# Patient Record
Sex: FEMALE | Race: BLACK OR AFRICAN AMERICAN | NOT HISPANIC OR LATINO | Employment: UNEMPLOYED | URBAN - METROPOLITAN AREA
[De-identification: names, ages, dates, MRNs, and addresses within clinical notes are randomized per-mention and may not be internally consistent; named-entity substitution may affect disease eponyms.]

---

## 2022-01-06 ENCOUNTER — APPOINTMENT (EMERGENCY)
Dept: RADIOLOGY | Facility: HOSPITAL | Age: 52
End: 2022-01-06
Payer: MEDICARE

## 2022-01-06 ENCOUNTER — APPOINTMENT (EMERGENCY)
Dept: CT IMAGING | Facility: HOSPITAL | Age: 52
End: 2022-01-06
Payer: MEDICARE

## 2022-01-06 ENCOUNTER — HOSPITAL ENCOUNTER (EMERGENCY)
Facility: HOSPITAL | Age: 52
Discharge: HOME | End: 2022-01-06
Attending: EMERGENCY MEDICINE | Admitting: EMERGENCY MEDICINE
Payer: MEDICARE

## 2022-01-06 VITALS
TEMPERATURE: 100.2 F | SYSTOLIC BLOOD PRESSURE: 126 MMHG | HEART RATE: 88 BPM | OXYGEN SATURATION: 93 % | WEIGHT: 221 LBS | DIASTOLIC BLOOD PRESSURE: 65 MMHG | RESPIRATION RATE: 16 BRPM

## 2022-01-06 DIAGNOSIS — J06.9 VIRAL URI WITH COUGH: ICD-10-CM

## 2022-01-06 DIAGNOSIS — R53.83 FATIGUE: ICD-10-CM

## 2022-01-06 DIAGNOSIS — U07.1 LAB TEST POSITIVE FOR DETECTION OF COVID-19 VIRUS: Primary | ICD-10-CM

## 2022-01-06 LAB
ALBUMIN SERPL BCP-MCNC: 4.3 G/DL (ref 3–5.2)
ALP SERPL-CCNC: 94 U/L (ref 43–122)
ALT SERPL W P-5'-P-CCNC: 78 U/L
ANION GAP SERPL CALCULATED.3IONS-SCNC: 3 MMOL/L (ref 5–14)
AST SERPL W P-5'-P-CCNC: 62 U/L (ref 14–36)
ATRIAL RATE: 96 BPM
BACTERIA UR QL AUTO: ABNORMAL /HPF
BASOPHILS # BLD AUTO: 0.1 THOUSANDS/ΜL (ref 0–0.1)
BASOPHILS NFR BLD AUTO: 1 % (ref 0–1)
BILIRUB SERPL-MCNC: 0.44 MG/DL
BILIRUB UR QL STRIP: NEGATIVE
BUN SERPL-MCNC: 14 MG/DL (ref 5–25)
CALCIUM SERPL-MCNC: 8.5 MG/DL (ref 8.4–10.2)
CARDIAC TROPONIN I PNL SERPL HS: 5 NG/L
CHLORIDE SERPL-SCNC: 101 MMOL/L (ref 97–108)
CLARITY UR: CLEAR
CO2 SERPL-SCNC: 31 MMOL/L (ref 22–30)
COLOR UR: ABNORMAL
CREAT SERPL-MCNC: 0.85 MG/DL (ref 0.6–1.2)
D DIMER PPP FEU-MCNC: 1.6 UG/ML FEU
EOSINOPHIL # BLD AUTO: 0 THOUSAND/ΜL (ref 0–0.4)
EOSINOPHIL NFR BLD AUTO: 0 % (ref 0–6)
ERYTHROCYTE [DISTWIDTH] IN BLOOD BY AUTOMATED COUNT: 14.7 %
EXT PREG TEST URINE: NEGATIVE
EXT. CONTROL ED NAV: NORMAL
FLUAV RNA RESP QL NAA+PROBE: NEGATIVE
FLUBV RNA RESP QL NAA+PROBE: NEGATIVE
GFR SERPL CREATININE-BSD FRML MDRD: 79 ML/MIN/1.73SQ M
GLUCOSE SERPL-MCNC: 135 MG/DL (ref 70–99)
GLUCOSE UR STRIP-MCNC: NEGATIVE MG/DL
HCT VFR BLD AUTO: 45.6 % (ref 36–46)
HGB BLD-MCNC: 15.7 G/DL (ref 12–16)
HGB UR QL STRIP.AUTO: 150
KETONES UR STRIP-MCNC: NEGATIVE MG/DL
LEUKOCYTE ESTERASE UR QL STRIP: NEGATIVE
LYMPHOCYTES # BLD AUTO: 1.5 THOUSANDS/ΜL (ref 0.5–4)
LYMPHOCYTES NFR BLD AUTO: 16 % (ref 25–45)
MAGNESIUM SERPL-MCNC: 2.2 MG/DL (ref 1.6–2.3)
MCH RBC QN AUTO: 30.7 PG (ref 26–34)
MCHC RBC AUTO-ENTMCNC: 34.5 G/DL (ref 31–36)
MCV RBC AUTO: 89 FL (ref 80–100)
MONOCYTES # BLD AUTO: 1 THOUSAND/ΜL (ref 0.2–0.9)
MONOCYTES NFR BLD AUTO: 11 % (ref 1–10)
NEUTROPHILS # BLD AUTO: 6.7 THOUSANDS/ΜL (ref 1.8–7.8)
NEUTS SEG NFR BLD AUTO: 72 % (ref 45–65)
NITRITE UR QL STRIP: NEGATIVE
NON-SQ EPI CELLS URNS QL MICRO: ABNORMAL /HPF
P AXIS: 57 DEGREES
PH UR STRIP.AUTO: 6 [PH]
PLATELET # BLD AUTO: 223 THOUSANDS/UL (ref 150–450)
PMV BLD AUTO: 8.6 FL (ref 8.9–12.7)
POTASSIUM SERPL-SCNC: 3.6 MMOL/L (ref 3.6–5)
PR INTERVAL: 148 MS
PROT SERPL-MCNC: 7.8 G/DL (ref 5.9–8.4)
PROT UR STRIP-MCNC: ABNORMAL MG/DL
QRS AXIS: 33 DEGREES
QRSD INTERVAL: 80 MS
QT INTERVAL: 342 MS
QTC INTERVAL: 432 MS
RBC # BLD AUTO: 5.11 MILLION/UL (ref 4–5.2)
RBC #/AREA URNS AUTO: ABNORMAL /HPF
RSV RNA RESP QL NAA+PROBE: NEGATIVE
SARS-COV-2 RNA RESP QL NAA+PROBE: POSITIVE
SODIUM SERPL-SCNC: 135 MMOL/L (ref 137–147)
SP GR UR STRIP.AUTO: 1.02 (ref 1–1.04)
T WAVE AXIS: 45 DEGREES
TSH SERPL DL<=0.05 MIU/L-ACNC: 1.56 UIU/ML (ref 0.47–4.68)
UROBILINOGEN UA: NEGATIVE MG/DL
VENTRICULAR RATE: 96 BPM
WBC # BLD AUTO: 9.2 THOUSAND/UL (ref 4.5–11)
WBC #/AREA URNS AUTO: ABNORMAL /HPF

## 2022-01-06 PROCEDURE — 36415 COLL VENOUS BLD VENIPUNCTURE: CPT | Performed by: EMERGENCY MEDICINE

## 2022-01-06 PROCEDURE — 71046 X-RAY EXAM CHEST 2 VIEWS: CPT

## 2022-01-06 PROCEDURE — 81001 URINALYSIS AUTO W/SCOPE: CPT | Performed by: EMERGENCY MEDICINE

## 2022-01-06 PROCEDURE — G1004 CDSM NDSC: HCPCS

## 2022-01-06 PROCEDURE — 93010 ELECTROCARDIOGRAM REPORT: CPT

## 2022-01-06 PROCEDURE — 0241U HB NFCT DS VIR RESP RNA 4 TRGT: CPT | Performed by: EMERGENCY MEDICINE

## 2022-01-06 PROCEDURE — 99285 EMERGENCY DEPT VISIT HI MDM: CPT | Performed by: EMERGENCY MEDICINE

## 2022-01-06 PROCEDURE — 85025 COMPLETE CBC W/AUTO DIFF WBC: CPT | Performed by: EMERGENCY MEDICINE

## 2022-01-06 PROCEDURE — 84443 ASSAY THYROID STIM HORMONE: CPT | Performed by: EMERGENCY MEDICINE

## 2022-01-06 PROCEDURE — 83735 ASSAY OF MAGNESIUM: CPT | Performed by: EMERGENCY MEDICINE

## 2022-01-06 PROCEDURE — 93005 ELECTROCARDIOGRAM TRACING: CPT

## 2022-01-06 PROCEDURE — 94640 AIRWAY INHALATION TREATMENT: CPT

## 2022-01-06 PROCEDURE — 81025 URINE PREGNANCY TEST: CPT | Performed by: EMERGENCY MEDICINE

## 2022-01-06 PROCEDURE — 84484 ASSAY OF TROPONIN QUANT: CPT | Performed by: EMERGENCY MEDICINE

## 2022-01-06 PROCEDURE — 85379 FIBRIN DEGRADATION QUANT: CPT | Performed by: EMERGENCY MEDICINE

## 2022-01-06 PROCEDURE — 96360 HYDRATION IV INFUSION INIT: CPT

## 2022-01-06 PROCEDURE — 96361 HYDRATE IV INFUSION ADD-ON: CPT

## 2022-01-06 PROCEDURE — 80053 COMPREHEN METABOLIC PANEL: CPT | Performed by: EMERGENCY MEDICINE

## 2022-01-06 PROCEDURE — 71275 CT ANGIOGRAPHY CHEST: CPT

## 2022-01-06 PROCEDURE — 99284 EMERGENCY DEPT VISIT MOD MDM: CPT

## 2022-01-06 RX ORDER — IPRATROPIUM BROMIDE AND ALBUTEROL SULFATE 2.5; .5 MG/3ML; MG/3ML
3 SOLUTION RESPIRATORY (INHALATION) ONCE
Status: COMPLETED | OUTPATIENT
Start: 2022-01-06 | End: 2022-01-06

## 2022-01-06 RX ORDER — ACETAMINOPHEN 325 MG/1
650 TABLET ORAL ONCE
Status: COMPLETED | OUTPATIENT
Start: 2022-01-06 | End: 2022-01-06

## 2022-01-06 RX ORDER — GUAIFENESIN/DEXTROMETHORPHAN 100-10MG/5
10 SYRUP ORAL ONCE
Status: COMPLETED | OUTPATIENT
Start: 2022-01-06 | End: 2022-01-06

## 2022-01-06 RX ADMIN — IPRATROPIUM BROMIDE AND ALBUTEROL SULFATE 3 ML: 2.5; .5 SOLUTION RESPIRATORY (INHALATION) at 19:52

## 2022-01-06 RX ADMIN — ACETAMINOPHEN 650 MG: 325 TABLET ORAL at 19:44

## 2022-01-06 RX ADMIN — SODIUM CHLORIDE 1000 ML: 0.9 INJECTION, SOLUTION INTRAVENOUS at 19:51

## 2022-01-06 RX ADMIN — IOHEXOL 85 ML: 350 INJECTION, SOLUTION INTRAVENOUS at 21:15

## 2022-01-06 RX ADMIN — GUAIFENESIN AND DEXTROMETHORPHAN 10 ML: 100; 10 SYRUP ORAL at 19:44

## 2022-01-07 ENCOUNTER — TELEPHONE (OUTPATIENT)
Dept: FAMILY MEDICINE CLINIC | Facility: CLINIC | Age: 52
End: 2022-01-07

## 2022-01-07 NOTE — TELEPHONE ENCOUNTER
Called pt re: COVID + f/u  Pt had many complaints regarding her apartment but not her health  She has not been able to contact her PCP  I have offered to set her up with a   [facility] P[facility] and she has declined presently

## 2022-01-07 NOTE — ED PROVIDER NOTES
History  Chief Complaint   Patient presents with    Cold Like Symptoms     body aches, cough, diarrhea since monday  landlord fixed something at home that caused a dust cloud  Patient is a 49-year-old female coming in today with complaints of chest pain with coughing, cough of initially yellow-green turning to clear, body aches, fatigue, shortness of breath with exacerbation  Patient states that she is recently new to the area  She does have a history of hypertension and chronic neck and back pain  She has been taking her medications  She states that she has been trying to med this at home but over the past several days of been worsening  She just feel so for tired and weak she can not eat or drink  She has good urine output and good bowel movements        History provided by:  Patient   used: No    Cough  Cough characteristics:  Productive  Sputum characteristics:  Green and brown  Severity:  Mild  Onset quality:  Gradual  Timing:  Constant  Progression:  Partially resolved  Chronicity:  New  Smoker: no    Context: not animal exposure, not exposure to allergens, not fumes, not occupational exposure, not sick contacts, not smoke exposure, not upper respiratory infection, not weather changes and not with activity    Relieved by:  Nothing  Worsened by:  Nothing  Ineffective treatments:  None tried  Associated symptoms: chest pain, myalgias, shortness of breath and sore throat    Associated symptoms: no chills, no diaphoresis, no ear fullness, no ear pain, no eye discharge, no fever, no headaches, no rash, no rhinorrhea, no sinus congestion, no weight loss and no wheezing    Chest pain:     Quality: aching      Severity:  Mild    Onset quality:  Gradual    Timing:  Intermittent    Progression:  Waxing and waning    Chronicity:  New  Myalgias:     Quality:  Aching    Severity:  Moderate    Onset quality:  Gradual    Timing:  Intermittent    Progression:  Waxing and waning  Shortness of breath:     Severity:  Mild    Onset quality:  Gradual    Timing:  Intermittent    Progression:  Waxing and waning  Sore throat:     Severity:  Mild    Onset quality:  Gradual    Timing:  Constant    Progression:  Waxing and waning  Risk factors: no chemical exposure, no recent infection and no recent travel        None       Past Medical History:   Diagnosis Date    Herniated cervical disc     Hypertension        Past Surgical History:   Procedure Laterality Date     SECTION      EYE SURGERY      SHOULDER SURGERY         History reviewed  No pertinent family history  I have reviewed and agree with the history as documented  E-Cigarette/Vaping     E-Cigarette/Vaping Substances     Social History     Tobacco Use    Smoking status: Current Every Day Smoker     Packs/day: 0 50    Smokeless tobacco: Never Used   Substance Use Topics    Alcohol use: Never    Drug use: Not on file       Review of Systems   Constitutional: Negative  Negative for chills, diaphoresis, fever and weight loss  HENT: Positive for sore throat  Negative for ear pain and rhinorrhea  Eyes: Negative  Negative for pain, discharge and visual disturbance  Respiratory: Positive for cough and shortness of breath  Negative for wheezing  Cardiovascular: Positive for chest pain  Negative for palpitations  Gastrointestinal: Negative  Negative for abdominal pain and vomiting  Genitourinary: Negative  Negative for dysuria and hematuria  Musculoskeletal: Positive for myalgias  Negative for arthralgias and back pain  Skin: Negative  Negative for color change and rash  Neurological: Negative  Negative for seizures, syncope and headaches  Hematological: Negative  Psychiatric/Behavioral: Negative  All other systems reviewed and are negative  Physical Exam  Physical Exam  Vitals and nursing note reviewed  Constitutional:       General: She is not in acute distress  Appearance: She is well-developed  HENT:      Head: Normocephalic and atraumatic  Comments: Patient maintaining airway and secretions  No stridor   No brawniness under tongue  Posterior pharynx clear with no evidence of exudate or pharyngeal edema or erythema  No  Eyes:      Extraocular Movements: Extraocular movements intact  Conjunctiva/sclera: Conjunctivae normal       Pupils: Pupils are equal, round, and reactive to light  Cardiovascular:      Rate and Rhythm: Regular rhythm  Tachycardia present  Pulses:           Radial pulses are 2+ on the right side and 2+ on the left side  Dorsalis pedis pulses are 2+ on the right side and 2+ on the left side  Heart sounds: No murmur heard  Pulmonary:      Effort: Pulmonary effort is normal  No respiratory distress  Breath sounds: Examination of the right-lower field reveals decreased breath sounds and wheezing  Examination of the left-lower field reveals decreased breath sounds and wheezing  Decreased breath sounds and wheezing present  Abdominal:      General: Abdomen is flat  Palpations: Abdomen is soft  Tenderness: There is no abdominal tenderness  Musculoskeletal:      Cervical back: Neck supple  Skin:     General: Skin is warm and dry  Capillary Refill: Capillary refill takes less than 2 seconds  Neurological:      General: No focal deficit present  Mental Status: She is alert and oriented to person, place, and time  Psychiatric:         Mood and Affect: Mood normal          Behavior: Behavior normal          Thought Content:  Thought content normal          Judgment: Judgment normal          Vital Signs  ED Triage Vitals [01/06/22 1903]   Temperature Pulse Respirations Blood Pressure SpO2   100 2 °F (37 9 °C) 102 16 130/87 98 %      Temp Source Heart Rate Source Patient Position - Orthostatic VS BP Location FiO2 (%)   Tympanic Monitor Sitting Left arm --      Pain Score       --           Vitals:    01/06/22 1903 01/06/22 2153 BP: 130/87 126/65   Pulse: 102 88   Patient Position - Orthostatic VS: Sitting Lying         Visual Acuity      ED Medications  Medications   sodium chloride 0 9 % bolus 1,000 mL (0 mL Intravenous Stopped 1/6/22 2124)   ipratropium-albuterol (DUO-NEB) 0 5-2 5 mg/3 mL inhalation solution 3 mL (3 mL Nebulization Given 1/6/22 1952)   dextromethorphan-guaiFENesin (ROBITUSSIN DM) oral syrup 10 mL (10 mL Oral Given 1/6/22 1944)   acetaminophen (TYLENOL) tablet 650 mg (650 mg Oral Given 1/6/22 1944)   iohexol (OMNIPAQUE) 350 MG/ML injection (SINGLE-DOSE) 85 mL (85 mL Intravenous Given 1/6/22 2115)       Diagnostic Studies  Results Reviewed     Procedure Component Value Units Date/Time    Urine Microscopic [[id number]]  (Abnormal) Collected: 01/06/22 2041    Lab Status: Final result Specimen: Urine, Clean Catch Updated: 01/06/22 2109     RBC, UA 2-4 /hpf      WBC, UA 0-1 /hpf      Epithelial Cells Moderate /hpf      Bacteria, UA Moderate /hpf     UA (URINE) with reflex to Scope [[id number]]  (Abnormal) Collected: 01/06/22 2041    Lab Status: Final result Specimen: Urine, Clean Catch Updated: 01/06/22 2103     Color, UA Alissa     Clarity, UA Clear     Specific [place], UA 1 020     pH, UA 6 0     Leukocytes, UA Negative     Nitrite, UA Negative     Protein, UA 30 (1+) mg/dl      Glucose, UA Negative mg/dl      Ketones, UA Negative mg/dl      Bilirubin, UA Negative     Blood,  0     UROBILINOGEN UA Negative mg/dL     POCT pregnancy, urine [[id number]]  (Normal) Resulted: 01/06/22 2100    Lab Status: Final result Updated: 01/06/22 2100     EXT PREG TEST UR (Ref: Negative) negative     Control valid    COVID/FLU/RSV - 2 hour TAT [[id number]]  (Abnormal) Collected: 01/06/22 2012    Lab Status: Final result Specimen: Nares from Nose Updated: 01/06/22 2057     SARS-CoV-2 Positive     INFLUENZA A PCR Negative     INFLUENZA B PCR Negative     RSV PCR Negative    Narrative:      FOR PEDIATRIC PATIENTS - copy/paste COVID Guidelines URL to browser: [email or web address]    SARS-CoV-2 assay is a Nucleic Acid Amplification assay intended for the  qualitative detection of nucleic acid from SARS-CoV-2 in nasopharyngeal  swabs  Results are for the presumptive identification of SARS-CoV-2 RNA  Positive results are indicative of infection with SARS-CoV-2, the virus  causing COVID-19, but do not rule out bacterial infection or co-infection  with other viruses  Laboratories within the [place] and its  territories are required to report all positive results to the appropriate  public health authorities  Negative results do not preclude SARS-CoV-2  infection and should not be used as the sole basis for treatment or other  patient management decisions  Negative results must be combined with  clinical observations, patient history, and epidemiological information  This test has not been FDA cleared or approved  This test has been authorized by FDA under an Emergency Use Authorization  (EUA)  This test is only authorized for the duration of time the  declaration that circumstances exist justifying the authorization of the  emergency use of an in vitro diagnostic tests for detection of SARS-CoV-2  virus and/or diagnosis of COVID-19 infection under section 564(b)(1) of  the Act, 21 U  S C  735EYM-0(Y)(6), unless the authorization is terminated  or revoked sooner  The test has been validated but independent review by FDA  and CLIA is pending  Test performed using [facility] GeneXpert: This RT-PCR assay targets N2,  a region unique to SARS-CoV-2  A conserved region in the E-gene was chosen  for pan-Sarbecovirus detection which includes SARS-CoV-2      TSH [[id number]]  (Normal) Collected: 01/06/22 1953    Lab Status: Final result Specimen: Blood from Arm, Right Updated: 01/06/22 2042     TSH 3RD GENERATON 1 560 uIU/mL     Narrative:      Patients undergoing fluorescein dye angiography may retain small amounts of fluorescein in the body for 48-72 hours post procedure  Samples containing fluorescein can produce falsely depressed TSH values  If the patient had this procedure,a specimen should be resubmitted post fluorescein clearance        HS Troponin 0hr (reflex protocol) [[id number]]  (Normal) Collected: 01/06/22 1953    Lab Status: Final result Specimen: Blood from Arm, Right Updated: 01/06/22 2023     hs TnI 0hr 5 ng/L     Comprehensive metabolic panel [[id number]]  (Abnormal) Collected: 01/06/22 1953    Lab Status: Final result Specimen: Blood from Arm, Right Updated: 01/06/22 2020     Sodium 135 mmol/L      Potassium 3 6 mmol/L      Chloride 101 mmol/L      CO2 31 mmol/L      ANION GAP 3 mmol/L      BUN 14 mg/dL      Creatinine 0 85 mg/dL      Glucose 135 mg/dL      Calcium 8 5 mg/dL      AST 62 U/L      ALT 78 U/L      Alkaline Phosphatase 94 U/L      Total Protein 7 8 g/dL      Albumin 4 3 g/dL      Total Bilirubin 0 44 mg/dL      eGFR 79 ml/min/1 73sq m     Narrative:      [place] guidelines for Chronic Kidney Disease (CKD):     Stage 1 with normal or high GFR (GFR > 90 mL/min/1 73 square meters)    Stage 2 Mild CKD (GFR = 60-89 mL/min/1 73 square meters)    Stage 3A Moderate CKD (GFR = 45-59 mL/min/1 73 square meters)    Stage 3B Moderate CKD (GFR = 30-44 mL/min/1 73 square meters)    Stage 4 Severe CKD (GFR = 15-29 mL/min/1 73 square meters)    Stage 5 End Stage CKD (GFR <15 mL/min/1 73 square meters)  Note: GFR calculation is accurate only with a steady state creatinine    Magnesium [[id number]]  (Normal) Collected: 01/06/22 1953    Lab Status: Final result Specimen: Blood from Arm, Right Updated: 01/06/22 2020     Magnesium 2 2 mg/dL     D-Dimer [[id number]]  (Abnormal) Collected: 01/06/22 1953    Lab Status: Final result Specimen: Blood from Arm, Right Updated: 01/06/22 2017     D-Dimer, Quant 1 60 ug/ml FEU     CBC and differential [[id number]]  (Abnormal) Collected: 01/06/22 1953    Lab Status: Final result Specimen: Blood from Arm, Right Updated: 01/06/22 2001     WBC 9 20 Thousand/uL      RBC 5 11 Million/uL      Hemoglobin 15 7 g/dL      Hematocrit 45 6 %      MCV 89 fL      MCH 30 7 pg      MCHC 34 5 g/dL      RDW 14 7 %      MPV 8 6 fL      Platelets 952 Thousands/uL      Neutrophils Relative 72 %      Lymphocytes Relative 16 %      Monocytes Relative 11 %      Eosinophils Relative 0 %      Basophils Relative 1 %      Neutrophils Absolute 6 70 Thousands/µL      Lymphocytes Absolute 1 50 Thousands/µL      Monocytes Absolute 1 00 Thousand/µL      Eosinophils Absolute 0 00 Thousand/µL      Basophils Absolute 0 10 Thousands/µL                  CTA ED chest PE Study   Final Result by Maritza Whittaker MD (01/06 2129)      No acute pulmonary emboli but interpretation is compromised by suboptimal opacification of the pulmonary arteries  Moderate emphysema  No groundglass opacity as suggested by the chest radiograph  Mild bibasilar atelectasis  Workstation performed: [id number]         XR chest 2 views   Final Result by Maritza Whittaker MD (01/06 2031)      Mild bilateral groundglass opacity suspicious for Covid-19  Workstation performed: [id number]                    Procedures  Procedures         ED Course  ED Course as of 01/06/22 2210   Thu Jan 06, 2022 1933 Patient is a 80-year-old female with a history of hypertension coming in today complaining of fatigue, cough, fevers, wound chest pain with coughing over the past several days  On exam she is well-appearing in no distress however she is tachycardic with mild elevation in temperature  Will give acetaminophen and check basic labs including troponin EKG chest x-ray and COVID    Portions of the record may have been created with voice recognition software   Occasional wrong word or "sound a like" substitutions may have occurred due to the inherent limitations of voice recognition software  Read the chart carefully and recognize, using context, where substitutions have occurred  2048 Patient's labs are stable  D-dimer elevated will CT rule out PE  Troponin 5   2056 Chest x-ray consistent with concerns for COVID-19   2134 CT without acute pathology  Will trial ambulatory sats   2151 Patient resting in bed  Patient tearful and crying upset about her COVID  She is maintaining airway and secretions  Will read vital   Email sent to [facility] team and will plan for DC home  Patient agreeable  HEART Risk Score      Most Recent Value   Heart Score Risk Calculator    History 0 Filed at: 01/06/2022 2048   ECG 1 Filed at: 01/06/2022 2048   Age 1 Filed at: 01/06/2022 2048   Risk Factors 1 Filed at: 01/06/2022 2048   Troponin 0 Filed at: 01/06/2022 2048   HEART Score 3 Filed at: 01/06/2022 2048                PERC Rule for PE      Most Recent Value   PERC Rule for PE    Age >=50 1 Filed at: 01/06/2022 1936   HR >=100 1 Filed at: 01/06/2022 1936   O2 Sat on room air < 95% 0 Filed at: 01/06/2022 1936   History of PE or DVT 0 Filed at: 01/06/2022 1936   Recent trauma or surgery 0 Filed at: 01/06/2022 1936   Hemoptysis 0 Filed at: 01/06/2022 1936   Exogenous estrogen 0 Filed at: 01/06/2022 1936   Unilateral leg swelling 0 Filed at: 01/06/2022 1936   PERC Rule for PE Results 2 Filed at: 01/06/2022 1936              SBIRT 20yo+      Most Recent Value   SBIRT (23 yo +)    In order to provide better care to our patients, we are screening all of our patients for alcohol and drug use  Would it be okay to ask you these screening questions? Yes Filed at: 01/06/2022 5618   Initial Alcohol Screen: US AUDIT-C     1  How often do you have a drink containing alcohol? 0 Filed at: 01/06/2022 1923   2  How many drinks containing alcohol do you have on a typical day you are drinking? 0 Filed at: 01/06/2022 1923   3b  FEMALE Any Age, or MALE 65+:  How often do you have 4 or more drinks on one occassion? 0 Filed at: 01/06/2022 1923   Audit-C Score 0 Filed at: 01/06/2022 6512   MESERET: How many times in the past year have you    Used an illegal drug or used a prescription medication for non-medical reasons? Never Filed at: 01/06/2022 Joyce Stauffer Criteria for PE      Most Recent Value   Sravan' Criteria for PE    Clinical signs and symptoms of DVT 0 Filed at: 01/06/2022 3201   PE is primary diagnosis or equally likely 0 Filed at: 01/06/2022 1937   HR >100 1 5 Filed at: 01/06/2022 1937   Immobilization at least 3 days or Surgery in the previous 4 weeks 0 Filed at: 01/06/2022 1937   Previous, objectively diagnosed PE or DVT 0 Filed at: 01/06/2022 1937   Hemoptysis 0 Filed at: 01/06/2022 1937   Malignancy with treatment within 6 months or palliative 0 Filed at: 01/06/2022 1937   Sravan' Criteria Total 1 5 Filed at: 01/06/2022 1937                MDM  Number of Diagnoses or Management Options  Diagnosis management comments:     EKG  @ 1956  RHYTHM:sinus tachy at 100 bpm  AXIS: normal axis   INTERVALS: WA @ 148 ms  QRS COMPLEX: QRS @ 80 ms  ST SEGMENT:  Nonspecific ST segment changes  Diffuse artifact  QT INTERVAL:  QTC measured at 432 milliseconds DOUGLAS  COMPARED WITH PRIOR   John Cruz Interpretation by Josue Campbell, DO    Different diagnosis : ACS, NSTEMI, pleurisry, pneumothorax, costochondritis, pneumonia, GERD, anxiety, hepatitis, pancreatitis, aortic dissection, pericarditis, myocarditis, pericardial effusion, asthma exacerbation, COPD exacerbation, herpes zoster, peritoneal rupture, esophageal spasm           Amount and/or Complexity of Data Reviewed  Clinical lab tests: ordered and reviewed  Tests in the radiology section of CPT®: ordered and reviewed  Tests in the medicine section of CPT®: ordered and reviewed  Independent visualization of images, tracings, or specimens: yes        Disposition  Final diagnoses:   Lab test positive for detection of COVID-19 virus   Viral URI with cough   Fatigue Time reflects when diagnosis was documented in both MDM as applicable and the Disposition within this note     Time User Action Codes Description Comment    1/6/2022  9:37 PM Victor Huog Jennings Add [U07 1] Lab test positive for detection of COVID-19 virus     1/6/2022  9:37 PM MichaelaVictor Hugo Add [J06 9] Viral URI with cough     1/6/2022  9:38 PM Duc Cantu Add [R53 83] Fatigue       ED Disposition     ED Disposition Condition Date/Time Comment    Discharge Stable Thu Jan 6, 2022  9:38 PM Deidre Waldenrosalba discharge to home/self care  Follow-up Information     Follow up With Specialties Details Why Contact Info Additional [address] [facility] Family Medicine Schedule an appointment as soon as possible for a visit in 1 week  [address], [address] [facility] [address]  [address], [address], [address] [facility], [address]    Lacey Meyers MD Family Medicine Schedule an appointment as soon as possible for a visit in 3 days  [facility] [address]  [phone]             There are no discharge medications for this patient  No discharge procedures on file      PDMP Review     None          ED Provider  Electronically Signed by           Virgilio Shah DO  01/06/22 7746

## 2022-01-10 ENCOUNTER — TELEPHONE (OUTPATIENT)
Dept: FAMILY MEDICINE CLINIC | Facility: CLINIC | Age: 52
End: 2022-01-10

## 2022-01-10 ENCOUNTER — TELEMEDICINE (OUTPATIENT)
Dept: FAMILY MEDICINE CLINIC | Facility: CLINIC | Age: 52
End: 2022-01-10
Payer: MEDICARE

## 2022-01-10 DIAGNOSIS — U07.1 PNEUMONIA DUE TO COVID-19 VIRUS: Primary | ICD-10-CM

## 2022-01-10 DIAGNOSIS — U07.1 COVID-19 VIRUS INFECTION: ICD-10-CM

## 2022-01-10 DIAGNOSIS — J12.82 PNEUMONIA DUE TO COVID-19 VIRUS: Primary | ICD-10-CM

## 2022-01-10 PROCEDURE — G0438 PPPS, INITIAL VISIT: HCPCS | Performed by: NURSE PRACTITIONER

## 2022-01-10 RX ORDER — AMLODIPINE BESYLATE 10 MG/1
10 TABLET ORAL
COMMUNITY
End: 2022-03-16 | Stop reason: HOSPADM

## 2022-01-10 RX ORDER — METHYLPREDNISOLONE 4 MG/1
TABLET ORAL
Qty: 21 EACH | Refills: 0 | Status: SHIPPED | OUTPATIENT
Start: 2022-01-10 | End: 2022-03-16 | Stop reason: HOSPADM

## 2022-01-10 RX ORDER — AZITHROMYCIN 250 MG/1
TABLET, FILM COATED ORAL
Qty: 6 TABLET | Refills: 0 | Status: SHIPPED | OUTPATIENT
Start: 2022-01-10 | End: 2022-01-14

## 2022-01-10 RX ORDER — ACETAMINOPHEN 500 MG
1000 TABLET ORAL EVERY 6 HOURS PRN
COMMUNITY

## 2022-01-10 NOTE — PROGRESS NOTES
COVID-19 Outpatient Progress Note    Assessment/Plan:    Problem List Items Addressed This Visit        Respiratory    Pneumonia due to COVID-19 virus - Primary     Acute symptomatic reviewed ED records, medications, imaging with patient from 1/6/2022  Chest xray showed Mild bilateral groundglass opacity suspicious for Covid-19  Will recommend increase fluids COVID vitamins breathing exercises and start Z-Nba and Medrol Dosepak  Educated on side effects and proper use of medication and will follow-up with patient 01/12/2022  Patient call with any worsening symptoms or report to ED after hours         Relevant Medications    methylPREDNISolone 4 MG tablet therapy pack    azithromycin (ZITHROMAX) 250 mg tablet       Other    COVID-19 virus infection     Acute symptomatic patient positive for covid 1/6/2022  reviewed ED records, medications, imaging with patient from 1/6/2022  Patient began with symptoms 01/03/2022 and reports continues with chest tightness coughing shortness of breath diarrhea body aches fatigue dry mouth  Patient with no ability to check oxygen saturation  Patient is a candidate for monoclonal antibody infusion with unvaccinated status and hypertension and elevated BMI, however patient refusing  Will recommend patient increase fluids COVID vitamins breathing exercises and will follow-up with patient 01/12/2022 to re-evaluate  Educated patient if she has worsening of symptoms to call office  Or report to ED after hours  Disposition:     I have spent 15 minutes directly with the patient  Greater than 50% of this time was spent in counseling/coordination of care regarding: instructions for management and patient and family education        Encounter provider EMMA Douglas    Provider located at [facility] AT [address] [id number] [phone]    Recent Visits  Date Type Provider Dept   01/10/22 Telemedicine Heidi [address] [facility] Tristian Rivas   Showing recent visits within past 7 days and meeting all other requirements  Future Appointments  No visits were found meeting these conditions  Showing future appointments within next 150 days and meeting all other requirements     This virtual check-in was done via [employer] and patient was informed that this is a secure, HIPAA-compliant platform  She agrees to proceed  Patient agrees to participate in a virtual check in via telephone or video visit instead of presenting to the office to address urgent/immediate medical needs  Patient is aware this is a billable service  After connecting through [facility], the patient was identified by name and date of birth  Yarely Umana was informed that this was a telemedicine visit and that the exam was being conducted confidentially over secure lines  My office door was closed  No one else was in the room  Yarely Umana acknowledged consent and understanding of privacy and security of the telemedicine visit  I informed the patient that I have reviewed her record in Epic and presented the opportunity for her to ask any questions regarding the visit today  The patient agreed to participate  Verification of patient location:  Patient is located in the following state in which I hold an active license: [place]    Subjective:   Yarely Umana is a 46 y o  female who has been screened for COVID-19  Patient's symptoms include fatigue, cough, shortness of breath, chest tightness, diarrhea and myalgias  Patient denies fever, chills, malaise, congestion, rhinorrhea, sore throat, anosmia, loss of taste, abdominal pain, nausea, vomiting and headaches  - Date of symptom onset: 1/3/2022  - Date of positive COVID-19 PCR: 1/6/2022  Type of test: PCR    COVID-19 vaccination status: Not vaccinated    Katie has been staying home and has isolated themselves in her home   She is taking care to not share personal items and is cleaning all surfaces that are touched often, like counters, tabletops, and doorknobs using household cleaning sprays or wipes  She is wearing a mask when she leaves her room  Lab Results   Component Value Date    SARSCOV2 Positive (A) 2022     Past Medical History:   Diagnosis Date    Herniated cervical disc     Hypertension      Past Surgical History:   Procedure Laterality Date     SECTION      EYE SURGERY      SHOULDER SURGERY       Current Outpatient Medications   Medication Sig Dispense Refill    acetaminophen (TYLENOL) 500 mg tablet Take 1,000 mg by mouth every 6 (six) hours as needed      amLODIPine (NORVASC) 10 mg tablet Take 10 mg by mouth      azithromycin (ZITHROMAX) 250 mg tablet Take 2 tablets today then 1 tablet daily x 4 days 6 tablet 0    methylPREDNISolone 4 MG tablet therapy pack Use as directed on package 21 each 0     No current facility-administered medications for this visit  No Known Allergies    Review of Systems   Constitutional: Positive for activity change, appetite change and fatigue  Negative for chills and fever  HENT: Negative for congestion, rhinorrhea, sneezing and sore throat  Respiratory: Positive for cough, chest tightness and shortness of breath  Gastrointestinal: Positive for diarrhea  Negative for abdominal pain, nausea and vomiting  Musculoskeletal: Positive for myalgias  Neurological: Negative for headaches  Objective: There were no vitals filed for this visit  Physical Exam  Vitals and nursing note reviewed  Constitutional:       General: She is not in acute distress  Appearance: Normal appearance  She is ill-appearing  She is not toxic-appearing or diaphoretic  HENT:      Head: Normocephalic and atraumatic  Nose: No rhinorrhea  Eyes:      General:         Right eye: No discharge  Left eye: No discharge     Pulmonary:      Effort: Pulmonary effort is normal  No respiratory distress  Musculoskeletal:         General: Normal range of motion  Cervical back: Normal range of motion  Skin:     Coloration: Skin is not jaundiced or pale  Findings: No bruising, erythema, lesion or rash  Neurological:      Mental Status: She is alert and oriented to person, place, and time  Psychiatric:         Mood and Affect: Mood normal          Behavior: Behavior normal          Thought Content: Thought content normal          Judgment: Judgment normal          VIRTUAL VISIT DISCLAIMER    Katie Goffluz Bill verbally agrees to participate in [employer]  Pt is aware that [employer] could be limited without vital signs or the ability to perform a full hands-on physical Bonnee Median understands she or the provider may request at any time to terminate the video visit and request the patient to seek care or treatment in person

## 2022-01-10 NOTE — Clinical Note
Can you please call patient to see if prescription has arrived  I noted in the script for delivery and to call patient but maybe provide pharmacy number to patient ([phone]) so she can call and verify delivery   thanks

## 2022-01-11 ENCOUNTER — TELEPHONE (OUTPATIENT)
Dept: FAMILY MEDICINE CLINIC | Facility: CLINIC | Age: 52
End: 2022-01-11

## 2022-01-11 PROBLEM — U07.1 COVID-19 VIRUS INFECTION: Status: ACTIVE | Noted: 2022-01-11

## 2022-01-11 PROBLEM — J12.82 PNEUMONIA DUE TO COVID-19 VIRUS: Status: ACTIVE | Noted: 2022-01-11

## 2022-01-11 PROBLEM — U07.1 PNEUMONIA DUE TO COVID-19 VIRUS: Status: ACTIVE | Noted: 2022-01-11

## 2022-01-11 NOTE — ASSESSMENT & PLAN NOTE
Acute symptomatic reviewed ED records, medications, imaging with patient from 1/6/2022  Chest xray showed Mild bilateral groundglass opacity suspicious for Covid-19  Will recommend increase fluids COVID vitamins breathing exercises and start Z-Nba and Medrol Dosepak  Educated on side effects and proper use of medication and will follow-up with patient 01/12/2022    Patient call with any worsening symptoms or report to ED after hours

## 2022-01-11 NOTE — ASSESSMENT & PLAN NOTE
Acute symptomatic patient positive for covid 1/6/2022  reviewed ED records, medications, imaging with patient from 1/6/2022  Patient began with symptoms 01/03/2022 and reports continues with chest tightness coughing shortness of breath diarrhea body aches fatigue dry mouth  Patient with no ability to check oxygen saturation  Patient is a candidate for monoclonal antibody infusion with unvaccinated status and hypertension and elevated BMI, however patient refusing  Will recommend patient increase fluids COVID vitamins breathing exercises and will follow-up with patient 01/12/2022 to re-evaluate  Educated patient if she has worsening of symptoms to call office  Or report to ED after hours

## 2022-01-11 NOTE — TELEPHONE ENCOUNTER
----- Message from Audra Garay, [address] sent at 1/11/2022 10:14 AM EST -----  Can you please call patient to see if prescription has arrived  I noted in the script for delivery and to call patient but maybe provide pharmacy number to patient ([phone]) so she can call and verify delivery   thanks

## 2022-01-13 ENCOUNTER — TELEMEDICINE (OUTPATIENT)
Dept: FAMILY MEDICINE CLINIC | Facility: CLINIC | Age: 52
End: 2022-01-13
Payer: MEDICARE

## 2022-01-13 DIAGNOSIS — Z74.8 ASSISTANCE NEEDED WITH TRANSPORTATION: ICD-10-CM

## 2022-01-13 DIAGNOSIS — U07.1 COVID-19 VIRUS INFECTION: Primary | ICD-10-CM

## 2022-01-13 DIAGNOSIS — Z59.48: ICD-10-CM

## 2022-01-13 PROCEDURE — 99214 OFFICE O/P EST MOD 30 MIN: CPT | Performed by: NURSE PRACTITIONER

## 2022-01-13 SDOH — ECONOMIC STABILITY - FOOD INSECURITY: OTHER SPECIFIED LACK OF ADEQUATE FOOD: Z59.48

## 2022-01-13 NOTE — PROGRESS NOTES
COVID-19 Outpatient Progress Note    Assessment/Plan:    Problem List Items Addressed This Visit        Other    COVID-19 virus infection - Primary     Acute symptomatic patient positive for covid 1/6/2022  Patient began with symptoms 01/03/2022 and reports continues with chest tightness coughing diarrhea body aches fatigue  Patient with no ability to check oxygen saturation  Will recommend patient increase fluids COVID vitamins breathing exercises and will follow-up with patient 01/17/2022 to re-evaluate  Educated patient if she has worsening of symptoms to call office  Or report to ED after hours  Assistance needed with transportation     Patient with no transportation and new to the area  No family near patient all in [place]  Will notify social work to assist patient with needs  Relevant Orders    Ambulatory Referral to Social Work Care Management Program    Insufficient supply of food     Patient reports need assistance after moving to area  Patient reports no family or friends near by  Needs assistance with every day tasks, transportation, and food needs  Referral sent for social work          Relevant Orders    Ambulatory Referral to Social Work Care Management Program         Disposition:     I have spent 15 minutes directly with the patient  Greater than 50% of this time was spent in counseling/coordination of care regarding: instructions for management and patient and family education        Encounter provider EMMA Ugarte    Provider located at [facility] AT [address] [address] [phone]    Recent Visits  Date Type Provider Dept   01/13/22 Telemedicine Heidi [address] [facility]   01/11/22 Telephone Anh Cooper, [address] [place] [facility]   01/10/22 Telemedicine Heidi [address], [facility]   Showing recent visits within past 7 days and meeting all other requirements  Today's Visits  Date Type Provider Dept   01/14/22 Telephone Tae Alfaro MA [facility]   Showing today's visits and meeting all other requirements  Future Appointments  No visits were found meeting these conditions  Showing future appointments within next 150 days and meeting all other requirements     This virtual check-in was done via Shoto and patient was informed that this is a secure, HIPAA-compliant platform  She agrees to proceed  Patient agrees to participate in a virtual check in via telephone or video visit instead of presenting to the office to address urgent/immediate medical needs  Patient is aware this is a billable service  After connecting through [facility], the patient was identified by name and date of birth  Brynn Willams was informed that this was a telemedicine visit and that the exam was being conducted confidentially over secure lines  My office door was closed  No one else was in the room  Brynn Willams acknowledged consent and understanding of privacy and security of the telemedicine visit  I informed the patient that I have reviewed her record in Epic and presented the opportunity for her to ask any questions regarding the visit today  The patient agreed to participate  Verification of patient location:  Patient is located in the following state in which I hold an active license: [place]    Subjective:   Brynn Willams is a 46 y o  female who has been screened for COVID-19  Patient's symptoms include fatigue, cough, chest tightness, diarrhea and myalgias  Patient denies fever, chills, malaise, congestion, rhinorrhea, sore throat, anosmia, loss of taste, shortness of breath, abdominal pain, nausea, vomiting and headaches  - Date of symptom onset: 1/3/2022  - Date of positive COVID-19 test: 1/6/2022  Type of test: PCR       COVID-19 vaccination status: Not vaccinated    Katie has been staying home and has isolated themselves in her home  She is taking care to not share personal items and is cleaning all surfaces that are touched often, like counters, tabletops, and doorknobs using household cleaning sprays or wipes  She is wearing a mask when she leaves her room  Lab Results   Component Value Date    SARSCOV2 Positive (A) 2022     Past Medical History:   Diagnosis Date    Herniated cervical disc     Hypertension      Past Surgical History:   Procedure Laterality Date     SECTION      EYE SURGERY      SHOULDER SURGERY       Current Outpatient Medications   Medication Sig Dispense Refill    acetaminophen (TYLENOL) 500 mg tablet Take 1,000 mg by mouth every 6 (six) hours as needed      amLODIPine (NORVASC) 10 mg tablet Take 10 mg by mouth      azithromycin (ZITHROMAX) 250 mg tablet Take 2 tablets today then 1 tablet daily x 4 days 6 tablet 0    benzonatate (TESSALON PERLES) 100 mg capsule Take 2 capsules (200 mg total) by mouth 3 (three) times a day as needed for cough 20 capsule 0    methylPREDNISolone 4 MG tablet therapy pack Use as directed on package 21 each 0     No current facility-administered medications for this visit  No Known Allergies    Review of Systems   Constitutional: Positive for activity change, appetite change and fatigue  Negative for chills and fever  HENT: Negative for congestion, rhinorrhea, sneezing and sore throat  Respiratory: Positive for cough and chest tightness  Negative for shortness of breath  Gastrointestinal: Positive for diarrhea  Negative for abdominal pain, nausea and vomiting  Musculoskeletal: Positive for myalgias  Neurological: Negative for headaches  Objective: There were no vitals filed for this visit  Physical Exam  Vitals and nursing note reviewed  Constitutional:       General: She is not in acute distress  Appearance: Normal appearance  She is ill-appearing   She is not toxic-appearing or diaphoretic  HENT:      Head: Normocephalic and atraumatic  Nose: No rhinorrhea  Eyes:      General:         Right eye: No discharge  Left eye: No discharge  Pulmonary:      Effort: Pulmonary effort is normal  No respiratory distress  Musculoskeletal:         General: Normal range of motion  Cervical back: Normal range of motion  Skin:     Coloration: Skin is not jaundiced or pale  Findings: No bruising, erythema, lesion or rash  Neurological:      Mental Status: She is alert and oriented to person, place, and time  Psychiatric:         Mood and Affect: Mood normal          Behavior: Behavior normal          Thought Content: Thought content normal          Judgment: Judgment normal          VIRTUAL VISIT DISCLAIMER    Katie Quintero verbally agrees to participate in [employer]  Pt is aware that [employer] could be limited without vital signs or the ability to perform a full hands-on physical Sathish Menezes understands she or the provider may request at any time to terminate the video visit and request the patient to seek care or treatment in person

## 2022-01-14 ENCOUNTER — TELEPHONE (OUTPATIENT)
Dept: FAMILY MEDICINE CLINIC | Facility: CLINIC | Age: 52
End: 2022-01-14

## 2022-01-14 DIAGNOSIS — R05.9 COUGH: Primary | ICD-10-CM

## 2022-01-14 PROBLEM — Z59.48 INSUFFICIENT SUPPLY OF FOOD: Status: ACTIVE | Noted: 2022-01-14

## 2022-01-14 PROBLEM — Z74.8 ASSISTANCE NEEDED WITH TRANSPORTATION: Status: ACTIVE | Noted: 2022-01-14

## 2022-01-14 RX ORDER — BENZONATATE 100 MG/1
200 CAPSULE ORAL 3 TIMES DAILY PRN
Qty: 20 CAPSULE | Refills: 0 | Status: ON HOLD | OUTPATIENT
Start: 2022-01-14 | End: 2022-03-15 | Stop reason: SDUPTHER

## 2022-01-14 NOTE — TELEPHONE ENCOUNTER
Called tessalon pearls tid prn for cough to pharmacy, will discuss additional concerns during Monday virtual appt

## 2022-01-14 NOTE — TELEPHONE ENCOUNTER
Pt would like cough medication sent to the pharmacy  She is starting to feel better just has a few concerns regarding her medical conditions   Her tongue keeps going into her teeth,

## 2022-01-15 NOTE — ASSESSMENT & PLAN NOTE
Patient with no transportation and new to the area  No family near patient all in [place]  Will notify social work to assist patient with needs

## 2022-01-15 NOTE — ASSESSMENT & PLAN NOTE
Patient reports need assistance after moving to area  Patient reports no family or friends near by  Needs assistance with every day tasks, transportation, and food needs   Referral sent for social work

## 2022-01-15 NOTE — ASSESSMENT & PLAN NOTE
Acute symptomatic patient positive for covid 1/6/2022  Patient began with symptoms 01/03/2022 and reports continues with chest tightness coughing diarrhea body aches fatigue  Patient with no ability to check oxygen saturation  Will recommend patient increase fluids COVID vitamins breathing exercises and will follow-up with patient 01/17/2022 to re-evaluate  Educated patient if she has worsening of symptoms to call office  Or report to ED after hours

## 2022-01-17 ENCOUNTER — PATIENT OUTREACH (OUTPATIENT)
Dept: FAMILY MEDICINE CLINIC | Facility: CLINIC | Age: 52
End: 2022-01-17

## 2022-01-17 ENCOUNTER — TELEMEDICINE (OUTPATIENT)
Dept: FAMILY MEDICINE CLINIC | Facility: CLINIC | Age: 52
End: 2022-01-17
Payer: MEDICARE

## 2022-01-17 DIAGNOSIS — H69.81 DYSFUNCTION OF RIGHT EUSTACHIAN TUBE: ICD-10-CM

## 2022-01-17 DIAGNOSIS — Z74.8 ASSISTANCE NEEDED WITH TRANSPORTATION: Primary | ICD-10-CM

## 2022-01-17 DIAGNOSIS — U07.1 COVID-19 VIRUS INFECTION: Primary | ICD-10-CM

## 2022-01-17 PROBLEM — R06.02 SOB (SHORTNESS OF BREATH) ON EXERTION: Status: ACTIVE | Noted: 2022-01-17

## 2022-01-17 PROBLEM — H69.91 DYSFUNCTION OF RIGHT EUSTACHIAN TUBE: Status: ACTIVE | Noted: 2022-01-17

## 2022-01-17 PROCEDURE — 99214 OFFICE O/P EST MOD 30 MIN: CPT | Performed by: NURSE PRACTITIONER

## 2022-01-17 RX ORDER — ALBUTEROL SULFATE 90 UG/1
2 AEROSOL, METERED RESPIRATORY (INHALATION) EVERY 6 HOURS PRN
Qty: 8.5 G | Refills: 1 | Status: ON HOLD | OUTPATIENT
Start: 2022-01-17 | End: 2022-03-15 | Stop reason: SDUPTHER

## 2022-01-17 RX ORDER — FLUTICASONE PROPIONATE 50 MCG
1 SPRAY, SUSPENSION (ML) NASAL DAILY
Qty: 9.9 ML | Refills: 0 | Status: SHIPPED | OUTPATIENT
Start: 2022-01-17

## 2022-01-17 NOTE — PROGRESS NOTES
COVID-19 Outpatient Progress Note    Assessment/Plan:    Problem List Items Addressed This Visit        Nervous and Auditory    Dysfunction of right eustachian tube     Acute symptomatic secondary to covid infection 1/6/2022  Will recommend flonase 2 spray daily  Educated on s/e and proper use  Patient to call with concerns  Relevant Medications    fluticasone (FLONASE) 50 mcg/act nasal spray       Other    COVID-19 virus infection - Primary     Acute symptomatic patient positive for covid 1/6/2022  Patient began with symptoms 01/03/2022 and reports continues with chest tightness coughing, and fatigue   Patient with no ability to check oxygen saturation     Will recommend patient increase fluids COVID vitamins breathing exercises, start albuterol q6 hours prn, Patient has been able to end quarantine since 1/9/2022 but she is going to follow the note given by the ED stating to quarantine for 14 days  Patient notified to schedule with [facility] for routine care, provided information  Relevant Medications    albuterol (ProAir HFA) 90 mcg/act inhaler         Disposition:     I recommended continued isolation until at least 24 hours have passed since recovery defined as resolution of fever without the use of fever-reducing medications AND improvement in COVID symptoms AND 10 days have passed since onset of symptoms (or 10 days have passed since date of first positive viral diagnostic test for asymptomatic patients)  I have spent 15 minutes directly with the patient  Greater than 50% of this time was spent in counseling/coordination of care regarding: instructions for management and patient and family education        Encounter provider EMMA Orellana    Provider located at [facility] AT [address] [address] [phone]    Recent Visits  Date Type Provider Dept   01/17/22 Telemedicine EMMA [facility] Louie Daubs   01/14/22 Telephone Tawanda Deras Pg  Primary [facility] [place] [facility]   01/13/22 Telemedicine Heidi [address] [facility] [place] [facility]   01/11/22 Telephone Cinthia Davison MA Pg  Primary Care [place] [facility]   Showing recent visits within past 7 days and meeting all other requirements  Future Appointments  No visits were found meeting these conditions  Showing future appointments within next 150 days and meeting all other requirements     This virtual check-in was done via [email or web address] and patient was informed that this is a secure, HIPAA-compliant platform  She agrees to proceed  Patient agrees to participate in a virtual check in via telephone or video visit instead of presenting to the office to address urgent/immediate medical needs  Patient is aware this is a billable service  After connecting through [facility], the patient was identified by name and date of birth  Bailey Palmer was informed that this was a telemedicine visit and that the exam was being conducted confidentially over secure lines  My office door was closed  No one else was in the room  Bailey Palmer acknowledged consent and understanding of privacy and security of the telemedicine visit  I informed the patient that I have reviewed her record in Epic and presented the opportunity for her to ask any questions regarding the visit today  The patient agreed to participate  Verification of patient location:  Patient is located in the following state in which I hold an active license: [place]    Subjective:   Bailey Palmer is a 46 y o  female who has been screened for COVID-19  Symptom change since last report: improving  Patient's symptoms include fatigue, cough and chest tightness  Patient denies fever, chills, malaise, congestion, rhinorrhea, sore throat, anosmia, loss of taste, shortness of breath, abdominal pain, nausea, vomiting, diarrhea, myalgias and headaches       - Date of symptom onset: 1/3/2022  - Date of positive COVID-19 test: 2022  Type of test: PCR  COVID-19 vaccination status: Not vaccinated    Katie has been staying home and has isolated themselves in her home  She is taking care to not share personal items and is cleaning all surfaces that are touched often, like counters, tabletops, and doorknobs using household cleaning sprays or wipes  She is wearing a mask when she leaves her room  Lab Results   Component Value Date    SARSCOV2 Positive (A) 2022     Past Medical History:   Diagnosis Date    Herniated cervical disc     Hypertension      Past Surgical History:   Procedure Laterality Date     SECTION      EYE SURGERY      SHOULDER SURGERY       Current Outpatient Medications   Medication Sig Dispense Refill    acetaminophen (TYLENOL) 500 mg tablet Take 1,000 mg by mouth every 6 (six) hours as needed      albuterol (ProAir HFA) 90 mcg/act inhaler Inhale 2 puffs every 6 (six) hours as needed for wheezing 8 5 g 1    amLODIPine (NORVASC) 10 mg tablet Take 10 mg by mouth      benzonatate (TESSALON PERLES) 100 mg capsule Take 2 capsules (200 mg total) by mouth 3 (three) times a day as needed for cough 20 capsule 0    fluticasone (FLONASE) 50 mcg/act nasal spray 1 spray into each nostril daily 9 9 mL 0    methylPREDNISolone 4 MG tablet therapy pack Use as directed on package 21 each 0     No current facility-administered medications for this visit  No Known Allergies    Review of Systems   Constitutional: Positive for fatigue  Negative for activity change, chills and fever  HENT: Negative for congestion, rhinorrhea, sneezing and sore throat  Respiratory: Positive for cough and chest tightness  Negative for shortness of breath and wheezing  Gastrointestinal: Negative for abdominal pain, diarrhea, nausea and vomiting  Musculoskeletal: Negative for myalgias  Neurological: Negative for headaches  Objective:     There were no vitals filed for this visit  Physical Exam  Vitals and nursing note reviewed  Constitutional:       General: She is not in acute distress  Appearance: Normal appearance  She is ill-appearing  She is not toxic-appearing or diaphoretic  HENT:      Head: Normocephalic and atraumatic  Nose: No rhinorrhea  Eyes:      General:         Right eye: No discharge  Left eye: No discharge  Pulmonary:      Effort: Pulmonary effort is normal  No respiratory distress  Musculoskeletal:         General: Normal range of motion  Cervical back: Normal range of motion  Skin:     Coloration: Skin is not jaundiced or pale  Findings: No bruising, erythema, lesion or rash  Neurological:      Mental Status: She is alert and oriented to person, place, and time  Psychiatric:         Mood and Affect: Mood normal          Behavior: Behavior normal          Thought Content: Thought content normal          Judgment: Judgment normal          VIRTUAL VISIT DISCLAIMER    Katie Nicole Or verbally agrees to participate in [employer]  Pt is aware that [employer] could be limited without vital signs or the ability to perform a full hands-on physical Lucia Enter understands she or the provider may request at any time to terminate the video visit and request the patient to seek care or treatment in person

## 2022-01-17 NOTE — ASSESSMENT & PLAN NOTE
Acute symptomatic patient positive for covid 1/6/2022  Patient began with symptoms 01/03/2022 and reports continues with chest tightness coughing, and fatigue   Patient with no ability to check oxygen saturation     Will recommend patient increase fluids COVID vitamins breathing exercises, start albuterol q6 hours prn, Patient has been able to end quarantine since 1/9/2022 but she is going to follow the note given by the ED stating to quarantine for 14 days  Patient notified to schedule with [facility] for routine care, provided information

## 2022-01-17 NOTE — PROGRESS NOTES
PRESTON XIONG received a referral from patient's PCP regarding patient's need for social work follow up due to food insecurity and and lack of transportation  PRESTON XIONG contacted patient to assess  Patient spoke about her experience in the ED-states she was unhappy with being sent home with no medication for COVID  Patient went on to discuss how she "does not understanding the [address]" and how once she no longer has COVID she may "go back to [facility] where she belongs "    Patient reports she moved to [facility] from [address] [facility] 4 months ago  Patient spoke about her insurance and wanting to know if she will still have [employer] for insurance  PRESTON XIONG asked if patient was able to successfully cancel her benefits in [place] and transfer all of her benefits to [place]  Patient stated this was "a third grade level question" and she will not have third grade level conversations with people from [facility]  PRESTON XIONG reassured patient she was just seeking clarification so that she can best assist      PRESTON XIONG attempted to address the food insecurity and asked patient about food stamps and food pantries  Patient stated she was "agitated" by the question because she has food stamps and has cash for groceries  Patient stated she was "no longer going to hold her tongue" and she "does not care who she offends " Her concern is that due to quarantine, she is unable to get to a grocery store  PRESTON XIONG informed patient she may have to then utilize [employer] to get groceries delivered  Patient went on to state "she is tired of having to repeat herself" because "no one knows how to deliver to her apartment" because [place] and often deals with deliveries being dropped off at the incorrect place  PRESTON XIONG informed patient that she will need to look into other potential options on how to get groceries to her home  Patient lives alone and states she is disabled due to a neck and spine injury    Patient states she paid someone to insulate her windows and they "did it incorrectly " Patient requested that someone come to her home and "staple plastic to the windows" because it is cold and she cannot reach the windows  PRESTON XIONG informed patient she would have to contact the individual whom she paid to insulate and ask them to fix it  Patient states she will not be doing this because she lives alone and does not want to argue with a man in her home she also states she would be required to pay again and she does not have the money to do so  Patient states her main concern is that she would like to know who her new PCP and she would like this to be updated in the system so that she can get a new insurance card with new PCP listed  Patient also expressed wanting to know why she has not received her cough suppressant  PRESTON XIONG explained that per chart review it does appear a a cough suppressant want sent to the pharmacy  PRESTON XIONG encouraged patient to contact the pharmacy to inquire if it was filled-PRESTON XIONG encouraged patient to let us know if she has any issues with obtaining this medication  PRESTON XIONG was unable to further discuss transportation during this call  Will address at next contact

## 2022-01-18 ENCOUNTER — PATIENT OUTREACH (OUTPATIENT)
Dept: FAMILY MEDICINE CLINIC | Facility: CLINIC | Age: 52
End: 2022-01-18

## 2022-01-18 NOTE — ASSESSMENT & PLAN NOTE
Acute symptomatic secondary to covid infection 1/6/2022  Will recommend flonase 2 spray daily  Educated on s/e and proper use  Patient to call with concerns

## 2022-01-18 NOTE — PROGRESS NOTES
CHW made outreach call to pt regarding applying for [facility]  Pt stated that she already had her physical assessment with [facility]  No other need at this time

## 2022-02-16 ENCOUNTER — APPOINTMENT (EMERGENCY)
Dept: RADIOLOGY | Facility: HOSPITAL | Age: 52
DRG: 720 | End: 2022-02-16
Payer: MEDICARE

## 2022-02-16 ENCOUNTER — APPOINTMENT (EMERGENCY)
Dept: CT IMAGING | Facility: HOSPITAL | Age: 52
DRG: 720 | End: 2022-02-16
Payer: MEDICARE

## 2022-02-16 ENCOUNTER — HOSPITAL ENCOUNTER (INPATIENT)
Facility: HOSPITAL | Age: 52
LOS: 28 days | Discharge: HOME | DRG: 720 | End: 2022-03-16
Attending: EMERGENCY MEDICINE | Admitting: INTERNAL MEDICINE
Payer: MEDICARE

## 2022-02-16 DIAGNOSIS — J96.01 ACUTE RESPIRATORY FAILURE WITH HYPOXIA (HCC): Primary | ICD-10-CM

## 2022-02-16 DIAGNOSIS — N32.81 OAB (OVERACTIVE BLADDER): ICD-10-CM

## 2022-02-16 DIAGNOSIS — U07.1 COVID-19 VIRUS INFECTION: ICD-10-CM

## 2022-02-16 DIAGNOSIS — R05.9 COUGH: ICD-10-CM

## 2022-02-16 DIAGNOSIS — J12.82 PNEUMONIA DUE TO COVID-19 VIRUS: ICD-10-CM

## 2022-02-16 DIAGNOSIS — R74.01 TRANSAMINITIS: ICD-10-CM

## 2022-02-16 DIAGNOSIS — U07.1 PNEUMONIA DUE TO COVID-19 VIRUS: ICD-10-CM

## 2022-02-16 DIAGNOSIS — E11.9 TYPE 2 DIABETES MELLITUS WITHOUT COMPLICATION, WITHOUT LONG-TERM CURRENT USE OF INSULIN (HCC): ICD-10-CM

## 2022-02-16 DIAGNOSIS — I10 PRIMARY HYPERTENSION: Chronic | ICD-10-CM

## 2022-02-16 PROBLEM — D75.839 THROMBOCYTOSIS: Status: ACTIVE | Noted: 2022-02-16

## 2022-02-16 PROBLEM — R79.89 ELEVATED BRAIN NATRIURETIC PEPTIDE (BNP) LEVEL: Status: ACTIVE | Noted: 2022-02-16

## 2022-02-16 PROBLEM — E87.6 HYPOKALEMIA: Status: ACTIVE | Noted: 2022-02-16

## 2022-02-16 PROBLEM — R73.9 HYPERGLYCEMIA: Status: ACTIVE | Noted: 2022-02-16

## 2022-02-16 PROBLEM — R65.10 SIRS (SYSTEMIC INFLAMMATORY RESPONSE SYNDROME) (HCC): Status: ACTIVE | Noted: 2022-02-16

## 2022-02-16 LAB
4HR DELTA HS TROPONIN: 5 NG/L
ALBUMIN SERPL BCP-MCNC: 2.9 G/DL (ref 3.5–5)
ALP SERPL-CCNC: 101 U/L (ref 46–116)
ALT SERPL W P-5'-P-CCNC: 28 U/L (ref 12–78)
ANION GAP SERPL CALCULATED.3IONS-SCNC: 11 MMOL/L (ref 4–13)
APTT PPP: 45 SECONDS (ref 23–37)
AST SERPL W P-5'-P-CCNC: 25 U/L (ref 5–45)
BACTERIA UR QL AUTO: ABNORMAL /HPF
BASOPHILS # BLD AUTO: 0.09 THOUSANDS/ΜL (ref 0–0.1)
BASOPHILS NFR BLD AUTO: 0 % (ref 0–1)
BILIRUB SERPL-MCNC: 0.69 MG/DL (ref 0.2–1)
BILIRUB UR QL STRIP: NEGATIVE
BUN SERPL-MCNC: 11 MG/DL (ref 5–25)
CALCIUM ALBUM COR SERPL-MCNC: 10 MG/DL (ref 8.3–10.1)
CALCIUM SERPL-MCNC: 9.1 MG/DL (ref 8.3–10.1)
CARDIAC TROPONIN I PNL SERPL HS: 49 NG/L
CARDIAC TROPONIN I PNL SERPL HS: 54 NG/L
CHLORIDE SERPL-SCNC: 100 MMOL/L (ref 100–108)
CK SERPL-CCNC: 83 U/L (ref 26–192)
CLARITY UR: CLEAR
CO2 SERPL-SCNC: 25 MMOL/L (ref 21–32)
COLOR UR: YELLOW
CREAT SERPL-MCNC: 0.9 MG/DL (ref 0.6–1.3)
D DIMER PPP FEU-MCNC: 1.18 UG/ML FEU
EOSINOPHIL # BLD AUTO: 0 THOUSAND/ΜL (ref 0–0.61)
EOSINOPHIL NFR BLD AUTO: 0 % (ref 0–6)
ERYTHROCYTE [DISTWIDTH] IN BLOOD BY AUTOMATED COUNT: 15.5 % (ref 11.6–15.1)
GFR SERPL CREATININE-BSD FRML MDRD: 74 ML/MIN/1.73SQ M
GLUCOSE SERPL-MCNC: 137 MG/DL (ref 65–140)
GLUCOSE SERPL-MCNC: 165 MG/DL (ref 65–140)
GLUCOSE UR STRIP-MCNC: NEGATIVE MG/DL
HCT VFR BLD AUTO: 47.3 % (ref 34.8–46.1)
HGB BLD-MCNC: 15.1 G/DL (ref 11.5–15.4)
HGB UR QL STRIP.AUTO: ABNORMAL
IMM GRANULOCYTES # BLD AUTO: 0.23 THOUSAND/UL (ref 0–0.2)
IMM GRANULOCYTES NFR BLD AUTO: 1 % (ref 0–2)
INR PPP: 1.09 (ref 0.84–1.19)
KETONES UR STRIP-MCNC: NEGATIVE MG/DL
LACTATE SERPL-SCNC: 1.5 MMOL/L (ref 0.5–2)
LACTATE SERPL-SCNC: 2.7 MMOL/L (ref 0.5–2)
LEUKOCYTE ESTERASE UR QL STRIP: NEGATIVE
LYMPHOCYTES # BLD AUTO: 3.36 THOUSANDS/ΜL (ref 0.6–4.47)
LYMPHOCYTES NFR BLD AUTO: 16 % (ref 14–44)
MCH RBC QN AUTO: 29.2 PG (ref 26.8–34.3)
MCHC RBC AUTO-ENTMCNC: 31.9 G/DL (ref 31.4–37.4)
MCV RBC AUTO: 91 FL (ref 82–98)
MONOCYTES # BLD AUTO: 1.38 THOUSAND/ΜL (ref 0.17–1.22)
MONOCYTES NFR BLD AUTO: 7 % (ref 4–12)
NEUTROPHILS # BLD AUTO: 15.53 THOUSANDS/ΜL (ref 1.85–7.62)
NEUTS SEG NFR BLD AUTO: 76 % (ref 43–75)
NITRITE UR QL STRIP: NEGATIVE
NON-SQ EPI CELLS URNS QL MICRO: ABNORMAL /HPF
NRBC BLD AUTO-RTO: 1 /100 WBCS
NT-PROBNP SERPL-MCNC: 1472 PG/ML
PH UR STRIP.AUTO: 6 [PH]
PLATELET # BLD AUTO: 440 THOUSANDS/UL (ref 149–390)
PLATELET # BLD AUTO: 502 THOUSANDS/UL (ref 149–390)
PMV BLD AUTO: 9.8 FL (ref 8.9–12.7)
PMV BLD AUTO: 9.9 FL (ref 8.9–12.7)
POTASSIUM SERPL-SCNC: 3.1 MMOL/L (ref 3.5–5.3)
PROCALCITONIN SERPL-MCNC: 0.34 NG/ML
PROCALCITONIN SERPL-MCNC: 0.38 NG/ML
PROT SERPL-MCNC: 8.1 G/DL (ref 6.4–8.2)
PROT UR STRIP-MCNC: NEGATIVE MG/DL
PROTHROMBIN TIME: 13.8 SECONDS (ref 11.6–14.5)
RBC # BLD AUTO: 5.18 MILLION/UL (ref 3.81–5.12)
RBC #/AREA URNS AUTO: ABNORMAL /HPF
SODIUM SERPL-SCNC: 136 MMOL/L (ref 136–145)
SP GR UR STRIP.AUTO: <=1.005 (ref 1–1.03)
UROBILINOGEN UR QL STRIP.AUTO: 0.2 E.U./DL
WBC # BLD AUTO: 20.59 THOUSAND/UL (ref 4.31–10.16)
WBC #/AREA URNS AUTO: ABNORMAL /HPF

## 2022-02-16 PROCEDURE — 96365 THER/PROPH/DIAG IV INF INIT: CPT

## 2022-02-16 PROCEDURE — 85730 THROMBOPLASTIN TIME PARTIAL: CPT | Performed by: EMERGENCY MEDICINE

## 2022-02-16 PROCEDURE — 83880 ASSAY OF NATRIURETIC PEPTIDE: CPT | Performed by: EMERGENCY MEDICINE

## 2022-02-16 PROCEDURE — 83036 HEMOGLOBIN GLYCOSYLATED A1C: CPT

## 2022-02-16 PROCEDURE — 83605 ASSAY OF LACTIC ACID: CPT | Performed by: EMERGENCY MEDICINE

## 2022-02-16 PROCEDURE — 83605 ASSAY OF LACTIC ACID: CPT

## 2022-02-16 PROCEDURE — 86140 C-REACTIVE PROTEIN: CPT | Performed by: INTERNAL MEDICINE

## 2022-02-16 PROCEDURE — 82550 ASSAY OF CK (CPK): CPT | Performed by: EMERGENCY MEDICINE

## 2022-02-16 PROCEDURE — 99291 CRITICAL CARE FIRST HOUR: CPT | Performed by: EMERGENCY MEDICINE

## 2022-02-16 PROCEDURE — 96374 THER/PROPH/DIAG INJ IV PUSH: CPT

## 2022-02-16 PROCEDURE — G1004 CDSM NDSC: HCPCS

## 2022-02-16 PROCEDURE — 84145 PROCALCITONIN (PCT): CPT | Performed by: EMERGENCY MEDICINE

## 2022-02-16 PROCEDURE — 85610 PROTHROMBIN TIME: CPT | Performed by: EMERGENCY MEDICINE

## 2022-02-16 PROCEDURE — 81001 URINALYSIS AUTO W/SCOPE: CPT | Performed by: EMERGENCY MEDICINE

## 2022-02-16 PROCEDURE — 99291 CRITICAL CARE FIRST HOUR: CPT

## 2022-02-16 PROCEDURE — 87205 SMEAR GRAM STAIN: CPT

## 2022-02-16 PROCEDURE — 71275 CT ANGIOGRAPHY CHEST: CPT

## 2022-02-16 PROCEDURE — 85379 FIBRIN DEGRADATION QUANT: CPT | Performed by: EMERGENCY MEDICINE

## 2022-02-16 PROCEDURE — 36415 COLL VENOUS BLD VENIPUNCTURE: CPT | Performed by: EMERGENCY MEDICINE

## 2022-02-16 PROCEDURE — 80053 COMPREHEN METABOLIC PANEL: CPT | Performed by: EMERGENCY MEDICINE

## 2022-02-16 PROCEDURE — 85025 COMPLETE CBC W/AUTO DIFF WBC: CPT | Performed by: EMERGENCY MEDICINE

## 2022-02-16 PROCEDURE — 82948 REAGENT STRIP/BLOOD GLUCOSE: CPT

## 2022-02-16 PROCEDURE — 71045 X-RAY EXAM CHEST 1 VIEW: CPT

## 2022-02-16 PROCEDURE — 85049 AUTOMATED PLATELET COUNT: CPT

## 2022-02-16 PROCEDURE — 87070 CULTURE OTHR SPECIMN AEROBIC: CPT

## 2022-02-16 PROCEDURE — 87081 CULTURE SCREEN ONLY: CPT

## 2022-02-16 PROCEDURE — 87040 BLOOD CULTURE FOR BACTERIA: CPT | Performed by: EMERGENCY MEDICINE

## 2022-02-16 PROCEDURE — 84484 ASSAY OF TROPONIN QUANT: CPT

## 2022-02-16 PROCEDURE — 84484 ASSAY OF TROPONIN QUANT: CPT | Performed by: EMERGENCY MEDICINE

## 2022-02-16 PROCEDURE — 93005 ELECTROCARDIOGRAM TRACING: CPT

## 2022-02-16 PROCEDURE — 84145 PROCALCITONIN (PCT): CPT

## 2022-02-16 RX ORDER — POTASSIUM CHLORIDE 20 MEQ/1
40 TABLET, EXTENDED RELEASE ORAL ONCE
Status: COMPLETED | OUTPATIENT
Start: 2022-02-16 | End: 2022-02-16

## 2022-02-16 RX ORDER — AZITHROMYCIN 250 MG/1
500 TABLET, FILM COATED ORAL ONCE
Status: COMPLETED | OUTPATIENT
Start: 2022-02-16 | End: 2022-02-16

## 2022-02-16 RX ORDER — BENZONATATE 100 MG/1
200 CAPSULE ORAL 3 TIMES DAILY PRN
Status: DISCONTINUED | OUTPATIENT
Start: 2022-02-16 | End: 2022-03-16 | Stop reason: HOSPADM

## 2022-02-16 RX ORDER — POTASSIUM CHLORIDE 14.9 MG/ML
20 INJECTION INTRAVENOUS ONCE
Status: DISCONTINUED | OUTPATIENT
Start: 2022-02-16 | End: 2022-02-16

## 2022-02-16 RX ORDER — FUROSEMIDE 10 MG/ML
40 INJECTION INTRAMUSCULAR; INTRAVENOUS ONCE
Status: COMPLETED | OUTPATIENT
Start: 2022-02-16 | End: 2022-02-16

## 2022-02-16 RX ORDER — HEPARIN SODIUM 5000 [USP'U]/ML
5000 INJECTION, SOLUTION INTRAVENOUS; SUBCUTANEOUS EVERY 8 HOURS SCHEDULED
Status: DISCONTINUED | OUTPATIENT
Start: 2022-02-16 | End: 2022-02-17

## 2022-02-16 RX ORDER — FLUTICASONE PROPIONATE 50 MCG
1 SPRAY, SUSPENSION (ML) NASAL DAILY
Status: DISCONTINUED | OUTPATIENT
Start: 2022-02-17 | End: 2022-02-16

## 2022-02-16 RX ORDER — SODIUM CHLORIDE 9 MG/ML
3 INJECTION INTRAVENOUS
Status: DISCONTINUED | OUTPATIENT
Start: 2022-02-16 | End: 2022-02-16

## 2022-02-16 RX ORDER — AMLODIPINE BESYLATE 10 MG/1
10 TABLET ORAL DAILY
Status: DISCONTINUED | OUTPATIENT
Start: 2022-02-17 | End: 2022-02-18

## 2022-02-16 RX ORDER — KETOROLAC TROMETHAMINE 30 MG/ML
15 INJECTION, SOLUTION INTRAMUSCULAR; INTRAVENOUS ONCE
Status: COMPLETED | OUTPATIENT
Start: 2022-02-16 | End: 2022-02-16

## 2022-02-16 RX ADMIN — AZITHROMYCIN MONOHYDRATE 500 MG: 250 TABLET ORAL at 20:35

## 2022-02-16 RX ADMIN — POTASSIUM CHLORIDE 40 MEQ: 1500 TABLET, EXTENDED RELEASE ORAL at 20:35

## 2022-02-16 RX ADMIN — FUROSEMIDE 40 MG: 10 INJECTION, SOLUTION INTRAVENOUS at 23:03

## 2022-02-16 RX ADMIN — POTASSIUM CHLORIDE 40 MEQ: 1500 TABLET, EXTENDED RELEASE ORAL at 23:07

## 2022-02-16 RX ADMIN — HEPARIN SODIUM 5000 UNITS: 5000 INJECTION INTRAVENOUS; SUBCUTANEOUS at 23:06

## 2022-02-16 RX ADMIN — CEFTRIAXONE SODIUM 1000 MG: 10 INJECTION, POWDER, FOR SOLUTION INTRAVENOUS at 19:35

## 2022-02-16 RX ADMIN — KETOROLAC TROMETHAMINE 15 MG: 30 INJECTION, SOLUTION INTRAMUSCULAR at 19:35

## 2022-02-16 RX ADMIN — IOHEXOL 85 ML: 350 INJECTION, SOLUTION INTRAVENOUS at 21:16

## 2022-02-16 RX ADMIN — SODIUM CHLORIDE 500 ML: 0.9 INJECTION, SOLUTION INTRAVENOUS at 20:39

## 2022-02-17 ENCOUNTER — APPOINTMENT (INPATIENT)
Dept: NON INVASIVE DIAGNOSTICS | Facility: HOSPITAL | Age: 52
DRG: 720 | End: 2022-02-17
Payer: MEDICARE

## 2022-02-17 LAB
ALBUMIN SERPL BCP-MCNC: 2.6 G/DL (ref 3.5–5)
ALP SERPL-CCNC: 93 U/L (ref 46–116)
ALT SERPL W P-5'-P-CCNC: 26 U/L (ref 12–78)
ANION GAP SERPL CALCULATED.3IONS-SCNC: 10 MMOL/L (ref 4–13)
AORTIC ROOT: 2.8 CM
AORTIC VALVE MEAN VELOCITY: 12.1 M/S
APICAL FOUR CHAMBER EJECTION FRACTION: 65 %
ASCENDING AORTA: 3.1 CM (ref 2.04–3.05)
AST SERPL W P-5'-P-CCNC: 24 U/L (ref 5–45)
ATRIAL RATE: 110 BPM
ATRIAL RATE: 115 BPM
AV LVOT MEAN GRADIENT: 3 MMHG
AV LVOT PEAK GRADIENT: 6 MMHG
AV MEAN GRADIENT: 7 MMHG
AV PEAK GRADIENT: 13 MMHG
AV VELOCITY RATIO: 0.66
BASOPHILS # BLD AUTO: 0.06 THOUSANDS/ΜL (ref 0–0.1)
BASOPHILS NFR BLD AUTO: 0 % (ref 0–1)
BILIRUB SERPL-MCNC: 0.74 MG/DL (ref 0.2–1)
BUN SERPL-MCNC: 11 MG/DL (ref 5–25)
CA-I BLD-SCNC: 1.06 MMOL/L (ref 1.12–1.32)
CALCIUM ALBUM COR SERPL-MCNC: 9.6 MG/DL (ref 8.3–10.1)
CALCIUM SERPL-MCNC: 8.5 MG/DL (ref 8.3–10.1)
CHLORIDE SERPL-SCNC: 105 MMOL/L (ref 100–108)
CO2 SERPL-SCNC: 25 MMOL/L (ref 21–32)
CREAT SERPL-MCNC: 0.69 MG/DL (ref 0.6–1.3)
CRP SERPL QL: 125.1 MG/L
DOP CALC AO PEAK VEL: 1.82 M/S
DOP CALC AO VTI: 26.3 CM
DOP CALC LVOT PEAK VEL VTI: 19.68 CM
DOP CALC LVOT PEAK VEL: 1.2 M/S
EOSINOPHIL # BLD AUTO: 0 THOUSAND/ΜL (ref 0–0.61)
EOSINOPHIL NFR BLD AUTO: 0 % (ref 0–6)
ERYTHROCYTE [DISTWIDTH] IN BLOOD BY AUTOMATED COUNT: 15.3 % (ref 11.6–15.1)
EST. AVERAGE GLUCOSE BLD GHB EST-MCNC: 143 MG/DL
FRACTIONAL SHORTENING: 31 % (ref 28–44)
GFR SERPL CREATININE-BSD FRML MDRD: 101 ML/MIN/1.73SQ M
GLUCOSE SERPL-MCNC: 149 MG/DL (ref 65–140)
GLUCOSE SERPL-MCNC: 184 MG/DL (ref 65–140)
GLUCOSE SERPL-MCNC: 265 MG/DL (ref 65–140)
GLUCOSE SERPL-MCNC: 344 MG/DL (ref 65–140)
HBA1C MFR BLD: 6.6 %
HCT VFR BLD AUTO: 43.1 % (ref 34.8–46.1)
HGB BLD-MCNC: 14.1 G/DL (ref 11.5–15.4)
IMM GRANULOCYTES # BLD AUTO: 0.22 THOUSAND/UL (ref 0–0.2)
IMM GRANULOCYTES NFR BLD AUTO: 1 % (ref 0–2)
INTERVENTRICULAR SEPTUM IN DIASTOLE (PARASTERNAL SHORT AXIS VIEW): 1.3 CM (ref 0.53–1)
INTERVENTRICULAR SEPTUM: 1.3 CM (ref 0.6–1.1)
L PNEUMO1 AG UR QL IA.RAPID: NEGATIVE
LAAS-AP2: 21.5 CM2
LAAS-AP4: 17.6 CM2
LEFT ATRIUM SIZE: 3.2 CM
LEFT INTERNAL DIMENSION IN SYSTOLE: 2.7 CM (ref 2.97–4.49)
LEFT VENTRICULAR INTERNAL DIMENSION IN DIASTOLE: 3.9 CM (ref 4.88–7.26)
LEFT VENTRICULAR POSTERIOR WALL IN END DIASTOLE: 1 CM (ref 0.52–0.99)
LEFT VENTRICULAR STROKE VOLUME: 37 ML
LVSV (TEICH): 37 ML
LYMPHOCYTES # BLD AUTO: 2.32 THOUSANDS/ΜL (ref 0.6–4.47)
LYMPHOCYTES NFR BLD AUTO: 12 % (ref 14–44)
MAGNESIUM SERPL-MCNC: 2.3 MG/DL (ref 1.6–2.6)
MCH RBC QN AUTO: 30.2 PG (ref 26.8–34.3)
MCHC RBC AUTO-ENTMCNC: 32.7 G/DL (ref 31.4–37.4)
MCV RBC AUTO: 92 FL (ref 82–98)
MONOCYTES # BLD AUTO: 1.14 THOUSAND/ΜL (ref 0.17–1.22)
MONOCYTES NFR BLD AUTO: 6 % (ref 4–12)
NEUTROPHILS # BLD AUTO: 15.71 THOUSANDS/ΜL (ref 1.85–7.62)
NEUTS SEG NFR BLD AUTO: 81 % (ref 43–75)
NRBC BLD AUTO-RTO: 1 /100 WBCS
P AXIS: 26 DEGREES
P AXIS: 27 DEGREES
PHOSPHATE SERPL-MCNC: 4.5 MG/DL (ref 2.7–4.5)
PLATELET # BLD AUTO: 430 THOUSANDS/UL (ref 149–390)
PMV BLD AUTO: 9.9 FL (ref 8.9–12.7)
POTASSIUM SERPL-SCNC: 4.2 MMOL/L (ref 3.5–5.3)
PR INTERVAL: 138 MS
PR INTERVAL: 148 MS
PROT SERPL-MCNC: 7.5 G/DL (ref 6.4–8.2)
QRS AXIS: 104 DEGREES
QRS AXIS: 96 DEGREES
QRSD INTERVAL: 74 MS
QRSD INTERVAL: 84 MS
QT INTERVAL: 342 MS
QT INTERVAL: 354 MS
QTC INTERVAL: 473 MS
QTC INTERVAL: 479 MS
RBC # BLD AUTO: 4.67 MILLION/UL (ref 3.81–5.12)
RIGHT ATRIAL 2D VOLUME: 47 ML
RIGHT ATRIUM AREA SYSTOLE A4C: 16.3 CM2
RIGHT VENTRICLE ID DIMENSION: 3.4 CM
S PNEUM AG UR QL: NEGATIVE
SL CV LEFT ATRIUM LENGTH A2C: 5.7 CM
SL CV LV EF: 65
SL CV PED ECHO LEFT VENTRICLE DIASTOLIC VOLUME (MOD BIPLANE) 2D: 64 ML
SL CV PED ECHO LEFT VENTRICLE SYSTOLIC VOLUME (MOD BIPLANE) 2D: 27 ML
SODIUM SERPL-SCNC: 140 MMOL/L (ref 136–145)
T WAVE AXIS: 17 DEGREES
T WAVE AXIS: 29 DEGREES
VENTRICULAR RATE: 110 BPM
VENTRICULAR RATE: 115 BPM
WBC # BLD AUTO: 19.45 THOUSAND/UL (ref 4.31–10.16)
Z-SCORE OF ASCENDING AORTA: 2.17
Z-SCORE OF INTERVENTRICULAR SEPTUM IN END DIASTOLE: 4.46
Z-SCORE OF LEFT VENTRICULAR DIMENSION IN END DIASTOLE: -4.23
Z-SCORE OF LEFT VENTRICULAR DIMENSION IN END SYSTOLE: -2.38
Z-SCORE OF LEFT VENTRICULAR POSTERIOR WALL IN END DIASTOLE: 2.05

## 2022-02-17 PROCEDURE — 94640 AIRWAY INHALATION TREATMENT: CPT

## 2022-02-17 PROCEDURE — 85025 COMPLETE CBC W/AUTO DIFF WBC: CPT

## 2022-02-17 PROCEDURE — 83735 ASSAY OF MAGNESIUM: CPT

## 2022-02-17 PROCEDURE — 82948 REAGENT STRIP/BLOOD GLUCOSE: CPT

## 2022-02-17 PROCEDURE — 86038 ANTINUCLEAR ANTIBODIES: CPT | Performed by: NURSE PRACTITIONER

## 2022-02-17 PROCEDURE — 80053 COMPREHEN METABOLIC PANEL: CPT

## 2022-02-17 PROCEDURE — 93010 ELECTROCARDIOGRAM REPORT: CPT

## 2022-02-17 PROCEDURE — 87449 NOS EACH ORGANISM AG IA: CPT

## 2022-02-17 PROCEDURE — 82330 ASSAY OF CALCIUM: CPT

## 2022-02-17 PROCEDURE — 84100 ASSAY OF PHOSPHORUS: CPT

## 2022-02-17 PROCEDURE — 93306 TTE W/DOPPLER COMPLETE: CPT | Performed by: INTERNAL MEDICINE

## 2022-02-17 PROCEDURE — 99223 1ST HOSP IP/OBS HIGH 75: CPT | Performed by: INTERNAL MEDICINE

## 2022-02-17 PROCEDURE — 94664 DEMO&/EVAL PT USE INHALER: CPT

## 2022-02-17 PROCEDURE — 93306 TTE W/DOPPLER COMPLETE: CPT

## 2022-02-17 RX ORDER — CALCIUM GLUCONATE 20 MG/ML
1 INJECTION, SOLUTION INTRAVENOUS ONCE
Status: DISCONTINUED | OUTPATIENT
Start: 2022-02-17 | End: 2022-02-17

## 2022-02-17 RX ORDER — LEVALBUTEROL 1.25 MG/.5ML
1.25 SOLUTION, CONCENTRATE RESPIRATORY (INHALATION) EVERY 8 HOURS PRN
Status: DISCONTINUED | OUTPATIENT
Start: 2022-02-17 | End: 2022-02-17

## 2022-02-17 RX ORDER — GUAIFENESIN 600 MG
600 TABLET, EXTENDED RELEASE 12 HR ORAL EVERY 12 HOURS SCHEDULED
Status: DISCONTINUED | OUTPATIENT
Start: 2022-02-17 | End: 2022-03-16 | Stop reason: HOSPADM

## 2022-02-17 RX ORDER — METHYLPREDNISOLONE SODIUM SUCCINATE 125 MG/2ML
60 INJECTION, POWDER, LYOPHILIZED, FOR SOLUTION INTRAMUSCULAR; INTRAVENOUS EVERY 6 HOURS
Status: DISCONTINUED | OUTPATIENT
Start: 2022-02-17 | End: 2022-02-20

## 2022-02-17 RX ORDER — PANTOPRAZOLE SODIUM 40 MG/1
40 TABLET, DELAYED RELEASE ORAL
Status: DISCONTINUED | OUTPATIENT
Start: 2022-02-17 | End: 2022-03-16 | Stop reason: HOSPADM

## 2022-02-17 RX ORDER — CALCIUM GLUCONATE 20 MG/ML
2 INJECTION, SOLUTION INTRAVENOUS ONCE
Status: COMPLETED | OUTPATIENT
Start: 2022-02-17 | End: 2022-02-17

## 2022-02-17 RX ORDER — ALPRAZOLAM 0.5 MG/1
0.5 TABLET ORAL 2 TIMES DAILY PRN
Status: DISCONTINUED | OUTPATIENT
Start: 2022-02-17 | End: 2022-02-23

## 2022-02-17 RX ORDER — SODIUM CHLORIDE 30 MG/ML INHALATION SOLUTION 30 MG/ML
4 SOLUTION INHALANT
Status: DISCONTINUED | OUTPATIENT
Start: 2022-02-17 | End: 2022-02-21

## 2022-02-17 RX ORDER — AZITHROMYCIN 250 MG/1
500 TABLET, FILM COATED ORAL EVERY 24 HOURS
Status: DISCONTINUED | OUTPATIENT
Start: 2022-02-17 | End: 2022-02-18

## 2022-02-17 RX ORDER — LEVALBUTEROL 1.25 MG/.5ML
1.25 SOLUTION, CONCENTRATE RESPIRATORY (INHALATION)
Status: DISCONTINUED | OUTPATIENT
Start: 2022-02-17 | End: 2022-03-11

## 2022-02-17 RX ORDER — LEVALBUTEROL 1.25 MG/.5ML
1.25 SOLUTION, CONCENTRATE RESPIRATORY (INHALATION)
Status: DISCONTINUED | OUTPATIENT
Start: 2022-02-17 | End: 2022-02-17

## 2022-02-17 RX ADMIN — BENZONATATE 200 MG: 100 CAPSULE ORAL at 08:26

## 2022-02-17 RX ADMIN — GUAIFENESIN 600 MG: 600 TABLET, EXTENDED RELEASE ORAL at 08:26

## 2022-02-17 RX ADMIN — LEVALBUTEROL HYDROCHLORIDE 1.25 MG: 1.25 SOLUTION, CONCENTRATE RESPIRATORY (INHALATION) at 07:55

## 2022-02-17 RX ADMIN — IPRATROPIUM BROMIDE 0.5 MG: 0.5 SOLUTION RESPIRATORY (INHALATION) at 12:45

## 2022-02-17 RX ADMIN — INSULIN LISPRO 1 UNITS: 100 INJECTION, SOLUTION INTRAVENOUS; SUBCUTANEOUS at 13:01

## 2022-02-17 RX ADMIN — INSULIN LISPRO 5 UNITS: 100 INJECTION, SOLUTION INTRAVENOUS; SUBCUTANEOUS at 21:09

## 2022-02-17 RX ADMIN — LEVALBUTEROL HYDROCHLORIDE 1.25 MG: 1.25 SOLUTION, CONCENTRATE RESPIRATORY (INHALATION) at 02:20

## 2022-02-17 RX ADMIN — HEPARIN SODIUM 5000 UNITS: 5000 INJECTION INTRAVENOUS; SUBCUTANEOUS at 05:00

## 2022-02-17 RX ADMIN — ALPRAZOLAM 0.5 MG: 0.5 TABLET ORAL at 13:17

## 2022-02-17 RX ADMIN — BENZONATATE 200 MG: 100 CAPSULE ORAL at 20:00

## 2022-02-17 RX ADMIN — SODIUM CHLORIDE SOLN NEBU 3% 4 ML: 3 NEBU SOLN at 02:20

## 2022-02-17 RX ADMIN — IPRATROPIUM BROMIDE 0.5 MG: 0.5 SOLUTION RESPIRATORY (INHALATION) at 16:09

## 2022-02-17 RX ADMIN — SODIUM CHLORIDE SOLN NEBU 3% 4 ML: 3 NEBU SOLN at 19:39

## 2022-02-17 RX ADMIN — METHYLPREDNISOLONE SODIUM SUCCINATE 60 MG: 125 INJECTION, POWDER, FOR SOLUTION INTRAMUSCULAR; INTRAVENOUS at 22:16

## 2022-02-17 RX ADMIN — AZITHROMYCIN 500 MG: 250 TABLET, FILM COATED ORAL at 19:59

## 2022-02-17 RX ADMIN — METHYLPREDNISOLONE SODIUM SUCCINATE 60 MG: 125 INJECTION, POWDER, FOR SOLUTION INTRAMUSCULAR; INTRAVENOUS at 13:02

## 2022-02-17 RX ADMIN — ENOXAPARIN SODIUM 60 MG: 60 INJECTION SUBCUTANEOUS at 13:17

## 2022-02-17 RX ADMIN — LEVALBUTEROL HYDROCHLORIDE 1.25 MG: 1.25 SOLUTION, CONCENTRATE RESPIRATORY (INHALATION) at 16:09

## 2022-02-17 RX ADMIN — IPRATROPIUM BROMIDE 0.5 MG: 0.5 SOLUTION RESPIRATORY (INHALATION) at 19:39

## 2022-02-17 RX ADMIN — CEFTRIAXONE SODIUM 1000 MG: 10 INJECTION, POWDER, FOR SOLUTION INTRAVENOUS at 20:02

## 2022-02-17 RX ADMIN — SODIUM CHLORIDE SOLN NEBU 3% 4 ML: 3 NEBU SOLN at 07:55

## 2022-02-17 RX ADMIN — PANTOPRAZOLE SODIUM 40 MG: 40 TABLET, DELAYED RELEASE ORAL at 13:02

## 2022-02-17 RX ADMIN — LEVALBUTEROL HYDROCHLORIDE 1.25 MG: 1.25 SOLUTION, CONCENTRATE RESPIRATORY (INHALATION) at 12:45

## 2022-02-17 RX ADMIN — METHYLPREDNISOLONE SODIUM SUCCINATE 60 MG: 125 INJECTION, POWDER, FOR SOLUTION INTRAMUSCULAR; INTRAVENOUS at 18:29

## 2022-02-17 RX ADMIN — SODIUM CHLORIDE SOLN NEBU 3% 4 ML: 3 NEBU SOLN at 12:44

## 2022-02-17 RX ADMIN — LEVALBUTEROL HYDROCHLORIDE 1.25 MG: 1.25 SOLUTION, CONCENTRATE RESPIRATORY (INHALATION) at 19:39

## 2022-02-17 RX ADMIN — ENOXAPARIN SODIUM 60 MG: 60 INJECTION SUBCUTANEOUS at 20:58

## 2022-02-17 RX ADMIN — AMLODIPINE BESYLATE 10 MG: 10 TABLET ORAL at 08:26

## 2022-02-17 RX ADMIN — GUAIFENESIN 600 MG: 600 TABLET, EXTENDED RELEASE ORAL at 02:22

## 2022-02-17 RX ADMIN — CALCIUM GLUCONATE 2 G: 20 INJECTION, SOLUTION INTRAVENOUS at 05:29

## 2022-02-17 RX ADMIN — GUAIFENESIN 600 MG: 600 TABLET, EXTENDED RELEASE ORAL at 20:00

## 2022-02-17 RX ADMIN — INSULIN LISPRO 4 UNITS: 100 INJECTION, SOLUTION INTRAVENOUS; SUBCUTANEOUS at 18:29

## 2022-02-17 NOTE — ASSESSMENT & PLAN NOTE
· Patient was diagnosed with COVID on 1/6, with symptom onset on 1/3  · She reports increasing shortness of breath, fatigue, weakness and unable to complete ADL's over the past 2 weeks  · She presents via EMS hypoxic with oxygen saturations in the 60's  · She was placed on a NRB with improvement in her oxygen saturations to the 80's  · She is currently requiring HFNC 55L/100% + NRB for a oxygen saturation of 90%  · CTA was obtained in the ED and revealed No evidence of pulmonary artery embolus   Scattered airspace opacities within the lungs which may represent infection  · BNP was elevated on admission  · Obtain infectious work-up  · Strep/legionella pneumonia   · Blood cultures/ sputum culture  · Patient was given ceftriaxone and azithromycin in the ED  · Continue Ceftriaxone for now   · Procal on admission was 0 3

## 2022-02-17 NOTE — ED NOTES
Respiratory @bedside placing pt on highflow o2  Pt more comfortable, aox4 and complains of hunger despite decreased appetite over the last few days       Rupa Garcia RN  02/16/22 0186

## 2022-02-17 NOTE — ASSESSMENT & PLAN NOTE
· POA as evidenced by: tachycardia, leukocytosis, and lactic acidosis  · Lactic acid 2 7 and WBC 20 59  · Received 500 ml bolus in the ED  · Obtain infectious work-up as outlined   · Continue antibiotics as outlined

## 2022-02-17 NOTE — UTILIZATION REVIEW
Initial Clinical Review    Admission: Date/Time/Statement:   Admission Orders (From admission, onward)     Ordered        02/16/22 2056  Inpatient Admission  Once                      Orders Placed This Encounter   Procedures    Inpatient Admission     Standing Status:   Standing     Number of Occurrences:   1     Order Specific Question:   Level of Care     Answer:   Level 1 Stepdown [13]     Order Specific Question:   Estimated length of stay     Answer:   More than 2 Midnights     Order Specific Question:   Certification     Answer:   I certify that inpatient services are medically necessary for this patient for a duration of greater than two midnights  See H&P and MD Progress Notes for additional information about the patient's course of treatment  ED Arrival Information     Expected Arrival Acuity    - 2/16/2022 19:01 Emergent         Means of arrival Escorted by Service Admission type    [facility] ([address]) Critical Care/ICU Emergency         Arrival complaint    Shortness of Breath        Chief Complaint   Patient presents with    Respiratory Distress     pt covid+ back in january with ongoing symptoms  called ems tn for increasing sob coughing and body aches  RA 60s% and NRB 80s%  awake aox4       Initial Presentation:    46  Y O female presents to ED via  EMS from home with increasing shortness of breath for past 2 weeks  Now unable to ambulate, do ADL's due to shortness of breath, weakness and fatigue  PMH  Is cervical injury, chronic pain, and recent COVID  Infection diagnosed  1/6  Sats in the  60's  On  ED arrival and placed on  NRB with improvement in sats  To the  80's  She then was placed on HFNC with requirements of 55L/100% + NRB  Labs revealed lactic acidosis, leukocytosis, and elevation in her pro-BNP  Met  SIRS  Criteria with leukocytosis, tachycardia and lactic acidosis  CTA  Negative for PE  But with opacities  Given WILLIAM in  ED     Admit Ip Stepdown LOC With   Acute respiratory failure with hypoxia, SIRS, Elevated  BNP and thrombocytosis and plan is  WILLIAM, 2 DE, blood/sputum cultures,  NRB  For now, S/P  500 cc  Bolus in ED,  accu checks and replace electrolytes as needed  Date:    2/17     Day 2:   Starting IV  S/medrol  Continue  WILLIAM  Continue diuresis with IV lasix  Starting atrovent/xopenex nebulizers  Now with possible  Acute  CHF  Continue  NRB  Continue to monitor  Sats, remain in the  80's       ED Triage Vitals   Temperature Pulse Respirations Blood Pressure SpO2   02/16/22 1904 02/16/22 1904 02/16/22 1904 02/16/22 1904 02/16/22 1904   98 2 °F (36 8 °C) (!) 119 (!) 40 (!) 173/72 (!) 86 %      Temp Source Heart Rate Source Patient Position - Orthostatic VS BP Location FiO2 (%)   02/16/22 1904 02/16/22 1904 02/16/22 1904 02/16/22 1904 02/16/22 1910   Oral Monitor Lying Left arm 100      Pain Score       02/16/22 1904       8          Wt Readings from Last 1 Encounters:   02/17/22 99 3 kg (219 lb)     Additional Vital Signs:    -- 112 Abnormal  41 Abnormal  -- -- 85 % Abnormal  -- -- -- -- --   02/17/22 1145 -- 110 Abnormal  45 Abnormal  -- -- 90 % -- -- -- -- --   02/17/22 1115 -- 116 Abnormal  -- -- -- 81 % Abnormal  -- -- -- -- --   02/17/22 1100 -- 116 Abnormal  -- -- -- 73 % Abnormal  -- -- -- -- --   02/17/22 1045 -- 110 Abnormal  16 -- -- 87 % Abnormal  -- -- -- -- --   02/17/22 1030 -- 112 Abnormal  40 Abnormal  -- -- 92 % -- -- -- -- --   02/17/22 1015 -- 116 Abnormal  36 Abnormal  -- -- 91 % -- -- -- -- --   02/17/22 1000 -- 114 Abnormal  19 127/63 88 91 % -- -- -- -- --   02/17/22 0800 -- 114 Abnormal  38 Abnormal  151/81 111 89 % Abnormal  -- -- -- HFNC prongs --   02/17/22 0759 -- -- -- -- -- -- 100 55 L/min High flow nasal cannula -- --   02/17/22 0700 -- 110 Abnormal  35 Abnormal  -- -- 91 % -- -- -- -- --   02/17/22 9805 -- 110 Abnormal  32 Abnormal  149/74 108 92 % -- -- -- -- --   02/17/22 5865 -- 108 Abnormal  35 Abnormal  137/80 107 91 % -- -- -- -- --   02/17/22 0300 -- 106 Abnormal  28 Abnormal  140/85 108 -- -- -- -- -- --   02/17/22 0222 -- -- -- -- -- -- -- -- -- HFNC prongs --   02/17/22 0135 -- 102 27 Abnormal  153/79 110 92 % -- -- -- -- --   02/17/22 0035 -- 100 32 Abnormal  117/66 94 93 % -- -- -- -- --   02/16/22 2340 98 3 °F (36 8 °C) -- -- -- -- -- -- -- -- -- --   02/16/22 2335 -- 102 30 Abnormal  155/89 119 92 % -- -- -- -- --   02/16/22 2300 -- 108 Abnormal  25 Abnormal  158/85 119 91 % -- -- -- -- --   02/16/22 2246 100 1 °F (37 8 °C) -- -- -- -- -- -- -- -- -- --   02/16/22 2225 -- 106 Abnormal  32 Abnormal  145/79 110 88 % Abnormal  -- -- -- -- --   02/16/22 2154 -- 104 26 Abnormal  -- -- 95 % 100 55 L/min High flow nasal cannula  -- Lying   02/16/22 2018 -- 111 Abnormal  39 Abnormal  167/82 -- 93 % -- -- Non-rebreather mask -- Lying   02/16/22 2016 -- 112 Abnormal  34 Abnormal  -- -- 93 % -- -- -- -- --   02/16/22 1910 -- -- -- -- -- 91 % 100 55 L/min High flow nasal cannula --        Pertinent Labs/Diagnostic Test Results:   CTA  Chest  ( 2/16)    No evidence of pulmonary artery embolus  Scattered airspace opacities within the lungs which may represent infection   Recommend short-term follow-up chest CT scan in 3 months to evaluate for resolution  CXR  ( 2/17)     Multifocal groundglass infiltrates concerning for  Covid  19 infection    EKG     ST  HR   115      RAD, qtc 473, nonspecific st changes, S1Q3 new from prior      Results from last 7 days   Lab Units 02/17/22  0457 02/16/22 2233 02/16/22 1914   WBC Thousand/uL 19 45*  --  20 59*   HEMOGLOBIN g/dL 14 1  --  15 1   HEMATOCRIT % 43 1  --  47 3*   PLATELETS Thousands/uL 430* 440* 502*   NEUTROS ABS Thousands/µL 15 71*  --  15 53*         Results from last 7 days   Lab Units 02/17/22 0457 02/16/22  1915   SODIUM mmol/L 140 136   POTASSIUM mmol/L 4 2 3 1*   CHLORIDE mmol/L 105 100   CO2 mmol/L 25 25   ANION GAP mmol/L 10 11   BUN mg/dL 11 11 CREATININE mg/dL 0 69 0 90   EGFR ml/min/1 73sq m 101 74   CALCIUM mg/dL 8 5 9 1   CALCIUM, IONIZED mmol/L 1 06*  --    MAGNESIUM mg/dL 2 3  --    PHOSPHORUS mg/dL 4 5  --      Results from last 7 days   Lab Units 02/17/22 0457 02/16/22 1915   AST U/L 24 25   ALT U/L 26 28   ALK PHOS U/L 93 101   TOTAL PROTEIN g/dL 7 5 8 1   ALBUMIN g/dL 2 6* 2 9*   TOTAL BILIRUBIN mg/dL 0 74 0 69     Results from last 7 days   Lab Units 02/16/22  2319   POC GLUCOSE mg/dl 137     Results from last 7 days   Lab Units 02/17/22 0457 02/16/22 1915   GLUCOSE RANDOM mg/dL 149* 165*         Results from last 7 days   Lab Units 02/16/22 2233   HEMOGLOBIN A1C % 6 6*   EAG mg/dl 143       Results from last 7 days   Lab Units 02/16/22 1916   CK TOTAL U/L 83     Results from last 7 days   Lab Units 02/16/22 2233 02/16/22 1914   HS TNI 0HR ng/L  --  49   HS TNI 4HR ng/L 54*  --    HSTNI D4 ng/L 5  --      Results from last 7 days   Lab Units 02/16/22 1915   D-DIMER QUANTITATIVE ug/ml FEU 1 18*     Results from last 7 days   Lab Units 02/16/22 1915   PROTIME seconds 13 8   INR  1 09   PTT seconds 45*         Results from last 7 days   Lab Units 02/16/22 2237 02/16/22 1915   PROCALCITONIN ng/ml 0 38* 0 34*     Results from last 7 days   Lab Units 02/16/22 2233 02/16/22 1914   LACTIC ACID mmol/L 1 5 2 7*             Results from last 7 days   Lab Units 02/16/22 1915   NT-PRO BNP pg/mL 1,472*           Results from last 7 days   Lab Units 02/16/22 2204   CLARITY UA  Clear   COLOR UA  Yellow   SPEC GRAV UA  <=1 005   PH UA  6 0   GLUCOSE UA mg/dl Negative   KETONES UA mg/dl Negative   BLOOD UA  Small*   PROTEIN UA mg/dl Negative   NITRITE UA  Negative   BILIRUBIN UA  Negative   UROBILINOGEN UA E U /dl 0 2   LEUKOCYTES UA  Negative   WBC UA /hpf 0-1*   RBC UA /hpf 1-2*   BACTERIA UA /hpf Occasional   EPITHELIAL CELLS WET PREP /hpf Occasional     Results from last 7 days   Lab Units 02/17/22  0150   STREP PNEUMONIAE ANTIGEN, URINE Negative   LEGIONELLA URINARY ANTIGEN  Negative                             Results from last 7 days   Lab Units 02/16/22  1920 02/16/22  1916   BLOOD CULTURE  Received in Microbiology Lab  Culture in Progress  Received in Microbiology Lab  Culture in Progress                 ED Treatment:   Medication Administration from 02/16/2022 1901 to 02/16/2022 2218       Date/Time Order Dose Route Action Comments     02/16/2022 1935 ketorolac (TORADOL) injection 15 mg 15 mg Intravenous Given      02/16/2022 2018 ceftriaxone (ROCEPHIN) 1 g/50 mL in dextrose IVPB 0 mg Intravenous Stopped      02/16/2022 1935 ceftriaxone (ROCEPHIN) 1 g/50 mL in dextrose IVPB 1,000 mg Intravenous New Bag      02/16/2022 2035 azithromycin (ZITHROMAX) tablet 500 mg 500 mg Oral Given      02/16/2022 2039 sodium chloride 0 9 % bolus 500 mL 500 mL Intravenous New Bag      02/16/2022 2035 potassium chloride (K-DUR,KLOR-CON) CR tablet 40 mEq 40 mEq Oral Given      02/16/2022 2116 iohexol (OMNIPAQUE) 350 MG/ML injection (SINGLE-DOSE) 85 mL 85 mL Intravenous Given         Admitting Diagnosis: Shortness of breath [R06 02]  Acute respiratory failure with hypoxia (HCC) [J96 01]  Pneumonia due to COVID-19 virus [U07 1, J12 82]  Age/Sex: 46 y o  female  Admission Orders:  Scheduled Medications:  amLODIPine, 10 mg, Oral, Daily  azithromycin, 500 mg, Oral, Q24H  cefTRIAXone, 1,000 mg, Intravenous, Q24H  enoxaparin, 60 mg, Subcutaneous, Q12H NED  guaiFENesin, 600 mg, Oral, Q12H NED  insulin lispro, 1-6 Units, Subcutaneous, TID AC  insulin lispro, 1-6 Units, Subcutaneous, HS  ipratropium, 0 5 mg, Nebulization, 4x Daily  levalbuterol, 1 25 mg, Nebulization, 4x Daily  methylPREDNISolone sodium succinate, 60 mg, Intravenous, Q6H  pantoprazole, 40 mg, Oral, Daily Before Breakfast  sodium chloride, 4 mL, Nebulization, Q6H  IV lasix    2/16      Continuous IV Infusions:     PRN Meds:  benzonatate, 200 mg, Oral, TID PRN        IP CONSULT TO CASE MANAGEMENT    Network Utilization Review Department  ATTENTION: Please call with any questions or concerns to [phone] and carefully listen to the prompts so that you are directed to the right person  All voicemails are confidential   Sudie Crigler all requests for admission clinical reviews, approved or denied determinations and any other requests to dedicated fax number below belonging to the campus where the patient is receiving treatment   List of dedicated fax numbers for the Facilities:  [address] DENIALS (Administrative/Medical Necessity) [phone]   [address] (Maternity/NICU/Pediatrics) [phone]   [address]  [address] [facility] [place] [address] [address] [place] [address] [address] [facility] [place] [address] [phone]

## 2022-02-17 NOTE — PLAN OF CARE
Problem: Potential for Falls  Goal: Patient will remain free of falls  Description: INTERVENTIONS:  - Educate patient/family on patient safety including physical limitations  - Instruct patient to call for assistance with activity   - Consult OT/PT to assist with strengthening/mobility   - Keep Call bell within reach  - Keep bed low and locked with side rails adjusted as appropriate  - Keep care items and personal belongings within reach  - Initiate and maintain comfort rounds  - Make Fall Risk Sign visible to staff  - Apply yellow socks and bracelet for high fall risk patients  - Consider moving patient to room near nurses station  Outcome: Progressing     Problem: MOBILITY - ADULT  Goal: Maintain or return to baseline ADL function  Description: INTERVENTIONS:  -  Assess patient's ability to carry out ADLs; assess patient's baseline for ADL function and identify physical deficits which impact ability to perform ADLs (bathing, care of mouth/teeth, toileting, grooming, dressing, etc )  - Assess/evaluate cause of self-care deficits   - Assess range of motion  - Assess patient's mobility; develop plan if impaired  - Assess patient's need for assistive devices and provide as appropriate  - Encourage maximum independence but intervene and supervise when necessary  - Involve family in performance of ADLs  - Assess for home care needs following discharge   - Consider OT consult to assist with ADL evaluation and planning for discharge  - Provide patient education as appropriate  Outcome: Progressing  Goal: Maintains/Returns to pre admission functional level  Description: INTERVENTIONS:  - Perform BMAT or MOVE assessment daily    - Set and communicate daily mobility goal to care team and patient/family/caregiver  - Collaborate with rehabilitation services on mobility goals if consulted  - Perform Range of Motion 3 times a day  - Reposition patient every 2 hours    - Out of bed for toileting  - Record patient progress and toleration of activity level   Outcome: Progressing     Problem: Prexisting or High Potential for Compromised Skin Integrity  Goal: Skin integrity is maintained or improved  Description: INTERVENTIONS:  - Identify patients at risk for skin breakdown  - Assess and monitor skin integrity  - Assess and monitor nutrition and hydration status  - Monitor labs   - Assess for incontinence   - Turn and reposition patient  - Assist with mobility/ambulation  - Relieve pressure over bony prominences  - Avoid friction and shearing  - Provide appropriate hygiene as needed including keeping skin clean and dry  - Evaluate need for skin moisturizer/barrier cream  - Collaborate with interdisciplinary team   - Patient/family teaching  - Consider wound care consult   Outcome: Progressing     Problem: RESPIRATORY - ADULT  Goal: Achieves optimal ventilation and oxygenation  Description: INTERVENTIONS:  - Assess for changes in respiratory status  - Assess for changes in mentation and behavior  - Position to facilitate oxygenation and minimize respiratory effort  - Oxygen administered by appropriate delivery if ordered  - Initiate smoking cessation education as indicated  - Encourage broncho-pulmonary hygiene including cough, deep breathe, Incentive Spirometry  - Assess the need for suctioning and aspirate as needed  - Assess and instruct to report SOB or any respiratory difficulty  - Respiratory Therapy support as indicated  Outcome: Progressing     Problem: METABOLIC, FLUID AND ELECTROLYTES - ADULT  Goal: Electrolytes maintained within normal limits  Description: INTERVENTIONS:  - Monitor labs and assess patient for signs and symptoms of electrolyte imbalances  - Administer electrolyte replacement as ordered  - Monitor response to electrolyte replacements, including repeat lab results as appropriate  - Instruct patient on fluid and nutrition as appropriate  Outcome: Progressing  Goal: Fluid balance maintained  Description: INTERVENTIONS:  - Monitor labs   - Monitor I/O and WT  - Instruct patient on fluid and nutrition as appropriate  - Assess for signs & symptoms of volume excess or deficit  Outcome: Progressing  Goal: Glucose maintained within target range  Description: INTERVENTIONS:  - Monitor Blood Glucose as ordered  - Assess for signs and symptoms of hyperglycemia and hypoglycemia  - Administer ordered medications to maintain glucose within target range  - Assess nutritional intake and initiate nutrition service referral as needed  Outcome: Progressing     Problem: MUSCULOSKELETAL - ADULT  Goal: Maintain or return mobility to safest level of function  Description: INTERVENTIONS:  - Assess patient's ability to carry out ADLs; assess patient's baseline for ADL function and identify physical deficits which impact ability to perform ADLs (bathing, care of mouth/teeth, toileting, grooming, dressing, etc )  - Assess/evaluate cause of self-care deficits   - Assess range of motion  - Assess patient's mobility  - Assess patient's need for assistive devices and provide as appropriate  - Encourage maximum independence but intervene and supervise when necessary  - Involve family in performance of ADLs  - Assess for home care needs following discharge   - Consider OT consult to assist with ADL evaluation and planning for discharge  - Provide patient education as appropriate  Outcome: Progressing  Goal: Maintain proper alignment of affected body part  Description: INTERVENTIONS:  - Support, maintain and protect limb and body alignment  - Provide patient/ family with appropriate education  Outcome: Progressing

## 2022-02-17 NOTE — H&P
[facility]  H&P- Bailey Palmer 1970, 46 y o  female MRN: [id number]  Unit/Bed#: ICU 11 Encounter: [id number]  Primary Care Provider: Puma Braswell MD   Date and time admitted to hospital: 2/16/2022  7:01 PM    * Acute respiratory failure with hypoxia [facility])  Assessment & Plan  · Patient was diagnosed with COVID on 1/6, with symptom onset on 1/3  · She reports increasing shortness of breath, fatigue, weakness and unable to complete ADL's over the past 2 weeks  · She presents via EMS hypoxic with oxygen saturations in the 60's  · She was placed on a NRB with improvement in her oxygen saturations to the 80's  · She is currently requiring HFNC 55L/100% + NRB for a oxygen saturation of 90%  · CTA was obtained in the ED and revealed No evidence of pulmonary artery embolus   Scattered airspace opacities within the lungs which may represent infection  · BNP was elevated on admission  · Obtain infectious work-up  · Strep/legionella pneumonia   · Blood cultures/ sputum culture  · Patient was given ceftriaxone and azithromycin in the ED  · Continue Ceftriaxone for now   · Procal on admission was 0 3    SIRS (systemic inflammatory response syndrome) (HCC)  Assessment & Plan  · POA as evidenced by: tachycardia, leukocytosis, and lactic acidosis  · Lactic acid 2 7 and WBC 20 59  · Received 500 ml bolus in the ED  · Obtain infectious work-up as outlined   · Continue antibiotics as outlined     Elevated brain natriuretic peptide (BNP) level  Assessment & Plan  · NT- proBNP elevated on admission at 1472  · Obtain ECHO  · Administer 40 of lasix     Hyperglycemia  Assessment & Plan  · Glucose elevated on BMP on admission  · Obtain hemoglobin A1c  · Patient denies history of diabetes  · Initiate accu checks + SSI    Thrombocytosis  Assessment & Plan  · Platelets on admission of 502  · Platelet count in January normal  · Suspect secondary to post COVID infection  · Continue to monitor and start aspirin if needed    BMI 50 0-59 9, adult ([facility])  Assessment & Plan  · BMI 53 on admission  · Will benefit from lifestyle modifications on discharge     Hypokalemia  Assessment & Plan  · Potassium 3 1 on admission  · Replete as appropriate     -------------------------------------------------------------------------------------------------------------  Chief Complaint: Shortness of breath    History of Present Illness     Katie Maria is a 46 y o  female who presents with increasing shortness of breath  She has a past medical history of previous cervical injury with associated chronic pain, and recent COVID infection, diagnosed 1/6  She presented to the ED yesterday after having progressive shortness of breath over the past 2 weeks  She stated she was unable to ambulate and complete her ADL's secondary to her shortness of breath and increasing fatigue/weakness  On arrival to the ED, she was hypoxic in the 60's  She was placed on a NRB with increase in her oxygen saturation to 80's  She then was placed on HFNC with requirements of 55L/100% + NRB  Labs revealed lactic acidosis, leukocytosis, and elevation in her pro-BNP  A CTA was performed which was negative for PE, but revealed scattered airspace opacities  She was given ceftriaxone and azithromycin in the ED and admitted under critical care medicine secondary to high oxygen requirements  History obtained from chart review and the patient   -------------------------------------------------------------------------------------------------------------  Dispo: Admit to Stepdown Level 1    Code Status: Level 1 - Full Code  --------------------------------------------------------------------------------------------------------------  Review of Systems   Constitutional: Positive for activity change, appetite change and fatigue  Negative for fever  Respiratory: Positive for cough and shortness of breath  Negative for chest tightness and wheezing  Neurological: Positive for weakness  A 12-point, complete review of systems was reviewed and negative except as stated above     Physical Exam  Constitutional:       General: She is not in acute distress  Appearance: She is obese  She is ill-appearing  HENT:      Head: Normocephalic  Mouth/Throat:      Mouth: Mucous membranes are moist       Pharynx: Oropharynx is clear  Eyes:      Extraocular Movements: Extraocular movements intact  Conjunctiva/sclera: Conjunctivae normal       Pupils: Pupils are equal, round, and reactive to light  Cardiovascular:      Rate and Rhythm: Regular rhythm  Tachycardia present  Pulmonary:      Effort: Tachypnea present  Breath sounds: Rales present  No wheezing or rhonchi  Abdominal:      General: Bowel sounds are normal       Palpations: Abdomen is soft  Tenderness: There is no abdominal tenderness  Musculoskeletal:         General: Normal range of motion  Cervical back: Neck supple  Right lower leg: No edema  Left lower leg: No edema  Skin:     General: Skin is warm and dry  Neurological:      General: No focal deficit present  Mental Status: She is alert and oriented to person, place, and time  Psychiatric:         Mood and Affect: Mood normal          Behavior: Behavior normal          Thought Content: Thought content normal        --------------------------------------------------------------------------------------------------------------  Vitals:   Vitals:    02/16/22 2224 02/16/22 2225 02/16/22 2246 02/16/22 2300   BP:  145/79  158/85   BP Location:       Pulse:  (!) 106  (!) 108   Resp:  (!) 32  (!) 25   Temp:   100 1 °F (37 8 °C)    TempSrc:   Temporal    SpO2:  (!) 88%  91%   Weight: 99 7 kg (219 lb 12 8 oz)      Height: 4' 6 5" (1 384 m)        Temp  Min: 98 2 °F (36 8 °C)  Max: 100 1 °F (37 8 °C)     Height: 4' 6 5" (138 4 cm)  Body mass index is 52 03 kg/m²      Laboratory and Diagnostics:  Results from last 7 days   Lab Units 22   WBC Thousand/uL  --  20 59*   HEMOGLOBIN g/dL  --  15 1   HEMATOCRIT %  --  47 3*   PLATELETS Thousands/uL 440* 502*   NEUTROS PCT %  --  76*   MONOS PCT %  --  7     Results from last 7 days   Lab Units 22   SODIUM mmol/L 136   POTASSIUM mmol/L 3 1*   CHLORIDE mmol/L 100   CO2 mmol/L 25   ANION GAP mmol/L 11   BUN mg/dL 11   CREATININE mg/dL 0 90   CALCIUM mg/dL 9 1   GLUCOSE RANDOM mg/dL 165*   ALT U/L 28   AST U/L 25   ALK PHOS U/L 101   ALBUMIN g/dL 2 9*   TOTAL BILIRUBIN mg/dL 0 69          Results from last 7 days   Lab Units 22   INR  1 09   PTT seconds 45*          Results from last 7 days   Lab Units 22   LACTIC ACID mmol/L 1 5 2 7*     ABG:    VBG:    Results from last 7 days   Lab Units 22   PROCALCITONIN ng/ml 0 38* 0 34*       Micro:      Imaging: I have personally reviewed pertinent reports  Historical Information   Past Medical History:   Diagnosis Date    Herniated cervical disc     Hypertension      Past Surgical History:   Procedure Laterality Date     SECTION      EYE SURGERY      SHOULDER SURGERY       Social History   Social History     Substance and Sexual Activity   Alcohol Use Never     Social History     Substance and Sexual Activity   Drug Use Not on file     Social History     Tobacco Use   Smoking Status Current Every Day Smoker    Packs/day: 0 50   Smokeless Tobacco Never Used     Family History:   History reviewed  No pertinent family history    Family history unknown and I have reviewed this patient's family history and commented on sigificant items within the HPI      Medications:  Current Facility-Administered Medications   Medication Dose Route Frequency    [START ON 2022] amLODIPine (NORVASC) tablet 10 mg  10 mg Oral Daily    benzonatate (TESSALON PERLES) capsule 200 mg  200 mg Oral TID PRN    [START ON 2022] cefTRIAXone (ROCEPHIN) 1,000 mg in dextrose 5 % 50 mL IVPB  1,000 mg Intravenous Q24H    heparin (porcine) subcutaneous injection 5,000 Units  5,000 Units Subcutaneous Q8H [address]    [START ON 2022] insulin lispro (HumaLOG) 100 units/mL subcutaneous injection 1-6 Units  1-6 Units Subcutaneous TID AC    insulin lispro (HumaLOG) 100 units/mL subcutaneous injection 1-6 Units  1-6 Units Subcutaneous HS     Home medications:  Prior to Admission Medications   Prescriptions Last Dose Informant Patient Reported?  Taking?   acetaminophen (TYLENOL) 500 mg tablet Past Week at Unknown time  Yes Yes   Sig: Take 1,000 mg by mouth every 6 (six) hours as needed   albuterol (ProAir HFA) 90 mcg/act inhaler 2/15/2022 at Unknown time  No Yes   Sig: Inhale 2 puffs every 6 (six) hours as needed for wheezing   amLODIPine (NORVASC) 10 mg tablet 2/15/2022 at Unknown time  Yes Yes   Sig: Take 10 mg by mouth   benzonatate (TESSALON PERLES) 100 mg capsule Not Taking at Unknown time  No No   Sig: Take 2 capsules (200 mg total) by mouth 3 (three) times a day as needed for cough   Patient not taking: Reported on 2022    fluticasone (FLONASE) 50 mcg/act nasal spray Past Week at Unknown time  No Yes   Si spray into each nostril daily   methylPREDNISolone 4 MG tablet therapy pack Not Taking at Unknown time  No No   Sig: Use as directed on package   Patient not taking: Reported on 2022       Facility-Administered Medications: None     Allergies:  No Known Allergies    ------------------------------------------------------------------------------------------------------------  Advance Directive and Living Will:      Power of :    POLST:    ------------------------------------------------------------------------------------------------------------  Anticipated Length of Stay is > 2 midnights    Care Time Delivered:   No Critical Care time spent       [facility]        Portions of the record may have been created with voice recognition software  Occasional wrong word or "sound a like" substitutions may have occurred due to the inherent limitations of voice recognition software    Read the chart carefully and recognize, using context, where substitutions have occurred

## 2022-02-17 NOTE — ASSESSMENT & PLAN NOTE
· Glucose elevated on BMP on admission  · Obtain hemoglobin A1c  · Patient denies history of diabetes  · Initiate accu checks + SSI

## 2022-02-17 NOTE — ED PROVIDER NOTES
History  Chief Complaint   Patient presents with    Respiratory Distress     pt covid+ back in january with ongoing symptoms  called ems tn for increasing sob coughing and body aches  RA 60s% and NRB 80s%  awake aox4     47 yo F h/o HTN, recent COVID infection presenting in respiratory distress/hypoxic  Pt was diagnosed with COVID on , did not require hospitalization  States she has not improved and recently has had worsening sx  Reports severe SOB and severe dyspnea with minimal exertion  C/o body aches/HA  +Chills  Prior smoker, states she stopped smoking when diagnosed with COVID  Never diagnosed with asthma/COPD  Has sleep apnea and uses CPAP nightly  No history of VTE  Pt was found to be hypoxic in 60s on RA on EMS arrival, on 15L NRB she is mid [de-identified] and reports feeling better, but is still tachypneic and with conversational dyspnea    MDM: 47 yo F respiratory distress/hypoxia- supplemental O2/HFNC, EKG/trop, septic workup, ddimer, admit                  Prior to Admission Medications   Prescriptions Last Dose Informant Patient Reported?  Taking?   acetaminophen (TYLENOL) 500 mg tablet   Yes No   Sig: Take 1,000 mg by mouth every 6 (six) hours as needed   albuterol (ProAir HFA) 90 mcg/act inhaler   No No   Sig: Inhale 2 puffs every 6 (six) hours as needed for wheezing   amLODIPine (NORVASC) 10 mg tablet   Yes No   Sig: Take 10 mg by mouth   benzonatate (TESSALON PERLES) 100 mg capsule   No No   Sig: Take 2 capsules (200 mg total) by mouth 3 (three) times a day as needed for cough   fluticasone (FLONASE) 50 mcg/act nasal spray   No No   Si spray into each nostril daily   methylPREDNISolone 4 MG tablet therapy pack   No No   Sig: Use as directed on package      Facility-Administered Medications: None       Past Medical History:   Diagnosis Date    Herniated cervical disc     Hypertension        Past Surgical History:   Procedure Laterality Date     SECTION      EYE SURGERY      SHOULDER SURGERY         History reviewed  No pertinent family history  I have reviewed and agree with the history as documented  E-Cigarette/Vaping     E-Cigarette/Vaping Substances     Social History     Tobacco Use    Smoking status: Current Every Day Smoker     Packs/day: 0 50    Smokeless tobacco: Never Used   Substance Use Topics    Alcohol use: Never    Drug use: Not on file       Review of Systems   Constitutional: Positive for activity change, chills and fatigue  Negative for fever and unexpected weight change  HENT: Negative for ear pain, rhinorrhea and sore throat  Eyes: Negative for pain and visual disturbance  Respiratory: Positive for cough and shortness of breath  Cardiovascular: Negative for chest pain and leg swelling  Gastrointestinal: Negative for abdominal pain, constipation, diarrhea, nausea and vomiting  Endocrine: Negative for polydipsia, polyphagia and polyuria  Genitourinary: Negative for dysuria, frequency, hematuria and urgency  Musculoskeletal: Positive for myalgias  Negative for back pain and neck pain  Skin: Negative for color change and rash  Allergic/Immunologic: Negative for environmental allergies and immunocompromised state  Neurological: Negative for dizziness, weakness, light-headedness, numbness and headaches  Hematological: Negative for adenopathy  Does not bruise/bleed easily  Psychiatric/Behavioral: Negative for agitation and confusion  All other systems reviewed and are negative  Physical Exam  Physical Exam  Vitals and nursing note reviewed  Constitutional:       Appearance: She is well-developed  She is ill-appearing  HENT:      Head: Normocephalic and atraumatic  Nose: Nose normal    Eyes:      Conjunctiva/sclera: Conjunctivae normal    Cardiovascular:      Rate and Rhythm: Normal rate and regular rhythm  Heart sounds: Normal heart sounds     Pulmonary:      Effort: Tachypnea, accessory muscle usage and respiratory distress present  Breath sounds: No stridor  No wheezing  Chest:      Chest wall: No tenderness  Abdominal:      General: There is no distension  Palpations: Abdomen is soft  Tenderness: There is no abdominal tenderness  There is no guarding or rebound  Musculoskeletal:         General: No swelling, tenderness or deformity  Cervical back: Normal range of motion and neck supple  Comments: No calf swelling/ttp, no peripheral edema   Skin:     General: Skin is warm and dry  Findings: No rash  Neurological:      Mental Status: She is alert and oriented to person, place, and time  Motor: No abnormal muscle tone  Coordination: Coordination normal    Psychiatric:         Behavior: Behavior is cooperative  Thought Content:  Thought content normal          Judgment: Judgment normal          Vital Signs  ED Triage Vitals   Temperature Pulse Respirations Blood Pressure SpO2   02/16/22 1904 02/16/22 1904 02/16/22 1904 02/16/22 1904 02/16/22 1904   98 2 °F (36 8 °C) (!) 119 (!) 40 (!) 173/72 (!) 86 %      Temp Source Heart Rate Source Patient Position - Orthostatic VS BP Location FiO2 (%)   02/16/22 1904 02/16/22 1904 02/16/22 1904 02/16/22 1904 02/16/22 1910   Oral Monitor Lying Left arm 100      Pain Score       02/16/22 1904       8           Vitals:    02/16/22 1904 02/16/22 2016 02/16/22 2018 02/16/22 2154   BP: (!) 173/72  167/82    Pulse: (!) 119 (!) 112 (!) 111 104   Patient Position - Orthostatic VS: Lying  Lying Lying         Visual Acuity      ED Medications  Medications   sodium chloride (PF) 0 9 % injection 3 mL (has no administration in time range)   ketorolac (TORADOL) injection 15 mg (15 mg Intravenous Given 2/16/22 1935)   ceftriaxone (ROCEPHIN) 1 g/50 mL in dextrose IVPB (0 mg Intravenous Stopped 2/16/22 2018)   azithromycin (ZITHROMAX) tablet 500 mg (500 mg Oral Given 2/16/22 2035)   sodium chloride 0 9 % bolus 500 mL (500 mL Intravenous New Bag 2/16/22 2039) potassium chloride (K-DUR,KLOR-CON) CR tablet 40 mEq (40 mEq Oral Given 2/16/22 2035)   iohexol (OMNIPAQUE) 350 MG/ML injection (SINGLE-DOSE) 85 mL (85 mL Intravenous Given 2/16/22 2116)       Diagnostic Studies  Results Reviewed     Procedure Component Value Units Date/Time    UA w Reflex to Microscopic w Reflex to Culture [[id number]]  (Abnormal) Collected: 02/16/22 2204    Lab Status: Final result Specimen: Urine, Clean Catch Updated: 02/16/22 2209     Color, UA Yellow     Clarity, UA Clear     Specific Gravity, UA <=1 005     pH, UA 6 0     Leukocytes, UA Negative     Nitrite, UA Negative     Protein, UA Negative mg/dl      Glucose, UA Negative mg/dl      Ketones, UA Negative mg/dl      Urobilinogen, UA 0 2 E U /dl      Bilirubin, UA Negative     Blood, UA Small    Urine Microscopic [[id number]] Collected: 02/16/22 2204    Lab Status:  In process Specimen: Urine, Clean Catch Updated: 02/16/22 2209    HS Troponin I 2hr [[id number]] Collected: 02/16/22 2130    Lab Status: No result Specimen: Blood from Arm, Right     CK (with reflex to MB) [[id number]]  (Normal) Collected: 02/16/22 1916    Lab Status: Final result Specimen: Blood from Arm, Right Updated: 02/16/22 2019     Total CK 83 U/L     HS Troponin I 4hr [[id number]]     Lab Status: No result Specimen: Blood     NT-BNP PRO [[id number]]  (Abnormal) Collected: 02/16/22 1915    Lab Status: Final result Specimen: Blood from Arm, Right Updated: 02/16/22 2004     NT-proBNP 1,472 pg/mL     Procalcitonin with AM Reflex [[id number]]  (Abnormal) Collected: 02/16/22 1915    Lab Status: Final result Specimen: Blood from Arm, Right Updated: 02/16/22 2001     Procalcitonin 0 34 ng/ml     Procalcitonin Reflex [[id number]]     Lab Status: No result Specimen: Blood     Comprehensive metabolic panel [[id number]]  (Abnormal) Collected: 02/16/22 1915    Lab Status: Final result Specimen: Blood from Arm, Right Updated: 02/16/22 2000     Sodium 136 mmol/L      Potassium 3 1 mmol/L Chloride 100 mmol/L      CO2 25 mmol/L      ANION GAP 11 mmol/L      BUN 11 mg/dL      Creatinine 0 90 mg/dL      Glucose 165 mg/dL      Calcium 9 1 mg/dL      Corrected Calcium 10 0 mg/dL      AST 25 U/L      ALT 28 U/L      Alkaline Phosphatase 101 U/L      Total Protein 8 1 g/dL      Albumin 2 9 g/dL      Total Bilirubin 0 69 mg/dL      eGFR 74 ml/min/1 73sq m     Narrative:      [place] guidelines for Chronic Kidney Disease (CKD):     Stage 1 with normal or high GFR (GFR > 90 mL/min/1 73 square meters)    Stage 2 Mild CKD (GFR = 60-89 mL/min/1 73 square meters)    Stage 3A Moderate CKD (GFR = 45-59 mL/min/1 73 square meters)    Stage 3B Moderate CKD (GFR = 30-44 mL/min/1 73 square meters)    Stage 4 Severe CKD (GFR = 15-29 mL/min/1 73 square meters)    Stage 5 End Stage CKD (GFR <15 mL/min/1 73 square meters)  Note: GFR calculation is accurate only with a steady state creatinine    HS Troponin 0hr (reflex protocol) [[id number]]  (Normal) Collected: 02/16/22 1914    Lab Status: Final result Specimen: Blood from Arm, Right Updated: 02/16/22 2000     hs TnI 0hr 49 ng/L     D-dimer, quantitative [[id number]]  (Abnormal) Collected: 02/16/22 1915    Lab Status: Final result Specimen: Blood from Arm, Right Updated: 02/16/22 1959     D-Dimer, Quant 1 18 ug/ml FEU     Lactic Acid [[id number]]  (Abnormal) Collected: 02/16/22 1914    Lab Status: Final result Specimen: Blood from Arm, Right Updated: 02/16/22 1959     LACTIC ACID 2 7 mmol/L     Narrative:      Result may be elevated if tourniquet was used during collection      Lactic acid 2 Hours [[id number]]     Lab Status: No result Specimen: Blood     Protime-INR [[id number]]  (Normal) Collected: 02/16/22 1915    Lab Status: Final result Specimen: Blood from Arm, Right Updated: 02/16/22 1953     Protime 13 8 seconds      INR 1 09    APTT [[id number]]  (Abnormal) Collected: 02/16/22 1915    Lab Status: Final result Specimen: Blood from Arm, Right Updated: 02/16/22 1953     PTT 45 seconds     CBC and differential [[id number]]  (Abnormal) Collected: 02/16/22 1914    Lab Status: Final result Specimen: Blood from Arm, Right Updated: 02/16/22 1929     WBC 20 59 Thousand/uL      RBC 5 18 Million/uL      Hemoglobin 15 1 g/dL      Hematocrit 47 3 %      MCV 91 fL      MCH 29 2 pg      MCHC 31 9 g/dL      RDW 15 5 %      MPV 9 9 fL      Platelets 750 Thousands/uL      nRBC 1 /100 WBCs      Neutrophils Relative 76 %      Immat GRANS % 1 %      Lymphocytes Relative 16 %      Monocytes Relative 7 %      Eosinophils Relative 0 %      Basophils Relative 0 %      Neutrophils Absolute 15 53 Thousands/µL      Immature Grans Absolute 0 23 Thousand/uL      Lymphocytes Absolute 3 36 Thousands/µL      Monocytes Absolute 1 38 Thousand/µL      Eosinophils Absolute 0 00 Thousand/µL      Basophils Absolute 0 09 Thousands/µL     Blood culture #2 [[id number]] Collected: 02/16/22 1916    Lab Status: In process Specimen: Blood from Arm, Right Updated: 02/16/22 1924    Blood culture #1 [[id number]] Collected: 02/16/22 1920    Lab Status:  In process Specimen: Blood from Arm, Left Updated: 02/16/22 1924                 XR chest 1 view portable    (Results Pending)   CTA ED chest PE Study    (Results Pending)              Procedures  CriticalCare Time  Performed by: Merary Pickett DO  Authorized by: Merary Pickett DO     Critical care provider statement:     Critical care time (minutes):  35    Critical care time was exclusive of:  Separately billable procedures and treating other patients    Critical care was necessary to treat or prevent imminent or life-threatening deterioration of the following conditions:  Respiratory failure    Critical care was time spent personally by me on the following activities:  Obtaining history from patient or surrogate, development of treatment plan with patient or surrogate, discussions with consultants, evaluation of patient's response to treatment, examination of patient, interpretation of cardiac output measurements, ordering and performing treatments and interventions, ordering and review of laboratory studies, ordering and review of radiographic studies, re-evaluation of patient's condition, review of old charts and ventilator management  Comments:      Respiratory distress/acute hypoxic resp failure requiring HFNC/NRB, frequent reassessments             ED Course  ED Course as of 02/16/22 2221 Wed Feb 16, 2022 1911 SpO2(!): 86 %  On 15L NRB   1923 EKG: sinus tachycardia @ 115 bpm, RAD, qtc 473, nonspecific st changes, S1Q3 new from prior   1930 WBC(!): 20 59   1931 CXR Interpreted by myself: diffuse patchy infiltrates consistent with COVID PNA   2000 D-Dimer, Quant(!): 1 18   2001 hs TnI 0hr: 49   2001 LACTIC ACID(!!): 2 7   2005 NT-proBNP(!): 1,472   2044 Pt reassessed, she is on 55L high flow and 15L NRB, looks much more comfortable             HEART Risk Score      Most Recent Value   Heart Score Risk Calculator    History 0 Filed at: 02/16/2022 2001   ECG 1 Filed at: 02/16/2022 2001   Age 1 Filed at: 02/16/2022 2001   Risk Factors 1 Filed at: 02/16/2022 2001   Troponin 2 Filed at: 02/16/2022 2001   HEART Score 5 Filed at: 02/16/2022 2001                     Initial Sepsis Screening     Row Name 02/16/22 2002 02/16/22 1912             Is the patient's history suggestive of a new or worsening infection? -- Yes (Proceed)  -[address]       Suspected source of infection -- pneumonia  -KH       Are two or more of the following signs & symptoms of infection both present and new to the patient? -- Yes (Proceed)  -KH       Indicate SIRS criteria -- Tachycardia > 90 bpm;Tachypnea > 20 resp per min  -[address]       If the answer is yes to both questions, suspicion of sepsis is present -- --       If severe sepsis is present AND tissue hypoperfusion perists in the hour after fluid resuscitation or lactate > 4, the patient meets criteria for SEPTIC SHOCK -- --       Are any of the following organ dysfunction criteria present within 6 hours of suspected infection and SIRS criteria that are NOT considered to be chronic conditions?  Yes  -KH No  -KH       Organ dysfunction Lactate > 2 0 mmol/L  -KH --       Date of presentation of severe sepsis 02/16/22  -[address] --       Time of presentation of severe sepsis 2002  -KH --       Tissue hypoperfusion persists in the hour after crystalloid fluid administration, evidenced, by either: -- --       Was hypotension present within one hour of the conclusion of crystalloid fluid administration? -- --       Date of presentation of septic shock -- --       Time of presentation of septic shock -- --             User Key  (r) = Recorded By, (t) = Taken By, (c) = Cosigned By    [address] Name Provider Type    [address] [place] [address],  Physician                    Marina Reddy' Criteria for PE      Most Recent Value   Wells' Criteria for PE    Clinical signs and symptoms of DVT 0 Filed at: 02/16/2022 2000   PE is primary diagnosis or equally likely 3 Filed at: 02/16/2022 2000   HR >100 1 5 Filed at: 02/16/2022 2000   Immobilization at least 3 days or Surgery in the previous 4 weeks 0 Filed at: 02/16/2022 2000   Previous, objectively diagnosed PE or DVT 0 Filed at: 02/16/2022 2000   Hemoptysis 0 Filed at: 02/16/2022 2000   Malignancy with treatment within 6 months or palliative 0 Filed at: 02/16/2022 2000   Wells' Criteria Total 4 5 Filed at: 02/16/2022 2000                MDM  Number of Diagnoses or Management Options  Acute respiratory failure with hypoxia ([address] )  Pneumonia due to COVID-19 virus  Diagnosis management comments: 45 yo F diagnosed with COVID>1 month ago with progressive SOB, presenting in respiratory distress with hypoxia, required 55L HFNC and 15LNRB to maintain O2 saturations  - CTA PE scan performed, pending rads read  - Admitted to ICU SD level 1 for further management       Amount and/or Complexity of Data Reviewed  Clinical lab tests: ordered and reviewed  Tests in the radiology section of CPT®: ordered and reviewed  Tests in the medicine section of CPT®: ordered and reviewed  Review and summarize past medical records: yes  Independent visualization of images, tracings, or specimens: yes        Disposition  Final diagnoses:   Acute respiratory failure with hypoxia ([facility] )   Pneumonia due to COVID-19 virus     Time reflects when diagnosis was documented in both MDM as applicable and the Disposition within this note     Time User Action Codes Description Comment    2/16/2022  8:56 PM Cecilia RYDER Add [J96 01] Acute respiratory failure with hypoxia ([facility] )     2/16/2022  8:56 PM Concepción Kirby Add [U07 1,  J12 82] Pneumonia due to COVID-19 virus       ED Disposition     ED Disposition Condition Date/Time Comment    Admit Stable Wed Feb 16, 2022  8:55 PM Case was discussed with Dr Maria Ines Rizvi and the patient's admission status was agreed to be Admission Status: inpatient status to the service of Dr Maria Ines Rizvi   Follow-up Information    None         Current Discharge Medication List      CONTINUE these medications which have NOT CHANGED    Details   acetaminophen (TYLENOL) 500 mg tablet Take 1,000 mg by mouth every 6 (six) hours as needed      albuterol (ProAir HFA) 90 mcg/act inhaler Inhale 2 puffs every 6 (six) hours as needed for wheezing  Qty: 8 5 g, Refills: 1    Comments: Substitution to a formulary equivalent within the same pharmaceutical class is authorized  Associated Diagnoses: COVID-19 virus infection      amLODIPine (NORVASC) 10 mg tablet Take 10 mg by mouth      benzonatate (TESSALON PERLES) 100 mg capsule Take 2 capsules (200 mg total) by mouth 3 (three) times a day as needed for cough  Qty: 20 capsule, Refills: 0    Comments:  To be delivered to patient please call when attempting to deliver [phone]  Associated Diagnoses: Cough      fluticasone (FLONASE) 50 mcg/act nasal spray 1 spray into each nostril daily  Qty: 9 9 mL, Refills: 0    Comments: Needs to be delivered, call when delivery [phone]  Associated Diagnoses: Dysfunction of right eustachian tube      methylPREDNISolone 4 MG tablet therapy pack Use as directed on package  Qty: 21 each, Refills: 0    Comments: For delivery! Call patient when delivering for instructions [phone]  Associated Diagnoses: Pneumonia due to COVID-19 virus             No discharge procedures on file      PDMP Review     None          ED Provider  Electronically Signed by           Victor M William DO  02/16/22 1572

## 2022-02-17 NOTE — ASSESSMENT & PLAN NOTE
· Platelets on admission of 502  · Platelet count in January normal  · Suspect secondary to post COVID infection  · Continue to monitor and start aspirin if needed

## 2022-02-18 LAB
ANION GAP SERPL CALCULATED.3IONS-SCNC: 12 MMOL/L (ref 4–13)
BASOPHILS # BLD AUTO: 0.03 THOUSANDS/ΜL (ref 0–0.1)
BASOPHILS NFR BLD AUTO: 0 % (ref 0–1)
BUN SERPL-MCNC: 15 MG/DL (ref 5–25)
CA-I BLD-SCNC: 1.15 MMOL/L (ref 1.12–1.32)
CALCIUM SERPL-MCNC: 9.2 MG/DL (ref 8.3–10.1)
CHLORIDE SERPL-SCNC: 104 MMOL/L (ref 100–108)
CO2 SERPL-SCNC: 24 MMOL/L (ref 21–32)
CREAT SERPL-MCNC: 0.76 MG/DL (ref 0.6–1.3)
EOSINOPHIL # BLD AUTO: 0 THOUSAND/ΜL (ref 0–0.61)
EOSINOPHIL NFR BLD AUTO: 0 % (ref 0–6)
ERYTHROCYTE [DISTWIDTH] IN BLOOD BY AUTOMATED COUNT: 15.5 % (ref 11.6–15.1)
GFR SERPL CREATININE-BSD FRML MDRD: 91 ML/MIN/1.73SQ M
GLUCOSE SERPL-MCNC: 191 MG/DL (ref 65–140)
GLUCOSE SERPL-MCNC: 207 MG/DL (ref 65–140)
GLUCOSE SERPL-MCNC: 223 MG/DL (ref 65–140)
GLUCOSE SERPL-MCNC: 234 MG/DL (ref 65–140)
GLUCOSE SERPL-MCNC: 331 MG/DL (ref 65–140)
HCT VFR BLD AUTO: 43.7 % (ref 34.8–46.1)
HGB BLD-MCNC: 13.8 G/DL (ref 11.5–15.4)
IMM GRANULOCYTES # BLD AUTO: 0.2 THOUSAND/UL (ref 0–0.2)
IMM GRANULOCYTES NFR BLD AUTO: 1 % (ref 0–2)
LYMPHOCYTES # BLD AUTO: 2.86 THOUSANDS/ΜL (ref 0.6–4.47)
LYMPHOCYTES NFR BLD AUTO: 16 % (ref 14–44)
MAGNESIUM SERPL-MCNC: 2.8 MG/DL (ref 1.6–2.6)
MCH RBC QN AUTO: 29.5 PG (ref 26.8–34.3)
MCHC RBC AUTO-ENTMCNC: 31.6 G/DL (ref 31.4–37.4)
MCV RBC AUTO: 93 FL (ref 82–98)
MONOCYTES # BLD AUTO: 0.49 THOUSAND/ΜL (ref 0.17–1.22)
MONOCYTES NFR BLD AUTO: 3 % (ref 4–12)
MRSA NOSE QL CULT: ABNORMAL
MRSA NOSE QL CULT: ABNORMAL
NEUTROPHILS # BLD AUTO: 14.09 THOUSANDS/ΜL (ref 1.85–7.62)
NEUTS SEG NFR BLD AUTO: 80 % (ref 43–75)
NRBC BLD AUTO-RTO: 0 /100 WBCS
PLATELET # BLD AUTO: 458 THOUSANDS/UL (ref 149–390)
PMV BLD AUTO: 10.6 FL (ref 8.9–12.7)
POTASSIUM SERPL-SCNC: 4.4 MMOL/L (ref 3.5–5.3)
RBC # BLD AUTO: 4.68 MILLION/UL (ref 3.81–5.12)
RYE IGE QN: NEGATIVE
SODIUM SERPL-SCNC: 140 MMOL/L (ref 136–145)
WBC # BLD AUTO: 17.67 THOUSAND/UL (ref 4.31–10.16)

## 2022-02-18 PROCEDURE — 99232 SBSQ HOSP IP/OBS MODERATE 35: CPT | Performed by: INTERNAL MEDICINE

## 2022-02-18 PROCEDURE — 80048 BASIC METABOLIC PNL TOTAL CA: CPT | Performed by: NURSE PRACTITIONER

## 2022-02-18 PROCEDURE — 82948 REAGENT STRIP/BLOOD GLUCOSE: CPT

## 2022-02-18 PROCEDURE — 83735 ASSAY OF MAGNESIUM: CPT | Performed by: NURSE PRACTITIONER

## 2022-02-18 PROCEDURE — 94640 AIRWAY INHALATION TREATMENT: CPT

## 2022-02-18 PROCEDURE — 92610 EVALUATE SWALLOWING FUNCTION: CPT

## 2022-02-18 PROCEDURE — 82330 ASSAY OF CALCIUM: CPT | Performed by: NURSE PRACTITIONER

## 2022-02-18 PROCEDURE — 94660 CPAP INITIATION&MGMT: CPT

## 2022-02-18 PROCEDURE — 85025 COMPLETE CBC W/AUTO DIFF WBC: CPT | Performed by: NURSE PRACTITIONER

## 2022-02-18 PROCEDURE — XW0DXM6 INTRODUCTION OF BARICITINIB INTO MOUTH AND PHARYNX, EXTERNAL APPROACH, NEW TECHNOLOGY GROUP 6: ICD-10-PCS | Performed by: INTERNAL MEDICINE

## 2022-02-18 RX ORDER — METOPROLOL TARTRATE 5 MG/5ML
10 INJECTION INTRAVENOUS ONCE
Status: COMPLETED | OUTPATIENT
Start: 2022-02-18 | End: 2022-02-18

## 2022-02-18 RX ORDER — FUROSEMIDE 10 MG/ML
40 INJECTION INTRAMUSCULAR; INTRAVENOUS ONCE
Status: COMPLETED | OUTPATIENT
Start: 2022-02-18 | End: 2022-02-18

## 2022-02-18 RX ORDER — ACETAMINOPHEN 325 MG/1
650 TABLET ORAL EVERY 6 HOURS PRN
Status: DISCONTINUED | OUTPATIENT
Start: 2022-02-18 | End: 2022-03-16 | Stop reason: HOSPADM

## 2022-02-18 RX ORDER — DILTIAZEM HYDROCHLORIDE 5 MG/ML
25 INJECTION INTRAVENOUS ONCE
Status: COMPLETED | OUTPATIENT
Start: 2022-02-18 | End: 2022-02-18

## 2022-02-18 RX ORDER — METOPROLOL TARTRATE 5 MG/5ML
2.5 INJECTION INTRAVENOUS ONCE
Status: COMPLETED | OUTPATIENT
Start: 2022-02-18 | End: 2022-02-18

## 2022-02-18 RX ORDER — MAGNESIUM SULFATE HEPTAHYDRATE 40 MG/ML
2 INJECTION, SOLUTION INTRAVENOUS ONCE
Status: COMPLETED | OUTPATIENT
Start: 2022-02-18 | End: 2022-02-19

## 2022-02-18 RX ORDER — DILTIAZEM HYDROCHLORIDE 5 MG/ML
15 INJECTION INTRAVENOUS ONCE
Status: COMPLETED | OUTPATIENT
Start: 2022-02-18 | End: 2022-02-18

## 2022-02-18 RX ORDER — LABETALOL 20 MG/4 ML (5 MG/ML) INTRAVENOUS SYRINGE
20 ONCE
Status: DISCONTINUED | OUTPATIENT
Start: 2022-02-18 | End: 2022-02-18

## 2022-02-18 RX ORDER — ACETAMINOPHEN 325 MG/1
650 TABLET ORAL ONCE
Status: COMPLETED | OUTPATIENT
Start: 2022-02-18 | End: 2022-02-18

## 2022-02-18 RX ADMIN — METOROPROLOL TARTRATE 10 MG: 5 INJECTION, SOLUTION INTRAVENOUS at 08:50

## 2022-02-18 RX ADMIN — LEVALBUTEROL HYDROCHLORIDE 1.25 MG: 1.25 SOLUTION, CONCENTRATE RESPIRATORY (INHALATION) at 19:54

## 2022-02-18 RX ADMIN — SODIUM CHLORIDE SOLN NEBU 3% 4 ML: 3 NEBU SOLN at 08:14

## 2022-02-18 RX ADMIN — METHYLPREDNISOLONE SODIUM SUCCINATE 60 MG: 125 INJECTION, POWDER, FOR SOLUTION INTRAMUSCULAR; INTRAVENOUS at 03:57

## 2022-02-18 RX ADMIN — AMIODARONE HYDROCHLORIDE 0.5 MG/MIN: 50 INJECTION, SOLUTION INTRAVENOUS at 21:54

## 2022-02-18 RX ADMIN — INSULIN LISPRO 11 UNITS: 100 INJECTION, SOLUTION INTRAVENOUS; SUBCUTANEOUS at 22:03

## 2022-02-18 RX ADMIN — LEVALBUTEROL HYDROCHLORIDE 1.25 MG: 1.25 SOLUTION, CONCENTRATE RESPIRATORY (INHALATION) at 08:14

## 2022-02-18 RX ADMIN — INSULIN LISPRO 5 UNITS: 100 INJECTION, SOLUTION INTRAVENOUS; SUBCUTANEOUS at 21:38

## 2022-02-18 RX ADMIN — BENZONATATE 200 MG: 100 CAPSULE ORAL at 08:27

## 2022-02-18 RX ADMIN — DEXTROSE 150 MG: 50 INJECTION, SOLUTION INTRAVENOUS at 21:21

## 2022-02-18 RX ADMIN — GUAIFENESIN 600 MG: 600 TABLET, EXTENDED RELEASE ORAL at 21:33

## 2022-02-18 RX ADMIN — BARICITINIB 4 MG: 2 TABLET, FILM COATED ORAL at 11:57

## 2022-02-18 RX ADMIN — SODIUM CHLORIDE SOLN NEBU 3% 4 ML: 3 NEBU SOLN at 13:31

## 2022-02-18 RX ADMIN — DILTIAZEM HYDROCHLORIDE 15 MG: 5 INJECTION INTRAVENOUS at 10:20

## 2022-02-18 RX ADMIN — CEFTRIAXONE SODIUM 1000 MG: 10 INJECTION, POWDER, FOR SOLUTION INTRAVENOUS at 19:51

## 2022-02-18 RX ADMIN — BENZONATATE 200 MG: 100 CAPSULE ORAL at 21:33

## 2022-02-18 RX ADMIN — MAGNESIUM SULFATE HEPTAHYDRATE 2 G: 40 INJECTION, SOLUTION INTRAVENOUS at 23:27

## 2022-02-18 RX ADMIN — ACETAMINOPHEN 650 MG: 325 TABLET, FILM COATED ORAL at 16:36

## 2022-02-18 RX ADMIN — FUROSEMIDE 40 MG: 10 INJECTION, SOLUTION INTRAVENOUS at 11:01

## 2022-02-18 RX ADMIN — INSULIN LISPRO 4 UNITS: 100 INJECTION, SOLUTION INTRAVENOUS; SUBCUTANEOUS at 16:37

## 2022-02-18 RX ADMIN — INSULIN LISPRO 4 UNITS: 100 INJECTION, SOLUTION INTRAVENOUS; SUBCUTANEOUS at 11:20

## 2022-02-18 RX ADMIN — SODIUM CHLORIDE SOLN NEBU 3% 4 ML: 3 NEBU SOLN at 19:54

## 2022-02-18 RX ADMIN — METHYLPREDNISOLONE SODIUM SUCCINATE 60 MG: 125 INJECTION, POWDER, FOR SOLUTION INTRAMUSCULAR; INTRAVENOUS at 21:46

## 2022-02-18 RX ADMIN — AMIODARONE HYDROCHLORIDE 1 MG/MIN: 50 INJECTION, SOLUTION INTRAVENOUS at 15:57

## 2022-02-18 RX ADMIN — ACETAMINOPHEN 650 MG: 325 TABLET, FILM COATED ORAL at 11:57

## 2022-02-18 RX ADMIN — METHYLPREDNISOLONE SODIUM SUCCINATE 60 MG: 125 INJECTION, POWDER, FOR SOLUTION INTRAMUSCULAR; INTRAVENOUS at 16:36

## 2022-02-18 RX ADMIN — METHYLPREDNISOLONE SODIUM SUCCINATE 60 MG: 125 INJECTION, POWDER, FOR SOLUTION INTRAMUSCULAR; INTRAVENOUS at 10:20

## 2022-02-18 RX ADMIN — PANTOPRAZOLE SODIUM 40 MG: 40 TABLET, DELAYED RELEASE ORAL at 08:27

## 2022-02-18 RX ADMIN — ALPRAZOLAM 0.5 MG: 0.5 TABLET ORAL at 11:57

## 2022-02-18 RX ADMIN — INSULIN LISPRO 2 UNITS: 100 INJECTION, SOLUTION INTRAVENOUS; SUBCUTANEOUS at 08:29

## 2022-02-18 RX ADMIN — DILTIAZEM HYDROCHLORIDE 25 MG: 5 INJECTION INTRAVENOUS at 11:01

## 2022-02-18 RX ADMIN — IPRATROPIUM BROMIDE 0.5 MG: 0.5 SOLUTION RESPIRATORY (INHALATION) at 19:54

## 2022-02-18 RX ADMIN — ENOXAPARIN SODIUM 60 MG: 60 INJECTION SUBCUTANEOUS at 08:28

## 2022-02-18 RX ADMIN — SODIUM CHLORIDE SOLN NEBU 3% 4 ML: 3 NEBU SOLN at 02:50

## 2022-02-18 RX ADMIN — IPRATROPIUM BROMIDE 0.5 MG: 0.5 SOLUTION RESPIRATORY (INHALATION) at 08:14

## 2022-02-18 RX ADMIN — LEVALBUTEROL HYDROCHLORIDE 1.25 MG: 1.25 SOLUTION, CONCENTRATE RESPIRATORY (INHALATION) at 13:31

## 2022-02-18 RX ADMIN — GUAIFENESIN 600 MG: 600 TABLET, EXTENDED RELEASE ORAL at 08:28

## 2022-02-18 RX ADMIN — METOPROLOL TARTRATE 2.5 MG: 5 INJECTION INTRAVENOUS at 23:29

## 2022-02-18 RX ADMIN — AMLODIPINE BESYLATE 10 MG: 10 TABLET ORAL at 08:27

## 2022-02-18 RX ADMIN — ALPRAZOLAM 0.5 MG: 0.5 TABLET ORAL at 22:10

## 2022-02-18 RX ADMIN — DILTIAZEM HYDROCHLORIDE 1 MG/HR: 5 INJECTION INTRAVENOUS at 10:28

## 2022-02-18 RX ADMIN — IPRATROPIUM BROMIDE 0.5 MG: 0.5 SOLUTION RESPIRATORY (INHALATION) at 13:31

## 2022-02-18 RX ADMIN — ENOXAPARIN SODIUM 60 MG: 60 INJECTION SUBCUTANEOUS at 21:34

## 2022-02-18 RX ADMIN — DEXTROSE 150 MG: 50 INJECTION, SOLUTION INTRAVENOUS at 15:43

## 2022-02-18 NOTE — SPEECH THERAPY NOTE
Speech Language/Pathology  Speech/Language Pathology  Assessment    Patient Name: Graciela Nicolas  RIKZS'W Date: 2022     Problem List  Principal Problem:    Acute respiratory failure with hypoxia ([address] )  Active Problems:    Hypokalemia    Elevated brain natriuretic peptide (BNP) level    SIRS (systemic inflammatory response syndrome) (HCC)    Thrombocytosis    Hyperglycemia    BMI 50 0-59 9, adult [facility])    Past Medical History  Past Medical History:   Diagnosis Date    Herniated cervical disc     Hypertension      Past Surgical History  Past Surgical History:   Procedure Laterality Date     SECTION      EYE SURGERY      SHOULDER SURGERY          Bedside Swallow Evaluation:    Summary:  Pt presented with oral and pharyngeal stages that were WNL  Positioned upright and alert  Coordinated po presentation with non re-breather mask  Pt also on HFNC  Trialed apple sauce, chente crackers, and thin liquid by straw  Mastication, bolus formation, transfer, and control were WNL  Swallows were prompt  Laryngeal rise appeared adequate  No overt s/s of aspiration  No cough or wet vocal quality  Speech clear, Excellent volume  Recommendations:  Diet: Regular   Liquid: Thin  Meds: As tolereated  Supervision: Full while mask needs to be coordinated  Positioning:Upright  Strategies: Pt to take PO/Meds only when fully alert and upright  Coordinate mask as needed with po presentation  Oral care  Aspiration precautions  Reflux precautions  Eval only, No f/u tx indicated  Consider consult w/:  Nutrition    H&P/Admit info/ pertinent provider notes: (PMH noted above)  Graciela Nicolas is a 46 y o  female who presents with increasing shortness of breath  She has a past medical history of previous cervical injury with associated chronic pain, and recent COVID infection, diagnosed   She presented to the ED yesterday after having progressive shortness of breath over the past 2 weeks   She stated she was unable to ambulate and complete her ADL's secondary to her shortness of breath and increasing fatigue/weakness  On arrival to the ED, she was hypoxic in the 60's  She was placed on a NRB with increase in her oxygen saturation to 80's  She then was placed on HFNC with requirements of 55L/100% + NRB  Labs revealed lactic acidosis, leukocytosis, and elevation in her pro-BNP  A CTA was performed which was negative for PE, but revealed scattered airspace opacities  She was given ceftriaxone and azithromycin in the ED and admitted under critical care medicine secondary to high oxygen requirements  History obtained from chart review and the patient  Special Studies:  CTA ED chest PE study 2/16/2022  No evidence of pulmonary artery embolus  XR chest impression 2/16/2022  Multifocal groundglass infiltrates concerning for  Covid  19 infection      Previous VBS:  None  Patient's goal: Eat meat  Did the pt report pain? No  If yes, was nursing notified/was it addressed?n/a  Reason for consult:  R/o aspiration  Determine safest and least restrictive diet  respiratory compromise  Nursing reported cough w/ PO (sip of coffee this am and made NPO)    Food allergies:  None known  Current diet:  NPO  Premorbid diet[de-identified]  Regular and thin  O2 requirement:  HFNC  NRB  Voice/Speech:  WNL  Social:  Lives alone  Follows commands:   Basic                       Cognitive Status:  Alert and aware  Oral [facility] exam:  Dentition: Natural  Labial strength and ROM: WNL  Lingual strength and ROM: WNL  Mandibular strength and ROM: WNL    Items administered:  Apple sauce, chnete crackers, and thin liquid by straw    Oral stage:  Lip closure:  WNL  Mastication: WNL  Bolus formation:WNL  Bolus control: WNL  Transfer: WNL  Oral residue: none  Pocketing: none    Pharyngeal stage:  Swallow promptness: prompt  Laryngeal rise: adequate  Wet voice: none  Throat clear: none  Cough: none  Secondary swallows: none  Audible swallows:none  No overt s/s aspiration    Esophageal stage:  No s/s reported  H/o of GERD  H/o of EGD    Past Medical History:   Diagnosis Date    H  pylori infection 08/2018    Hypertension   Past Surgical History:   Procedure Laterality Date    COLONOSCOPY 08/2018    EGD 08/2018     Results d/w:  Pt, nursing,physician,

## 2022-02-18 NOTE — PROGRESS NOTES
02/18/22 1200   Clinical Encounter Type   Visited With Patient   Routine Visit Introduction   Continue Visiting Yes   Referral From Nurse

## 2022-02-18 NOTE — PROGRESS NOTES
Daily Progress Note - Critical Care   Katie Reinoso 46 y o  female MRN: [id number]  Unit/Bed#: ICU 6 Encounter: [id number]        ----------------------------------------------------------------------------------------  HPI/24hr events: 46year old female with recent COVID infection, presenting with increased dyspnea, currently being managed for bacterial pneumonia, volume overload, and possible pneumonitis    overnight patient became hypoxic requiring BIPAP  With the BIPAP patient was saturating in the low 90's     ---------------------------------------------------------------------------------------  SUBJECTIVE  No new complains today  She would like to have breakfast  Denies chest pain, denies difficulty breathing, patient is on BIPAP  No fever or chills    Review of Systems   Constitutional: Positive for fatigue  Negative for activity change, appetite change, chills and fever  HENT: Negative  Eyes: Negative  Negative for photophobia and visual disturbance  Respiratory: Positive for cough and shortness of breath  Negative for chest tightness and wheezing  Cardiovascular: Negative  Negative for chest pain, palpitations and leg swelling  Gastrointestinal: Negative  Negative for abdominal pain, blood in stool, diarrhea, nausea and vomiting  Endocrine: Negative  Genitourinary: Negative  Musculoskeletal: Negative  Skin: Negative  Neurological: Negative  Negative for dizziness, light-headedness and headaches  Psychiatric/Behavioral: Negative  All other systems reviewed and are negative  Review of systems was reviewed and negative unless stated above in HPI/24-hour events   ---------------------------------------------------------------------------------------  Assessment and Plan:    Neuro:    None  o CAM ICU      CV:    Acute diastolic heart failure, POA with elevated pro BNP of 1472, CXR of possible pulm congestion   Echo 2/17/21: preserved EF 65%, G1DD, IVC normal  o S/p IV lasix 40mg x 1 on 2/16  o Will give IV lasix 40 mg once today to maintain a -ve 500cc fluid balance  o Monitor I/O, daily weight  o Monitor electrolytes, maintain Mg>2, K>4  o Monitor renal functions   Hypertension  o Continue Amlodipine 10 mg daily      Pulm:   Acute hypoxic respiratory failure likely multifactorial secondary to pneumonia, recent COVID sequale, and volume overload, and possible pneumonitis  o Wean off BiPAP to HFNC  o Maintain SPO2 > 88%  o Continue Solumedrol 60 mg q6h day 2  o Continue IV ceftriaxone Day 3 and azithromycin Day 3/3  o Continue Atrovent and Xopenex nebs  o IV lasix 40 mg x once today  o Mucinex, tessalon perls   o Incentive spirometry  o Follow up ABDOULAYE   Emphysemia  o Continue nebs as outlined above      GI:    None, continue protonix 40 mg daily po for GI prophylaxis      :    None      F/E/N:    Hypokalemia - resolved      Heme/Onc:    Thrombocytosis, likely reactive  o Will continue to monitor   DVT prophylaxis: s/q enoxaparin 40 mg q12h      Endo:    Hyperglycemia, HbA1C 6 6, on steroids  o Continue ISS alg 3 with accuchecks  o May need oral antihyperglycemic on discharge       ID:    Sepsis vs SIRS, POA with leukocytosis, tachycardia, lactic acidosis   Likely source pulmonary  o Azithromycin Day 3/3  o Continue IV ceftriaxone day 3  o Follow up blood cultures, MRSA  o Holding IV fluids given possible pulmonary edema      MSK/Skin:    History of cervical disc herniation    Patient appropriate for transfer out of the ICU today?: No  Disposition: Continue Stepdown Level 1 level of care   Code Status: Level 1 - Full Code  ---------------------------------------------------------------------------------------  ICU CORE MEASURES    Prophylaxis   VTE Pharmacologic Prophylaxis: Enoxaparin (Lovenox)  VTE Mechanical Prophylaxis: sequential compression device  Stress Ulcer Prophylaxis: Pantoprazole PO    ABCDE Protocol (if indicated)  Plan to perform spontaneous awakening trial today? No Not applicable  Plan to perform spontaneous breathing trial today? Not applicable  Obvious barriers to extubation? Not applicable  CAM-ICU: Negative    Invasive Devices Review  Invasive Devices  Report    Peripheral Intravenous Line            Peripheral IV 22 Right Antecubital 1 day    Peripheral IV 22 Right;Ventral (anterior) Hand <1 day              Can any invasive devices be discontinued today? Not applicable  ---------------------------------------------------------------------------------------  OBJECTIVE    Vitals   Vitals:    22 0200 22 0300 22 0500 22 0600   BP: 116/66 (!) 137/101 136/71 137/70   Pulse: 86 96 88 90   Resp: (!) 39 (!) 28 (!) 37 (!) 25   Temp:       TempSrc:       SpO2: 91% (!) 86% 93% 91%   Weight:    101 kg (223 lb 5 2 oz)   Height:         Temp (24hrs), Av 4 °F (36 3 °C), Min:97 4 °F (36 3 °C), Max:97 4 °F (36 3 °C)  Current: Temperature: (!) 97 4 °F (36 3 °C)  HR: 90  BP: 132/68  RR: 30  SpO2: 89%    Respiratory:  SpO2: SpO2: (!) 84 %  , SpO2 Device: O2 Device: High flow nasal cannula       Invasive/non-invasive ventilation settings   Respiratory  Report   Lab Data (Last 4 hours)    None         O2/Vent Data (Last 4 hours)       0440          Non-Invasive Ventilation Mode BiPAP                   Physical Exam  Vitals and nursing note reviewed  Constitutional:       General: She is not in acute distress  Appearance: She is ill-appearing  She is not toxic-appearing  HENT:      Head: Normocephalic  Nose: Nose normal       Mouth/Throat:      Comments: Patient on BIPAP  Eyes:      Pupils: Pupils are equal, round, and reactive to light  Cardiovascular:      Rate and Rhythm: Normal rate and regular rhythm  Pulses: Normal pulses  Heart sounds: No murmur heard  Pulmonary:      Breath sounds: Rales present  No wheezing  Abdominal:      General: There is no distension  Tenderness:  There is no abdominal tenderness  Musculoskeletal:      Right lower leg: No edema  Left lower leg: No edema  Skin:     Capillary Refill: Capillary refill takes less than 2 seconds  Neurological:      Mental Status: She is alert and oriented to person, place, and time  Psychiatric:         Mood and Affect: Mood normal              Laboratory and Diagnostics:  Results from last 7 days   Lab Units 02/18/22 0316 02/17/22 0457 02/16/22 2233 02/16/22 1914   WBC Thousand/uL 17 67* 19 45*  --  20 59*   HEMOGLOBIN g/dL 13 8 14 1  --  15 1   HEMATOCRIT % 43 7 43 1  --  47 3*   PLATELETS Thousands/uL 458* 430* 440* 502*   NEUTROS PCT % 80* 81*  --  76*   MONOS PCT % 3* 6  --  7     Results from last 7 days   Lab Units 02/18/22 0316 02/17/22 0457 02/16/22 1915   SODIUM mmol/L 140 140 136   POTASSIUM mmol/L 4 4 4 2 3 1*   CHLORIDE mmol/L 104 105 100   CO2 mmol/L 24 25 25   ANION GAP mmol/L 12 10 11   BUN mg/dL 15 11 11   CREATININE mg/dL 0 76 0 69 0 90   CALCIUM mg/dL 9 2 8 5 9 1   GLUCOSE RANDOM mg/dL 207* 149* 165*   ALT U/L  --  26 28   AST U/L  --  24 25   ALK PHOS U/L  --  93 101   ALBUMIN g/dL  --  2 6* 2 9*   TOTAL BILIRUBIN mg/dL  --  0 74 0 69     Results from last 7 days   Lab Units 02/18/22 0316 02/17/22 0457   MAGNESIUM mg/dL 2 8* 2 3   PHOSPHORUS mg/dL  --  4 5      Results from last 7 days   Lab Units 02/16/22 1915   INR  1 09   PTT seconds 45*          Results from last 7 days   Lab Units 02/16/22 2233 02/16/22 1914   LACTIC ACID mmol/L 1 5 2 7*     ABG:    VBG:    Results from last 7 days   Lab Units 02/16/22 2237 02/16/22 1915   PROCALCITONIN ng/ml 0 38* 0 34*       Micro  Results from last 7 days   Lab Units 02/17/22  0150 02/16/22  2316 02/16/22  1920 02/16/22 1916   BLOOD CULTURE   --   --  No Growth at 24 hrs  No Growth at 24 hrs     GRAM STAIN RESULT   --  1+ Epithelial cells per low power field*  4+ Polys*  4+ Gram positive cocci in pairs, chains and clusters*  4+ Gram negative rods*  4+ Gram positive rods*  Rare Budding Yeast with Pseudomycelia*  --   --    LEGIONELLA URINARY ANTIGEN  Negative  --   --   --    STREP PNEUMONIAE ANTIGEN, URINE  Negative  --   --   --        EKG: n/a  Imaging:  I have personally reviewed pertinent reports  Intake and Output  I/O       02/16 0701  02/17 0700 02/17 0701  02/18 0700 02/18 0701  02/19 0700    P  O   952     IV Piggyback 500 150     Total Intake(mL/kg) 500 (5) 1102 (10 9)     Urine (mL/kg/hr) 1150 650 (0 3)     Total Output 1150 650     Net -650 +452                  Height and Weights   Height: 4' 6" (137 2 cm)     Body mass index is 53 85 kg/m²  Weight (last 2 days)     Date/Time Weight    02/18/22 0600 101 (223 33)    02/17/22 0800 99 3 (219)    02/17/22 0300 99 7 (219 8)    02/16/22 2224 99 7 (219 8)    02/16/22 1904 100 (221)            Nutrition       Diet Orders   (From admission, onward)             Start     Ordered    02/16/22 2226  Diet Regular; Regular House  Diet effective now        References:    Nutrtion Support Algorithm Enteral vs  Parenteral   Question Answer Comment   Diet Type Regular    Regular Regular House    RD to adjust diet per protocol?  Yes        02/16/22 2225                  Active Medications  Scheduled Meds:  Current Facility-Administered Medications   Medication Dose Route Frequency Provider Last Rate    ALPRAZolam  0 5 mg Oral BID PRN Raynold Ona Sonday, EMMA      amLODIPine  10 mg Oral Daily EMMA Ferrara      azithromycin  500 mg Oral Q24H Raynold Ona Sonday, EMMA      benzonatate  200 mg Oral TID PRN [facility], EM[facility]      cefTRIAXone  1,000 mg Intravenous Q24H EMMA Stone 1,000 mg (02/17/22 2002)    enoxaparin  60 mg Subcutaneous Q12H [facility] Ord, EMMA      guaiFENesin  600 mg Oral Q12H [address] Gina Sullivan, [address]      insulin lispro  1-6 Units Subcutaneous TID AC EMMA Stone      insulin lispro  1-6 Units Subcutaneous HS EMMA Stone      ipratropium 0 5 mg Nebulization 4x Daily EMMA Vicente      levalbuterol  1 25 mg Nebulization 4x Daily EMMA Vicente      methylPREDNISolone sodium succinate  60 mg Intravenous Q6H EMMA Vicente      pantoprazole  40 mg Oral Daily Before Breakfast Claudell Spurling, MD      sodium chloride  4 mL Nebulization Q6H EMMA Stone       Continuous Infusions:     PRN Meds:   ALPRAZolam, 0 5 mg, BID PRN  benzonatate, 200 mg, TID PRN        Allergies   No Known Allergies  ---------------------------------------------------------------------------------------  Advance Directive and Living Will:      Power of :    POLST:    ---------------------------------------------------------------------------------------      Claudell Spurling, MD      Portions of the record may have been created with voice recognition software  Occasional wrong word or "sound a like" substitutions may have occurred due to the inherent limitations of voice recognition software    Read the chart carefully and recognize, using context, where substitutions have occurred

## 2022-02-18 NOTE — PROGRESS NOTES
Pastoral Care Progress Note    2022  Patient: Angela Guevara : 1970  Admission Date & Time: 2022 1901  MRN: [id number] CSN: [id number]        Patient having a hard time dealing with her breathing status having the machine on  Supported her emotional , feelings at presence  Will continue to follow patient

## 2022-02-19 LAB
ALBUMIN SERPL BCP-MCNC: 2.4 G/DL (ref 3.5–5)
ALP SERPL-CCNC: 120 U/L (ref 46–116)
ALT SERPL W P-5'-P-CCNC: 91 U/L (ref 12–78)
ANION GAP SERPL CALCULATED.3IONS-SCNC: 10 MMOL/L (ref 4–13)
APTT PPP: 45 SECONDS (ref 23–37)
APTT PPP: 76 SECONDS (ref 23–37)
AST SERPL W P-5'-P-CCNC: 57 U/L (ref 5–45)
BACTERIA SPT RESP CULT: ABNORMAL
BASOPHILS # BLD AUTO: 0.02 THOUSANDS/ΜL (ref 0–0.1)
BASOPHILS NFR BLD AUTO: 0 % (ref 0–1)
BILIRUB SERPL-MCNC: 0.26 MG/DL (ref 0.2–1)
BUN SERPL-MCNC: 27 MG/DL (ref 5–25)
CALCIUM ALBUM COR SERPL-MCNC: 10.2 MG/DL (ref 8.3–10.1)
CALCIUM SERPL-MCNC: 8.9 MG/DL (ref 8.3–10.1)
CHLORIDE SERPL-SCNC: 104 MMOL/L (ref 100–108)
CO2 SERPL-SCNC: 24 MMOL/L (ref 21–32)
CREAT SERPL-MCNC: 0.85 MG/DL (ref 0.6–1.3)
CRP SERPL QL: 108.2 MG/L
EOSINOPHIL # BLD AUTO: 0 THOUSAND/ΜL (ref 0–0.61)
EOSINOPHIL NFR BLD AUTO: 0 % (ref 0–6)
ERYTHROCYTE [DISTWIDTH] IN BLOOD BY AUTOMATED COUNT: 14.7 % (ref 11.6–15.1)
GFR SERPL CREATININE-BSD FRML MDRD: 79 ML/MIN/1.73SQ M
GLUCOSE SERPL-MCNC: 162 MG/DL (ref 65–140)
GLUCOSE SERPL-MCNC: 196 MG/DL (ref 65–140)
GLUCOSE SERPL-MCNC: 199 MG/DL (ref 65–140)
GLUCOSE SERPL-MCNC: 255 MG/DL (ref 65–140)
GLUCOSE SERPL-MCNC: 303 MG/DL (ref 65–140)
GRAM STN SPEC: ABNORMAL
HCT VFR BLD AUTO: 41.9 % (ref 34.8–46.1)
HGB BLD-MCNC: 13.5 G/DL (ref 11.5–15.4)
IMM GRANULOCYTES # BLD AUTO: 0.17 THOUSAND/UL (ref 0–0.2)
IMM GRANULOCYTES NFR BLD AUTO: 1 % (ref 0–2)
INR PPP: 1.19 (ref 0.84–1.19)
LYMPHOCYTES # BLD AUTO: 1.98 THOUSANDS/ΜL (ref 0.6–4.47)
LYMPHOCYTES NFR BLD AUTO: 12 % (ref 14–44)
MCH RBC QN AUTO: 30.8 PG (ref 26.8–34.3)
MCHC RBC AUTO-ENTMCNC: 32.2 G/DL (ref 31.4–37.4)
MCV RBC AUTO: 95 FL (ref 82–98)
MONOCYTES # BLD AUTO: 1.15 THOUSAND/ΜL (ref 0.17–1.22)
MONOCYTES NFR BLD AUTO: 7 % (ref 4–12)
NEUTROPHILS # BLD AUTO: 13.03 THOUSANDS/ΜL (ref 1.85–7.62)
NEUTS SEG NFR BLD AUTO: 80 % (ref 43–75)
NRBC BLD AUTO-RTO: 0 /100 WBCS
PLATELET # BLD AUTO: 409 THOUSANDS/UL (ref 149–390)
PMV BLD AUTO: 10.2 FL (ref 8.9–12.7)
POTASSIUM SERPL-SCNC: 4.3 MMOL/L (ref 3.5–5.3)
PROCALCITONIN SERPL-MCNC: 0.22 NG/ML
PROT SERPL-MCNC: 7.5 G/DL (ref 6.4–8.2)
PROTHROMBIN TIME: 14.7 SECONDS (ref 11.6–14.5)
RBC # BLD AUTO: 4.39 MILLION/UL (ref 3.81–5.12)
SODIUM SERPL-SCNC: 138 MMOL/L (ref 136–145)
WBC # BLD AUTO: 16.35 THOUSAND/UL (ref 4.31–10.16)

## 2022-02-19 PROCEDURE — 85730 THROMBOPLASTIN TIME PARTIAL: CPT | Performed by: NURSE PRACTITIONER

## 2022-02-19 PROCEDURE — 80053 COMPREHEN METABOLIC PANEL: CPT | Performed by: INTERNAL MEDICINE

## 2022-02-19 PROCEDURE — 94660 CPAP INITIATION&MGMT: CPT

## 2022-02-19 PROCEDURE — 84145 PROCALCITONIN (PCT): CPT | Performed by: INTERNAL MEDICINE

## 2022-02-19 PROCEDURE — 86140 C-REACTIVE PROTEIN: CPT | Performed by: INTERNAL MEDICINE

## 2022-02-19 PROCEDURE — 99232 SBSQ HOSP IP/OBS MODERATE 35: CPT | Performed by: INTERNAL MEDICINE

## 2022-02-19 PROCEDURE — 94640 AIRWAY INHALATION TREATMENT: CPT

## 2022-02-19 PROCEDURE — 85025 COMPLETE CBC W/AUTO DIFF WBC: CPT | Performed by: INTERNAL MEDICINE

## 2022-02-19 PROCEDURE — 94644 CONT INHLJ TX 1ST HOUR: CPT

## 2022-02-19 PROCEDURE — 85610 PROTHROMBIN TIME: CPT | Performed by: NURSE PRACTITIONER

## 2022-02-19 PROCEDURE — 94645 CONT INHLJ TX EACH ADDL HOUR: CPT

## 2022-02-19 PROCEDURE — 82948 REAGENT STRIP/BLOOD GLUCOSE: CPT

## 2022-02-19 RX ORDER — HEPARIN SODIUM 1000 [USP'U]/ML
2000 INJECTION, SOLUTION INTRAVENOUS; SUBCUTANEOUS
Status: DISCONTINUED | OUTPATIENT
Start: 2022-02-19 | End: 2022-02-26

## 2022-02-19 RX ORDER — HEPARIN SODIUM 1000 [USP'U]/ML
4000 INJECTION, SOLUTION INTRAVENOUS; SUBCUTANEOUS
Status: DISCONTINUED | OUTPATIENT
Start: 2022-02-19 | End: 2022-02-26

## 2022-02-19 RX ORDER — HEPARIN SODIUM 1000 [USP'U]/ML
4000 INJECTION, SOLUTION INTRAVENOUS; SUBCUTANEOUS ONCE
Status: DISCONTINUED | OUTPATIENT
Start: 2022-02-19 | End: 2022-02-19 | Stop reason: CLARIF

## 2022-02-19 RX ORDER — DEXMEDETOMIDINE 100 UG/ML
.1-.7 INJECTION, SOLUTION, CONCENTRATE INTRAVENOUS
Status: DISCONTINUED | OUTPATIENT
Start: 2022-02-19 | End: 2022-02-24

## 2022-02-19 RX ORDER — HEPARIN SODIUM 1000 [USP'U]/ML
4000 INJECTION, SOLUTION INTRAVENOUS; SUBCUTANEOUS
Status: DISCONTINUED | OUTPATIENT
Start: 2022-02-19 | End: 2022-02-19 | Stop reason: CLARIF

## 2022-02-19 RX ORDER — HEPARIN SODIUM 10000 [USP'U]/100ML
3-20 INJECTION, SOLUTION INTRAVENOUS
Status: DISCONTINUED | OUTPATIENT
Start: 2022-02-19 | End: 2022-02-19 | Stop reason: CLARIF

## 2022-02-19 RX ORDER — METOPROLOL TARTRATE 5 MG/5ML
5 INJECTION INTRAVENOUS ONCE
Status: DISCONTINUED | OUTPATIENT
Start: 2022-02-19 | End: 2022-02-19

## 2022-02-19 RX ORDER — POLYETHYLENE GLYCOL 3350 17 G/17G
17 POWDER, FOR SOLUTION ORAL DAILY
Status: DISCONTINUED | OUTPATIENT
Start: 2022-02-19 | End: 2022-02-22

## 2022-02-19 RX ORDER — HEPARIN SODIUM 1000 [USP'U]/ML
4000 INJECTION, SOLUTION INTRAVENOUS; SUBCUTANEOUS ONCE
Status: COMPLETED | OUTPATIENT
Start: 2022-02-19 | End: 2022-02-19

## 2022-02-19 RX ORDER — METOPROLOL TARTRATE 5 MG/5ML
2.5 INJECTION INTRAVENOUS ONCE
Status: COMPLETED | OUTPATIENT
Start: 2022-02-19 | End: 2022-02-19

## 2022-02-19 RX ORDER — HEPARIN SODIUM 1000 [USP'U]/ML
2000 INJECTION, SOLUTION INTRAVENOUS; SUBCUTANEOUS
Status: DISCONTINUED | OUTPATIENT
Start: 2022-02-19 | End: 2022-02-19 | Stop reason: CLARIF

## 2022-02-19 RX ORDER — INSULIN GLARGINE 100 [IU]/ML
10 INJECTION, SOLUTION SUBCUTANEOUS EVERY 12 HOURS SCHEDULED
Status: DISCONTINUED | OUTPATIENT
Start: 2022-02-19 | End: 2022-02-22

## 2022-02-19 RX ORDER — FUROSEMIDE 10 MG/ML
40 INJECTION INTRAMUSCULAR; INTRAVENOUS ONCE
Status: COMPLETED | OUTPATIENT
Start: 2022-02-19 | End: 2022-02-19

## 2022-02-19 RX ADMIN — PANTOPRAZOLE SODIUM 40 MG: 40 TABLET, DELAYED RELEASE ORAL at 09:21

## 2022-02-19 RX ADMIN — IPRATROPIUM BROMIDE 0.5 MG: 0.5 SOLUTION RESPIRATORY (INHALATION) at 07:10

## 2022-02-19 RX ADMIN — INSULIN LISPRO 5 UNITS: 100 INJECTION, SOLUTION INTRAVENOUS; SUBCUTANEOUS at 09:30

## 2022-02-19 RX ADMIN — METOPROLOL TARTRATE 2.5 MG: 5 INJECTION INTRAVENOUS at 23:58

## 2022-02-19 RX ADMIN — INSULIN LISPRO 4 UNITS: 100 INJECTION, SOLUTION INTRAVENOUS; SUBCUTANEOUS at 23:04

## 2022-02-19 RX ADMIN — ACETAMINOPHEN 650 MG: 325 TABLET, FILM COATED ORAL at 14:26

## 2022-02-19 RX ADMIN — INSULIN GLARGINE 10 UNITS: 100 INJECTION, SOLUTION SUBCUTANEOUS at 13:17

## 2022-02-19 RX ADMIN — METOPROLOL TARTRATE 25 MG: 25 TABLET, FILM COATED ORAL at 23:03

## 2022-02-19 RX ADMIN — INSULIN GLARGINE 10 UNITS: 100 INJECTION, SOLUTION SUBCUTANEOUS at 22:56

## 2022-02-19 RX ADMIN — ENOXAPARIN SODIUM 60 MG: 60 INJECTION SUBCUTANEOUS at 09:31

## 2022-02-19 RX ADMIN — BARICITINIB 4 MG: 2 TABLET, FILM COATED ORAL at 12:47

## 2022-02-19 RX ADMIN — CEFTRIAXONE SODIUM 1000 MG: 10 INJECTION, POWDER, FOR SOLUTION INTRAVENOUS at 18:15

## 2022-02-19 RX ADMIN — AMIODARONE HYDROCHLORIDE 0.5 MG/MIN: 50 INJECTION, SOLUTION INTRAVENOUS at 14:41

## 2022-02-19 RX ADMIN — METOPROLOL TARTRATE 25 MG: 25 TABLET, FILM COATED ORAL at 12:46

## 2022-02-19 RX ADMIN — INSULIN LISPRO 20 UNITS: 100 INJECTION, SOLUTION INTRAVENOUS; SUBCUTANEOUS at 18:03

## 2022-02-19 RX ADMIN — SODIUM CHLORIDE 0.4 MCG/KG/HR: 9 INJECTION, SOLUTION INTRAVENOUS at 23:53

## 2022-02-19 RX ADMIN — SODIUM CHLORIDE SOLN NEBU 3% 4 ML: 3 NEBU SOLN at 19:49

## 2022-02-19 RX ADMIN — HEPARIN SODIUM 11.1 UNITS/KG/HR: 10000 INJECTION, SOLUTION INTRAVENOUS; SUBCUTANEOUS at 14:28

## 2022-02-19 RX ADMIN — METHYLPREDNISOLONE SODIUM SUCCINATE 60 MG: 125 INJECTION, POWDER, FOR SOLUTION INTRAMUSCULAR; INTRAVENOUS at 11:24

## 2022-02-19 RX ADMIN — LEVALBUTEROL HYDROCHLORIDE 1.25 MG: 1.25 SOLUTION, CONCENTRATE RESPIRATORY (INHALATION) at 14:42

## 2022-02-19 RX ADMIN — METHYLPREDNISOLONE SODIUM SUCCINATE 60 MG: 125 INJECTION, POWDER, FOR SOLUTION INTRAMUSCULAR; INTRAVENOUS at 18:12

## 2022-02-19 RX ADMIN — GUAIFENESIN 600 MG: 600 TABLET, EXTENDED RELEASE ORAL at 09:21

## 2022-02-19 RX ADMIN — LEVALBUTEROL HYDROCHLORIDE 1.25 MG: 1.25 SOLUTION, CONCENTRATE RESPIRATORY (INHALATION) at 19:49

## 2022-02-19 RX ADMIN — LEVALBUTEROL HYDROCHLORIDE 1.25 MG: 1.25 SOLUTION, CONCENTRATE RESPIRATORY (INHALATION) at 07:10

## 2022-02-19 RX ADMIN — SODIUM CHLORIDE SOLN NEBU 3% 4 ML: 3 NEBU SOLN at 14:42

## 2022-02-19 RX ADMIN — SODIUM CHLORIDE SOLN NEBU 3% 4 ML: 3 NEBU SOLN at 07:10

## 2022-02-19 RX ADMIN — HEPARIN SODIUM 4000 UNITS: 1000 INJECTION INTRAVENOUS; SUBCUTANEOUS at 14:32

## 2022-02-19 RX ADMIN — IPRATROPIUM BROMIDE 0.5 MG: 0.5 SOLUTION RESPIRATORY (INHALATION) at 19:49

## 2022-02-19 RX ADMIN — EPOPROSTENOL 50 NG/KG/MIN: 1.5 INJECTION, POWDER, LYOPHILIZED, FOR SOLUTION INTRAVENOUS at 12:37

## 2022-02-19 RX ADMIN — GUAIFENESIN 600 MG: 600 TABLET, EXTENDED RELEASE ORAL at 22:56

## 2022-02-19 RX ADMIN — IPRATROPIUM BROMIDE 0.5 MG: 0.5 SOLUTION RESPIRATORY (INHALATION) at 14:42

## 2022-02-19 RX ADMIN — METHYLPREDNISOLONE SODIUM SUCCINATE 60 MG: 125 INJECTION, POWDER, FOR SOLUTION INTRAMUSCULAR; INTRAVENOUS at 23:58

## 2022-02-19 RX ADMIN — INSULIN LISPRO 15 UNITS: 100 INJECTION, SOLUTION INTRAVENOUS; SUBCUTANEOUS at 11:26

## 2022-02-19 RX ADMIN — METHYLPREDNISOLONE SODIUM SUCCINATE 60 MG: 125 INJECTION, POWDER, FOR SOLUTION INTRAMUSCULAR; INTRAVENOUS at 05:07

## 2022-02-19 RX ADMIN — ALPRAZOLAM 0.5 MG: 0.5 TABLET ORAL at 22:56

## 2022-02-19 RX ADMIN — FUROSEMIDE 40 MG: 10 INJECTION, SOLUTION INTRAVENOUS at 17:59

## 2022-02-19 NOTE — PROGRESS NOTES
Daily Progress Note - Critical Care   Katie Velasquez 46 y o  female MRN: [id number]  Unit/Bed#: ICU 6 Encounter: [id number]        ----------------------------------------------------------------------------------------  HPI/24hr events: 46year old female with recent COVID infection, presenting with increased dyspnea, currently being managed for bacterial pneumonia, volume overload, and possible pneumonitis  Patient went into afib with RVR following an aspiration event    Overnight patient was transitioned to BIPAP with improvement in her SPO2  She received an additional amiodarone 150mg bolus overnight with improvement in her blood pressure and heart rate, followed by an IV lopressor 2 5 mg x 1  Insulin sliding scale was increased to alg 6 overnight      ---------------------------------------------------------------------------------------  SUBJECTIVE  No new complains  She would like to get out of bed today and if possible walk  Denies chest pain, palpitations, dizziness  Had trouble sleeping overnight with the BIPAP on  Review of Systems   Constitutional: Positive for fatigue  Negative for chills, diaphoresis and fever  HENT: Negative  Eyes: Negative  Negative for photophobia and visual disturbance  Respiratory: Positive for cough and shortness of breath  Feels congested   Cardiovascular: Negative  Negative for chest pain, palpitations and leg swelling  Gastrointestinal: Positive for constipation  Negative for abdominal distention, abdominal pain, nausea and vomiting  Genitourinary: Negative  Negative for frequency and urgency  Musculoskeletal: Negative  Neurological: Negative  Negative for dizziness and headaches  All other systems reviewed and are negative  Review of systems was reviewed and negative unless stated above in HPI/24-hour events   ---------------------------------------------------------------------------------------  Assessment and Plan:    1   Acute hypoxic respiratory failure   2  New onset AFib with RVR  3  Sepsis versus SIRS  4  Acute diastolic heart failure  5  Hypertension  6  Emphysema  7  Transaminitis  8  Hypokalemia-resolved  9  Thrombocytosis-improving      Neuro:   · None  · CAM ICU        CV:   · New onset AFib with RVR, Karen Vasc 3 (female, CHF, HTN)  · Continue amiodarone drip today, if liver enzymes continue to worsen, will consider digoxin  · Will initiate anticoagulation today  · If blood pressure remains stable, will start po lopressor 12 5 mg bid  · If persistent, can discuss about cardioversion once stable  · Acute diastolic heart failure, POA with elevated pro BNP of 1472, CXR of possible pulm congestion   Echo 2/17/21: preserved EF 65%, G1DD, IVC normal  · S/p IV lasix 40mg x 1 on 2/16, 40mg on 2/18  · Will continue to monitor today, will plan for diuresis tomorrow  · Monitor I/O, daily weight  · Monitor electrolytes, maintain Mg>2, K>4  · Monitor renal functions  · Hypertension  · Amlodipine 10 mg daily on hold        Pulm:  · Acute hypoxic respiratory failure likely multifactorial secondary to pneumonia, recent COVID sequale, and volume overload, and possible pneumonitis  · Wean off BiPAP to HFNC  · Maintain SPO2 > 88%  · Continue Solumedrol 60 mg q6h day 3  · Continue IV ceftriaxone Day 4 and azithromycin stopped after 2 doses  · Continue Atrovent and Xopenex nebs  · Hold IV lasix today  · Mucinex, tessalon perls   · Incentive spirometry  · Vest therapy  · Emphysemia  · Continue nebs as outlined above        GI:   · Mild transaminitis  · Continue to monitor liver enzymes  · If worsening, will discontinue amiodarone and transition to digoxin  · continue protonix 40 mg daily po for GI prophylaxis  · Bowel regimen for constipation        :   · None        F/E/N:   · Hypokalemia - resolved        Heme/Onc:   · Thrombocytosis, likely reactive - improving  · Will continue to monitor  · DVT prophylaxis: s/q enoxaparin 40 mg q12h        Endo: · Hyperglycemia, HbA1C 6 6, on steroids  · Continue ISS alg 6 with accuchecks  · Will consider addition of insulin lantus 10 U daily  · May need oral antihyperglycemic on discharge         ID:   · Sepsis vs SIRS, POA with leukocytosis, tachycardia, lactic acidosis  Likely source pulmonary, MRSA +ve  · Azithromycin discontinued on D2  · Continue IV ceftriaxone day 4  · Follow blood cultures  · Holding IV fluids given possible pulmonary edema        MSK/Skin:   · History of cervical disc herniation      Patient appropriate for transfer out of the ICU today?: No  Disposition: Continue Stepdown Level 1 level of care   Code Status: Level 1 - Full Code  ---------------------------------------------------------------------------------------  ICU CORE MEASURES    Prophylaxis   VTE Pharmacologic Prophylaxis: Enoxaparin (Lovenox)  VTE Mechanical Prophylaxis: sequential compression device  Stress Ulcer Prophylaxis: Pantoprazole PO    ABCDE Protocol (if indicated)  Plan to perform spontaneous awakening trial today? Not applicable  Plan to perform spontaneous breathing trial today? Not applicable  Obvious barriers to extubation? Not applicable  CAM-ICU: Negative    Invasive Devices Review  Invasive Devices  Report    Peripheral Intravenous Line            Peripheral IV 22 Right Antecubital 2 days    Peripheral IV 22 Right;Ventral (anterior) Hand <1 day              Can any invasive devices be discontinued today?  Not applicable  ---------------------------------------------------------------------------------------  OBJECTIVE    Vitals   Vitals:    22 0458 22 0500 22 0530 22 0600   BP:    103/64   Pulse:  (!) 108 (!) 112 (!) 116   Resp:  (!) 28 (!) 44 (!) 29   Temp:       TempSrc:       SpO2:  94% 95% 92%   Weight: 101 kg (223 lb 5 2 oz)      Height:         Temp (24hrs), Av 1 °F (36 7 °C), Min:97 3 °F (36 3 °C), Max:99 2 °F (37 3 °C)  Current: Temperature: 97 7 °F (36 5 °C)  HR: 116  BP: 103/64  RR: 29  SpO2: 92%    Respiratory:  SpO2: SpO2: 95 %, SpO2 Device: O2 Device: High flow nasal cannula       Invasive/non-invasive ventilation settings   Respiratory  Report   Lab Data (Last 4 hours)    None         O2/Vent Data (Last 4 hours)      02/19 0306          Non-Invasive Ventilation Mode BiPAP                   Physical Exam  Vitals and nursing note reviewed  Constitutional:       General: She is not in acute distress  Appearance: She is not toxic-appearing  HENT:      Head: Normocephalic  Nose: Nose normal       Mouth/Throat:      Comments: Patient on BIPAP, unable to examine the mouth  Eyes:      Pupils: Pupils are equal, round, and reactive to light  Cardiovascular:      Rate and Rhythm: Tachycardia present  Rhythm irregular  Heart sounds: Normal heart sounds  No murmur heard  Pulmonary:      Effort: Respiratory distress present  Breath sounds: Rales present  No wheezing  Abdominal:      General: Bowel sounds are normal  There is no distension  Tenderness: There is no abdominal tenderness  Musculoskeletal:      Right lower leg: No edema  Left lower leg: No edema  Skin:     Capillary Refill: Capillary refill takes less than 2 seconds  Coloration: Skin is not jaundiced  Neurological:      General: No focal deficit present  Mental Status: She is alert and oriented to person, place, and time  Mental status is at baseline     Psychiatric:         Mood and Affect: Mood normal            Laboratory and Diagnostics:  Results from last 7 days   Lab Units 02/19/22  0437 02/18/22 0316 02/17/22 0457 02/16/22 2233 02/16/22  1914   WBC Thousand/uL 16 35* 17 67* 19 45*  --  20 59*   HEMOGLOBIN g/dL 13 5 13 8 14 1  --  15 1   HEMATOCRIT % 41 9 43 7 43 1  --  47 3*   PLATELETS Thousands/uL 409* 458* 430* 440* 502*   NEUTROS PCT % 80* 80* 81*  --  76*   MONOS PCT % 7 3* 6  --  7     Results from last 7 days   Lab Units 02/18/22 0316 02/17/22  0457 02/16/22 1915   SODIUM mmol/L 140 140 136   POTASSIUM mmol/L 4 4 4 2 3 1*   CHLORIDE mmol/L 104 105 100   CO2 mmol/L 24 25 25   ANION GAP mmol/L 12 10 11   BUN mg/dL 15 11 11   CREATININE mg/dL 0 76 0 69 0 90   CALCIUM mg/dL 9 2 8 5 9 1   GLUCOSE RANDOM mg/dL 207* 149* 165*   ALT U/L  --  26 28   AST U/L  --  24 25   ALK PHOS U/L  --  93 101   ALBUMIN g/dL  --  2 6* 2 9*   TOTAL BILIRUBIN mg/dL  --  0 74 0 69     Results from last 7 days   Lab Units 02/18/22  0316 02/17/22  0457   MAGNESIUM mg/dL 2 8* 2 3   PHOSPHORUS mg/dL  --  4 5      Results from last 7 days   Lab Units 02/16/22 1915   INR  1 09   PTT seconds 45*          Results from last 7 days   Lab Units 02/16/22 2233 02/16/22 1914   LACTIC ACID mmol/L 1 5 2 7*     ABG:    VBG:    Results from last 7 days   Lab Units 02/19/22  0437 02/16/22 2237 02/16/22 1915   PROCALCITONIN ng/ml 0 22 0 38* 0 34*       Micro  Results from last 7 days   Lab Units 02/17/22  0150 02/16/22  2316 02/16/22  1920 02/16/22 1916   BLOOD CULTURE   --   --  No Growth at 48 hrs  No Growth at 48 hrs  SPUTUM CULTURE   --  Culture too young- will reincubate  --   --    GRAM STAIN RESULT   --  1+ Epithelial cells per low power field*  4+ Polys*  4+ Gram positive cocci in pairs, chains and clusters*  4+ Gram negative rods*  4+ Gram positive rods*  Rare Budding Yeast with Pseudomycelia*  --   --    MRSA CULTURE ONLY   --  Methicillin Resistant Staphylococcus aureus isolated*  This patient requires contact isolation precautions per [place] law  Contact precautions are not required in [place] for nasal surveillance cultures  --   --    LEGIONELLA URINARY ANTIGEN  Negative  --   --   --    STREP PNEUMONIAE ANTIGEN, URINE  Negative  --   --   --        EKG: N/A  Imaging: I have personally reviewed pertinent reports  Intake and Output  I/O       02/17 0701 02/18 0700 02/18 0701 02/19 0700    P  O  952     I V  (mL/kg)  327 (3 2)    IV Piggyback 150 300    Total Intake(mL/kg) 1102 (10 9) 627 (6 2)    Urine (mL/kg/hr) 650 (0 3) 1225 (0 5)    Total Output 650 1225    Net +452 598                Height and Weights   Height: 4' 6" (137 2 cm)     Body mass index is 53 85 kg/m²  Weight (last 2 days)     Date/Time Weight    02/19/22 0458 101 (223 33)    02/18/22 0600 101 (223 33)    02/17/22 0800 99 3 (219)    02/17/22 0300 99 7 (219 8)            Nutrition       Diet Orders   (From admission, onward)             Start     Ordered    02/18/22 1523  Diet [place]/CHO Controlled; Consistent Carbohydrate Diet Level 2 (5 carb servings/75 grams CHO/meal)  Diet effective now        References:    Nutrtion Support Algorithm Enteral vs  Parenteral   Question Answer Comment   Diet Type [place]/CHO Controlled    [place]/CHO Controlled Consistent Carbohydrate Diet Level 2 (5 carb servings/75 grams CHO/meal)    RD to adjust diet per protocol?  Yes        02/18/22 1522                  Active Medications  Scheduled Meds:  Current Facility-Administered Medications   Medication Dose Route Frequency Provider Last Rate    acetaminophen  650 mg Oral Q6H PRN Tadeo Longoria MD      ALPRAZolam  0 5 mg Oral BID PRN Christiannoricci McFall EMMA Collins      amiodarone  0 5 mg/min Intravenous Continuous EMMA Corral 0 5 mg/min (02/18/22 2154)    Baricitinib  4 mg Oral Q24H Tadeo Longoria MD      benzonatate  200 mg Oral TID PRN EMMA Carolina      cefTRIAXone  1,000 mg Intravenous Q24H EMMA Stone 1,000 mg (02/18/22 1951)    enoxaparin  60 mg Subcutaneous Q12H [facility] EMMA Chi      guaiFENesin  600 mg Oral Q12H [facility] EMMA Ferrara      insulin lispro  4-20 Units Subcutaneous HS [place], [place]      insulin lispro  5-25 Units Subcutaneous TID [place], [place]      ipratropium  0 5 mg Nebulization 4x Daily Raynold Nicolette Ord, EMMA      levalbuterol  1 25 mg Nebulization 4x Daily Raynold Nicolette SondayEMMA      methylPREDNISolone sodium succinate  60 mg Intravenous Q6H EMMA Doshi      pantoprazole  40 mg Oral Daily Before Breakfast Kirsten Fernandes MD      sodium chloride  4 mL Nebulization Q6H EMMA Stone       Continuous Infusions:  amiodarone, 0 5 mg/min, Last Rate: 0 5 mg/min (02/18/22 5456)      PRN Meds:   acetaminophen, 650 mg, Q6H PRN  ALPRAZolam, 0 5 mg, BID PRN  benzonatate, 200 mg, TID PRN        Allergies   No Known Allergies  ---------------------------------------------------------------------------------------  Advance Directive and Living Will:      Power of :    POLST:    ---------------------------------------------------------------------------------------    Kirsten Fernandes MD      Portions of the record may have been created with voice recognition software  Occasional wrong word or "sound a like" substitutions may have occurred due to the inherent limitations of voice recognition software    Read the chart carefully and recognize, using context, where substitutions have occurred

## 2022-02-19 NOTE — QUICK NOTE
Called patients daughter Anoop Daughters and updated on current management plan   All questions answered

## 2022-02-19 NOTE — PLAN OF CARE
Problem: Potential for Falls  Goal: Patient will remain free of falls  Description: INTERVENTIONS:  - Educate patient/family on patient safety including physical limitations  - Instruct patient to call for assistance with activity   - Consult OT/PT to assist with strengthening/mobility   - Keep Call bell within reach  - Keep bed low and locked with side rails adjusted as appropriate  - Keep care items and personal belongings within reach  - Initiate and maintain comfort rounds  - Make Fall Risk Sign visible to staff  - Offer Toileting every 2 Hours, in advance of need  - Initiate/Maintain bed alarm  - Obtain necessary fall risk management equipment  - Apply yellow socks and bracelet for high fall risk patients  - Consider moving patient to room near nurses station  Outcome: Progressing     Problem: MOBILITY - ADULT  Goal: Maintain or return to baseline ADL function  Description: INTERVENTIONS:  -  Assess patient's ability to carry out ADLs; assess patient's baseline for ADL function and identify physical deficits which impact ability to perform ADLs (bathing, care of mouth/teeth, toileting, grooming, dressing, etc )  - Assess/evaluate cause of self-care deficits   - Assess range of motion  - Assess patient's mobility; develop plan if impaired  - Assess patient's need for assistive devices and provide as appropriate  - Encourage maximum independence but intervene and supervise when necessary  - Involve family in performance of ADLs  - Assess for home care needs following discharge   - Consider OT consult to assist with ADL evaluation and planning for discharge  - Provide patient education as appropriate  Outcome: Progressing  Goal: Maintains/Returns to pre admission functional level  Description: INTERVENTIONS:  - Perform BMAT or MOVE assessment daily    - Set and communicate daily mobility goal to care team and patient/family/caregiver     - Collaborate with rehabilitation services on mobility goals if consulted  - Perform Range of Motion 3 times a day  - Reposition patient every 2 hours    - Dangle patient 3 times a day  - Stand patient as tolerated  - Ambulate patient as tolerated  - Out of bed to chair 2 times a day   - Out of bed for meals 2 times a day  - Out of bed for toileting  - Record patient progress and toleration of activity level   Outcome: Progressing     Problem: Prexisting or High Potential for Compromised Skin Integrity  Goal: Skin integrity is maintained or improved  Description: INTERVENTIONS:  - Identify patients at risk for skin breakdown  - Assess and monitor skin integrity  - Assess and monitor nutrition and hydration status  - Monitor labs   - Assess for incontinence   - Turn and reposition patient  - Assist with mobility/ambulation  - Relieve pressure over bony prominences  - Avoid friction and shearing  - Provide appropriate hygiene as needed including keeping skin clean and dry  - Evaluate need for skin moisturizer/barrier cream  - Collaborate with interdisciplinary team   - Patient/family teaching  - Consider wound care consult   Outcome: Progressing     Problem: RESPIRATORY - ADULT  Goal: Achieves optimal ventilation and oxygenation  Description: INTERVENTIONS:  - Assess for changes in respiratory status  - Assess for changes in mentation and behavior  - Position to facilitate oxygenation and minimize respiratory effort  - Oxygen administered by appropriate delivery if ordered  - Initiate smoking cessation education as indicated  - Encourage broncho-pulmonary hygiene including cough, deep breathe, Incentive Spirometry  - Assess the need for suctioning and aspirate as needed  - Assess and instruct to report SOB or any respiratory difficulty  - Respiratory Therapy support as indicated  Outcome: Progressing     Problem: METABOLIC, FLUID AND ELECTROLYTES - ADULT  Goal: Electrolytes maintained within normal limits  Description: INTERVENTIONS:  - Monitor labs and assess patient for signs and symptoms of electrolyte imbalances  - Administer electrolyte replacement as ordered  - Monitor response to electrolyte replacements, including repeat lab results as appropriate  - Instruct patient on fluid and nutrition as appropriate  Outcome: Progressing  Goal: Fluid balance maintained  Description: INTERVENTIONS:  - Monitor labs   - Monitor I/O and WT  - Instruct patient on fluid and nutrition as appropriate  - Assess for signs & symptoms of volume excess or deficit  Outcome: Progressing  Goal: Glucose maintained within target range  Description: INTERVENTIONS:  - Monitor Blood Glucose as ordered  - Assess for signs and symptoms of hyperglycemia and hypoglycemia  - Administer ordered medications to maintain glucose within target range  - Assess nutritional intake and initiate nutrition service referral as needed  Outcome: Progressing     Problem: MUSCULOSKELETAL - ADULT  Goal: Maintain or return mobility to safest level of function  Description: INTERVENTIONS:  - Assess patient's ability to carry out ADLs; assess patient's baseline for ADL function and identify physical deficits which impact ability to perform ADLs (bathing, care of mouth/teeth, toileting, grooming, dressing, etc )  - Assess/evaluate cause of self-care deficits   - Assess range of motion  - Assess patient's mobility  - Assess patient's need for assistive devices and provide as appropriate  - Encourage maximum independence but intervene and supervise when necessary  - Involve family in performance of ADLs  - Assess for home care needs following discharge   - Consider OT consult to assist with ADL evaluation and planning for discharge  - Provide patient education as appropriate  Outcome: Progressing  Goal: Maintain proper alignment of affected body part  Description: INTERVENTIONS:  - Support, maintain and protect limb and body alignment  - Provide patient/ family with appropriate education  Outcome: Progressing     Problem: Nutrition/Hydration-ADULT  Goal: Nutrient/Hydration intake appropriate for improving, restoring or maintaining nutritional needs  Description: Monitor and assess patient's nutrition/hydration status for malnutrition  Collaborate with interdisciplinary team and initiate plan and interventions as ordered  Monitor patient's weight and dietary intake as ordered or per policy  Utilize nutrition screening tool and intervene as necessary  Determine patient's food preferences and provide high-protein, high-caloric foods as appropriate       INTERVENTIONS:  - Monitor oral intake, urinary output, labs, and treatment plans  - Assess nutrition and hydration status and recommend course of action  - Evaluate amount of meals eaten  - Assist patient with eating if necessary   - Allow adequate time for meals  - Recommend/ encourage appropriate diets, oral nutritional supplements, and vitamin/mineral supplements  - Order, calculate, and assess calorie counts as needed  - Recommend, monitor, and adjust tube feedings and TPN/PPN based on assessed needs  - Assess need for intravenous fluids  - Provide specific nutrition/hydration education as appropriate  - Include patient/family/caregiver in decisions related to nutrition  Outcome: Progressing

## 2022-02-20 LAB
ALBUMIN SERPL BCP-MCNC: 2.3 G/DL (ref 3.5–5)
ALP SERPL-CCNC: 120 U/L (ref 46–116)
ALT SERPL W P-5'-P-CCNC: 93 U/L (ref 12–78)
ANION GAP SERPL CALCULATED.3IONS-SCNC: 8 MMOL/L (ref 4–13)
APTT PPP: 83 SECONDS (ref 23–37)
AST SERPL W P-5'-P-CCNC: 37 U/L (ref 5–45)
BASOPHILS # BLD AUTO: 0.03 THOUSANDS/ΜL (ref 0–0.1)
BASOPHILS NFR BLD AUTO: 0 % (ref 0–1)
BILIRUB SERPL-MCNC: 0.27 MG/DL (ref 0.2–1)
BUN SERPL-MCNC: 29 MG/DL (ref 5–25)
CALCIUM ALBUM COR SERPL-MCNC: 10.5 MG/DL (ref 8.3–10.1)
CALCIUM SERPL-MCNC: 9.1 MG/DL (ref 8.3–10.1)
CHLORIDE SERPL-SCNC: 104 MMOL/L (ref 100–108)
CO2 SERPL-SCNC: 27 MMOL/L (ref 21–32)
CREAT SERPL-MCNC: 0.86 MG/DL (ref 0.6–1.3)
CRP SERPL QL: 46.9 MG/L
EOSINOPHIL # BLD AUTO: 0 THOUSAND/ΜL (ref 0–0.61)
EOSINOPHIL NFR BLD AUTO: 0 % (ref 0–6)
ERYTHROCYTE [DISTWIDTH] IN BLOOD BY AUTOMATED COUNT: 14.8 % (ref 11.6–15.1)
GFR SERPL CREATININE-BSD FRML MDRD: 78 ML/MIN/1.73SQ M
GLUCOSE SERPL-MCNC: 174 MG/DL (ref 65–140)
GLUCOSE SERPL-MCNC: 175 MG/DL (ref 65–140)
GLUCOSE SERPL-MCNC: 180 MG/DL (ref 65–140)
GLUCOSE SERPL-MCNC: 187 MG/DL (ref 65–140)
GLUCOSE SERPL-MCNC: 263 MG/DL (ref 65–140)
GLUCOSE SERPL-MCNC: 280 MG/DL (ref 65–140)
HCT VFR BLD AUTO: 43.1 % (ref 34.8–46.1)
HGB BLD-MCNC: 13.9 G/DL (ref 11.5–15.4)
IMM GRANULOCYTES # BLD AUTO: 0.11 THOUSAND/UL (ref 0–0.2)
IMM GRANULOCYTES NFR BLD AUTO: 1 % (ref 0–2)
LYMPHOCYTES # BLD AUTO: 1.67 THOUSANDS/ΜL (ref 0.6–4.47)
LYMPHOCYTES NFR BLD AUTO: 10 % (ref 14–44)
MAGNESIUM SERPL-MCNC: 3 MG/DL (ref 1.6–2.6)
MCH RBC QN AUTO: 30.2 PG (ref 26.8–34.3)
MCHC RBC AUTO-ENTMCNC: 32.3 G/DL (ref 31.4–37.4)
MCV RBC AUTO: 94 FL (ref 82–98)
MONOCYTES # BLD AUTO: 1.29 THOUSAND/ΜL (ref 0.17–1.22)
MONOCYTES NFR BLD AUTO: 8 % (ref 4–12)
NEUTROPHILS # BLD AUTO: 12.89 THOUSANDS/ΜL (ref 1.85–7.62)
NEUTS SEG NFR BLD AUTO: 81 % (ref 43–75)
NRBC BLD AUTO-RTO: 0 /100 WBCS
PLATELET # BLD AUTO: 402 THOUSANDS/UL (ref 149–390)
PMV BLD AUTO: 10 FL (ref 8.9–12.7)
POTASSIUM SERPL-SCNC: 4.5 MMOL/L (ref 3.5–5.3)
PROT SERPL-MCNC: 7 G/DL (ref 6.4–8.2)
RBC # BLD AUTO: 4.6 MILLION/UL (ref 3.81–5.12)
SODIUM SERPL-SCNC: 139 MMOL/L (ref 136–145)
WBC # BLD AUTO: 15.99 THOUSAND/UL (ref 4.31–10.16)

## 2022-02-20 PROCEDURE — 85025 COMPLETE CBC W/AUTO DIFF WBC: CPT | Performed by: INTERNAL MEDICINE

## 2022-02-20 PROCEDURE — 94644 CONT INHLJ TX 1ST HOUR: CPT

## 2022-02-20 PROCEDURE — 83735 ASSAY OF MAGNESIUM: CPT | Performed by: INTERNAL MEDICINE

## 2022-02-20 PROCEDURE — 94640 AIRWAY INHALATION TREATMENT: CPT

## 2022-02-20 PROCEDURE — 94645 CONT INHLJ TX EACH ADDL HOUR: CPT

## 2022-02-20 PROCEDURE — 94660 CPAP INITIATION&MGMT: CPT

## 2022-02-20 PROCEDURE — 80053 COMPREHEN METABOLIC PANEL: CPT | Performed by: INTERNAL MEDICINE

## 2022-02-20 PROCEDURE — 86140 C-REACTIVE PROTEIN: CPT | Performed by: INTERNAL MEDICINE

## 2022-02-20 PROCEDURE — 82948 REAGENT STRIP/BLOOD GLUCOSE: CPT

## 2022-02-20 PROCEDURE — 99232 SBSQ HOSP IP/OBS MODERATE 35: CPT | Performed by: INTERNAL MEDICINE

## 2022-02-20 PROCEDURE — 85730 THROMBOPLASTIN TIME PARTIAL: CPT | Performed by: INTERNAL MEDICINE

## 2022-02-20 PROCEDURE — 97163 PT EVAL HIGH COMPLEX 45 MIN: CPT

## 2022-02-20 RX ORDER — METHYLPREDNISOLONE SODIUM SUCCINATE 40 MG/ML
40 INJECTION, POWDER, LYOPHILIZED, FOR SOLUTION INTRAMUSCULAR; INTRAVENOUS EVERY 8 HOURS SCHEDULED
Status: DISCONTINUED | OUTPATIENT
Start: 2022-02-20 | End: 2022-02-23

## 2022-02-20 RX ORDER — FUROSEMIDE 10 MG/ML
20 INJECTION INTRAMUSCULAR; INTRAVENOUS ONCE
Status: COMPLETED | OUTPATIENT
Start: 2022-02-20 | End: 2022-02-20

## 2022-02-20 RX ADMIN — PANTOPRAZOLE SODIUM 40 MG: 40 TABLET, DELAYED RELEASE ORAL at 05:58

## 2022-02-20 RX ADMIN — INSULIN LISPRO 5 UNITS: 100 INJECTION, SOLUTION INTRAVENOUS; SUBCUTANEOUS at 08:00

## 2022-02-20 RX ADMIN — METOPROLOL TARTRATE 25 MG: 25 TABLET, FILM COATED ORAL at 10:39

## 2022-02-20 RX ADMIN — EPOPROSTENOL 50 NG/KG/MIN: 1.5 INJECTION, POWDER, LYOPHILIZED, FOR SOLUTION INTRAVENOUS at 22:20

## 2022-02-20 RX ADMIN — INSULIN LISPRO 15 UNITS: 100 INJECTION, SOLUTION INTRAVENOUS; SUBCUTANEOUS at 17:24

## 2022-02-20 RX ADMIN — GUAIFENESIN 600 MG: 600 TABLET, EXTENDED RELEASE ORAL at 21:59

## 2022-02-20 RX ADMIN — LEVALBUTEROL HYDROCHLORIDE 1.25 MG: 1.25 SOLUTION, CONCENTRATE RESPIRATORY (INHALATION) at 19:53

## 2022-02-20 RX ADMIN — FUROSEMIDE 20 MG: 10 INJECTION, SOLUTION INTRAMUSCULAR; INTRAVENOUS at 13:26

## 2022-02-20 RX ADMIN — SODIUM CHLORIDE SOLN NEBU 3% 4 ML: 3 NEBU SOLN at 08:06

## 2022-02-20 RX ADMIN — GUAIFENESIN 600 MG: 600 TABLET, EXTENDED RELEASE ORAL at 10:39

## 2022-02-20 RX ADMIN — METHYLPREDNISOLONE SODIUM SUCCINATE 60 MG: 125 INJECTION, POWDER, FOR SOLUTION INTRAMUSCULAR; INTRAVENOUS at 05:58

## 2022-02-20 RX ADMIN — METHYLPREDNISOLONE SODIUM SUCCINATE 60 MG: 125 INJECTION, POWDER, FOR SOLUTION INTRAMUSCULAR; INTRAVENOUS at 10:39

## 2022-02-20 RX ADMIN — IPRATROPIUM BROMIDE 0.5 MG: 0.5 SOLUTION RESPIRATORY (INHALATION) at 19:53

## 2022-02-20 RX ADMIN — INSULIN GLARGINE 10 UNITS: 100 INJECTION, SOLUTION SUBCUTANEOUS at 21:59

## 2022-02-20 RX ADMIN — METHYLPREDNISOLONE SODIUM SUCCINATE 40 MG: 40 INJECTION, POWDER, FOR SOLUTION INTRAMUSCULAR; INTRAVENOUS at 21:59

## 2022-02-20 RX ADMIN — IPRATROPIUM BROMIDE 0.5 MG: 0.5 SOLUTION RESPIRATORY (INHALATION) at 08:06

## 2022-02-20 RX ADMIN — BARICITINIB 4 MG: 2 TABLET, FILM COATED ORAL at 10:39

## 2022-02-20 RX ADMIN — METOPROLOL TARTRATE 25 MG: 25 TABLET, FILM COATED ORAL at 21:59

## 2022-02-20 RX ADMIN — SODIUM CHLORIDE SOLN NEBU 3% 4 ML: 3 NEBU SOLN at 19:53

## 2022-02-20 RX ADMIN — INSULIN GLARGINE 10 UNITS: 100 INJECTION, SOLUTION SUBCUTANEOUS at 10:39

## 2022-02-20 RX ADMIN — IPRATROPIUM BROMIDE 0.5 MG: 0.5 SOLUTION RESPIRATORY (INHALATION) at 15:04

## 2022-02-20 RX ADMIN — HEPARIN SODIUM 11 UNITS/KG/HR: 10000 INJECTION, SOLUTION INTRAVENOUS; SUBCUTANEOUS at 12:32

## 2022-02-20 RX ADMIN — CEFTRIAXONE SODIUM 1000 MG: 10 INJECTION, POWDER, FOR SOLUTION INTRAVENOUS at 19:30

## 2022-02-20 RX ADMIN — INSULIN LISPRO 5 UNITS: 100 INJECTION, SOLUTION INTRAVENOUS; SUBCUTANEOUS at 10:53

## 2022-02-20 RX ADMIN — EPOPROSTENOL 50 NG/KG/MIN: 1.5 INJECTION, POWDER, LYOPHILIZED, FOR SOLUTION INTRAVENOUS at 00:15

## 2022-02-20 RX ADMIN — LEVALBUTEROL HYDROCHLORIDE 1.25 MG: 1.25 SOLUTION, CONCENTRATE RESPIRATORY (INHALATION) at 15:04

## 2022-02-20 RX ADMIN — EPOPROSTENOL 50 NG/KG/MIN: 1.5 INJECTION, POWDER, LYOPHILIZED, FOR SOLUTION INTRAVENOUS at 09:57

## 2022-02-20 RX ADMIN — LEVALBUTEROL HYDROCHLORIDE 1.25 MG: 1.25 SOLUTION, CONCENTRATE RESPIRATORY (INHALATION) at 08:06

## 2022-02-20 RX ADMIN — SODIUM CHLORIDE SOLN NEBU 3% 4 ML: 3 NEBU SOLN at 15:04

## 2022-02-20 RX ADMIN — INSULIN LISPRO 8 UNITS: 100 INJECTION, SOLUTION INTRAVENOUS; SUBCUTANEOUS at 21:58

## 2022-02-20 RX ADMIN — SODIUM CHLORIDE SOLN NEBU 3% 4 ML: 3 NEBU SOLN at 03:09

## 2022-02-20 RX ADMIN — SODIUM CHLORIDE 0.4 MCG/KG/HR: 9 INJECTION, SOLUTION INTRAVENOUS at 10:01

## 2022-02-20 NOTE — ASSESSMENT & PLAN NOTE
· NT- proBNP elevated on admission at 1472  · ECHO was normal  · Administer 40 of lasix on admission

## 2022-02-20 NOTE — PLAN OF CARE
Problem: PHYSICAL THERAPY ADULT  Goal: Performs mobility at highest level of function for planned discharge setting  See evaluation for individualized goals  Description: Treatment/Interventions: Functional transfer training,LE strengthening/ROM,Therapeutic exercise,Endurance training,Patient/family training,Equipment eval/education,Bed mobility,Gait training,Spoke to nursing  Equipment Recommended: Reyes Wilder (current use of RW for mobility)       See flowsheet documentation for full assessment, interventions and recommendations  Note: Prognosis: Guarded  Problem List: Decreased strength,Decreased endurance,Impaired balance,Decreased mobility,Obesity,Decreased skin integrity  Assessment: pt is a 45 yo female admitted to [facility] 2* sepsis vs SIRS,COVID positive 01 06 22 with late COVID pneumonitis with emphysema,PNA vs volume overload  Pt lives alone in 2 story apartment with 3 LOUIE,no use of DME PTA,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,reports being completely (I) PTA for ADLs and IADLs  Pt currently is not at functional mobility baseline,needs significant A for mobility and OOB assessment,use of high flow O2,labored breathing and SOB,fatigue,weakness,multiple lines,telemetry monitoring,use of RW for mobility new DME  Pt demonstrates moderate deficits during functional mobility and gait including dec endurance,dec balance,dec BLE strength,ataxic and unsteady gait pattern,inc SOB,dec activity tolerance,labored breathing throughout and needs modAx2 for BM,rolling,transfers and gait with use of RW  Pt able to ambulate very short distances bed->chair 2* fatigue,labored breathing,dec in SpO2 at rest and during mobility and use of high flow O2  pt would cont to benefit from skilled inpt PT services to maximize functional independence    Barriers to Discharge: Inaccessible home environment,Decreased caregiver support (2 story apartment with LOUIE,lives alone with limited A availa)  Barriers to Discharge Comments: limited A and care available upon DC     PT Discharge Recommendation: Post acute rehabilitation services          See flowsheet documentation for full assessment

## 2022-02-20 NOTE — ASSESSMENT & PLAN NOTE
· Patient was diagnosed with COVID on 1/6, with symptom onset on 1/3  · She reports increasing shortness of breath, fatigue, weakness and unable to complete ADL's over the past 2 weeks  · She presents via EMS hypoxic with oxygen saturations in the 60's  · She was placed on a NRB with improvement in her oxygen saturations to the 80's  · She is currently requiring HFNC 55L/100% + NRB for a oxygen saturation of 90% using BiPAP at night  · CTA was obtained in the ED and revealed No evidence of pulmonary artery embolus   Scattered airspace opacities within the lungs which may represent infection  · BNP was elevated on admission  · Obtain infectious work-up  · Strep/legionella pneumonia negative  · Blood cultures negative/ sputum culture positive for mixed respiratory jenna  · Patient was given ceftriaxone and azithromycin in the ED  · Antibiotic course completed  · MRSA positive  · Procal on admission was 0 3

## 2022-02-20 NOTE — ASSESSMENT & PLAN NOTE
· Platelets on admission of 502 now  402  · Platelet count in January normal  · Suspect secondary to post COVID infection  · Continue to monitor and start aspirin if needed

## 2022-02-20 NOTE — ASSESSMENT & PLAN NOTE
· POA as evidenced by: tachycardia, leukocytosis, and lactic acidosis  · Lactic acid 2 7 and WBC 20 59  · Received 500 ml bolus in the ED  · Obtain infectious work-up as outlined   · Antibiotic course completed

## 2022-02-20 NOTE — PLAN OF CARE
Problem: Potential for Falls  Goal: Patient will remain free of falls  Description: INTERVENTIONS:  - Educate patient/family on patient safety including physical limitations  - Instruct patient to call for assistance with activity   - Consult OT/PT to assist with strengthening/mobility   - Keep Call bell within reach  - Keep bed low and locked with side rails adjusted as appropriate  - Keep care items and personal belongings within reach  - Initiate and maintain comfort rounds  - Make Fall Risk Sign visible to staff  - Offer Toileting every 2 Hours, in advance of need  - Obtain necessary fall risk management equipment: bed rails  - Apply yellow socks and bracelet for high fall risk patients  - Consider moving patient to room near nurses station  Outcome: Progressing     Problem: MOBILITY - ADULT  Goal: Maintain or return to baseline ADL function  Description: INTERVENTIONS:  -  Assess patient's ability to carry out ADLs; assess patient's baseline for ADL function and identify physical deficits which impact ability to perform ADLs (bathing, care of mouth/teeth, toileting, grooming, dressing, etc )  - Assess/evaluate cause of self-care deficits   - Assess range of motion  - Assess patient's mobility; develop plan if impaired  - Assess patient's need for assistive devices and provide as appropriate  - Encourage maximum independence but intervene and supervise when necessary  - Involve family in performance of ADLs  - Assess for home care needs following discharge   - Consider OT consult to assist with ADL evaluation and planning for discharge  - Provide patient education as appropriate  Outcome: Progressing  Goal: Maintains/Returns to pre admission functional level  Description: INTERVENTIONS:  - Perform BMAT or MOVE assessment daily    - Set and communicate daily mobility goal to care team and patient/family/caregiver     - Collaborate with rehabilitation services on mobility goals if consulted  - Reposition patient every 2 hours    - Dangle patient 3 times a day  - Stand patient 3 times a day  - Ambulate patient 1 times a day  - Out of bed to chair 1 times a day   - Out of bed for meals 1 times a day  - Out of bed for toileting  - Record patient progress and toleration of activity level   Outcome: Progressing     Problem: Prexisting or High Potential for Compromised Skin Integrity  Goal: Skin integrity is maintained or improved  Description: INTERVENTIONS:  - Identify patients at risk for skin breakdown  - Assess and monitor skin integrity  - Assess and monitor nutrition and hydration status  - Monitor labs   - Assess for incontinence   - Turn and reposition patient  - Assist with mobility/ambulation  - Relieve pressure over bony prominences  - Avoid friction and shearing  - Provide appropriate hygiene as needed including keeping skin clean and dry  - Evaluate need for skin moisturizer/barrier cream  - Collaborate with interdisciplinary team   - Patient/family teaching  - Consider wound care consult   Outcome: Progressing     Problem: RESPIRATORY - ADULT  Goal: Achieves optimal ventilation and oxygenation  Description: INTERVENTIONS:  - Assess for changes in respiratory status  - Assess for changes in mentation and behavior  - Position to facilitate oxygenation and minimize respiratory effort  - Oxygen administered by appropriate delivery if ordered  - Initiate smoking cessation education as indicated  - Encourage broncho-pulmonary hygiene including cough, deep breathe, Incentive Spirometry  - Assess the need for suctioning and aspirate as needed  - Assess and instruct to report SOB or any respiratory difficulty  - Respiratory Therapy support as indicated  Outcome: Progressing     Problem: METABOLIC, FLUID AND ELECTROLYTES - ADULT  Goal: Electrolytes maintained within normal limits  Description: INTERVENTIONS:  - Monitor labs and assess patient for signs and symptoms of electrolyte imbalances  - Administer electrolyte replacement as ordered  - Monitor response to electrolyte replacements, including repeat lab results as appropriate  - Instruct patient on fluid and nutrition as appropriate  Outcome: Progressing  Goal: Fluid balance maintained  Description: INTERVENTIONS:  - Monitor labs   - Monitor I/O and WT  - Instruct patient on fluid and nutrition as appropriate  - Assess for signs & symptoms of volume excess or deficit  Outcome: Progressing  Goal: Glucose maintained within target range  Description: INTERVENTIONS:  - Monitor Blood Glucose as ordered  - Assess for signs and symptoms of hyperglycemia and hypoglycemia  - Administer ordered medications to maintain glucose within target range  - Assess nutritional intake and initiate nutrition service referral as needed  Outcome: Progressing     Problem: MUSCULOSKELETAL - ADULT  Goal: Maintain or return mobility to safest level of function  Description: INTERVENTIONS:  - Assess patient's ability to carry out ADLs; assess patient's baseline for ADL function and identify physical deficits which impact ability to perform ADLs (bathing, care of mouth/teeth, toileting, grooming, dressing, etc )  - Assess/evaluate cause of self-care deficits   - Assess range of motion  - Assess patient's mobility  - Assess patient's need for assistive devices and provide as appropriate  - Encourage maximum independence but intervene and supervise when necessary  - Involve family in performance of ADLs  - Assess for home care needs following discharge   - Consider OT consult to assist with ADL evaluation and planning for discharge  - Provide patient education as appropriate  Outcome: Progressing  Goal: Maintain proper alignment of affected body part  Description: INTERVENTIONS:  - Support, maintain and protect limb and body alignment  - Provide patient/ family with appropriate education  Outcome: Progressing     Problem: Nutrition/Hydration-ADULT  Goal: Nutrient/Hydration intake appropriate for improving, restoring or maintaining nutritional needs  Description: Monitor and assess patient's nutrition/hydration status for malnutrition  Collaborate with interdisciplinary team and initiate plan and interventions as ordered  Monitor patient's weight and dietary intake as ordered or per policy  Utilize nutrition screening tool and intervene as necessary  Determine patient's food preferences and provide high-protein, high-caloric foods as appropriate       INTERVENTIONS:  - Monitor oral intake, urinary output, labs, and treatment plans  - Assess nutrition and hydration status and recommend course of action  - Evaluate amount of meals eaten  - Assist patient with eating if necessary   - Allow adequate time for meals  - Recommend/ encourage appropriate diets, oral nutritional supplements, and vitamin/mineral supplements  - Order, calculate, and assess calorie counts as needed  - Recommend, monitor, and adjust tube feedings and TPN/PPN based on assessed needs  - Assess need for intravenous fluids  - Provide specific nutrition/hydration education as appropriate  - Include patient/family/caregiver in decisions related to nutrition  Outcome: Progressing

## 2022-02-20 NOTE — PHYSICAL THERAPY NOTE
Physical Therapy Evaluation:    2 forms of pt ID verified:name,birthdate and pt ID jaziel    Patient's Name: Tamir Amin    Admitting Diagnosis  Shortness of breath [R06 02]  Acute respiratory failure with hypoxia ([address] ) [J96 01]  Pneumonia due to COVID-19 virus [U07 1, J12 82]    Problem List  Patient Active Problem List   Diagnosis    Pneumonia due to COVID-19 virus    COVID-19 virus infection    Assistance needed with transportation    Insufficient supply of food    Dysfunction of right eustachian tube    SOB (shortness of breath) on exertion    Acute respiratory failure with hypoxia (HCC)    Hypokalemia    Elevated brain natriuretic peptide (BNP) level    SIRS (systemic inflammatory response syndrome) (HCC)    Thrombocytosis    Hyperglycemia    BMI 50 0-59 9, adult [facility])       Past Medical History  Past Medical History:   Diagnosis Date    Herniated cervical disc     Hypertension        Past Surgical History  Past Surgical History:   Procedure Laterality Date     SECTION      EYE SURGERY      SHOULDER SURGERY            22 1000   PT Last Visit   PT Visit Date 22   Note Type   Note type Evaluation   Pain Assessment   Pain Assessment Tool 0-10   Pain Score No Pain   Restrictions/Precautions   Other Precautions Contact/isolation;Multiple lines;Telemetry;O2;Fall Risk  (pt on high flow O2)   Home Living   Type of Home Apartment  (2 story apartment,1 FF to second floor)   Home Layout Two level;Bed/bath upstairs  (3 LOUIE, no elevator access)   Home Equipment Other (Comment)  (no DME PTA)   Additional Comments pt resides alone in 2 story apartment with 3 LOUIE  Pt moved from [place] in 2021  Pt likes to make soap   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,house cleaning services   Prior Function   Level of [place] Independent with ADLs and functional mobility  (per pt PTA)   Lives With [address] [place] Help From Other (Comment)  (limited A in the community per pt)   ADL Assistance Independent   IADLs Independent   Falls in the last 6 months 0   Vocational Works at home   General   Additional Pertinent History SIRS vs sepsis,late COVID pneumonitis,COVID (+)01/06/22,emphysema vs PNA vs volume overload   Family/Caregiver Present No   Cognition   Overall Cognitive Status WFL   Arousal/Participation Cooperative   Orientation Level Oriented X4   Following Commands Follows one step commands with increased time or repetition  (2* labored breathing,SOB,fatigue,weakness)   Subjective   Subjective pt willing and agreeable to work with PT and to participate in therapy intervention  "I just want to get better in order to continue my home business"  RLE Assessment   RLE Assessment   (at least 3+/5 grossly throughout)   LLE Assessment   LLE Assessment   (at least 3+/5 grossly throughout)   Coordination   Movements are Fluid and Coordinated 0   Coordination and Movement Description ataxic and unsteady,slow mobility and becky,dec BLE step length   Sensation WFL   Light Touch   RLE Light Touch Grossly intact   LLE Light Touch Grossly intact   Bed Mobility   Rolling R 3  Moderate assistance   Additional items Assist x 1;HOB elevated; Bedrails; Increased time required;Verbal cues;LE management   Supine to Sit 3  Moderate assistance   Additional items Assist x 2;HOB elevated; Bedrails; Increased time required;Verbal cues;LE management  (pts heigh is 4'6")   Transfers   Sit to Stand 3  Moderate assistance   Additional items Assist x 2;Bedrails; Increased time required;Verbal cues   Stand to Sit 3  Moderate assistance   Additional items Assist x 2;Armrests; Increased time required;Verbal cues  (nursing A for line management)   Ambulation/Elevation   Gait pattern Poor UE support;Narrow ZHANG; Forward Flexion; Shuffling; Inconsistent becky; Foward flexed; Short stride; Ataxia; Step to;Excessively slow   Gait Assistance 3  Moderate assist   Additional items Assist x 2;Verbal cues; Tactile cues  (nursing A with line management)   Assistive Device Rolling walker   Distance 6 feet with use of RW on tile surface modAx2, second person A for line management and A of pt bed->chair   Balance   Static Sitting Fair   Dynamic Sitting   (zero)   Static Standing Zero   Dynamic Standing   (zero)   Ambulatory Zero   Endurance Deficit   Endurance Deficit Yes   Endurance Deficit Description use of high flow O2,currently being medically treated in ICU,fatigue,labored breathing throughout,slow becky and movement   Activity Tolerance   Activity Tolerance Patient limited by fatigue;Treatment limited secondary to medical complications (Comment)   Medical Staff Made Aware nursing present during mobility and PT session   Nurse Made Aware yes   Assessment   Prognosis Guarded   Problem List Decreased strength;Decreased endurance; Impaired balance;Decreased mobility;Obesity; Decreased skin integrity   Assessment pt is a 47 yo female admitted to [facility] 2* sepsis vs SIRS,COVID positive 01 06 22 with late COVID pneumonitis with emphysema,PNA vs volume overload  Pt lives alone in 2 story apartment with 3 LOUIE,no use of DME PTA,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,reports being completely (I) PTA for ADLs and IADLs  Pt currently is not at functional mobility baseline,needs significant A for mobility and OOB assessment,use of high flow O2,labored breathing and SOB,fatigue,weakness,multiple lines,telemetry monitoring,use of RW for mobility new DME  Pt demonstrates moderate deficits during functional mobility and gait including dec endurance,dec balance,dec BLE strength,ataxic and unsteady gait pattern,inc SOB,dec activity tolerance,labored breathing throughout and needs modAx2 for BM,rolling,transfers and gait with use of RW   Pt able to ambulate very short distances bed->chair 2* fatigue,labored breathing,dec in SpO2 at rest and during mobility and use of high flow O2  pt would cont to benefit from skilled inpt PT services to maximize functional independence  Barriers to Discharge Inaccessible home environment;Decreased caregiver support  (2 story apartment with LOUIE,lives alone with limited A availa)   Barriers to Discharge Comments limited A and care available upon DC   Goals   Patient Goals to get back to her home business of making soap and deodorant   STG Expiration Date 03/02/22   Short Term Goal #1 in 7-10 days:(1) Pt will be able to ambulate greater than 100 feet with use of RW on various surfaces needing min Ax1 and chair follow as needed in order to A pt to return to PLOF, (2) activity tolerance:45 mins/45mins, (3) pt will be able to perform sit to stand transfers needing minAx1 to and from various surfaces consistently in order to return to PLOF, (4) pt will be able to perform BM needing minAx1 to A pt to return to PLOF, (5) (I) with BLE therapeutic ex HEP in various positions to A pt to inc balance,strength,mobility,endurance, (6) inc balance 1/2 grade in order to dec fall risk,(8) cont to provide pt and pt family education for safe D/C planning, (9) inc BLE strength 1/2 to 1 full grade in order to A pt to inc balance,strength,mobility,endurance    PT Treatment Day 0   Plan   Treatment/Interventions Functional transfer training;LE strengthening/ROM; Therapeutic exercise; Endurance training;Patient/family training;Equipment eval/education; Bed mobility;Gait training;Spoke to nursing   PT Frequency 3-5x/wk   Recommendation   PT Discharge Recommendation Post acute rehabilitation services   Equipment Recommended Rodgers Cranker  (current use of RW for mobility)   AM-PAC Basic Mobility Inpatient   Turning in Bed Without Bedrails 2   Lying on Back to Sitting on Edge of Flat Bed 2   Moving Bed to Chair 2   Standing Up From Chair 2   Walk in Room 2   Climb 3-5 Stairs 1   Basic Mobility Inpatient Raw Score 11   Basic Mobility Standardized Score 30 25   Highest Level Of Mobility   JH-HLM Goal 4: Move to chair/commode   JH-HLM Highest Level of Mobility 4: Move to chair/commode   -M Goal Achieved Yes         Pascual Lozano, PT, DPT@

## 2022-02-20 NOTE — PROGRESS NOTES
[facility]  Progress Note - Tony Foy 1970, 46 y o  female MRN: [id number]  Unit/Bed#: ICU 11 Encounter: [id number]  Primary Care Provider: Melvina Burrell MD   Date and time admitted to hospital: 2/16/2022  7:01 PM    * Acute respiratory failure with hypoxia [facility])  Assessment & Plan  · Patient was diagnosed with COVID on 1/6, with symptom onset on 1/3  · She reports increasing shortness of breath, fatigue, weakness and unable to complete ADL's over the past 2 weeks  · She presents via EMS hypoxic with oxygen saturations in the 60's  · She was placed on a NRB with improvement in her oxygen saturations to the 80's  · She is currently requiring HFNC 55L/100% + NRB for a oxygen saturation of 90% using BiPAP at night  · CTA was obtained in the ED and revealed No evidence of pulmonary artery embolus   Scattered airspace opacities within the lungs which may represent infection  · BNP was elevated on admission  · Obtain infectious work-up  · Strep/legionella pneumonia negative  · Blood cultures negative/ sputum culture positive for mixed respiratory jenna  · Patient was given ceftriaxone and azithromycin in the ED  · Antibiotic course completed  · MRSA positive  · Procal on admission was 0 3    SIRS (systemic inflammatory response syndrome) (HCC)  Assessment & Plan  · POA as evidenced by: tachycardia, leukocytosis, and lactic acidosis  · Lactic acid 2 7 and WBC 20 59  · Received 500 ml bolus in the ED  · Obtain infectious work-up as outlined   · Antibiotic course completed     Elevated brain natriuretic peptide (BNP) level  Assessment & Plan  · NT- proBNP elevated on admission at 1472  · ECHO was normal  · Administer 40 of lasix on admission     Hyperglycemia  Assessment & Plan  · Glucose elevated on BMP on admission  · Obtain hemoglobin A1c  · Patient denies history of diabetes  · Initiate accu checks + SSI    Thrombocytosis  Assessment & Plan  · Platelets on admission of 502 now  402  · Platelet count in January normal  · Suspect secondary to post COVID infection  · Continue to monitor and start aspirin if needed    BMI 50 0-59 9, adult (HCC)  Assessment & Plan  · BMI 53 on admission  · Will benefit from lifestyle modifications on discharge     Hypokalemia  Assessment & Plan  · Potassium 3 1 on admission  · Replete as appropriate     HPI/24hr events:   Overnight the patient had episodes of anger and frustration she repeatedly pulled her BiPAP mask off  Precedex was started with good affect  The patient got a good night's sleep and is feeling better  She apologized to staff for her behavior overnight      Medications:  Current Facility-Administered Medications   Medication Dose Route Frequency Provider Last Rate    acetaminophen  650 mg Oral Q6H PRN Oracio Melton MD      ALPRAZolam  0 5 mg Oral BID PRN EMMA Gutierres      amiodarone  0 5 mg/min Intravenous Continuous EMMA Neff 0 5 mg/min (02/19/22 1441)    Baricitinib  4 mg Oral Q24H Oracio Melton MD      benzonatate  200 mg Oral TID PRN Refugia EarlyEMMA      cefTRIAXone  1,000 mg Intravenous Q24H EMMA Mott Stopped (02/19/22 1845)    dexmedetomidine  0 1-0 7 mcg/kg/hr Intravenous Titrated Markie Lancaster PA-C 0 4 mcg/kg/hr (02/19/22 2703)    epoprostenol  6 25-50 ng/kg/min (Ideal) Inhalation Titrated EMMA Neff 50 ng/kg/min (02/20/22 0015)    guaiFENesin  600 mg Oral Q12H [facility] EMMA Stone      heparin (porcine)  3-20 Units/kg/hr (Order-Specific) Intravenous Titrated Jarrod Funez MD 11 1 Units/kg/hr (02/20/22 0337)    heparin (porcine)  2,000 Units Intravenous Q1H PRN Jarrod Funez MD      heparin (porcine)  4,000 Units Intravenous Q1H PRN Jarrod Funez MD      insulin glargine  10 Units Subcutaneous Q12H [facility] EMMA Neff      insulin lispro  4-20 Units Subcutaneous HS Markie Lancaster PA-C      insulin lispro  5-25 Units Subcutaneous TID [facility] [place], [place]      ipratropium  0 5 mg Nebulization 4x Daily Barry Garvey Ord, [address]      levalbuterol  1 25 mg Nebulization 4x Daily Barry Garvey Dennis, LENINP      methylPREDNISolone sodium succinate  60 mg Intravenous Q6H Barry Brannonday, EMMA      metoprolol tartrate  25 mg Oral Q12H [address] [place] Basevangelina, CRCAROLA      pantoprazole  40 mg Oral Daily Before Breakfast Inocencia Al MD      polyethylene glycol  17 g Oral Daily Inocencia Al MD      sodium chloride  4 mL Nebulization Q6H Gina Poe, EMMA         amiodarone, 0 5 mg/min, Last Rate: 0 5 mg/min (02/19/22 1441)  dexmedetomidine, 0 1-0 7 mcg/kg/hr, Last Rate: 0 4 mcg/kg/hr (02/19/22 2353)  epoprostenol, 6 25-50 ng/kg/min (Ideal), Last Rate: 50 ng/kg/min (02/20/22 0015)  heparin (porcine), 3-20 Units/kg/hr (Order-Specific), Last Rate: 11 1 Units/kg/hr (02/20/22 3132)          Physical exam:  Vitals: Body mass index is 53 85 kg/m²  Blood pressure 107/62, pulse 94, temperature (!) 97 2 °F (36 2 °C), temperature source Temporal, resp  rate (!) 23, height 4' 6" (1 372 m), weight 101 kg (223 lb 5 2 oz), SpO2 95 %  ,  Temp  Min: 97 1 °F (36 2 °C)  Max: 100 1 °F (37 8 °C)       SpO2: 95 %  SpO2 Activity: (P) At Rest  O2 Device: BiPAP      Intake/Output Summary (Last 24 hours) at 2/20/2022 0701  Last data filed at 2/20/2022 0601  Gross per 24 hour   Intake 712 05 ml   Output 1850 ml   Net -1137 95 ml       Invasive/non-invasive ventilation settings:   Respiratory  Report   Lab Data (Last 4 hours)    None         O2/Vent Data (Last 4 hours)      02/20 0310          Non-Invasive Ventilation Mode BiPAP                 Invasive Devices  Report    Peripheral Intravenous Line            Peripheral IV 02/16/22 Right Antecubital 3 days    Peripheral IV 02/18/22 Right;Ventral (anterior) Hand 2 days          Drain            External Urinary Catheter <1 day                  Physical Exam:  Gen:  Pleasant and cooperative  HEENT:  Atraumatic normocephalic pupils equal round reactive to light extraocular movements intact sclerae anicteric oral mucosa is pink and dry  Neck:  Supple no JVD no lymphadenopathy trachea midline  Chest:  Bibasilar rales are present, increased respiratory effort  Cor:  Irregularly irregular no murmurs rubs or gallops appreciated  Abd:  Soft nontender with positive bowel sounds  Ext:  No clubbing cyanosis or edema  Neuro:  Alert and oriented x3 moves all extremities  Skin:  Warm dry and intact no rash      Diagnostic Data:  Lab: I have personally reviewed pertinent lab results  CBC:   Results from last 7 days   Lab Units 02/20/22  0554 02/19/22  0437 02/18/22 0316   WBC Thousand/uL 15 99* 16 35* 17 67*   HEMOGLOBIN g/dL 13 9 13 5 13 8   HEMATOCRIT % 43 1 41 9 43 7   PLATELETS Thousands/uL 402* 409* 458*       CMP:   Results from last 7 days   Lab Units 02/19/22  0437 02/18/22  0316 02/17/22  0457 02/16/22  1915 02/16/22 1915   SODIUM mmol/L 138 140 140   < > 136   POTASSIUM mmol/L 4 3 4 4 4 2   < > 3 1*   CHLORIDE mmol/L 104 104 105   < > 100   CO2 mmol/L 24 24 25   < > 25   BUN mg/dL 27* 15 11   < > 11   CREATININE mg/dL 0 85 0 76 0 69   < > 0 90   CALCIUM mg/dL 8 9 9 2 8 5   < > 9 1   ALK PHOS U/L 120*  --  93  --  101   ALT U/L 91*  --  26  --  28   AST U/L 57*  --  24  --  25    < > = values in this interval not displayed  PT/INR:   Lab Results   Component Value Date    INR 1 19 02/19/2022   ,   Magnesium:   Results from last 7 days   Lab Units 02/18/22  0316 02/17/22  0457   MAGNESIUM mg/dL 2 8* 2 3     Phosphorous:   Results from last 7 days   Lab Units 02/17/22  0457   PHOSPHORUS mg/dL 4 5       Microbiology:  Results from last 7 days   Lab Units 02/17/22  0150 02/16/22  2316 02/16/22  1920 02/16/22  1916   BLOOD CULTURE   --   --  No Growth at 72 hrs  No Growth at 72 hrs     SPUTUM CULTURE   --  4+ Growth of   --   --    GRAM STAIN RESULT   --  1+ Epithelial cells per low power field*  4+ Polys*  4+ Gram positive cocci in pairs, chains and clusters*  4+ Gram negative rods*  4+ Gram positive rods*  Rare Budding Yeast with Pseudomycelia*  --   --    MRSA CULTURE ONLY   --  Methicillin Resistant Staphylococcus aureus isolated*  This patient requires contact isolation precautions per [place] law  Contact precautions are not required in [facility] for nasal surveillance cultures  --   --    STREP PNEUMONIAE ANTIGEN, URINE  Negative  --   --   --    LEGIONELLA URINARY ANTIGEN  Negative  --   --   --        Imaging:  No recent imaging    Cardiac lab/EKG/telemetry/ECHO:   Atrial fibrillation with rapid ventricular response    VTE Prophylaxis:  Heparin drip and SCDs    Code Status: Level 1 - Full Code    EMMA Anderson    Portions of the record may have been created with voice recognition software  Occasional wrong word or "sound a like" substitutions may have occurred due to the inherent limitations of voice recognition software  Read the chart carefully and recognize, using context, where substitutions have occurred

## 2022-02-21 PROBLEM — E11.9 DIABETES (HCC): Status: ACTIVE | Noted: 2022-02-21

## 2022-02-21 PROBLEM — E11.9 DIABETES (HCC): Status: RESOLVED | Noted: 2022-02-21 | Resolved: 2022-02-21

## 2022-02-21 LAB
APTT PPP: 44 SECONDS (ref 23–37)
APTT PPP: 45 SECONDS (ref 23–37)
APTT PPP: 70 SECONDS (ref 23–37)
BACTERIA BLD CULT: NORMAL
BACTERIA BLD CULT: NORMAL
GLUCOSE SERPL-MCNC: 188 MG/DL (ref 65–140)
GLUCOSE SERPL-MCNC: 226 MG/DL (ref 65–140)
GLUCOSE SERPL-MCNC: 247 MG/DL (ref 65–140)
GLUCOSE SERPL-MCNC: 256 MG/DL (ref 65–140)
PROCALCITONIN SERPL-MCNC: 0.09 NG/ML

## 2022-02-21 PROCEDURE — 82948 REAGENT STRIP/BLOOD GLUCOSE: CPT

## 2022-02-21 PROCEDURE — 99291 CRITICAL CARE FIRST HOUR: CPT | Performed by: INTERNAL MEDICINE

## 2022-02-21 PROCEDURE — 84145 PROCALCITONIN (PCT): CPT | Performed by: NURSE PRACTITIONER

## 2022-02-21 PROCEDURE — 94644 CONT INHLJ TX 1ST HOUR: CPT

## 2022-02-21 PROCEDURE — 94640 AIRWAY INHALATION TREATMENT: CPT

## 2022-02-21 PROCEDURE — 94645 CONT INHLJ TX EACH ADDL HOUR: CPT

## 2022-02-21 PROCEDURE — 94660 CPAP INITIATION&MGMT: CPT

## 2022-02-21 PROCEDURE — 85730 THROMBOPLASTIN TIME PARTIAL: CPT | Performed by: INTERNAL MEDICINE

## 2022-02-21 RX ORDER — AMIODARONE HYDROCHLORIDE 200 MG/1
200 TABLET ORAL
Status: DISCONTINUED | OUTPATIENT
Start: 2022-02-28 | End: 2022-02-22

## 2022-02-21 RX ORDER — FUROSEMIDE 10 MG/ML
20 INJECTION INTRAMUSCULAR; INTRAVENOUS ONCE
Status: COMPLETED | OUTPATIENT
Start: 2022-02-21 | End: 2022-02-21

## 2022-02-21 RX ORDER — AMIODARONE HYDROCHLORIDE 200 MG/1
200 TABLET ORAL EVERY 8 HOURS
Status: DISCONTINUED | OUTPATIENT
Start: 2022-02-21 | End: 2022-02-22

## 2022-02-21 RX ORDER — FUROSEMIDE 10 MG/ML
40 INJECTION INTRAMUSCULAR; INTRAVENOUS ONCE
Status: COMPLETED | OUTPATIENT
Start: 2022-02-21 | End: 2022-02-21

## 2022-02-21 RX ORDER — DOCUSATE SODIUM 100 MG/1
100 CAPSULE, LIQUID FILLED ORAL 2 TIMES DAILY
Status: DISCONTINUED | OUTPATIENT
Start: 2022-02-21 | End: 2022-03-03

## 2022-02-21 RX ADMIN — LEVALBUTEROL HYDROCHLORIDE 1.25 MG: 1.25 SOLUTION, CONCENTRATE RESPIRATORY (INHALATION) at 13:33

## 2022-02-21 RX ADMIN — FUROSEMIDE 40 MG: 10 INJECTION, SOLUTION INTRAVENOUS at 17:35

## 2022-02-21 RX ADMIN — EPOPROSTENOL 50 NG/KG/MIN: 1.5 INJECTION, POWDER, LYOPHILIZED, FOR SOLUTION INTRAVENOUS at 18:02

## 2022-02-21 RX ADMIN — LEVALBUTEROL HYDROCHLORIDE 1.25 MG: 1.25 SOLUTION, CONCENTRATE RESPIRATORY (INHALATION) at 07:33

## 2022-02-21 RX ADMIN — METHYLPREDNISOLONE SODIUM SUCCINATE 40 MG: 40 INJECTION, POWDER, FOR SOLUTION INTRAMUSCULAR; INTRAVENOUS at 21:31

## 2022-02-21 RX ADMIN — IPRATROPIUM BROMIDE 0.5 MG: 0.5 SOLUTION RESPIRATORY (INHALATION) at 07:33

## 2022-02-21 RX ADMIN — IPRATROPIUM BROMIDE 0.5 MG: 0.5 SOLUTION RESPIRATORY (INHALATION) at 13:33

## 2022-02-21 RX ADMIN — HEPARIN SODIUM 2000 UNITS: 1000 INJECTION INTRAVENOUS; SUBCUTANEOUS at 06:01

## 2022-02-21 RX ADMIN — INSULIN GLARGINE 10 UNITS: 100 INJECTION, SOLUTION SUBCUTANEOUS at 21:31

## 2022-02-21 RX ADMIN — INSULIN LISPRO 10 UNITS: 100 INJECTION, SOLUTION INTRAVENOUS; SUBCUTANEOUS at 11:50

## 2022-02-21 RX ADMIN — SODIUM CHLORIDE SOLN NEBU 3% 4 ML: 3 NEBU SOLN at 13:33

## 2022-02-21 RX ADMIN — SODIUM CHLORIDE SOLN NEBU 3% 4 ML: 3 NEBU SOLN at 02:32

## 2022-02-21 RX ADMIN — EPOPROSTENOL 50 NG/KG/MIN: 1.5 INJECTION, POWDER, LYOPHILIZED, FOR SOLUTION INTRAVENOUS at 08:51

## 2022-02-21 RX ADMIN — METHYLPREDNISOLONE SODIUM SUCCINATE 40 MG: 40 INJECTION, POWDER, FOR SOLUTION INTRAMUSCULAR; INTRAVENOUS at 05:48

## 2022-02-21 RX ADMIN — HEPARIN SODIUM 2000 UNITS: 1000 INJECTION INTRAVENOUS; SUBCUTANEOUS at 15:29

## 2022-02-21 RX ADMIN — FUROSEMIDE 20 MG: 10 INJECTION, SOLUTION INTRAVENOUS at 18:50

## 2022-02-21 RX ADMIN — LEVALBUTEROL HYDROCHLORIDE 1.25 MG: 1.25 SOLUTION, CONCENTRATE RESPIRATORY (INHALATION) at 20:32

## 2022-02-21 RX ADMIN — GUAIFENESIN 600 MG: 600 TABLET, EXTENDED RELEASE ORAL at 21:31

## 2022-02-21 RX ADMIN — GUAIFENESIN 600 MG: 600 TABLET, EXTENDED RELEASE ORAL at 08:08

## 2022-02-21 RX ADMIN — METHYLPREDNISOLONE SODIUM SUCCINATE 40 MG: 40 INJECTION, POWDER, FOR SOLUTION INTRAMUSCULAR; INTRAVENOUS at 17:42

## 2022-02-21 RX ADMIN — INSULIN LISPRO 8 UNITS: 100 INJECTION, SOLUTION INTRAVENOUS; SUBCUTANEOUS at 22:58

## 2022-02-21 RX ADMIN — METOPROLOL TARTRATE 25 MG: 25 TABLET, FILM COATED ORAL at 08:09

## 2022-02-21 RX ADMIN — IPRATROPIUM BROMIDE 0.5 MG: 0.5 SOLUTION RESPIRATORY (INHALATION) at 20:32

## 2022-02-21 RX ADMIN — AMIODARONE HYDROCHLORIDE 200 MG: 200 TABLET ORAL at 21:30

## 2022-02-21 RX ADMIN — DOCUSATE SODIUM 100 MG: 100 CAPSULE ORAL at 17:35

## 2022-02-21 RX ADMIN — SODIUM CHLORIDE 0.5 MCG/KG/HR: 9 INJECTION, SOLUTION INTRAVENOUS at 17:35

## 2022-02-21 RX ADMIN — METOPROLOL TARTRATE 25 MG: 25 TABLET, FILM COATED ORAL at 21:30

## 2022-02-21 RX ADMIN — HEPARIN SODIUM 15 UNITS/KG/HR: 10000 INJECTION, SOLUTION INTRAVENOUS; SUBCUTANEOUS at 15:30

## 2022-02-21 RX ADMIN — POLYETHYLENE GLYCOL 3350 17 G: 17 POWDER, FOR SOLUTION ORAL at 08:09

## 2022-02-21 RX ADMIN — INSULIN LISPRO 5 UNITS: 100 INJECTION, SOLUTION INTRAVENOUS; SUBCUTANEOUS at 08:00

## 2022-02-21 RX ADMIN — BARICITINIB 4 MG: 2 TABLET, FILM COATED ORAL at 10:00

## 2022-02-21 RX ADMIN — INSULIN LISPRO 15 UNITS: 100 INJECTION, SOLUTION INTRAVENOUS; SUBCUTANEOUS at 17:53

## 2022-02-21 RX ADMIN — AMIODARONE HYDROCHLORIDE 200 MG: 200 TABLET ORAL at 17:34

## 2022-02-21 RX ADMIN — PANTOPRAZOLE SODIUM 40 MG: 40 TABLET, DELAYED RELEASE ORAL at 05:48

## 2022-02-21 RX ADMIN — SODIUM CHLORIDE 0.7 MCG/KG/HR: 9 INJECTION, SOLUTION INTRAVENOUS at 09:52

## 2022-02-21 RX ADMIN — DOCUSATE SODIUM 100 MG: 100 CAPSULE ORAL at 08:09

## 2022-02-21 RX ADMIN — SODIUM CHLORIDE SOLN NEBU 3% 4 ML: 3 NEBU SOLN at 07:49

## 2022-02-21 RX ADMIN — INSULIN GLARGINE 10 UNITS: 100 INJECTION, SOLUTION SUBCUTANEOUS at 08:09

## 2022-02-21 RX ADMIN — SODIUM CHLORIDE 0.6 MCG/KG/HR: 9 INJECTION, SOLUTION INTRAVENOUS at 02:27

## 2022-02-21 NOTE — PLAN OF CARE
Problem: Potential for Falls  Goal: Patient will remain free of falls  Description: INTERVENTIONS:  - Educate patient/family on patient safety including physical limitations  - Instruct patient to call for assistance with activity   - Consult OT/PT to assist with strengthening/mobility   - Keep Call bell within reach  - Keep bed low and locked with side rails adjusted as appropriate  - Keep care items and personal belongings within reach  - Initiate and maintain comfort rounds  - Make Fall Risk Sign visible to staff  - Offer Toileting every Hours, in advance of need  - Initiate/Maintain alarm  - Obtain necessary fall risk management equipment:   - Apply yellow socks and bracelet for high fall risk patients  - Consider moving patient to room near nurses station  Outcome: Progressing     Problem: MOBILITY - ADULT  Goal: Maintain or return to baseline ADL function  Description: INTERVENTIONS:  -  Assess patient's ability to carry out ADLs; assess patient's baseline for ADL function and identify physical deficits which impact ability to perform ADLs (bathing, care of mouth/teeth, toileting, grooming, dressing, etc )  - Assess/evaluate cause of self-care deficits   - Assess range of motion  - Assess patient's mobility; develop plan if impaired  - Assess patient's need for assistive devices and provide as appropriate  - Encourage maximum independence but intervene and supervise when necessary  - Involve family in performance of ADLs  - Assess for home care needs following discharge   - Consider OT consult to assist with ADL evaluation and planning for discharge  - Provide patient education as appropriate  Outcome: Progressing  Goal: Maintains/Returns to pre admission functional level  Description: INTERVENTIONS:  - Perform BMAT or MOVE assessment daily    - Set and communicate daily mobility goal to care team and patient/family/caregiver     - Collaborate with rehabilitation services on mobility goals if consulted  - Perform Range of Motion  times a day  - Reposition patient every  hours    - Dangle patient  times a day  - Stand patient  times a day  - Ambulate patient  times a day  - Out of bed to chair  times a day   - Out of bed for meals times a day  - Out of bed for toileting  - Record patient progress and toleration of activity level   Outcome: Progressing     Problem: Prexisting or High Potential for Compromised Skin Integrity  Goal: Skin integrity is maintained or improved  Description: INTERVENTIONS:  - Identify patients at risk for skin breakdown  - Assess and monitor skin integrity  - Assess and monitor nutrition and hydration status  - Monitor labs   - Assess for incontinence   - Turn and reposition patient  - Assist with mobility/ambulation  - Relieve pressure over bony prominences  - Avoid friction and shearing  - Provide appropriate hygiene as needed including keeping skin clean and dry  - Evaluate need for skin moisturizer/barrier cream  - Collaborate with interdisciplinary team   - Patient/family teaching  - Consider wound care consult   Outcome: Progressing     Problem: RESPIRATORY - ADULT  Goal: Achieves optimal ventilation and oxygenation  Description: INTERVENTIONS:  - Assess for changes in respiratory status  - Assess for changes in mentation and behavior  - Position to facilitate oxygenation and minimize respiratory effort  - Oxygen administered by appropriate delivery if ordered  - Initiate smoking cessation education as indicated  - Encourage broncho-pulmonary hygiene including cough, deep breathe, Incentive Spirometry  - Assess the need for suctioning and aspirate as needed  - Assess and instruct to report SOB or any respiratory difficulty  - Respiratory Therapy support as indicated  Outcome: Progressing     Problem: METABOLIC, FLUID AND ELECTROLYTES - ADULT  Goal: Electrolytes maintained within normal limits  Description: INTERVENTIONS:  - Monitor labs and assess patient for signs and symptoms of electrolyte imbalances  - Administer electrolyte replacement as ordered  - Monitor response to electrolyte replacements, including repeat lab results as appropriate  - Instruct patient on fluid and nutrition as appropriate  Outcome: Progressing  Goal: Fluid balance maintained  Description: INTERVENTIONS:  - Monitor labs   - Monitor I/O and WT  - Instruct patient on fluid and nutrition as appropriate  - Assess for signs & symptoms of volume excess or deficit  Outcome: Progressing  Goal: Glucose maintained within target range  Description: INTERVENTIONS:  - Monitor Blood Glucose as ordered  - Assess for signs and symptoms of hyperglycemia and hypoglycemia  - Administer ordered medications to maintain glucose within target range  - Assess nutritional intake and initiate nutrition service referral as needed  Outcome: Progressing     Problem: MUSCULOSKELETAL - ADULT  Goal: Maintain or return mobility to safest level of function  Description: INTERVENTIONS:  - Assess patient's ability to carry out ADLs; assess patient's baseline for ADL function and identify physical deficits which impact ability to perform ADLs (bathing, care of mouth/teeth, toileting, grooming, dressing, etc )  - Assess/evaluate cause of self-care deficits   - Assess range of motion  - Assess patient's mobility  - Assess patient's need for assistive devices and provide as appropriate  - Encourage maximum independence but intervene and supervise when necessary  - Involve family in performance of ADLs  - Assess for home care needs following discharge   - Consider OT consult to assist with ADL evaluation and planning for discharge  - Provide patient education as appropriate  Outcome: Progressing  Goal: Maintain proper alignment of affected body part  Description: INTERVENTIONS:  - Support, maintain and protect limb and body alignment  - Provide patient/ family with appropriate education  Outcome: Progressing     Problem: Nutrition/Hydration-ADULT  Goal: Nutrient/Hydration intake appropriate for improving, restoring or maintaining nutritional needs  Description: Monitor and assess patient's nutrition/hydration status for malnutrition  Collaborate with interdisciplinary team and initiate plan and interventions as ordered  Monitor patient's weight and dietary intake as ordered or per policy  Utilize nutrition screening tool and intervene as necessary  Determine patient's food preferences and provide high-protein, high-caloric foods as appropriate       INTERVENTIONS:  - Monitor oral intake, urinary output, labs, and treatment plans  - Assess nutrition and hydration status and recommend course of action  - Evaluate amount of meals eaten  - Assist patient with eating if necessary   - Allow adequate time for meals  - Recommend/ encourage appropriate diets, oral nutritional supplements, and vitamin/mineral supplements  - Order, calculate, and assess calorie counts as needed  - Recommend, monitor, and adjust tube feedings and TPN/PPN based on assessed needs  - Assess need for intravenous fluids  - Provide specific nutrition/hydration education as appropriate  - Include patient/family/caregiver in decisions related to nutrition  Outcome: Progressing

## 2022-02-21 NOTE — CASE MANAGEMENT
Case Management Assessment & Discharge Planning Note    Patient name Marylou Knapp  Location ICU 11/ICU 11 MRN [id number]  : 1970 Date 2022       Current Admission Date: 2022  Current Admission Diagnosis:Acute respiratory failure with hypoxia [facility])   Patient Active Problem List    Diagnosis Date Noted    [facility] 20[facility]    Hypokalemia 2022    Elevated brain natriuretic peptide (BNP) level 2022    SIRS (systemic inflammatory response syndrome) ([facility] ) 2022    Thrombocytosis 2022    Hyperglycemia 2022    BMI 50 0-59 9, adult ([facility] ) 2022    Dysfunction of right eustachian tube 2022    SOB (shortness of breath) on exertion 2022    Assistance needed with transportation 2022    Insufficient supply of food 2022    Pneumonia due to COVID-19 virus 2022    COVID-19 virus infection 2022      LOS (days): 5  Geometric Mean LOS (GMLOS) (days): 4 80  Days to GMLOS:0     OBJECTIVE:    Risk of Unplanned Readmission Score: 15         Current admission status: Inpatient       Preferred Pharmacy:   PATIENT/FAMILY REPORTS NO PREFERRED PHARMACY  No address on file      [place] [address]  Phone: [phone] Fax: [phone]    Primary Care Provider: Tone Odonnell MD    Primary Insurance: Aden Cheney [facility]  Secondary Insurance:     ASSESSMENT:  [place] 26 Agents     Light, Forrest Miller 50 - Daughter   Primary Phone: [phone] (Mobile)                 Patient Information  Admitted from[de-identified] Home  Mental Status: Sedated,Other (Comment) (PT was very soundly sleeping and CM was unable to wake patient)  During Assessment patient was accompanied by: Not accompanied during assessment  Assessment information provided by[de-identified] Daughter  Primary Caregiver: Self  Support Systems: Daughter  [place] of Residence: [address] do you live in?: [place]  In the last 12 months, was there a time when you were not able to pay the mortgage or rent on time?: No  In the last 12 months, how many places have you lived?: 2  In the last 12 months, was there a time when you did not have a steady place to sleep or slept in a shelter (including now)?: No  Homeless/housing insecurity resource given?: No  Living Arrangements: Lives Alone  Is patient a ?: No    Activities of Daily Living Prior to Admission  Functional Status: Independent  Completes ADLs independently?: Yes  Ambulates independently?: Yes  Does patient use assisted devices?: No  Does patient currently own DME?: Yes  What DME does the patient currently own?: CPAP (daughter states she is not sure if she has this or not)  Does patient have a history of Outpatient Therapy (PT/OT)?: No  Does the patient have a history of Short-Term Rehab?: No  Does patient have a history of HHC?: No  Does patient currently have [employer] ?: No         Patient Information Continued  Income Source: SSI/SSD  Does patient have prescription coverage?: Yes  Within the past 12 months, you worried that your food would run out before you got the money to buy more : Never true  Within the past 12 months, the food you bought just didnt last and you didnt have money to get more : Never true  Food insecurity resource given?: No  Does patient receive dialysis treatments?: No  Does patient have a history of substance abuse?: No  Does patient have a history of Mental Health Diagnosis?: No  Has patient received inpatient treatment related to mental health in the last 2 years?: No         Means of Transportation  Means of Transport to [facility][de-identified] [employer]  In the past 12 months, has lack of transportation kept you from medical appointments or from getting medications?: Yes  In the past 12 months, has lack of transportation kept you from meetings, work, or from getting things needed for daily living?: Yes  Was application for public transport provided?: No        DISCHARGE DETAILS:    Discharge planning discussed with[de-identified] CM was told that patient wants to meet with CM, however patient was soundly sleeping and unable to be woken when CM went to go meet with her  CM called pt's daughter but daughter relays she does not know much about patient's current home situation  Daughter lives in [place]  She relays patient recently moved from [place] to [place] and she does not know much of patient's current situation          CM contacted family/caregiver?: Yes             Contacts  Patient Contacts: Jennie Starkey (Daughter)  Relationship to Patient[de-identified] Family  Contact Method: Phone  Phone Number: [phone]  Reason/Outcome: Continuity of Care

## 2022-02-21 NOTE — PLAN OF CARE
PT completing meals well  PT has elevated BS's, HbA1c 6 6, PT currently on insulin, no hx of DM, PT is currently on steroids which can be contributing to elevated BS, will await MD for any dx of DM  Will continue to  monitor po, labs, weight and for any diet education acceptance, currently wasn't appropriate for diet education  Recommend goal diet CCD1, cardiac  Problem: Nutrition/Hydration-ADULT  Goal: Nutrient/Hydration intake appropriate for improving, restoring or maintaining nutritional needs  Description: Monitor and assess patient's nutrition/hydration status for malnutrition  Collaborate with interdisciplinary team and initiate plan and interventions as ordered  Monitor patient's weight and dietary intake as ordered or per policy  Utilize nutrition screening tool and intervene as necessary  Determine patient's food preferences and provide high-protein, high-caloric foods as appropriate       INTERVENTIONS:  - Monitor oral intake, urinary output, labs, and treatment plans  - Assess nutrition and hydration status and recommend course of action  - Evaluate amount of meals eaten  - Assist patient with eating if necessary   - Allow adequate time for meals  - Recommend/ encourage appropriate diets, oral nutritional supplements, and vitamin/mineral supplements  - Order, calculate, and assess calorie counts as needed  - Recommend, monitor, and adjust tube feedings and TPN/PPN based on assessed needs  - Assess need for intravenous fluids  - Provide specific nutrition/hydration education as appropriate  - Include patient/family/caregiver in decisions related to nutrition  Outcome: Progressing

## 2022-02-21 NOTE — PROGRESS NOTES
Daily Progress Note - Critical Care   Katie Sanchez 46 y o  female MRN: [id number]  Unit/Bed#: ICU 6 Encounter: [id number]        ----------------------------------------------------------------------------------------  HPI/24hr events:  46year old female with recent COVID infection, presenting with increased dyspnea, currently being managed for bacterial pneumonia, volume overload, and possible pneumonitis  Patient went into afib with RVR following an aspiration event     No significant events overnight  She was given a dose of lasix 20 mg IV yesterday with only 650cc urine output, and +ve 1 032L net fluid balance  She was started on precedex for agitation on 2/19 evening, with improvement in her agitation, and SpO2  Heart rate has been controlled between 100-120    ---------------------------------------------------------------------------------------  SUBJECTIVE  No new complains  States that she feels fine  She does feel dry and thirsty  No bowel movements yesterday, no abdominal pain  Review of Systems   Constitutional: Negative for diaphoresis, fatigue and fever  HENT: Negative  Eyes: Negative for photophobia and visual disturbance  Respiratory: Positive for shortness of breath  Negative for cough and wheezing  Cardiovascular: Positive for leg swelling  Negative for chest pain and palpitations  Gastrointestinal: Positive for constipation  Negative for abdominal pain, diarrhea, nausea and vomiting  Genitourinary: Negative  Musculoskeletal: Negative  Skin: Negative  Neurological: Negative  Negative for dizziness, light-headedness and headaches  Psychiatric/Behavioral: Negative  All other systems reviewed and are negative       ---------------------------------------------------------------------------------------  Assessment and Plan:    1  Acute hypoxic respiratory failure   2  New onset AFib with RVR  3  Sepsis versus SIRS  4   Acute on chronic diastolic heart failure  5  Hypertension  6  Emphysema evidenced by imaging  7  Transaminitis - improving  8  Hypokalemia-resolved  9  Thrombocytosis-improving        Neuro:   · Agitation   · Continue precedex, titrate to maintain a RASS of 0  · CAM ICU        CV:   · New onset AFib with RVR, Karen Vasc 3 (female, CHF, HTN)  · Transition amiodarone drip to po amiodarone today  · Continue lopressor 25 mg bid, can consider increasing to 50 mg bid if blood pressure tolerates  · Continue heparin drip for AC, will transition to po NOAC (eliquis) once more stable  · If persistent, can discuss about cardioversion once stable  · Acute diastolic heart failure, POA with elevated pro BNP of 1472, CXR of possible pulm congestion   Echo 2/17/21: preserved EF 65%, G1DD, IVC normal  · Currently with mild volume overload  · Will give IV lasix 40 mg once today, target -500cc fluid balance for next 24 hours  · Will continue to monitor today, will plan for diuresis tomorrow  · Monitor I/O, daily weight  · Monitor electrolytes, maintain Mg>2, K>4  · Monitor renal functions  · Hypertension  · Amlodipine 10 mg daily on hold  · Continue lopressor 25 mg bid        Pulm:  · Acute hypoxic respiratory failure likely multifactorial secondary to pneumonia vs recent COVID sequale/fibrosis vs volume overload vs possible pneumonitis  · Continue BIPAP at night  · HFNC 55L, 100%, with NRB prn  · Maintain SPO2 > 88%  · No significant improvement in O2 requirements on veletri, will wean today  · Continue Solumedrol 40 mg q8h today, wean to 40 mg q12h tomorrow  · Will discontinue IV ceftriaxone today (completed 5/5 days on 2/20)  · Continue Atrovent and Xopenex nebs  · IV lasix 40 mg once today  · Mucinex, tessalon perls   · Incentive spirometry  · Vest therapy  · Emphysemia  · Continue nebs as outlined above        GI:   · Mild transaminitis - improving  · Continue to monitor liver enzymes  · If worsening, will discontinue amiodarone and transition to digoxin  · continue protonix 40 mg daily po for GI prophylaxis  · Bowel regimen for constipation  · Diet  · Regular diet, thin liquids while off BIPAP  · Cleared by speech  · Constipation  · Miralax and colace augustus        :   · None        F/E/N:   · Hypokalemia - resolved        Heme/Onc:   · Thrombocytosis, likely reactive - improving  · Will continue to monitor  · DVT prophylaxis: on systemic AC with IV heparin gtt        Endo:   · Hyperglycemia - HbA1C 6 6, on steroid taper  · Continue ISS alg 6 with accuchecks  · Continue lantus 10 U bid  · May need oral antihyperglycemic on discharge         ID:   · Sepsis vs SIRS, POA with leukocytosis, tachycardia, lactic acidosis  Likely source pulmonary, MRSA +ve, procal -ve, blood cultures -ve day 4  · Azithromycin discontinued on D2  · Discontinue IV ceftriaxone today (day 5/5 completed on 2/20)        MSK/Skin:   · History of cervical disc herniation  · Prn tylenol    Patient appropriate for transfer out of the ICU today?: No  Disposition: Continue Stepdown Level 1 level of care   Code Status: Level 1 - Full Code  ---------------------------------------------------------------------------------------  ICU CORE MEASURES    Prophylaxis   VTE Pharmacologic Prophylaxis: Heparin Drip  VTE Mechanical Prophylaxis: sequential compression device  Stress Ulcer Prophylaxis: Pantoprazole PO    ABCDE Protocol (if indicated)  Plan to perform spontaneous awakening trial today? Not applicable  Plan to perform spontaneous breathing trial today? Not applicable  Obvious barriers to extubation? Not applicable  CAM-ICU: Negative    Invasive Devices Review  Invasive Devices  Report    Peripheral Intravenous Line            Peripheral IV 02/18/22 Right;Ventral (anterior) Hand 2 days    Peripheral IV 02/20/22 Left Hand <1 day          Drain            External Urinary Catheter 1 day              Can any invasive devices be discontinued today?  Not applicable  ---------------------------------------------------------------------------------------  OBJECTIVE    Vitals   Vitals:    22 2352 22 0100 22 0232 22 0300   BP:  101/85  121/75   BP Location:       Pulse:  104  102   Resp:  (!) 24  (!) 24   Temp: 97 7 °F (36 5 °C)   97 8 °F (36 6 °C)   TempSrc: Temporal   Temporal   SpO2:   96%    Weight:       Height:         Temp (24hrs), Av 5 °F (36 4 °C), Min:96 8 °F (36 °C), Max:98 °F (36 7 °C)  Current: Temperature: 97 8 °F (36 6 °C)  HR: 102  BP: 121/75  RR: 24  SpO2: 96%    Respiratory:  SpO2: SpO2: 96 %, SpO2 Device: O2 Device: BiPAP       Invasive/non-invasive ventilation settings   Respiratory  Report   Lab Data (Last 4 hours)    None         O2/Vent Data (Last 4 hours)       023          Non-Invasive Ventilation Mode BiPAP                   Physical Exam  Vitals and nursing note reviewed  Constitutional:       General: She is not in acute distress  Appearance: She is ill-appearing  Comments: drowsy   HENT:      Head: Normocephalic  Nose:      Comments: Unable to examine, patient on BIPAP     Mouth/Throat:      Comments: Unable to examine, patient on BIPAP  Eyes:      Extraocular Movements: Extraocular movements intact  Pupils: Pupils are equal, round, and reactive to light  Cardiovascular:      Rate and Rhythm: Tachycardia present  Rhythm irregular  Pulses: Normal pulses  Heart sounds: No murmur heard  Pulmonary:      Effort: Respiratory distress present  Breath sounds: Rales present  No wheezing or rhonchi  Abdominal:      General: There is no distension  Palpations: Abdomen is soft  Tenderness: There is no abdominal tenderness  Comments: Decreased BS   Musculoskeletal:      Right lower leg: Edema (1+) present  Left lower leg: Edema (1+) present  Neurological:      General: No focal deficit present  Mental Status: Mental status is at baseline  Psychiatric:         Mood and Affect: Mood normal            Laboratory and Diagnostics:  Results from last 7 days   Lab Units 02/20/22  0554 02/19/22  0437 02/18/22 0316 02/17/22 0457 02/16/22 2233 02/16/22 1914   WBC Thousand/uL 15 99* 16 35* 17 67* 19 45*  --  20 59*   HEMOGLOBIN g/dL 13 9 13 5 13 8 14 1  --  15 1   HEMATOCRIT % 43 1 41 9 43 7 43 1  --  47 3*   PLATELETS Thousands/uL 402* 409* 458* 430* 440* 502*   NEUTROS PCT % 81* 80* 80* 81*  --  76*   MONOS PCT % 8 7 3* 6  --  7     Results from last 7 days   Lab Units 02/20/22  0554 02/19/22 0437 02/18/22 0316 02/17/22 0457 02/16/22 1915   SODIUM mmol/L 139 138 140 140 136   POTASSIUM mmol/L 4 5 4 3 4 4 4 2 3 1*   CHLORIDE mmol/L 104 104 104 105 100   CO2 mmol/L 27 24 24 25 25   ANION GAP mmol/L 8 10 12 10 11   BUN mg/dL 29* 27* 15 11 11   CREATININE mg/dL 0 86 0 85 0 76 0 69 0 90   CALCIUM mg/dL 9 1 8 9 9 2 8 5 9 1   GLUCOSE RANDOM mg/dL 175* 162* 207* 149* 165*   ALT U/L 93* 91*  --  26 28   AST U/L 37 57*  --  24 25   ALK PHOS U/L 120* 120*  --  93 101   ALBUMIN g/dL 2 3* 2 4*  --  2 6* 2 9*   TOTAL BILIRUBIN mg/dL 0 27 0 26  --  0 74 0 69     Results from last 7 days   Lab Units 02/20/22  0554 02/18/22 0316 02/17/22 0457   MAGNESIUM mg/dL 3 0* 2 8* 2 3   PHOSPHORUS mg/dL  --   --  4 5      Results from last 7 days   Lab Units 02/21/22  0517 02/20/22  0303 02/19/22 2044 02/19/22  1257 02/16/22 1915   INR   --   --   --  1 19 1 09   PTT seconds 45* 83* 76* 45* 45*          Results from last 7 days   Lab Units 02/16/22 2233 02/16/22 1914   LACTIC ACID mmol/L 1 5 2 7*     ABG:    VBG:    Results from last 7 days   Lab Units 02/21/22  0517 02/19/22  0437 02/16/22 2237 02/16/22 1915   PROCALCITONIN ng/ml 0 09 0 22 0 38* 0 34*       Micro  Results from last 7 days   Lab Units 02/17/22  0150 02/16/22  2316 02/16/22 1920 02/16/22 1916   BLOOD CULTURE   --   --  No Growth After 4 Days  No Growth After 4 Days     SPUTUM CULTURE   --  4+ Growth of --   --    GRAM STAIN RESULT   --  1+ Epithelial cells per low power field*  4+ Polys*  4+ Gram positive cocci in pairs, chains and clusters*  4+ Gram negative rods*  4+ Gram positive rods*  Rare Budding Yeast with Pseudomycelia*  --   --    MRSA CULTURE ONLY   --  Methicillin Resistant Staphylococcus aureus isolated*  This patient requires contact isolation precautions per [place] law  Contact precautions are not required in [facility] for nasal surveillance cultures  --   --    LEGIONELLA URINARY ANTIGEN  Negative  --   --   --    STREP PNEUMONIAE ANTIGEN, URINE  Negative  --   --   --        EKG: n/a  Imaging:  I have personally reviewed pertinent reports  Intake and Output  I/O       02/19 0701  02/20 0700 02/20 0701  02/21 0700    P  O  480 952    I V  (mL/kg) 182 1 (1 8) 730 3 (7 2)    IV Piggyback 50     Total Intake(mL/kg) 712 1 (7 1) 1682 3 (16 7)    Urine (mL/kg/hr) 1850 (0 8) 650 (0 3)    Total Output 1850 650    Net -1138 +1032 3                Height and Weights   Height: 4' 6" (137 2 cm)     Body mass index is 53 85 kg/m²  Weight (last 2 days)     Date/Time Weight    02/19/22 0458 101 (223 33)            Nutrition       Diet Orders   (From admission, onward)             Start     Ordered    02/18/22 1523  Diet [place]/CHO Controlled; Consistent Carbohydrate Diet Level 2 (5 carb servings/75 grams CHO/meal)  Diet effective now        References:    Nutrtion Support Algorithm Enteral vs  Parenteral   Question Answer Comment   Diet Type [place]/CHO Controlled    [place]/CHO Controlled Consistent Carbohydrate Diet Level 2 (5 carb servings/75 grams CHO/meal)    RD to adjust diet per protocol?  Yes        02/18/22 1522                  Active Medications  Scheduled Meds:  Current Facility-Administered Medications   Medication Dose Route Frequency Provider Last Rate    acetaminophen  650 mg Oral Q6H PRN Jorge Salmeron MD      ALPRAZolam  0 5 mg Oral BID PRN EMMA Alonzo      amiodarone  0 5 mg/min Intravenous Continuous EMMA Alves 0 5 mg/min (02/19/22 1441)    Baricitinib  4 mg Oral Q24H Claudell Spurling, MD      benzonatate  200 mg Oral TID PRN EMMA Baker      cefTRIAXone  1,000 mg Intravenous Q24H EMMA Stone 1,000 mg (02/20/22 1930)    dexmedetomidine  0 1-0 7 mcg/kg/hr Intravenous Titrated Dejon Lancaster PA-C 0 6 mcg/kg/hr (02/21/22 0227)    epoprostenol  6 25-50 ng/kg/min (Ideal) Inhalation Titrated EMMA Alves 50 ng/kg/min (02/20/22 2220)    guaiFENesin  600 mg Oral Q12H [address] EMMA Chacon      heparin (porcine)  3-20 Units/kg/hr (Order-Specific) Intravenous Titrated Cydney Thompson MD 13 Units/kg/hr (02/21/22 0558)    heparin (porcine)  2,000 Units Intravenous Q1H PRN Cydney Thompson MD      heparin (porcine)  4,000 Units Intravenous Q1H PRN Cydney Thompson MD      insulin glargine  10 Units Subcutaneous Q12H [address] EMMA Alves      insulin lispro  4-20 Units Subcutaneous HS Dejon [place], [place]      insulin lispro  5-25 Units Subcutaneous TID [facility] Dejon Alfaro PA-C      ipratropium  0 5 mg Nebulization 4x Daily EMMA Vicente      levalbuterol  1 25 mg Nebulization 4x Daily EMMA Vicente      methylPREDNISolone sodium succinate  40 mg Intravenous Q8H [address] EMMA Alves      metoprolol tartrate  25 mg Oral Q12H [address] EMMA Alves      pantoprazole  40 mg Oral Daily Before Breakfast Claudell Spurling, MD      polyethylene glycol  17 g Oral Daily Claudell Spurling, MD      sodium chloride  4 mL Nebulization Q6H EMMA Stone       Continuous Infusions:  amiodarone, 0 5 mg/min, Last Rate: 0 5 mg/min (02/19/22 1441)  dexmedetomidine, 0 1-0 7 mcg/kg/hr, Last Rate: 0 6 mcg/kg/hr (02/21/22 0227)  epoprostenol, 6 25-50 ng/kg/min (Ideal), Last Rate: 50 ng/kg/min (02/20/22 2220)  heparin (porcine), 3-20 Units/kg/hr (Order-Specific), Last Rate: 13 Units/kg/hr (02/21/22 0558)      PRN Meds:   acetaminophen, 650 mg, Q6H PRN  ALPRAZolam, 0 5 mg, BID PRN  benzonatate, 200 mg, TID PRN  heparin (porcine), 2,000 Units, Q1H PRN  heparin (porcine), 4,000 Units, Q1H PRN        Allergies   No Known Allergies  ---------------------------------------------------------------------------------------  Advance Directive and Living Will:      Power of :    POLST:    ---------------------------------------------------------------------------------------    Edison Valentine MD      Portions of the record may have been created with voice recognition software  Occasional wrong word or "sound a like" substitutions may have occurred due to the inherent limitations of voice recognition software    Read the chart carefully and recognize, using context, where substitutions have occurred

## 2022-02-22 LAB
ALBUMIN SERPL BCP-MCNC: 2.3 G/DL (ref 3.5–5)
ALP SERPL-CCNC: 134 U/L (ref 46–116)
ALT SERPL W P-5'-P-CCNC: 97 U/L (ref 12–78)
ANION GAP SERPL CALCULATED.3IONS-SCNC: 8 MMOL/L (ref 4–13)
APTT PPP: 63 SECONDS (ref 23–37)
AST SERPL W P-5'-P-CCNC: 34 U/L (ref 5–45)
BASOPHILS # BLD AUTO: 0.02 THOUSANDS/ΜL (ref 0–0.1)
BASOPHILS NFR BLD AUTO: 0 % (ref 0–1)
BILIRUB SERPL-MCNC: 0.29 MG/DL (ref 0.2–1)
BUN SERPL-MCNC: 28 MG/DL (ref 5–25)
CALCIUM ALBUM COR SERPL-MCNC: 10.1 MG/DL (ref 8.3–10.1)
CALCIUM SERPL-MCNC: 8.7 MG/DL (ref 8.3–10.1)
CHLORIDE SERPL-SCNC: 101 MMOL/L (ref 100–108)
CO2 SERPL-SCNC: 29 MMOL/L (ref 21–32)
CREAT SERPL-MCNC: 0.86 MG/DL (ref 0.6–1.3)
EOSINOPHIL # BLD AUTO: 0.01 THOUSAND/ΜL (ref 0–0.61)
EOSINOPHIL NFR BLD AUTO: 0 % (ref 0–6)
ERYTHROCYTE [DISTWIDTH] IN BLOOD BY AUTOMATED COUNT: 14.4 % (ref 11.6–15.1)
GFR SERPL CREATININE-BSD FRML MDRD: 78 ML/MIN/1.73SQ M
GLUCOSE SERPL-MCNC: 183 MG/DL (ref 65–140)
GLUCOSE SERPL-MCNC: 199 MG/DL (ref 65–140)
GLUCOSE SERPL-MCNC: 207 MG/DL (ref 65–140)
GLUCOSE SERPL-MCNC: 237 MG/DL (ref 65–140)
GLUCOSE SERPL-MCNC: 265 MG/DL (ref 65–140)
HCT VFR BLD AUTO: 47.8 % (ref 34.8–46.1)
HGB BLD-MCNC: 15.4 G/DL (ref 11.5–15.4)
IMM GRANULOCYTES # BLD AUTO: 0.12 THOUSAND/UL (ref 0–0.2)
IMM GRANULOCYTES NFR BLD AUTO: 1 % (ref 0–2)
LYMPHOCYTES # BLD AUTO: 2.08 THOUSANDS/ΜL (ref 0.6–4.47)
LYMPHOCYTES NFR BLD AUTO: 14 % (ref 14–44)
MAGNESIUM SERPL-MCNC: 2.7 MG/DL (ref 1.6–2.6)
MCH RBC QN AUTO: 29.2 PG (ref 26.8–34.3)
MCHC RBC AUTO-ENTMCNC: 32.2 G/DL (ref 31.4–37.4)
MCV RBC AUTO: 91 FL (ref 82–98)
MONOCYTES # BLD AUTO: 1.02 THOUSAND/ΜL (ref 0.17–1.22)
MONOCYTES NFR BLD AUTO: 7 % (ref 4–12)
NEUTROPHILS # BLD AUTO: 11.83 THOUSANDS/ΜL (ref 1.85–7.62)
NEUTS SEG NFR BLD AUTO: 78 % (ref 43–75)
NRBC BLD AUTO-RTO: 0 /100 WBCS
PLATELET # BLD AUTO: 450 THOUSANDS/UL (ref 149–390)
PMV BLD AUTO: 10 FL (ref 8.9–12.7)
POTASSIUM SERPL-SCNC: 5.1 MMOL/L (ref 3.5–5.3)
PROT SERPL-MCNC: 6.7 G/DL (ref 6.4–8.2)
RBC # BLD AUTO: 5.28 MILLION/UL (ref 3.81–5.12)
SODIUM SERPL-SCNC: 138 MMOL/L (ref 136–145)
WBC # BLD AUTO: 15.08 THOUSAND/UL (ref 4.31–10.16)

## 2022-02-22 PROCEDURE — 94644 CONT INHLJ TX 1ST HOUR: CPT

## 2022-02-22 PROCEDURE — 99291 CRITICAL CARE FIRST HOUR: CPT | Performed by: INTERNAL MEDICINE

## 2022-02-22 PROCEDURE — 94645 CONT INHLJ TX EACH ADDL HOUR: CPT

## 2022-02-22 PROCEDURE — 85730 THROMBOPLASTIN TIME PARTIAL: CPT | Performed by: NURSE PRACTITIONER

## 2022-02-22 PROCEDURE — 94660 CPAP INITIATION&MGMT: CPT

## 2022-02-22 PROCEDURE — 80053 COMPREHEN METABOLIC PANEL: CPT | Performed by: INTERNAL MEDICINE

## 2022-02-22 PROCEDURE — 85025 COMPLETE CBC W/AUTO DIFF WBC: CPT | Performed by: INTERNAL MEDICINE

## 2022-02-22 PROCEDURE — 82948 REAGENT STRIP/BLOOD GLUCOSE: CPT

## 2022-02-22 PROCEDURE — 83735 ASSAY OF MAGNESIUM: CPT | Performed by: INTERNAL MEDICINE

## 2022-02-22 PROCEDURE — 94640 AIRWAY INHALATION TREATMENT: CPT

## 2022-02-22 RX ORDER — POLYETHYLENE GLYCOL 3350 17 G/17G
17 POWDER, FOR SOLUTION ORAL DAILY PRN
Status: DISCONTINUED | OUTPATIENT
Start: 2022-02-22 | End: 2022-03-16 | Stop reason: HOSPADM

## 2022-02-22 RX ORDER — AMIODARONE HYDROCHLORIDE 200 MG/1
200 TABLET ORAL EVERY 8 HOURS
Status: DISCONTINUED | OUTPATIENT
Start: 2022-02-22 | End: 2022-02-23

## 2022-02-22 RX ORDER — AMIODARONE HYDROCHLORIDE 200 MG/1
200 TABLET ORAL
Status: DISCONTINUED | OUTPATIENT
Start: 2022-02-28 | End: 2022-02-23

## 2022-02-22 RX ORDER — INSULIN GLARGINE 100 [IU]/ML
12 INJECTION, SOLUTION SUBCUTANEOUS EVERY 12 HOURS SCHEDULED
Status: DISCONTINUED | OUTPATIENT
Start: 2022-02-22 | End: 2022-02-23

## 2022-02-22 RX ADMIN — SODIUM CHLORIDE 0.7 MCG/KG/HR: 9 INJECTION, SOLUTION INTRAVENOUS at 18:52

## 2022-02-22 RX ADMIN — METHYLPREDNISOLONE SODIUM SUCCINATE 40 MG: 40 INJECTION, POWDER, FOR SOLUTION INTRAMUSCULAR; INTRAVENOUS at 05:54

## 2022-02-22 RX ADMIN — SODIUM CHLORIDE 0.7 MCG/KG/HR: 9 INJECTION, SOLUTION INTRAVENOUS at 06:17

## 2022-02-22 RX ADMIN — EPOPROSTENOL 50 NG/KG/MIN: 1.5 INJECTION, POWDER, LYOPHILIZED, FOR SOLUTION INTRAVENOUS at 03:36

## 2022-02-22 RX ADMIN — INSULIN LISPRO 6 UNITS: 100 INJECTION, SOLUTION INTRAVENOUS; SUBCUTANEOUS at 13:19

## 2022-02-22 RX ADMIN — INSULIN LISPRO 2 UNITS: 100 INJECTION, SOLUTION INTRAVENOUS; SUBCUTANEOUS at 23:59

## 2022-02-22 RX ADMIN — IPRATROPIUM BROMIDE 0.5 MG: 0.5 SOLUTION RESPIRATORY (INHALATION) at 07:46

## 2022-02-22 RX ADMIN — LEVALBUTEROL HYDROCHLORIDE 1.25 MG: 1.25 SOLUTION, CONCENTRATE RESPIRATORY (INHALATION) at 07:46

## 2022-02-22 RX ADMIN — GUAIFENESIN 600 MG: 600 TABLET, EXTENDED RELEASE ORAL at 21:15

## 2022-02-22 RX ADMIN — INSULIN LISPRO 5 UNITS: 100 INJECTION, SOLUTION INTRAVENOUS; SUBCUTANEOUS at 08:45

## 2022-02-22 RX ADMIN — DOCUSATE SODIUM 100 MG: 100 CAPSULE ORAL at 08:44

## 2022-02-22 RX ADMIN — IPRATROPIUM BROMIDE 0.5 MG: 0.5 SOLUTION RESPIRATORY (INHALATION) at 13:37

## 2022-02-22 RX ADMIN — METOPROLOL TARTRATE 25 MG: 25 TABLET, FILM COATED ORAL at 08:44

## 2022-02-22 RX ADMIN — LEVALBUTEROL HYDROCHLORIDE 1.25 MG: 1.25 SOLUTION, CONCENTRATE RESPIRATORY (INHALATION) at 13:37

## 2022-02-22 RX ADMIN — AMIODARONE HYDROCHLORIDE 200 MG: 200 TABLET ORAL at 21:15

## 2022-02-22 RX ADMIN — INSULIN GLARGINE 10 UNITS: 100 INJECTION, SOLUTION SUBCUTANEOUS at 08:43

## 2022-02-22 RX ADMIN — DOCUSATE SODIUM 100 MG: 100 CAPSULE ORAL at 18:51

## 2022-02-22 RX ADMIN — INSULIN LISPRO 4 UNITS: 100 INJECTION, SOLUTION INTRAVENOUS; SUBCUTANEOUS at 17:22

## 2022-02-22 RX ADMIN — IPRATROPIUM BROMIDE 0.5 MG: 0.5 SOLUTION RESPIRATORY (INHALATION) at 20:15

## 2022-02-22 RX ADMIN — INSULIN LISPRO 5 UNITS: 100 INJECTION, SOLUTION INTRAVENOUS; SUBCUTANEOUS at 13:19

## 2022-02-22 RX ADMIN — METOPROLOL TARTRATE 25 MG: 25 TABLET, FILM COATED ORAL at 21:15

## 2022-02-22 RX ADMIN — SODIUM CHLORIDE 0.7 MCG/KG/HR: 9 INJECTION, SOLUTION INTRAVENOUS at 13:16

## 2022-02-22 RX ADMIN — INSULIN LISPRO 5 UNITS: 100 INJECTION, SOLUTION INTRAVENOUS; SUBCUTANEOUS at 17:23

## 2022-02-22 RX ADMIN — INSULIN LISPRO 4 UNITS: 100 INJECTION, SOLUTION INTRAVENOUS; SUBCUTANEOUS at 09:08

## 2022-02-22 RX ADMIN — SODIUM CHLORIDE 0.7 MCG/KG/HR: 9 INJECTION, SOLUTION INTRAVENOUS at 00:26

## 2022-02-22 RX ADMIN — INSULIN GLARGINE 12 UNITS: 100 INJECTION, SOLUTION SUBCUTANEOUS at 21:15

## 2022-02-22 RX ADMIN — AMIODARONE HYDROCHLORIDE 200 MG: 200 TABLET ORAL at 13:36

## 2022-02-22 RX ADMIN — GUAIFENESIN 600 MG: 600 TABLET, EXTENDED RELEASE ORAL at 08:44

## 2022-02-22 RX ADMIN — METHYLPREDNISOLONE SODIUM SUCCINATE 40 MG: 40 INJECTION, POWDER, FOR SOLUTION INTRAMUSCULAR; INTRAVENOUS at 13:37

## 2022-02-22 RX ADMIN — AMIODARONE HYDROCHLORIDE 200 MG: 200 TABLET ORAL at 05:54

## 2022-02-22 RX ADMIN — HEPARIN SODIUM 15 UNITS/KG/HR: 10000 INJECTION, SOLUTION INTRAVENOUS; SUBCUTANEOUS at 13:16

## 2022-02-22 RX ADMIN — METHYLPREDNISOLONE SODIUM SUCCINATE 40 MG: 40 INJECTION, POWDER, FOR SOLUTION INTRAMUSCULAR; INTRAVENOUS at 21:15

## 2022-02-22 RX ADMIN — LEVALBUTEROL HYDROCHLORIDE 1.25 MG: 1.25 SOLUTION, CONCENTRATE RESPIRATORY (INHALATION) at 20:14

## 2022-02-22 RX ADMIN — ALPRAZOLAM 0.5 MG: 0.5 TABLET ORAL at 00:45

## 2022-02-22 RX ADMIN — PANTOPRAZOLE SODIUM 40 MG: 40 TABLET, DELAYED RELEASE ORAL at 07:58

## 2022-02-22 NOTE — ASSESSMENT & PLAN NOTE
·  likely multifactorial secondary to pneumonia vs recent COVID sequale/fibrosis vs volume overload vs possible pneumonitis  § Continue BIPAP at night  § tenuous respiratory status  § Maintain SPO2 > 88%  § veletri weaned off on 2/22  § Prednisone taper - 30 mg daily  § Completed 5/5 days of ceftriaxone on 2/20  § Continue Atrovent and Xopenex nebs  § Mucinex, tessalon perls   § Incentive spirometry  § Vest therapy

## 2022-02-22 NOTE — ASSESSMENT & PLAN NOTE
Lab Results   Component Value Date    HGBA1C 6 6 (H) 02/16/2022     Recent Labs     02/21/22  2236   POCGLU 247*     · New onset DM vs steroids in covid over last 30 days  · Lantus increase to 25U  · Humalog increase to 8 U  · SSI  · ADJUST WITH TAPER STEROIDS

## 2022-02-22 NOTE — PLAN OF CARE
Problem: Potential for Falls  Goal: Patient will remain free of falls  Description: INTERVENTIONS:  - Educate patient/family on patient safety including physical limitations  - Instruct patient to call for assistance with activity   - Consult OT/PT to assist with strengthening/mobility   - Keep Call bell within reach  - Keep bed low and locked with side rails adjusted as appropriate  - Keep care items and personal belongings within reach  - Initiate and maintain comfort rounds  - Make Fall Risk Sign visible to staff  - Offer Toileting every 2 Hours, in advance of need  - Initiate/Maintain bed alarm  - Obtain necessary fall risk management equipment: bed alarm  - Apply yellow socks and bracelet for high fall risk patients  - Consider moving patient to room near nurses station  Outcome: Progressing     Problem: Prexisting or High Potential for Compromised Skin Integrity  Goal: Skin integrity is maintained or improved  Description: INTERVENTIONS:  - Identify patients at risk for skin breakdown  - Assess and monitor skin integrity  - Assess and monitor nutrition and hydration status  - Monitor labs   - Assess for incontinence   - Turn and reposition patient  - Assist with mobility/ambulation  - Relieve pressure over bony prominences  - Avoid friction and shearing  - Provide appropriate hygiene as needed including keeping skin clean and dry  - Evaluate need for skin moisturizer/barrier cream  - Collaborate with interdisciplinary team   - Patient/family teaching  - Consider wound care consult   Outcome: Progressing     Problem: RESPIRATORY - ADULT  Goal: Achieves optimal ventilation and oxygenation  Description: INTERVENTIONS:  - Assess for changes in respiratory status  - Assess for changes in mentation and behavior  - Position to facilitate oxygenation and minimize respiratory effort  - Oxygen administered by appropriate delivery if ordered  - Initiate smoking cessation education as indicated  - Encourage broncho-pulmonary hygiene including cough, deep breathe, Incentive Spirometry  - Assess the need for suctioning and aspirate as needed  - Assess and instruct to report SOB or any respiratory difficulty  - Respiratory Therapy support as indicated  Outcome: Progressing

## 2022-02-22 NOTE — ASSESSMENT & PLAN NOTE
· NT- proBNP elevated on admission at 1472  · ECHO 2/17: Left ventricular cavity size is normal  LVEF 65% by visual estimation  Systolic function is normal  Wall motion is normal  There is mild asymmetric hypertrophy  Diastolic function is mildly abnormal, consistent with grade I (abnormal) relaxation    · IV Lasix PRN, try negative 500cc fluid balance

## 2022-02-22 NOTE — CASE MANAGEMENT
Case Management Assessment    Patient name Brynn Kong ICU 11/ICU 11 MRN [id number]  : 1970 Date 2022       Current Admission Date: 2022  Current Admission Diagnosis:Acute respiratory failure with hypoxia [facility])   Patient Active Problem List    Diagnosis Date Noted    Acute [facility] failure with hypoxia ([facility] ) 2022    Hypokalemia 2022    Elevated brain natriuretic peptide (BNP) level 2022    SIRS (systemic inflammatory response syndrome) ([facility] ) 2022    Thrombocytosis 2022    Hyperglycemia 2022    BMI 50 0-59 9, adult ([facility] ) 2022    Dysfunction of right eustachian tube 2022    SOB (shortness of breath) on exertion 2022    Assistance needed with transportation 2022    Insufficient supply of food 2022    Pneumonia due to COVID-19 virus 2022    COVID-19 virus infection 2022      LOS (days): 6  Geometric Mean LOS (GMLOS) (days): 4 80  Days to GMLOS:-0 9     OBJECTIVE:    Risk of Unplanned Readmission Score: 15         Current admission status: Inpatient       Preferred Pharmacy:   PATIENT/FAMILY REPORTS NO PREFERRED PHARMACY  No address on file      [address]  Phone: [phone] Fax: [phone]    Primary Care Provider: Jaz Balderas MD    Primary Insurance: [facility]  Secondary Insurance:     ASSESSMENT:  [place] [employer]     Light, Forrest Miller 50 - Daughter   Primary Phone: [phone] (Mobile)               CM m/w the pt to review pt's assessment and discuss discharge planning  Pt lives alone in a 2 story apartment with a full flight of steps to the 2nd floor  Pt reported that she was completely independent with all ADLs prior to this hospitalization  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        HCR: Ashish Acevedo, dtr, [phone]  Emergency Contact : Cuca Fuentes, relative, [phone]    Pt moved from [place] in September 2021  Pt lives in [place] [address]  Pt stated that her apartment recently underwent construction  Apparently the workers did not clean up properly, dust everywhere  She also stated her kitchen sink was also overflowing with sewage  Pt feels that the issues with her apartment is directly related to her getting sick  Pt's showed samples of the dust/debris that was left in her apartment  Pt wanted this CM to take the samples  CM refused and placed back in her purse  Pt requested a call to [facility] to make a formal complaint  CM explained our role within the hospital and explained that she and her family will need to follow up with this  CM called [email or web address] [phone]), left a VM message requesting they contact the pt and discuss her concerns  CM discussed PT's recommendation for post acute rehab  Pt is agreeable  No preference  CM will make referrals and will discuss with pt  CM will continue to follow

## 2022-02-22 NOTE — QUICK NOTE
Called patients daughter Barry Perez and updated on current status of the patient and management plan

## 2022-02-22 NOTE — ASSESSMENT & PLAN NOTE
· POA as evidenced by: tachycardia, leukocytosis (20 59), and lactic acidosis (2 7)  · BC NG  · PCT 0 38-0 09 ceftriaxone 5 day course completed

## 2022-02-22 NOTE — PROGRESS NOTES
Daily Progress Note - Critical Care   Katie Willoughby 46 y o  female MRN: [id number]  Unit/Bed#: ICU 6 Encounter: [id number]        ----------------------------------------------------------------------------------------  HPI/24hr events: 46year old female with recent COVID infection, presenting with increased dyspnea, currently being managed for bacterial pneumonia, volume overload, and possible pneumonitis  Patient went into afib with RVR following an aspiration event     No significant events overnight  She was given 1 dose of her PRN ativan at 0045 for agitation while on precedex     ---------------------------------------------------------------------------------------  SUBJECTIVE  States that she feels better, would like the BIPAP removed  Review of Systems   Constitutional: Positive for fatigue  Negative for chills and fever  HENT: Negative  Eyes: Negative for visual disturbance  Respiratory: Negative for cough, shortness of breath (denies shortness of breath) and wheezing  Cardiovascular: Negative for chest pain, palpitations and leg swelling  Gastrointestinal: Negative  Negative for constipation, nausea and vomiting  Genitourinary: Negative  Negative for dysuria, frequency and urgency  Musculoskeletal: Negative  Neurological: Negative  Negative for dizziness  Psychiatric/Behavioral: Positive for agitation  Review of systems was reviewed and negative unless stated above in HPI/24-hour events   ---------------------------------------------------------------------------------------  Assessment and Plan:    1  Acute hypoxic respiratory failure   2  New onset AFib with RVR  3  Sepsis versus SIRS  4  Acute on chronic diastolic heart failure  5  Hypertension  6  Emphysema evidenced by imaging  7  Transaminitis - improving  8  Hypokalemia-resolved  9   Thrombocytosis-improving        Neuro:   · Agitation and anxiety  · Continue precedex, titrate to maintain a RASS of 0 until improved respiratory status  · Prn ativan 0 5 mg  · CAM ICU        CV:   · New onset AFib with RVR, Thaddeus Vasc 3 (female, CHF, HTN)  · PO amiodarone 200 mg q8h x 7 days then 200 mg daily  · Continue lopressor 25 mg bid  · Continue IV heparin, transition to eliquis 5mg bid once more stable  · If persistent, can discuss about cardioversion/ablation  May benefit from transitioning amiodarone to a different agent if BP tolerates in the future given her age and possible lung fibrosis  · Acute diastolic heart failure, POA with elevated pro BNP of 1472, CXR of possible pulm congestion   Echo 2/17/21: preserved EF 65%, G1DD, IVC normal  · Hold lasix today  · Will continue to monitor today, will plan for diuresis tomorrow  · Monitor I/O, daily weight  · Monitor electrolytes, maintain Mg>2, K>4  · Monitor renal functions  · Hypertension  · Home amlodipine 10 mg daily on hold  · Continue lopressor 25 mg bid  · Continue amiodarone        Pulm:  · Acute hypoxic respiratory failure likely multifactorial secondary to pneumonia vs recent COVID sequale/fibrosis vs volume overload vs possible pneumonitis  · Continue BIPAP at night  · HFNC 55L, 100%, with NRB prn during day  · Maintain SPO2 > 88%  · Continue to wean veletri  · Continue Solumedrol at 40 mg q8h, wean to 40 q12h tomorrow (every 3 days)   · completed 5/5 days of ceftriaxone on 2/20  · Continue Atrovent and Xopenex nebs  · IV lasix 40 mg once today  · Mucinex, tessalon perls   · Incentive spirometry  · Vest therapy  · Emphysemia  · Continue nebs as outlined above        GI:   · Mild transaminitis - improving  · Continue to monitor liver enzymes  · If worsening, will discontinue amiodarone and transition to digoxin  · continue protonix 40 mg daily po for GI prophylaxis  · Bowel regimen for constipation  · Diet  · Regular diet, thin liquids while off BIPAP  · Cleared by speech  · Constipation  · colace augustus and miralax prn        :   · None        F/E/N:   · Hypokalemia - resolved        Heme/Onc:   · Thrombocytosis, likely reactive - improving  · Will continue to monitor  · DVT prophylaxis: on systemic AC with IV heparin gtt        Endo:   · Hyperglycemia - HbA1C 6 6, on steroid taper  · Will add meal time insulin humalog 5U tid with meals  · Increase lantus to 12 U bid  · ISS algorithm 4   · May need oral antihyperglycemic on discharge         ID:   · Sepsis vs SIRS, POA with leukocytosis, tachycardia, lactic acidosis  Likely source pulmonary, MRSA +ve, procal -ve, blood cultures -ve day 4  · Azithromycin discontinued on D2  · Completed 5/5 days of IV ceftriaxone today on 2/20        MSK/Skin:   · History of cervical disc herniation  · Prn tylenol      Patient appropriate for transfer out of the ICU today?: No  Disposition: Continue Stepdown Level 1 level of care   Code Status: Level 1 - Full Code  ---------------------------------------------------------------------------------------  ICU CORE MEASURES    Prophylaxis   VTE Pharmacologic Prophylaxis: Heparin Drip  VTE Mechanical Prophylaxis: sequential compression device  Stress Ulcer Prophylaxis: Pantoprazole PO    ABCDE Protocol (if indicated)  Plan to perform spontaneous awakening trial today? Not applicable  Plan to perform spontaneous breathing trial today? Not applicable  Obvious barriers to extubation? Not applicable  CAM-ICU: Negative    Invasive Devices Review  Invasive Devices  Report    Peripheral Intravenous Line            Peripheral IV 02/18/22 Right;Ventral (anterior) Hand 4 days    Peripheral IV 02/20/22 Left Hand 1 day    Peripheral IV 02/21/22 Left Antecubital <1 day          Drain            External Urinary Catheter 2 days              Can any invasive devices be discontinued today?  Not applicable  ---------------------------------------------------------------------------------------  OBJECTIVE    Vitals   Vitals:    02/22/22 0400 02/22/22 0500 02/22/22 0600 02/22/22 0700   BP: 137/89 143/90 (!) 145/107    BP Location: Left arm Left arm Left arm    Pulse: (!) 110 (!) 106 (!) 110 (!) 106   Resp: 22  (!) 23 19   Temp:       TempSrc:       SpO2: 96% 93% 92% 94%   Weight:   106 kg (233 lb 11 oz)    Height:         Temp (24hrs), Av 3 °F (36 3 °C), Min:97 °F (36 1 °C), Max:97 7 °F (36 5 °C)  Current: Temperature: 97 7 °F (36 5 °C)  HR: 106  BP: 146/92  RR: 19  SpO2: 92%    Respiratory:  SpO2: SpO2: 95 %, SpO2 Device: O2 Device: BiPAP       Invasive/non-invasive ventilation settings   Respiratory  Report   Lab Data (Last 4 hours)    None         O2/Vent Data (Last 4 hours)       0336          Non-Invasive Ventilation Mode BiPAP                   Physical Exam  Vitals and nursing note reviewed  Constitutional:       General: She is not in acute distress  Appearance: She is ill-appearing  Comments: BMI 56   HENT:      Head: Normocephalic  Mouth/Throat:      Mouth: Mucous membranes are moist       Comments: Patient is on BIPAP  Eyes:      Pupils: Pupils are equal, round, and reactive to light  Cardiovascular:      Rate and Rhythm: Tachycardia present  Rhythm irregular  Heart sounds: No murmur heard  Pulmonary:      Effort: Pulmonary effort is normal       Breath sounds: Rales present  No wheezing  Abdominal:      General: Bowel sounds are normal       Palpations: Abdomen is soft  Tenderness: There is no abdominal tenderness  There is no guarding  Musculoskeletal:      Right lower leg: Edema (1+) present  Left lower leg: Edema (1+) present  Skin:     General: Skin is warm  Capillary Refill: Capillary refill takes less than 2 seconds  Neurological:      General: No focal deficit present  Comments: Drowsy, but arousable, answers questions appropriately      Psychiatric:         Mood and Affect: Mood normal            Laboratory and Diagnostics:  Results from last 7 days   Lab Units 22  0602 22  0554 22  6274 22  0316 22  0457 02/16/22  2233 02/16/22 1914   WBC Thousand/uL 15 08* 15 99* 16 35* 17 67* 19 45*  --  20 59*   HEMOGLOBIN g/dL 15 4 13 9 13 5 13 8 14 1  --  15 1   HEMATOCRIT % 47 8* 43 1 41 9 43 7 43 1  --  47 3*   PLATELETS Thousands/uL 450* 402* 409* 458* 430* 440* 502*   NEUTROS PCT % 78* 81* 80* 80* 81*  --  76*   MONOS PCT % 7 8 7 3* 6  --  7     Results from last 7 days   Lab Units 02/22/22  0602 02/20/22  0554 02/19/22  0437 02/18/22  0316 02/17/22  0457 02/16/22 1915   SODIUM mmol/L 138 139 138 140 140 136   POTASSIUM mmol/L 5 1 4 5 4 3 4 4 4 2 3 1*   CHLORIDE mmol/L 101 104 104 104 105 100   CO2 mmol/L 29 27 24 24 25 25   ANION GAP mmol/L 8 8 10 12 10 11   BUN mg/dL 28* 29* 27* 15 11 11   CREATININE mg/dL 0 86 0 86 0 85 0 76 0 69 0 90   CALCIUM mg/dL 8 7 9 1 8 9 9 2 8 5 9 1   GLUCOSE RANDOM mg/dL 183* 175* 162* 207* 149* 165*   ALT U/L 97* 93* 91*  --  26 28   AST U/L 34 37 57*  --  24 25   ALK PHOS U/L 134* 120* 120*  --  93 101   ALBUMIN g/dL 2 3* 2 3* 2 4*  --  2 6* 2 9*   TOTAL BILIRUBIN mg/dL 0 29 0 27 0 26  --  0 74 0 69     Results from last 7 days   Lab Units 02/22/22  0602 02/20/22  0554 02/18/22  0316 02/17/22  0457   MAGNESIUM mg/dL 2 7* 3 0* 2 8* 2 3   PHOSPHORUS mg/dL  --   --   --  4 5      Results from last 7 days   Lab Units 02/22/22  0602 02/21/22  2145 02/21/22  1148 02/21/22  0517 02/20/22  0303 02/19/22  2044 02/19/22  1257 02/16/22  1915 02/16/22 1915   INR   --   --   --   --   --   --  1 19  --  1 09   PTT seconds 63* 70* 44* 45* 83* 76* 45*   < > 45*    < > = values in this interval not displayed            Results from last 7 days   Lab Units 02/16/22 2233 02/16/22 1914   LACTIC ACID mmol/L 1 5 2 7*     ABG:    VBG:    Results from last 7 days   Lab Units 02/21/22  0517 02/19/22  0437 02/16/22 2237 02/16/22 1915   PROCALCITONIN ng/ml 0 09 0 22 0 38* 0 34*       Micro  Results from last 7 days   Lab Units 02/17/22  0150 02/16/22  2316 02/16/22 1920 02/16/22 1916   BLOOD CULTURE   --   -- No Growth After 5 Days  No Growth After 5 Days  SPUTUM CULTURE   --  4+ Growth of   --   --    GRAM STAIN RESULT   --  1+ Epithelial cells per low power field*  4+ Polys*  4+ Gram positive cocci in pairs, chains and clusters*  4+ Gram negative rods*  4+ Gram positive rods*  Rare Budding Yeast with Pseudomycelia*  --   --    MRSA CULTURE ONLY   --  Methicillin Resistant Staphylococcus aureus isolated*  This patient requires contact isolation precautions per [place] law  Contact precautions are not required in [place] for nasal surveillance cultures  --   --    LEGIONELLA URINARY ANTIGEN  Negative  --   --   --    STREP PNEUMONIAE ANTIGEN, URINE  Negative  --   --   --        EKG: N/A  Imaging:  I have personally reviewed pertinent reports  Intake and Output  I/O       02/20 0701 02/21 0700 02/21 0701 02/22 0700 02/22 0701 02/23 0700    P  O  952 840     I V  (mL/kg) 730 3 (7 2) 1418 6 (13 4)     IV Piggyback       Total Intake(mL/kg) 1682 3 (16 7) 2258 6 (21 3)     Urine (mL/kg/hr) 650 (0 3) 4652 (1 8)     Total Output 650 4652     Net +1032 3 -2393 4                  Height and Weights   Height: 4' 6" (137 2 cm)     Body mass index is 56 34 kg/m²  Weight (last 2 days)     Date/Time Weight    02/22/22 0600 106 (233 69)            Nutrition       Diet Orders   (From admission, onward)             Start     Ordered    02/18/22 1523  Diet [place]/CHO Controlled; Consistent Carbohydrate Diet Level 2 (5 carb servings/75 grams CHO/meal)  Diet effective now        References:    Nutrtion Support Algorithm Enteral vs  Parenteral   Question Answer Comment   Diet Type [place]/CHO Controlled    [place]/CHO Controlled Consistent Carbohydrate Diet Level 2 (5 carb servings/75 grams CHO/meal)    RD to adjust diet per protocol?  Yes        02/18/22 1522                    Active Medications  Scheduled Meds:  Current Facility-Administered Medications   Medication Dose Route Frequency Provider Last Rate    acetaminophen 650 mg Oral Q6H PRN Catie Quezada MD      ALPRAZolam  0 5 mg Oral BID PRN EMMA Walsh      amiodarone  200 mg Oral Q8H Kirsty Butt PA-C      Followed by   [facility]arni [facility] ON 2/28/2022] amiodarone  200 mg Oral Daily With Breakfast Colin Crane PA-C      benzonatate  200 mg Oral TID PRN Monna Sandhoff, CRNP      dexmedetomidine  0 1-0 7 mcg/kg/hr Intravenous Titrated Monika Lancaster PA-C 0 7 mcg/kg/hr (02/22/22 0617)    docusate sodium  100 mg Oral BID Catie Quezada MD      epoprostenol  6 25-50 ng/kg/min (Ideal) Inhalation Titrated EMMA Vidales 50 ng/kg/min (02/22/22 0336)    guaiFENesin  600 mg Oral Q12H [address] EMMA Ridley      heparin (porcine)  3-20 Units/kg/hr (Order-Specific) Intravenous Titrated Мария Omer MD 15 Units/kg/hr (02/21/22 1530)    heparin (porcine)  2,000 Units Intravenous Q1H PRN Мария Omer MD      heparin (porcine)  4,000 Units Intravenous Q1H PRN Мария Omer MD      insulin glargine  10 Units Subcutaneous Q12H [address] EMMA Vidales      insulin lispro  4-20 Units Subcutaneous HS Aparna Hare Prime      insulin lispro  5-25 Units Subcutaneous TID [facility] Monika Alfaro PA-C      ipratropium  0 5 mg Nebulization 4x Daily EMMA Walsh      levalbuterol  1 25 mg Nebulization 4x Daily EMMA Walsh      methylPREDNISolone sodium succinate  40 mg Intravenous Q8H [address] EMMA Vidales      metoprolol tartrate  25 mg Oral Q12H [address] EMMA V[place]ales      pantoprazole  40 mg Oral Daily Before Breakfast Catie Quezada MD      polyethylene glycol  17 g Oral Daily Catie Quezada MD       Continuous Infusions:  dexmedetomidine, 0 1-0 7 mcg/kg/hr, Last Rate: 0 7 mcg/kg/hr (02/22/22 0617)  epoprostenol, 6 25-50 ng/kg/min (Ideal), Last Rate: 50 ng/kg/min (02/22/22 0336)  heparin (porcine), 3-20 Units/kg/hr (Order-Specific), Last Rate: 15 Units/kg/hr (02/21/22 1530)      PRN Meds:   acetaminophen, 650 mg, Q6H PRN  ALPRAZolam, 0 5 mg, BID PRN  benzonatate, 200 mg, TID PRN  heparin (porcine), 2,000 Units, Q1H PRN  heparin (porcine), 4,000 Units, Q1H PRN        Allergies   No Known Allergies  ---------------------------------------------------------------------------------------  Advance Directive and Living Will:      Power of :    POLST:    ---------------------------------------------------------------------------------------  Jorge Salmeron MD      Portions of the record may have been created with voice recognition software  Occasional wrong word or "sound a like" substitutions may have occurred due to the inherent limitations of voice recognition software    Read the chart carefully and recognize, using context, where substitutions have occurred

## 2022-02-23 ENCOUNTER — APPOINTMENT (INPATIENT)
Dept: RADIOLOGY | Facility: HOSPITAL | Age: 52
DRG: 720 | End: 2022-02-23
Payer: MEDICARE

## 2022-02-23 PROBLEM — R74.01 TRANSAMINITIS: Status: ACTIVE | Noted: 2022-02-23

## 2022-02-23 PROBLEM — I10 HTN (HYPERTENSION): Chronic | Status: ACTIVE | Noted: 2022-02-23

## 2022-02-23 PROBLEM — F41.9 ANXIETY: Status: ACTIVE | Noted: 2022-02-23

## 2022-02-23 PROBLEM — I10 HTN (HYPERTENSION): Status: ACTIVE | Noted: 2022-02-23

## 2022-02-23 PROBLEM — I48.91 NEW ONSET A-FIB (HCC): Status: ACTIVE | Noted: 2022-02-23

## 2022-02-23 PROBLEM — R65.10 SIRS (SYSTEMIC INFLAMMATORY RESPONSE SYNDROME) (HCC): Status: RESOLVED | Noted: 2022-02-16 | Resolved: 2022-02-23

## 2022-02-23 PROBLEM — E87.6 HYPOKALEMIA: Status: RESOLVED | Noted: 2022-02-16 | Resolved: 2022-02-23

## 2022-02-23 LAB
ANION GAP SERPL CALCULATED.3IONS-SCNC: 12 MMOL/L (ref 4–13)
APTT PPP: 109 SECONDS (ref 23–37)
APTT PPP: 40 SECONDS (ref 23–37)
ATRIAL RATE: 74 BPM
BASOPHILS # BLD AUTO: 0.02 THOUSANDS/ΜL (ref 0–0.1)
BASOPHILS NFR BLD AUTO: 0 % (ref 0–1)
BUN SERPL-MCNC: 27 MG/DL (ref 5–25)
CALCIUM SERPL-MCNC: 8.5 MG/DL (ref 8.3–10.1)
CHLORIDE SERPL-SCNC: 100 MMOL/L (ref 100–108)
CO2 SERPL-SCNC: 24 MMOL/L (ref 21–32)
CREAT SERPL-MCNC: 0.77 MG/DL (ref 0.6–1.3)
EOSINOPHIL # BLD AUTO: 0 THOUSAND/ΜL (ref 0–0.61)
EOSINOPHIL NFR BLD AUTO: 0 % (ref 0–6)
ERYTHROCYTE [DISTWIDTH] IN BLOOD BY AUTOMATED COUNT: 14.3 % (ref 11.6–15.1)
GFR SERPL CREATININE-BSD FRML MDRD: 89 ML/MIN/1.73SQ M
GLUCOSE SERPL-MCNC: 135 MG/DL (ref 65–140)
GLUCOSE SERPL-MCNC: 181 MG/DL (ref 65–140)
GLUCOSE SERPL-MCNC: 208 MG/DL (ref 65–140)
GLUCOSE SERPL-MCNC: 227 MG/DL (ref 65–140)
GLUCOSE SERPL-MCNC: 234 MG/DL (ref 65–140)
HCT VFR BLD AUTO: 46.7 % (ref 34.8–46.1)
HGB BLD-MCNC: 15.1 G/DL (ref 11.5–15.4)
IMM GRANULOCYTES # BLD AUTO: 0.17 THOUSAND/UL (ref 0–0.2)
IMM GRANULOCYTES NFR BLD AUTO: 1 % (ref 0–2)
LYMPHOCYTES # BLD AUTO: 2.05 THOUSANDS/ΜL (ref 0.6–4.47)
LYMPHOCYTES NFR BLD AUTO: 10 % (ref 14–44)
MCH RBC QN AUTO: 29.4 PG (ref 26.8–34.3)
MCHC RBC AUTO-ENTMCNC: 32.3 G/DL (ref 31.4–37.4)
MCV RBC AUTO: 91 FL (ref 82–98)
MONOCYTES # BLD AUTO: 1.58 THOUSAND/ΜL (ref 0.17–1.22)
MONOCYTES NFR BLD AUTO: 8 % (ref 4–12)
NEUTROPHILS # BLD AUTO: 16.24 THOUSANDS/ΜL (ref 1.85–7.62)
NEUTS SEG NFR BLD AUTO: 81 % (ref 43–75)
NRBC BLD AUTO-RTO: 0 /100 WBCS
P AXIS: 48 DEGREES
PLATELET # BLD AUTO: 447 THOUSANDS/UL (ref 149–390)
PMV BLD AUTO: 9.8 FL (ref 8.9–12.7)
POTASSIUM SERPL-SCNC: 5.1 MMOL/L (ref 3.5–5.3)
PR INTERVAL: 175 MS
PROCALCITONIN SERPL-MCNC: 0.06 NG/ML
QRS AXIS: 63 DEGREES
QRSD INTERVAL: 92 MS
QT INTERVAL: 404 MS
QTC INTERVAL: 449 MS
RBC # BLD AUTO: 5.14 MILLION/UL (ref 3.81–5.12)
SODIUM SERPL-SCNC: 136 MMOL/L (ref 136–145)
T WAVE AXIS: 59 DEGREES
VENTRICULAR RATE: 74 BPM
WBC # BLD AUTO: 20.06 THOUSAND/UL (ref 4.31–10.16)

## 2022-02-23 PROCEDURE — 85730 THROMBOPLASTIN TIME PARTIAL: CPT | Performed by: INTERNAL MEDICINE

## 2022-02-23 PROCEDURE — 97167 OT EVAL HIGH COMPLEX 60 MIN: CPT

## 2022-02-23 PROCEDURE — 84145 PROCALCITONIN (PCT): CPT | Performed by: INTERNAL MEDICINE

## 2022-02-23 PROCEDURE — 97530 THERAPEUTIC ACTIVITIES: CPT

## 2022-02-23 PROCEDURE — 99291 CRITICAL CARE FIRST HOUR: CPT | Performed by: INTERNAL MEDICINE

## 2022-02-23 PROCEDURE — 71045 X-RAY EXAM CHEST 1 VIEW: CPT

## 2022-02-23 PROCEDURE — 93010 ELECTROCARDIOGRAM REPORT: CPT

## 2022-02-23 PROCEDURE — 93005 ELECTROCARDIOGRAM TRACING: CPT

## 2022-02-23 PROCEDURE — 85730 THROMBOPLASTIN TIME PARTIAL: CPT | Performed by: NURSE PRACTITIONER

## 2022-02-23 PROCEDURE — 94640 AIRWAY INHALATION TREATMENT: CPT

## 2022-02-23 PROCEDURE — 82948 REAGENT STRIP/BLOOD GLUCOSE: CPT

## 2022-02-23 PROCEDURE — 85025 COMPLETE CBC W/AUTO DIFF WBC: CPT | Performed by: INTERNAL MEDICINE

## 2022-02-23 PROCEDURE — 80048 BASIC METABOLIC PNL TOTAL CA: CPT | Performed by: INTERNAL MEDICINE

## 2022-02-23 PROCEDURE — 87040 BLOOD CULTURE FOR BACTERIA: CPT | Performed by: INTERNAL MEDICINE

## 2022-02-23 PROCEDURE — 97112 NEUROMUSCULAR REEDUCATION: CPT

## 2022-02-23 RX ORDER — INSULIN GLARGINE 100 [IU]/ML
25 INJECTION, SOLUTION SUBCUTANEOUS EVERY 12 HOURS SCHEDULED
Status: DISCONTINUED | OUTPATIENT
Start: 2022-02-23 | End: 2022-02-23

## 2022-02-23 RX ORDER — METHYLPREDNISOLONE SODIUM SUCCINATE 40 MG/ML
40 INJECTION, POWDER, LYOPHILIZED, FOR SOLUTION INTRAMUSCULAR; INTRAVENOUS EVERY 12 HOURS SCHEDULED
Status: COMPLETED | OUTPATIENT
Start: 2022-02-23 | End: 2022-02-25

## 2022-02-23 RX ORDER — FUROSEMIDE 10 MG/ML
20 INJECTION INTRAMUSCULAR; INTRAVENOUS ONCE
Status: DISCONTINUED | OUTPATIENT
Start: 2022-02-23 | End: 2022-03-01

## 2022-02-23 RX ORDER — INSULIN GLARGINE 100 [IU]/ML
25 INJECTION, SOLUTION SUBCUTANEOUS EVERY MORNING
Status: DISCONTINUED | OUTPATIENT
Start: 2022-02-24 | End: 2022-02-23

## 2022-02-23 RX ORDER — MAGNESIUM HYDROXIDE/ALUMINUM HYDROXICE/SIMETHICONE 120; 1200; 1200 MG/30ML; MG/30ML; MG/30ML
30 SUSPENSION ORAL EVERY 4 HOURS PRN
Status: DISCONTINUED | OUTPATIENT
Start: 2022-02-23 | End: 2022-03-16 | Stop reason: HOSPADM

## 2022-02-23 RX ORDER — INSULIN GLARGINE 100 [IU]/ML
12 INJECTION, SOLUTION SUBCUTANEOUS EVERY 12 HOURS SCHEDULED
Status: DISCONTINUED | OUTPATIENT
Start: 2022-02-24 | End: 2022-02-26

## 2022-02-23 RX ORDER — SENNOSIDES 8.6 MG
1 TABLET ORAL DAILY
Status: DISCONTINUED | OUTPATIENT
Start: 2022-02-23 | End: 2022-03-02

## 2022-02-23 RX ORDER — ALPRAZOLAM 0.5 MG/1
1 TABLET ORAL 2 TIMES DAILY PRN
Status: DISCONTINUED | OUTPATIENT
Start: 2022-02-23 | End: 2022-03-03

## 2022-02-23 RX ADMIN — GUAIFENESIN 600 MG: 600 TABLET, EXTENDED RELEASE ORAL at 21:40

## 2022-02-23 RX ADMIN — DOCUSATE SODIUM 100 MG: 100 CAPSULE ORAL at 17:27

## 2022-02-23 RX ADMIN — IPRATROPIUM BROMIDE 0.5 MG: 0.5 SOLUTION RESPIRATORY (INHALATION) at 14:53

## 2022-02-23 RX ADMIN — LEVALBUTEROL HYDROCHLORIDE 1.25 MG: 1.25 SOLUTION, CONCENTRATE RESPIRATORY (INHALATION) at 12:33

## 2022-02-23 RX ADMIN — IPRATROPIUM BROMIDE 0.5 MG: 0.5 SOLUTION RESPIRATORY (INHALATION) at 08:15

## 2022-02-23 RX ADMIN — AMIODARONE HYDROCHLORIDE 200 MG: 200 TABLET ORAL at 06:15

## 2022-02-23 RX ADMIN — BENZONATATE 200 MG: 100 CAPSULE ORAL at 21:40

## 2022-02-23 RX ADMIN — POLYETHYLENE GLYCOL 3350 17 G: 17 POWDER, FOR SOLUTION ORAL at 11:55

## 2022-02-23 RX ADMIN — INSULIN LISPRO 4 UNITS: 100 INJECTION, SOLUTION INTRAVENOUS; SUBCUTANEOUS at 07:53

## 2022-02-23 RX ADMIN — SENNOSIDES 8.6 MG: 8.6 TABLET ORAL at 17:28

## 2022-02-23 RX ADMIN — SODIUM CHLORIDE 0.7 MCG/KG/HR: 9 INJECTION, SOLUTION INTRAVENOUS at 00:25

## 2022-02-23 RX ADMIN — INSULIN GLARGINE 25 UNITS: 100 INJECTION, SOLUTION SUBCUTANEOUS at 11:49

## 2022-02-23 RX ADMIN — ALPRAZOLAM 1 MG: 0.5 TABLET ORAL at 21:40

## 2022-02-23 RX ADMIN — LEVALBUTEROL HYDROCHLORIDE 1.25 MG: 1.25 SOLUTION, CONCENTRATE RESPIRATORY (INHALATION) at 14:53

## 2022-02-23 RX ADMIN — METHYLPREDNISOLONE SODIUM SUCCINATE 40 MG: 40 INJECTION, POWDER, FOR SOLUTION INTRAMUSCULAR; INTRAVENOUS at 21:44

## 2022-02-23 RX ADMIN — DOCUSATE SODIUM 100 MG: 100 CAPSULE ORAL at 11:49

## 2022-02-23 RX ADMIN — IPRATROPIUM BROMIDE 0.5 MG: 0.5 SOLUTION RESPIRATORY (INHALATION) at 12:32

## 2022-02-23 RX ADMIN — PANTOPRAZOLE SODIUM 40 MG: 40 TABLET, DELAYED RELEASE ORAL at 07:54

## 2022-02-23 RX ADMIN — METOPROLOL TARTRATE 25 MG: 25 TABLET, FILM COATED ORAL at 21:43

## 2022-02-23 RX ADMIN — SODIUM CHLORIDE 0.2 MCG/KG/HR: 9 INJECTION, SOLUTION INTRAVENOUS at 21:46

## 2022-02-23 RX ADMIN — IPRATROPIUM BROMIDE 0.5 MG: 0.5 SOLUTION RESPIRATORY (INHALATION) at 19:55

## 2022-02-23 RX ADMIN — INSULIN LISPRO 4 UNITS: 100 INJECTION, SOLUTION INTRAVENOUS; SUBCUTANEOUS at 17:29

## 2022-02-23 RX ADMIN — ACETAMINOPHEN 650 MG: 325 TABLET, FILM COATED ORAL at 21:41

## 2022-02-23 RX ADMIN — INSULIN LISPRO 4 UNITS: 100 INJECTION, SOLUTION INTRAVENOUS; SUBCUTANEOUS at 11:50

## 2022-02-23 RX ADMIN — LEVALBUTEROL HYDROCHLORIDE 1.25 MG: 1.25 SOLUTION, CONCENTRATE RESPIRATORY (INHALATION) at 19:55

## 2022-02-23 RX ADMIN — GUAIFENESIN 600 MG: 600 TABLET, EXTENDED RELEASE ORAL at 11:49

## 2022-02-23 RX ADMIN — METOPROLOL TARTRATE 25 MG: 25 TABLET, FILM COATED ORAL at 11:49

## 2022-02-23 RX ADMIN — LEVALBUTEROL HYDROCHLORIDE 1.25 MG: 1.25 SOLUTION, CONCENTRATE RESPIRATORY (INHALATION) at 08:15

## 2022-02-23 RX ADMIN — METHYLPREDNISOLONE SODIUM SUCCINATE 40 MG: 40 INJECTION, POWDER, FOR SOLUTION INTRAMUSCULAR; INTRAVENOUS at 06:15

## 2022-02-23 RX ADMIN — SODIUM CHLORIDE 0.5 MCG/KG/HR: 9 INJECTION, SOLUTION INTRAVENOUS at 14:34

## 2022-02-23 RX ADMIN — HEPARIN SODIUM 4000 UNITS: 1000 INJECTION INTRAVENOUS; SUBCUTANEOUS at 13:24

## 2022-02-23 NOTE — PLAN OF CARE
Problem: OCCUPATIONAL THERAPY ADULT  Goal: Performs self-care activities at highest level of function for planned discharge setting  See evaluation for individualized goals  Description: Treatment Interventions: ADL retraining,Functional transfer training,UE strengthening/ROM,Endurance training,Patient/family training,Equipment evaluation/education,Compensatory technique education          See flowsheet documentation for full assessment, interventions and recommendations  Note: Limitation: Decreased ADL status,Decreased UE strength,Decreased Safe judgement during ADL,Decreased endurance,Decreased high-level ADLs  Prognosis: Fair  Assessment: Pt is a 51y/o female admitted to the hospital 2* symptoms of increased SOB  Pt noted acute respiratory failure with hypoxia, A-fib with RVR  Pt with PMH C-spine disc herniation, HTN, shr sx, and COVID-19(1/6/22)  PTA pt states independence with all aspects of her ADLs, transfers, ambulation--w/o device; neg falls  During initial eval, pt demonstrated deficits with her functional balance, functional mobility, ADL status, transfer safety, b/l UE strength, and activity tolerance(currently fair=15-20mins)  Pt would benefit from continued OT tx for the above deficits  3-5xwk/1-2wks        OT Discharge Recommendation: Post acute rehabilitation services

## 2022-02-23 NOTE — OCCUPATIONAL THERAPY NOTE
Occupational Therapy Evaluation(time=4790-9229)     Patient Name: Jyoti Sin  UFOBN'U Date: 2022  Problem List  Principal Problem:    Acute respiratory failure with hypoxia (HCC)  Active Problems:    Elevated brain natriuretic peptide (BNP) level    SIRS (systemic inflammatory response syndrome) (HCC)    Thrombocytosis    Hyperglycemia    BMI 50 0-59 9, adult (HCC)    Anxiety    HTN (hypertension)    Transaminitis    New onset a-fib ([address] )    Past Medical History  Past Medical History:   Diagnosis Date    Herniated cervical disc     Hypertension      Past Surgical History  Past Surgical History:   Procedure Laterality Date     SECTION      EYE SURGERY      SHOULDER SURGERY               22 1145   Note Type   Note type Evaluation   Restrictions/Precautions   Weight Bearing Precautions Per Order No   Other Precautions Contact/isolation;Multiple lines;Telemetry;O2;Fall Risk  (high-flow)   Pain Assessment   Pain Assessment Tool FLACC   Pain Rating: FLACC (Rest) - Face 0   Pain Rating: FLACC (Rest) - Legs 0   Pain Rating: FLACC (Rest) - Activity 0   Pain Rating: FLACC (Rest) - Cry 0   Pain Rating: FLACC (Rest) - Consolability 0   Score: FLACC (Rest) 0   Home Living   Type of Home Apartment   Home Layout Two level  (3 ada)   Home Equipment   (denies)   Prior Function   Lives With Alone   ADL Assistance Independent   Lifestyle   Autonomy PTA pt states independence with all aspects of her ADLs, transfers, ambulation--w/o device; neg falls   Reciprocal Relationships supportive dtr   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; soap/deodorant making   Intrinsic Gratification watching TV   Psychosocial   Psychosocial (WDL) WDL   Subjective   Subjective "Chocolate chip cookie!"   ADL   Where Assessed Edge of bed   Eating Assistance 5  Supervision/Setup   Grooming Assistance 5  Supervision/Setup   UB Bathing Assistance 4  Minimal Assistance   LB Bathing Assistance 2  Maximal Assistance   UB Dressing Assistance 4  Minimal Assistance   LB Dressing Assistance 2  Maximal Assistance   Bed Mobility   Rolling R 3  Moderate assistance   Additional items Assist x 1; Increased time required   Rolling L 3  Moderate assistance   Additional items Assist x 1; Increased time required;Verbal cues;LE management   Supine to Sit 3  Moderate assistance   Additional items Assist x 2; Increased time required;Verbal cues;LE management   Transfers   Sit to Stand 3  Moderate assistance   Additional items Assist x 2; Increased time required;Verbal cues   Stand to Sit 3  Moderate assistance   Additional items Assist x 2; Increased time required;Verbal cues   Additional Comments bp's=115/72(EOB), 108/60(after transfer to chair)   Functional Mobility   Functional Mobility 3  Moderate assistance   Additional Comments x2   Additional items Rolling walker   Balance   Static Sitting Fair   Dynamic Sitting Fair -   Static Standing Poor +   Dynamic Standing Poor   Activity Tolerance   Activity Tolerance Patient limited by fatigue   Medical Staff Made Aware nsg, P T     RUE Assessment   RUE Assessment WFL   RUE Strength   RUE Overall Strength Within Functional Limits - able to perform ADL tasks with strength  (4/5 throughout)   LUE Assessment   LUE Assessment WFL   LUE Strength   LUE Overall Strength Within Functional Limits - able to perform ADL tasks with strength  (4/5 throughout)   Hand Function   Gross Motor Coordination Functional   Fine Motor Coordination Functional   Sensation   Light Touch No apparent deficits   Proprioception   Proprioception No apparent deficits   Vision-Basic Assessment   Current Vision   (glasses)   Vision - Complex Assessment   Acuity Able to read clock/calendar on wall without difficulty   Perception   Inattention/Neglect Appears intact   Cognition   Overall Cognitive Status [facility]   Arousal/Participation Alert   Attention Attends with cues to redirect   Orientation Level Oriented X4   Memory Within functional limits Following Commands Follows all commands and directions without difficulty   Assessment   Limitation Decreased ADL status; Decreased UE strength;Decreased Safe judgement during ADL;Decreased endurance;Decreased high-level ADLs   Prognosis Fair   Assessment Pt is a 49y/o female admitted to the hospital 2* symptoms of increased SOB  Pt noted acute respiratory failure with hypoxia, A-fib with RVR  Pt with PMH C-spine disc herniation, HTN, shr sx, and COVID-19(1/6/22)  PTA pt states independence with all aspects of her ADLs, transfers, ambulation--w/o device; neg falls  During initial eval, pt demonstrated deficits with her functional balance, functional mobility, ADL status, transfer safety, b/l UE strength, and activity tolerance(currently fair=15-20mins)  Pt would benefit from continued OT tx for the above deficits  3-5xwk/1-2wks  Goals   Patient Goals "to get better"   STG Time Frame   (1-7 days)   Short Term Goal #1 Pt will independently demonstrate knowledge and application of proper energy conservation techniques 100% of the time  Short Term Goal #2 Pt will demonstrate mod I with their bed mobility to facilitate EOB ADLs  Short Term Goal  Pt will demonstrate improved activity tolerance to good(20-30mins) and standing tolerance to 3-5mins to assist with ADLs  LTG Time Frame   (7-14 days)   Long Term Goal #1 Pt will demonstrate proper walker/transfer safety 100% of the time  Long Term Goal #2 Pt will demonstrate g/g- balance with all functional activities  Long Term Goal Pt will demonstrate mod I with their UE and LE bathing/dresssing  Plan   Treatment Interventions ADL retraining;Functional transfer training;UE strengthening/ROM; Endurance training;Patient/family training;Equipment evaluation/education; Compensatory technique education   Goal Expiration Date 03/09/22   OT Treatment Day 0   OT Frequency 3-5x/wk   Recommendation   OT Discharge Recommendation Post acute rehabilitation services AM-PAC Daily Activity Inpatient   Lower Body Dressing 2   Bathing 2   Toileting 2   Upper Body Dressing 2   Grooming 3   Eating 4   Daily Activity Raw Score 15   Daily Activity Standardized Score (Calc for Raw Score >=11) 34 69   AM-PAC Applied Cognition Inpatient   Following a Speech/Presentation 4   Understanding Ordinary Conversation 4   Taking Medications 4   Remembering Where Things Are Placed or Put Away 4   Remembering List of 4-5 Errands 4   Taking Care of Complicated Tasks 4   Applied Cognition Raw Score 24   Applied Cognition Standardized Score 62 21   Fabian Villatoro, OT

## 2022-02-23 NOTE — ASSESSMENT & PLAN NOTE
· [facility] (female, CHF, HTN), now in sinus rhythm, following an aspiration event  § Continue lopressor at 25 mg bid  § AC discontinued given this was a transient event - on prophylactic AC

## 2022-02-23 NOTE — PLAN OF CARE
Problem: PHYSICAL THERAPY ADULT  Goal: Performs mobility at highest level of function for planned discharge setting  See evaluation for individualized goals  Description: Treatment/Interventions: Functional transfer training,LE strengthening/ROM,Elevations,Therapeutic exercise,Endurance training,Patient/family training,Equipment eval/education,Bed mobility,Gait training,Compensatory technique education,Continued evaluation,Spoke to nursing,OT  Equipment Recommended: [facility]       See flowsheet documentation for full assessment, interventions and recommendations  Outcome: Progressing  Note: Prognosis: Fair  Problem List: Decreased strength,Decreased range of motion,Decreased endurance,Impaired balance,Decreased mobility,Obesity  Assessment: Seen for therapy progression at bedside  Remains on HFNC 55L at 100%  O2 sats with activity OOB to chair drop as low as 71%  Lengthy recovery to improve to 86-87% once OOB in chair  Resting 88%  Cues needed for appropriate breathing technique  Pacing between all transitions needed  Continues to require significant assistance with all mobility  The patient's AM-PAC Basic Mobility Inpatient Short Form Raw Score is 7  A Raw score of less than or equal to 16 suggests the patient may benefit from discharge to post-acute rehabilitation services  Please also refer to the recommendation of the Physical Therapist for safe discharge planning  Given impairments will continue to require STR at d/c in order to optimize outcomes and facilitate return to PLOF  Barriers to Discharge: Inaccessible home environment,Decreased caregiver support  Barriers to Discharge Comments: LOUIE, alone     PT Discharge Recommendation: Post acute rehabilitation services (? LTAC appropriate)          See flowsheet documentation for full assessment

## 2022-02-23 NOTE — PHYSICAL THERAPY NOTE
PHYSICAL THERAPY NOTE   11:20-11:48 ( 28 minutes)          Patient Name: Pratibha SANCHEZ'S Date: 2/23/2022 02/23/22 1148   PT Last Visit   PT Visit Date 02/23/22   Pain Assessment   Pain Score No Pain   Pain Rating: FLACC (Rest) - Face 0   Pain Rating: FLACC (Rest) - Legs 0   Pain Rating: FLACC (Rest) - Activity 1   Pain Rating: FLACC (Rest) - Cry 1   Pain Rating: FLACC (Rest) - Consolability 1   Score: FLACC (Rest) 3   Restrictions/Precautions   Weight Bearing Precautions Per Order No   Other Precautions Contact/isolation;Multiple lines;O2;Fall Risk;Telemetry  (HFNC 55L 100%)   General   Chart Reviewed Yes   Response to Previous Treatment Patient with no complaints from previous session  Family/Caregiver Present No   Cognition   Overall Cognitive Status [facility]   Arousal/Participation Alert   Attention Attends with cues to redirect   Comments Talkative  Subjective   Subjective Grateful for services   "Just say chocolate chip cookies!"   Bed Mobility   Supine to Sit 3  Moderate assistance   Additional items Assist x 2; Increased time required;Verbal cues;LE management;HOB elevated; Bedrails   Additional Comments Increased time to complete transitions  Desaturates to 81% on HFNC, recovering to 87-88% EOB  Cues for proper breathing  Transfers   Sit to Stand 3  Moderate assistance   Additional items Assist x 2; Increased time required;Verbal cues   Stand to Sit 3  Moderate assistance   Additional items Assist x 2; Increased time required;Verbal cues   Additional Comments Cues for hand placement  Increased time  BP /72, 108/60 OOB in chair  Ambulation/Elevation   Gait pattern Improper Weight shift;Decreased foot clearance; Forward Flexion; Shuffling; Inconsistent becky; Excessively slow; Short stride   Gait Assistance 3  Moderate assist   Additional items Assist x 2;Verbal cues; Tactile cues   Assistive Device Rolling walker  (HFNC)   Distance 3'x1 OOB to chair  Desaturates to 71% on HFNC  Seated rest improves to 86- 87%( recliner chair LE elevation  Coaching on breathing)  Increased recovery time needed  Balance   Static Sitting Fair   Dynamic Sitting Fair -   Static Standing Poor +   Dynamic Standing Poor   Ambulatory Poor -   Endurance Deficit   Endurance Deficit Yes   Endurance Deficit Description hypoxia, increased O2 demand on HFNC   Activity Tolerance   Activity Tolerance Patient limited by fatigue;Treatment limited secondary to medical complications (Comment)   Medical Staff Made Aware NurseEly  OT-Shailesh   Nurse Made Aware yes   Exercises   Neuro re-ed Ed on AP, QS when recovered  Encouraged LE ROM ex as tolerated to promote outcomes  Assessment   Prognosis Fair   Problem List Decreased strength;Decreased range of motion;Decreased endurance; Impaired balance;Decreased mobility;Obesity   Assessment Seen for therapy progression at bedside  Remains on HFNC 55L at 100%  O2 sats with activity OOB to chair drop as low as 71%  Lengthy recovery to improve to 86-87% once OOB in chair  Resting 88%  Cues needed for appropriate breathing technique  Pacing between all transitions needed  Continues to require significant assistance with all mobility  The patient's AM-PAC Basic Mobility Inpatient Short Form Raw Score is 7  A Raw score of less than or equal to 16 suggests the patient may benefit from discharge to post-acute rehabilitation services  Please also refer to the recommendation of the Physical Therapist for safe discharge planning  Given impairments will continue to require STR at d/c in order to optimize outcomes and facilitate return to PLOF       Barriers to Discharge Inaccessible home environment;Decreased caregiver support   Barriers to Discharge Comments LOUIE, alone   Goals   Patient Goals get better   STG Expiration Date 03/02/22   PT Treatment Day 1   Plan   Treatment/Interventions Functional transfer training;LE strengthening/ROM; Elevations; Therapeutic exercise; Endurance training;Patient/family training;Equipment eval/education; Bed mobility;Gait training; Compensatory technique education;Continued evaluation;Spoke to nursing;OT   Progress Slow progress, medical status limitations   PT Frequency 3-5x/wk   Recommendation   PT Discharge Recommendation Post acute rehabilitation services  (? LTAC appropriate)   Equipment Recommended [address] [facility] Recommended Wheeled walker   AM-PAC Basic Mobility Inpatient   Turning in Bed Without Bedrails 2   Lying on Back to Sitting on Edge of Flat Bed 1   Moving Bed to Chair 1   Standing Up From Chair 1   Walk in Room 1   Climb 3-5 Stairs 1   Basic Mobility Inpatient Raw Score 7   Highest Level Of Mobility   JH-HLM Goal 2: Bed activities/Dependent transfer   JH-HLM Highest Level of Mobility 4: Move to chair/commode   JH-HLM Goal Achieved Yes   Education   Education Provided Mobility training;Assistive device;Home exercise program   Patient Demonstrates acceptance/verbal understanding;Reinforcement needed   End of Consult   Patient Position at End of Consult Bedside chair; All needs within reach   Chiqui Gaona, PT

## 2022-02-23 NOTE — ASSESSMENT & PLAN NOTE
· Mild transaminitis   § Continue to monitor liver enzymes q3 days  § continue protonix 40 mg daily po for GI prophylaxis  § Bowel regimen for constipation  § Continue mylanta

## 2022-02-23 NOTE — PROGRESS NOTES
Daily Progress Note - Critical Care   Katie Martinez [facility] 46 y o  female MRN: [id number]  Unit/Bed#: ICU 6 Encounter: [id number]        ----------------------------------------------------------------------------------------  HPI/24hr events: 46year old female with recent COVID infection, presenting with increased dyspnea, currently being managed for bacterial pneumonia, volume overload, and possible pneumonitis  Patient went into afib with RVR following an aspiration event     No significant events overnight  She converted to sinus rhythm last night  This morning she had an episode of fever of 101F    ---------------------------------------------------------------------------------------  SUBJECTIVE  Feeling ok except for burning of the throat  Review of Systems   Constitutional: Positive for fatigue  Negative for appetite change, chills and fever  HENT: Negative  Negative for congestion, sore throat and trouble swallowing  Eyes: Negative  Respiratory: Positive for shortness of breath  Negative for cough and wheezing  Cardiovascular: Negative for chest pain (but has burning), palpitations and leg swelling  Gastrointestinal: Positive for constipation  Negative for abdominal distention, abdominal pain, diarrhea, nausea and vomiting  Genitourinary: Negative  Negative for dysuria, frequency and urgency  Musculoskeletal: Negative  Skin: Negative  Neurological: Negative  Negative for dizziness, tremors, seizures and numbness  Hematological: Negative  Psychiatric/Behavioral: Negative  All other systems reviewed and are negative  Review of systems was reviewed and negative unless stated above in HPI/24-hour events   ---------------------------------------------------------------------------------------  Assessment and Plan:    1  Acute hypoxic respiratory failure   2  New onset AFib with RVR  3  Sepsis versus SIRS  4   Acute on chronic diastolic heart failure  5  Hypertension  6  Emphysema evidenced by imaging  7  Transaminitis - improving  8  Hypokalemia-resolved  9  New onset fever  10  Leukocytosis   11  Thrombocytosis-improving        Neuro:   · Agitation and anxiety  · Continue precedex, titrate to maintain a RASS of 0 until improved respiratory status  · Prn ativan 0 5 mg  · CAM ICU        CV:   · New onset AFib with RVR, Thaddeus Vasc 3 (female, CHF, HTN), now in sinus rhythm  · PO amiodarone 200 mg q8h x 7 days then 200 mg daily  · Given conversion to NSR, can likely discontinue amiodarone and increase lopressor to 50 mg bid  · Continue lopressor 25 mg bid for now  · Continue IV heparin, transition to eliquis 5mg bid once more stable  · If persistent, can discuss about cardioversion/ablation  May benefit from transitioning amiodarone to a different agent if BP tolerates in the future given her age and possible lung fibrosis  · Acute diastolic heart failure, POA with elevated pro BNP of 1472, CXR of possible pulm congestion   Echo 2/17/21: preserved EF 65%, G1DD, IVC normal  · IV lasix 20 mg today  · Monitor I/O, daily weight  · Monitor electrolytes, maintain Mg>2, K>4  · Monitor renal functions  · Hypertension  · Home amlodipine 10 mg daily on hold  · Continue lopressor 25 mg bid  · Continue amiodarone        Pulm:  · Acute hypoxic respiratory failure likely multifactorial secondary to pneumonia vs recent COVID sequale/fibrosis vs volume overload vs possible pneumonitis  · Continue BIPAP at night  · HFNC 55L, 100%, with NRB prn during day  · Maintain SPO2 > 88%  · veletri weaned off on 2/22  · Continue Solumedrol at 40 mg q12h, wean every 3 days   · Completed 5/5 days of ceftriaxone on 2/20  · Repeat CXR given new onset fever and leukocytosis  · Continue Atrovent and Xopenex nebs  · Mucinex, tessalon perls   · Incentive spirometry  · Vest therapy  · Emphysemia  · Continue nebs as outlined above        GI:   · Mild transaminitis   · Continue to monitor liver enzymes q3 days  · If worsening, will discontinue amiodarone and transition to digoxin  · continue protonix 40 mg daily po for GI prophylaxis  · Bowel regimen for constipation  · Start mylanta  · Diet  · Regular diet, thin liquids while off BIPAP  · Cleared by speech  · Constipation  · colace augustus and miralax prn        :   · None        F/E/N:   · Hypokalemia - resolved        Heme/Onc:   · Leukocytosis patient has ongoing leukocytosis, trending down from 20 4 since admission, currently on steroid taper, this morning has worsening leukocytosis  Possible medication induced vs infection  · Will continue to monitor  · Repeat blood cultures, CXR, procalcitonin  · Thrombocytosis, likely reactive - improving  · Will continue to monitor  · DVT prophylaxis: on systemic AC with IV heparin gtt        Endo:   · Hyperglycemia - HbA1C 6 6, on steroid taper  · Continue humalog 5U tid with meals lantus to 12 U bid  · ISS algorithm 4   · May need oral antihyperglycemic on discharge         ID:   · New onset fever - with leukocytosis   Unclear etiology infection vs medication induced  · Will repeat blood cultures, CXR, procalcitonin  · Can start empiric ABx if there is persistent fever or unstable vital signs  · Sepsis vs SIRS, POA with leukocytosis, tachycardia, lactic acidosis  Likely source pulmonary, MRSA +ve, procal -ve, blood cultures -ve day 4  · Azithromycin discontinued on D2  · Completed 5/5 days of IV ceftriaxone today on 2/20        MSK/Skin:   · History of cervical disc herniation  · Prn tylenol        Patient appropriate for transfer out of the ICU today?: No  Disposition: Continue Stepdown Level 1 level of care   Code Status: Level 1 - Full Code  ---------------------------------------------------------------------------------------  ICU CORE MEASURES    Prophylaxis   VTE Pharmacologic Prophylaxis: Heparin Drip  VTE Mechanical Prophylaxis: sequential compression device  Stress Ulcer Prophylaxis: Pantoprazole PO    ABCDE Protocol (if indicated)  Plan to perform spontaneous awakening trial today? Not applicable  Plan to perform spontaneous breathing trial today? Not applicable  Obvious barriers to extubation? Not applicable  CAM-ICU: Negative    Invasive Devices Review  Invasive Devices  Report    Peripheral Intravenous Line            Peripheral IV 22 Right;Ventral (anterior) Hand 5 days    Peripheral IV 22 Left Hand 2 days    Peripheral IV 22 Left Antecubital 1 day          Drain            External Urinary Catheter 3 days              Can any invasive devices be discontinued today? Not applicable  ---------------------------------------------------------------------------------------  OBJECTIVE    Vitals   Vitals:    22 0400 22 0500 22 0600 22 0700   BP: 141/85 160/90 163/99 149/91   Pulse: 64 60 62 64   Resp: 18 22 17 19   Temp:       TempSrc:       SpO2: 95% 96% 95% 97%   Weight:   106 kg (233 lb 11 oz)    Height:         Temp (24hrs), Av 3 °F (36 8 °C), Min:97 6 °F (36 4 °C), Max:100 °F (37 8 °C)  Current: Temperature: 100 °F (37 8 °C)  HR: 64  BP: 149/91  RR: 20  SpO2: 97%    Respiratory:  SpO2: SpO2: 97 %, SpO2 Device: O2 Device: High flow nasal cannula       Invasive/non-invasive ventilation settings   Respiratory  Report   Lab Data (Last 4 hours)    None         O2/Vent Data (Last 4 hours)    None                Physical Exam  Vitals reviewed  Constitutional:       General: She is in acute distress  Appearance: She is ill-appearing  She is not toxic-appearing  Comments: BMI 56   HENT:      Head: Normocephalic  Nose:      Comments: On BIPAP, unable to examine  Eyes:      Pupils: Pupils are equal, round, and reactive to light  Cardiovascular:      Rate and Rhythm: Normal rate and regular rhythm  Pulses: Normal pulses  Heart sounds: Normal heart sounds  No murmur heard  Pulmonary:      Breath sounds: Rales present  No wheezing  Abdominal:      General: Bowel sounds are normal       Tenderness: There is no abdominal tenderness  There is no guarding  Musculoskeletal:      Right lower leg: No edema  Left lower leg: No edema  Skin:     Capillary Refill: Capillary refill takes less than 2 seconds  Neurological:      General: No focal deficit present  Mental Status: She is alert  Mental status is at baseline  Psychiatric:         Mood and Affect: Mood normal          Laboratory and Diagnostics:  Results from last 7 days   Lab Units 02/23/22  0630 02/22/22  0602 02/20/22  0554 02/19/22  0437 02/18/22 0316 02/17/22 0457 02/16/22  2233 02/16/22 1914 02/16/22  1914   WBC Thousand/uL 20 06* 15 08* 15 99* 16 35* 17 67* 19 45*  --   --  20 59*   HEMOGLOBIN g/dL 15 1 15 4 13 9 13 5 13 8 14 1  --   --  15 1   HEMATOCRIT % 46 7* 47 8* 43 1 41 9 43 7 43 1  --   --  47 3*   PLATELETS Thousands/uL 447* 450* 402* 409* 458* 430* 440*   < > 502*   NEUTROS PCT % 81* 78* 81* 80* 80* 81*  --   --  76*   MONOS PCT % 8 7 8 7 3* 6  --   --  7    < > = values in this interval not displayed       Results from last 7 days   Lab Units 02/22/22  0602 02/20/22  0554 02/19/22  0437 02/18/22 0316 02/17/22 0457 02/16/22  1915   SODIUM mmol/L 138 139 138 140 140 136   POTASSIUM mmol/L 5 1 4 5 4 3 4 4 4 2 3 1*   CHLORIDE mmol/L 101 104 104 104 105 100   CO2 mmol/L 29 27 24 24 25 25   ANION GAP mmol/L 8 8 10 12 10 11   BUN mg/dL 28* 29* 27* 15 11 11   CREATININE mg/dL 0 86 0 86 0 85 0 76 0 69 0 90   CALCIUM mg/dL 8 7 9 1 8 9 9 2 8 5 9 1   GLUCOSE RANDOM mg/dL 183* 175* 162* 207* 149* 165*   ALT U/L 97* 93* 91*  --  26 28   AST U/L 34 37 57*  --  24 25   ALK PHOS U/L 134* 120* 120*  --  93 101   ALBUMIN g/dL 2 3* 2 3* 2 4*  --  2 6* 2 9*   TOTAL BILIRUBIN mg/dL 0 29 0 27 0 26  --  0 74 0 69     Results from last 7 days   Lab Units 02/22/22  0602 02/20/22  0554 02/18/22  0316 02/17/22  0457   MAGNESIUM mg/dL 2 7* 3 0* 2 8* 2 3   PHOSPHORUS mg/dL  --   -- --  4 5      Results from last 7 days   Lab Units 02/22/22  0602 02/21/22  2145 02/21/22  1148 02/21/22  0517 02/20/22  0303 02/19/22  2044 02/19/22  1257 02/16/22 1915 02/16/22 1915   INR   --   --   --   --   --   --  1 19  --  1 09   PTT seconds 63* 70* 44* 45* 83* 76* 45*   < > 45*    < > = values in this interval not displayed  Results from last 7 days   Lab Units 02/16/22 2233 02/16/22 1914   LACTIC ACID mmol/L 1 5 2 7*     ABG:    VBG:    Results from last 7 days   Lab Units 02/21/22  0517 02/19/22  0437 02/16/22 2237 02/16/22 1915   PROCALCITONIN ng/ml 0 09 0 22 0 38* 0 34*       Micro  Results from last 7 days   Lab Units 02/17/22  0150 02/16/22  2316 02/16/22 1920 02/16/22 1916   BLOOD CULTURE   --   --  No Growth After 5 Days  No Growth After 5 Days  SPUTUM CULTURE   --  4+ Growth of   --   --    GRAM STAIN RESULT   --  1+ Epithelial cells per low power field*  4+ Polys*  4+ Gram positive cocci in pairs, chains and clusters*  4+ Gram negative rods*  4+ Gram positive rods*  Rare Budding Yeast with Pseudomycelia*  --   --    MRSA CULTURE ONLY   --  Methicillin Resistant Staphylococcus aureus isolated*  This patient requires contact isolation precautions per [place] law  Contact precautions are not required in [place] for nasal surveillance cultures  --   --    LEGIONELLA URINARY ANTIGEN  Negative  --   --   --    STREP PNEUMONIAE ANTIGEN, URINE  Negative  --   --   --        Imaging:  I have personally reviewed pertinent reports  Intake and Output  I/O       02/21 0701 02/22 0700 02/22 0701 02/23 0700 02/23 0701 02/24 0700    P  O  840 960     I V  (mL/kg) 1418 6 (13 4) 686 9 (6 5)     Total Intake(mL/kg) 2258 6 (21 3) 1646 9 (15 5)     Urine (mL/kg/hr) 4652 (1 8) 1650 (0 6)     Total Output 4652 1650     Net -2393 4 -3 1                  Height and Weights   Height: 4' 6" (137 2 cm)     Body mass index is 56 34 kg/m²    Weight (last 2 days)     Date/Time Weight 02/23/22 0600 106 (233 69)    02/22/22 0600 106 (233 69)            Nutrition       Diet Orders   (From admission, onward)             Start     Ordered    02/18/22 1523  Diet Polo/CHO Controlled; Consistent Carbohydrate Diet Level 2 (5 carb servings/75 grams CHO/meal)  Diet effective now        References:    Nutrtion Support Algorithm Enteral vs  Parenteral   Question Answer Comment   Diet Type Polo/CHO Controlled    Polo/CHO Controlled Consistent Carbohydrate Diet Level 2 (5 carb servings/75 grams CHO/meal)    RD to adjust diet per protocol?  Yes        02/18/22 1522                  Active Medications  Scheduled Meds:  Current Facility-Administered Medications   Medication Dose Route Frequency Provider Last Rate    acetaminophen  650 mg Oral Q6H PRN Nigel Nicolas MD      ALPRAZolam  0 5 mg Oral BID PRN EMMA Painter      [START ON 2/28/2022] amiodarone  200 mg Oral Daily With Breakfast Kirsty Butt PA-C      Followed by   Su Castorena amiodarone  200 mg Oral [address] [facility] ROBERTA Price      benzonatate  200 mg Oral TID PRN EMMA Slade      dexmedetomidine  0 1-0 7 mcg/kg/hr Intravenous Titrated Libra Lancaster PA-C 0 7 mcg/kg/hr (02/23/22 0025)    docusate sodium  100 mg Oral BID Nigel Nicolas MD      guaiFENesin  600 mg Oral Q12H [facility] EMMA Stone      heparin (porcine)  3-20 Units/kg/hr (Order-Specific) Intravenous Titrated Stephon Gardner MD 15 Units/kg/hr (02/22/22 1316)    heparin (porcine)  2,000 Units Intravenous Q1H PRN Stephon Gardner MD      heparin (porcine)  4,000 Units Intravenous Q1H PRN Stephon Gardner MD      insulin glargine  25 Units Subcutaneous Q12H [facility] EMMA Sultana      insulin lispro  1-6 Units Subcutaneous HS Nigel Nicolas MD      insulin lispro  2-12 Units Subcutaneous TID AC Nigel Nicolas MD      insulin lispro  8 Units Subcutaneous TID With Meals EMMA Sultana      ipratropium  0 5 mg Nebulization 4x Daily Barry Garvey Dennis, EMMA      levalbuterol  1 25 mg Nebulization 4x Daily Barry Garvey Dennis, EMMA      methylPREDNISolone sodium succinate  40 mg Intravenous Q8H [facility], LENINP      metoprolol tartrate  25 mg Oral Q12H [facility], CRNP      pantoprazole  40 mg Oral Daily Before Breakfast Inocencia Al MD      polyethylene glycol  17 g Oral Daily PRN Inocencia Al MD       Continuous Infusions:  dexmedetomidine, 0 1-0 7 mcg/kg/hr, Last Rate: 0 7 mcg/kg/hr (02/23/22 0025)  heparin (porcine), 3-20 Units/kg/hr (Order-Specific), Last Rate: 15 Units/kg/hr (02/22/22 1316)      PRN Meds:   acetaminophen, 650 mg, Q6H PRN  ALPRAZolam, 0 5 mg, BID PRN  benzonatate, 200 mg, TID PRN  heparin (porcine), 2,000 Units, Q1H PRN  heparin (porcine), 4,000 Units, Q1H PRN  polyethylene glycol, 17 g, Daily PRN        Allergies   No Known Allergies  ---------------------------------------------------------------------------------------  Advance Directive and Living Will:      Power of :    POLST:    ---------------------------------------------------------------------------------------    Inocencia Al MD      Portions of the record may have been created with voice recognition software  Occasional wrong word or "sound a like" substitutions may have occurred due to the inherent limitations of voice recognition software    Read the chart carefully and recognize, using context, where substitutions have occurred

## 2022-02-24 LAB
ANION GAP SERPL CALCULATED.3IONS-SCNC: 7 MMOL/L (ref 4–13)
APTT PPP: 112 SECONDS (ref 23–37)
APTT PPP: 79 SECONDS (ref 23–37)
APTT PPP: 82 SECONDS (ref 23–37)
BASOPHILS # BLD AUTO: 0.04 THOUSANDS/ΜL (ref 0–0.1)
BASOPHILS NFR BLD AUTO: 0 % (ref 0–1)
BUN SERPL-MCNC: 26 MG/DL (ref 5–25)
CALCIUM SERPL-MCNC: 8.2 MG/DL (ref 8.3–10.1)
CHLORIDE SERPL-SCNC: 101 MMOL/L (ref 100–108)
CO2 SERPL-SCNC: 28 MMOL/L (ref 21–32)
CREAT SERPL-MCNC: 0.86 MG/DL (ref 0.6–1.3)
EOSINOPHIL # BLD AUTO: 0.01 THOUSAND/ΜL (ref 0–0.61)
EOSINOPHIL NFR BLD AUTO: 0 % (ref 0–6)
ERYTHROCYTE [DISTWIDTH] IN BLOOD BY AUTOMATED COUNT: 14.6 % (ref 11.6–15.1)
GFR SERPL CREATININE-BSD FRML MDRD: 78 ML/MIN/1.73SQ M
GLUCOSE SERPL-MCNC: 137 MG/DL (ref 65–140)
GLUCOSE SERPL-MCNC: 139 MG/DL (ref 65–140)
GLUCOSE SERPL-MCNC: 194 MG/DL (ref 65–140)
GLUCOSE SERPL-MCNC: 237 MG/DL (ref 65–140)
GLUCOSE SERPL-MCNC: 297 MG/DL (ref 65–140)
HCT VFR BLD AUTO: 44.6 % (ref 34.8–46.1)
HGB BLD-MCNC: 14.3 G/DL (ref 11.5–15.4)
IMM GRANULOCYTES # BLD AUTO: 0.24 THOUSAND/UL (ref 0–0.2)
IMM GRANULOCYTES NFR BLD AUTO: 1 % (ref 0–2)
LYMPHOCYTES # BLD AUTO: 1.4 THOUSANDS/ΜL (ref 0.6–4.47)
LYMPHOCYTES NFR BLD AUTO: 8 % (ref 14–44)
MCH RBC QN AUTO: 30.4 PG (ref 26.8–34.3)
MCHC RBC AUTO-ENTMCNC: 32.1 G/DL (ref 31.4–37.4)
MCV RBC AUTO: 95 FL (ref 82–98)
MONOCYTES # BLD AUTO: 0.72 THOUSAND/ΜL (ref 0.17–1.22)
MONOCYTES NFR BLD AUTO: 4 % (ref 4–12)
NEUTROPHILS # BLD AUTO: 15.25 THOUSANDS/ΜL (ref 1.85–7.62)
NEUTS SEG NFR BLD AUTO: 87 % (ref 43–75)
NRBC BLD AUTO-RTO: 0 /100 WBCS
PLATELET # BLD AUTO: 450 THOUSANDS/UL (ref 149–390)
PMV BLD AUTO: 10.3 FL (ref 8.9–12.7)
POTASSIUM SERPL-SCNC: 5 MMOL/L (ref 3.5–5.3)
PROCALCITONIN SERPL-MCNC: 0.08 NG/ML
RBC # BLD AUTO: 4.7 MILLION/UL (ref 3.81–5.12)
SODIUM SERPL-SCNC: 136 MMOL/L (ref 136–145)
WBC # BLD AUTO: 17.66 THOUSAND/UL (ref 4.31–10.16)

## 2022-02-24 PROCEDURE — 82948 REAGENT STRIP/BLOOD GLUCOSE: CPT

## 2022-02-24 PROCEDURE — 99291 CRITICAL CARE FIRST HOUR: CPT | Performed by: INTERNAL MEDICINE

## 2022-02-24 PROCEDURE — 84145 PROCALCITONIN (PCT): CPT | Performed by: NURSE PRACTITIONER

## 2022-02-24 PROCEDURE — 97116 GAIT TRAINING THERAPY: CPT

## 2022-02-24 PROCEDURE — 85730 THROMBOPLASTIN TIME PARTIAL: CPT | Performed by: NURSE PRACTITIONER

## 2022-02-24 PROCEDURE — 94660 CPAP INITIATION&MGMT: CPT

## 2022-02-24 PROCEDURE — 85025 COMPLETE CBC W/AUTO DIFF WBC: CPT | Performed by: NURSE PRACTITIONER

## 2022-02-24 PROCEDURE — 97530 THERAPEUTIC ACTIVITIES: CPT

## 2022-02-24 PROCEDURE — 80048 BASIC METABOLIC PNL TOTAL CA: CPT | Performed by: NURSE PRACTITIONER

## 2022-02-24 PROCEDURE — 94640 AIRWAY INHALATION TREATMENT: CPT

## 2022-02-24 PROCEDURE — 85730 THROMBOPLASTIN TIME PARTIAL: CPT | Performed by: INTERNAL MEDICINE

## 2022-02-24 RX ADMIN — PANTOPRAZOLE SODIUM 40 MG: 40 TABLET, DELAYED RELEASE ORAL at 07:35

## 2022-02-24 RX ADMIN — LEVALBUTEROL HYDROCHLORIDE 1.25 MG: 1.25 SOLUTION, CONCENTRATE RESPIRATORY (INHALATION) at 07:10

## 2022-02-24 RX ADMIN — DOCUSATE SODIUM 100 MG: 100 CAPSULE ORAL at 10:53

## 2022-02-24 RX ADMIN — HEPARIN SODIUM 15 UNITS/KG/HR: 10000 INJECTION, SOLUTION INTRAVENOUS; SUBCUTANEOUS at 05:59

## 2022-02-24 RX ADMIN — BENZONATATE 200 MG: 100 CAPSULE ORAL at 21:17

## 2022-02-24 RX ADMIN — METHYLPREDNISOLONE SODIUM SUCCINATE 40 MG: 40 INJECTION, POWDER, FOR SOLUTION INTRAMUSCULAR; INTRAVENOUS at 21:17

## 2022-02-24 RX ADMIN — INSULIN LISPRO 4 UNITS: 100 INJECTION, SOLUTION INTRAVENOUS; SUBCUTANEOUS at 16:47

## 2022-02-24 RX ADMIN — METOPROLOL TARTRATE 25 MG: 25 TABLET, FILM COATED ORAL at 10:52

## 2022-02-24 RX ADMIN — METHYLPREDNISOLONE SODIUM SUCCINATE 40 MG: 40 INJECTION, POWDER, FOR SOLUTION INTRAMUSCULAR; INTRAVENOUS at 10:53

## 2022-02-24 RX ADMIN — BENZONATATE 200 MG: 100 CAPSULE ORAL at 13:45

## 2022-02-24 RX ADMIN — INSULIN GLARGINE 12 UNITS: 100 INJECTION, SOLUTION SUBCUTANEOUS at 21:23

## 2022-02-24 RX ADMIN — IPRATROPIUM BROMIDE 0.5 MG: 0.5 SOLUTION RESPIRATORY (INHALATION) at 20:09

## 2022-02-24 RX ADMIN — METOPROLOL TARTRATE 25 MG: 25 TABLET, FILM COATED ORAL at 21:23

## 2022-02-24 RX ADMIN — LEVALBUTEROL HYDROCHLORIDE 1.25 MG: 1.25 SOLUTION, CONCENTRATE RESPIRATORY (INHALATION) at 20:09

## 2022-02-24 RX ADMIN — ACETAMINOPHEN 650 MG: 325 TABLET, FILM COATED ORAL at 14:18

## 2022-02-24 RX ADMIN — IPRATROPIUM BROMIDE 0.5 MG: 0.5 SOLUTION RESPIRATORY (INHALATION) at 07:10

## 2022-02-24 RX ADMIN — ALPRAZOLAM 1 MG: 0.5 TABLET ORAL at 21:17

## 2022-02-24 RX ADMIN — GUAIFENESIN 600 MG: 600 TABLET, EXTENDED RELEASE ORAL at 21:17

## 2022-02-24 RX ADMIN — IPRATROPIUM BROMIDE 0.5 MG: 0.5 SOLUTION RESPIRATORY (INHALATION) at 13:49

## 2022-02-24 RX ADMIN — ACETAMINOPHEN 650 MG: 325 TABLET, FILM COATED ORAL at 21:17

## 2022-02-24 RX ADMIN — GUAIFENESIN 600 MG: 600 TABLET, EXTENDED RELEASE ORAL at 10:53

## 2022-02-24 RX ADMIN — INSULIN GLARGINE 12 UNITS: 100 INJECTION, SOLUTION SUBCUTANEOUS at 10:52

## 2022-02-24 RX ADMIN — LEVALBUTEROL HYDROCHLORIDE 1.25 MG: 1.25 SOLUTION, CONCENTRATE RESPIRATORY (INHALATION) at 13:49

## 2022-02-24 RX ADMIN — INSULIN LISPRO 4 UNITS: 100 INJECTION, SOLUTION INTRAVENOUS; SUBCUTANEOUS at 21:24

## 2022-02-24 RX ADMIN — SENNOSIDES 8.6 MG: 8.6 TABLET ORAL at 10:52

## 2022-02-24 RX ADMIN — DOCUSATE SODIUM 100 MG: 100 CAPSULE ORAL at 16:45

## 2022-02-24 NOTE — PHYSICAL THERAPY NOTE
PHYSICAL THERAPY NOTE          Patient Name: Garland Helm  [id number] Date: 2/24/2022 02/24/22 5326   Note Type   Note Type Treatment   Pain Assessment   Pain Assessment Tool 0-10   Pain Score No Pain   Restrictions/Precautions   Other Precautions Contact/isolation; Fall Risk;Multiple lines;Telemetry;O2  (HFNC 55%)   General   Chart Reviewed Yes   Family/Caregiver Present Yes  (cousin)   Cognition   Overall Cognitive Status WFL   Arousal/Participation Alert   Attention Attends with cues to redirect   Orientation Level Oriented X4   Memory Within functional limits   Following Commands Follows all commands and directions without difficulty   Subjective   Subjective You guys are so wonderful here  Sure, Let's do it  Bed Mobility   Additional Comments pt out of bed in recliner upon arrival and remained sated out of bed in recliner at conclusion of PT session  Transfers   Sit to Stand 4  Minimal assistance   Additional items Assist x 1; Increased time required;Verbal cues;Armrests   Stand to Sit 4  Minimal assistance   Additional items Assist x 1; Increased time required;Verbal cues   Additional Comments cues for hand placement, cues for backing up to chair with Rw, cues to reach back prior to sitting   Ambulation/Elevation   Gait pattern Short stride; Excessively slow; Step to; Forward Flexion   Gait Assistance 3  Moderate assist   Additional items Assist x 1;Verbal cues   Assistive Device Rolling walker   Distance 6' x3 ( 6' forward and 6' backward x3)   Balance   Static Sitting Good   Dynamic Sitting Fair   Static Standing Poor +   Dynamic Standing Poor   Ambulatory Poor +   Endurance Deficit   Endurance Deficit Description hypoxia,    Activity Tolerance   Activity Tolerance Patient limited by fatigue;Treatment limited secondary to medical complications (Comment)  (hypoxia on 55% HFNC w mobility, o2 sats 77%-81% hr 101 bpm )   Medical Staff Made Aware Jose C Blazing   Exercises   Ankle Pumps Sitting;10 reps;AROM; Bilateral   Equipment Use   Comments pursed lip breathing exercises performed to improve o2sats  Assessment   Prognosis Fair   Problem List Decreased strength;Decreased range of motion;Decreased endurance; Impaired balance;Decreased mobility;Obesity   Assessment Pt  Seen for PT treatment interventions as per PT POC  Pt  Showing great motivation  Pt  Remains on 55% HFNC  Pt  Out of bed upon arrival   Offers no c/o pain  PT is showing improved ability to perform transfers and ambulation this session requiring less assistance min assist x1 for transfers and mod assist x1 for ambulation with use of Rw  Pt progressed with ambulation distances from previous session to 6' x3( forward and backward ambulation training performed)pt requiring support of Rw for improved balance, safety, activity tolerance, endurance and mobility  Limitations in mobility due to fatigue, forrester, hypoxia, spo2 drops to 77%-81% on 55% HFNC  Increased time for rebound to 86%-88%  Cues for proper breathing techniques required  Pt  Remained seated out of bed in recliner at conclusion of PT session  The patient's AM-PAC Basic Mobility Inpatient Short Form Raw Score is 15  A Raw score of less than or equal to 16 suggests the patient may benefit from discharge to post-acute rehabilitation services  Please also refer to the recommendation of the Physical Therapist for safe discharge planning  Continue to recommend STR at d/c in order to optimize outcomes and facilitate return to PLOF      Goals   Patient Goals to get out of here  STG Expiration Date 03/02/22   PT Treatment Day 2   Plan   Treatment/Interventions Functional transfer training; Therapeutic exercise; Endurance training;Patient/family training;Equipment eval/education;Gait training;Spoke to nursing   Progress Slow progress, medical status limitations   PT Frequency 3-5x/wk   Recommendation   PT Discharge Recommendation Post acute rehabilitation services  (LTAC)   AM-PAC Basic Mobility Inpatient   Turning in Bed Without Bedrails 3   Lying on Back to Sitting on Edge of Flat Bed 3   Moving Bed to Chair 3   Standing Up From Chair 3   Walk in Room 2   Climb 3-5 Stairs 1   Basic Mobility Inpatient Raw Score 15   Basic Mobility Standardized Score 36 97   Highest Level Of Mobility   JH-HL Goal 4: Move to chair/commode   JH-HLM Highest Level of Mobility 6: Walk 10 steps or more   JH-HLM Goal Achieved Yes   End of Consult   Patient Position at End of Consult Bedside chair; All needs within reach   End of Consult Comments scd's applied to b/l radha's   Douglas Canela, PTA

## 2022-02-24 NOTE — PLAN OF CARE
Problem: PHYSICAL THERAPY ADULT  Goal: Performs mobility at highest level of function for planned discharge setting  See evaluation for individualized goals  Description: Treatment/Interventions: Functional transfer training,Therapeutic exercise,Endurance training,Patient/family training,Equipment eval/education,Gait training,Spoke to nursing  Equipment Recommended: Lakeisha Damico       See flowsheet documentation for full assessment, interventions and recommendations  Outcome: Progressing  Note: Prognosis: Fair  Problem List: Decreased strength,Decreased range of motion,Decreased endurance,Impaired balance,Decreased mobility,Obesity  Assessment: Pt  Seen for PT treatment interventions as per PT POC  Pt  Showing great motivation  Pt  Remains on 55% HFNC  Pt  Out of bed upon arrival   Offers no c/o pain  PT is showing improved ability to perform transfers and ambulation this session requiring less assistance min assist x1 for transfers and mod assist x1 for ambulation with use of Rw  Pt progressed with ambulation distances from previous session to 6' x3( forward and backward ambulation training performed)pt requiring support of Rw for improved balance, safety, activity tolerance, endurance and mobility  Limitations in mobility due to fatigue, forrester, hypoxia, spo2 drops to 77%-81% on 55% HFNC  Increased time for rebound to 86%-88%  Cues for proper breathing techniques required  Pt  Remained seated out of bed in recliner at conclusion of PT session  The patient's AM-PAC Basic Mobility Inpatient Short Form Raw Score is 15  A Raw score of less than or equal to 16 suggests the patient may benefit from discharge to post-acute rehabilitation services  Please also refer to the recommendation of the Physical Therapist for safe discharge planning  Continue to recommend STR at d/c in order to optimize outcomes and facilitate return to PLOF      Barriers to Discharge: Inaccessible home environment,Decreased caregiver support  Barriers to Discharge Comments: LOUIE, alone     PT Discharge Recommendation: Post acute rehabilitation services [facility])          See flowsheet documentation for full assessment

## 2022-02-24 NOTE — PROGRESS NOTES
Daily Progress Note - Critical Care   Katie Orlando 46 y o  female MRN: [id number]  Unit/Bed#: ICU 6 Encounter: [id number]        ----------------------------------------------------------------------------------------  HPI/24hr events: 46year old female with recent COVID infection, presenting with increased dyspnea, currently being managed for bacterial pneumonia, volume overload, and possible pneumonitis  Patient went into afib with RVR following an aspiration event    Precedex was discontinued  Received 1 dose of prn ativan overnight  Had no further fever episodes  ---------------------------------------------------------------------------------------  SUBJECTIVE  Feeling better, feels there's an improvement, has ongoing cough with sputum production  Chest burning is better  Review of Systems   Constitutional: Positive for fatigue  Negative for appetite change, chills and fever  HENT: Positive for sore throat  Eyes: Negative  Negative for photophobia and visual disturbance  Respiratory: Positive for cough and shortness of breath  Negative for wheezing  Cardiovascular: Negative for chest pain, palpitations and leg swelling  Gastrointestinal: Positive for constipation  Negative for diarrhea, nausea and vomiting  Genitourinary: Negative  Negative for dysuria, frequency and urgency  Musculoskeletal: Negative  Negative for arthralgias and myalgias  Skin: Negative  Neurological: Negative  Negative for dizziness, seizures, syncope and headaches  Psychiatric/Behavioral: Negative  Negative for agitation  The patient is not hyperactive  All other systems reviewed and are negative  Review of systems was reviewed and negative unless stated above in HPI/24-hour events   ---------------------------------------------------------------------------------------  Assessment and Plan:    1  Acute hypoxic respiratory failure   2  New onset AFib with RVR  3  Sepsis versus SIRS  4   Acute on chronic diastolic heart failure  5  Hypertension  6  Emphysema evidenced by imaging  7  Transaminitis - improving  8  Hypokalemia-resolved  9  New onset fever  10  Leukocytosis   11  Thrombocytosis-improving        Neuro:   · Agitation and anxiety  · Off precedex  · Prn ativan 1 mg  · Consider seroquel if patient continues to have agitation overnight  · CAM ICU       CV:   · New onset AFib with RVR, Thaddeus Vasc 3 (female, CHF, HTN), now in sinus rhythm  · Off amiodarone  · Continue lopressor at 25 mg bid  · Continue IV heparin, transition to eliquis 5mg bid once more stable  · Acute diastolic heart failure  · POA with elevated pro BNP of 1472, CXR of possible pulm congestion   Echo 2/17/21: preserved EF 65%, G1DD, IVC normal  · Appears euvolemic  · Hold further diuresis  · Monitor I/O, daily weight  · Monitor electrolytes, maintain Mg>2, K>4  · Monitor renal functions  · Hypertension  · Home amlodipine 10 mg daily on hold  · Continue lopressor 25 mg bid      Pulm:  · Acute hypoxic respiratory failure likely multifactorial secondary to pneumonia vs recent COVID sequale/fibrosis vs volume overload vs possible pneumonitis  · Continue BIPAP at night  · HFNC 55L, 100%, with NRB prn during day  · Maintain SPO2 > 88%  · veletri weaned off on 2/22  · Continue Solumedrol at 40 mg q12h, wean every 3 days   · Completed 5/5 days of ceftriaxone on 2/20  · Continue Atrovent and Xopenex nebs  · Mucinex, tessalon perls   · Incentive spirometry  · Vest therapy  · Emphysemia  · Continue nebs as outlined above       GI:   · Mild transaminitis   · Continue to monitor liver enzymes q3 days  · continue protonix 40 mg daily po for GI prophylaxis  · Bowel regimen for constipation  · Start mylanta  · Diet  · Regular diet, thin liquids while off BIPAP  · Cleared by speech  · Constipation  · Colace augustus, sennakot augustus, and miralax prn      · :   · None        F/E/N:   · Hypokalemia - resolved      Heme/Onc:   · Leukocytosis patient has ongoing leukocytosis, trending down from 20 4 since admission, currently on steroid taper, this morning has worsening leukocytosis  Possible medication induced vs infection  · Will continue to monitor  · Repeat blood cultures, CXR, procalcitonin  · Thrombocytosis, likely reactive - improving  · Will continue to monitor  · DVT prophylaxis: on systemic AC with IV heparin gtt      Endo:   · Hyperglycemia - HbA1C 6 6, on steroid taper  · Continue humalog 5U tid with meals lantus to 12 U bid  · ISS algorithm 4   · Monitor accu checks with steroid taper  · May need oral antihyperglycemic on discharge       ID:   · Fever - Resolved off antibiotics  Unclear etiology infection vs medication induced  · Continue to monitor off antibiotics  · Follow up blood cultures from 2/23  · Sepsis vs SIRS - resolved  POA with leukocytosis, tachycardia, lactic acidosis  Likely source pulmonary, MRSA +ve, procal -ve, blood cultures -ve day 4  · Azithromycin discontinued on D2  · Completed 5/5 days of IV ceftriaxone today on 2/20        MSK/Skin:   · History of cervical disc herniation  · Prn tylenol      Patient appropriate for transfer out of the ICU today?: No  Disposition: Continue Stepdown Level 1 level of care   Code Status: Level 1 - Full Code  ---------------------------------------------------------------------------------------  ICU CORE MEASURES    Prophylaxis   VTE Pharmacologic Prophylaxis: Heparin Drip  VTE Mechanical Prophylaxis: sequential compression device  Stress Ulcer Prophylaxis: Pantoprazole PO    ABCDE Protocol (if indicated)  Plan to perform spontaneous awakening trial today? Not applicable  Plan to perform spontaneous breathing trial today? Not applicable  Obvious barriers to extubation?  Not applicable  CAM-ICU: Negative    Invasive Devices Review  Invasive Devices  Report    Peripheral Intravenous Line            Peripheral IV 02/18/22 Right;Ventral (anterior) Hand 6 days    Peripheral IV 02/20/22 Left Hand 3 days Peripheral IV 22 Left Antecubital 2 days          Drain            External Urinary Catheter 4 days              Can any invasive devices be discontinued today? Not applicable  ---------------------------------------------------------------------------------------  OBJECTIVE    Vitals   Vitals:    22 0400 22 0500 22 0544 22 0600   BP: 108/69 109/59  109/53   Pulse: 68 66  68   Resp: (!) 23 14  17   Temp:       TempSrc:       SpO2: 97% 97%  97%   Weight:   106 kg (233 lb 11 oz)    Height:         Temp (24hrs), Av 3 °F (36 8 °C), Min:97 8 °F (36 6 °C), Max:98 7 °F (37 1 °C)  Current: Temperature: 97 8 °F (36 6 °C)  HR: 68  BP: 109/53  RR: 17  SpO2: 97%    Respiratory:  SpO2: SpO2: 97 %, SpO2 Device: O2 Device: High flow nasal cannula       Invasive/non-invasive ventilation settings   Respiratory  Report   Lab Data (Last 4 hours)    None         O2/Vent Data (Last 4 hours)    None                Physical Exam  Vitals and nursing note reviewed  Constitutional:       Comments: Naomie Sicilian:      Head: Normocephalic  Nose: Nose normal       Mouth/Throat:      Mouth: Mucous membranes are moist    Eyes:      Pupils: Pupils are equal, round, and reactive to light  Cardiovascular:      Rate and Rhythm: Normal rate and regular rhythm  Pulses: Normal pulses  Heart sounds: No murmur heard  Pulmonary:      Breath sounds: Wheezing and rales present  Abdominal:      Tenderness: There is no abdominal tenderness  There is no guarding  Comments: Reduced BS   Musculoskeletal:      Right lower leg: No edema  Left lower leg: No edema  Neurological:      General: No focal deficit present  Mental Status: She is alert  Mental status is at baseline     Psychiatric:         Mood and Affect: Mood normal              Laboratory and Diagnostics:  Results from last 7 days   Lab Units 22  0243 22  0630 22  0602 22  3015 22  0460 22  9351 WBC Thousand/uL 17 66* 20 06* 15 08* 15 99* 16 35* 17 67*   HEMOGLOBIN g/dL 14 3 15 1 15 4 13 9 13 5 13 8   HEMATOCRIT % 44 6 46 7* 47 8* 43 1 41 9 43 7   PLATELETS Thousands/uL 450* 447* 450* 402* 409* 458*   NEUTROS PCT % 87* 81* 78* 81* 80* 80*   MONOS PCT % 4 8 7 8 7 3*     Results from last 7 days   Lab Units 02/24/22  0243 02/23/22  0630 02/22/22  0602 02/20/22  0554 02/19/22  0437 02/18/22  0316   SODIUM mmol/L 136 136 138 139 138 140   POTASSIUM mmol/L 5 0 5 1 5 1 4 5 4 3 4 4   CHLORIDE mmol/L 101 100 101 104 104 104   CO2 mmol/L 28 24 29 27 24 24   ANION GAP mmol/L 7 12 8 8 10 12   BUN mg/dL 26* 27* 28* 29* 27* 15   CREATININE mg/dL 0 86 0 77 0 86 0 86 0 85 0 76   CALCIUM mg/dL 8 2* 8 5 8 7 9 1 8 9 9 2   GLUCOSE RANDOM mg/dL 194* 181* 183* 175* 162* 207*   ALT U/L  --   --  97* 93* 91*  --    AST U/L  --   --  34 37 57*  --    ALK PHOS U/L  --   --  134* 120* 120*  --    ALBUMIN g/dL  --   --  2 3* 2 3* 2 4*  --    TOTAL BILIRUBIN mg/dL  --   --  0 29 0 27 0 26  --      Results from last 7 days   Lab Units 02/22/22  0602 02/20/22  0554 02/18/22  0316   MAGNESIUM mg/dL 2 7* 3 0* 2 8*      Results from last 7 days   Lab Units 02/24/22  0243 02/23/22  1815 02/23/22  1027 02/22/22  0602 02/21/22  2145 02/21/22  1148 02/21/22  0517 02/19/22  2044 02/19/22  1257   INR   --   --   --   --   --   --   --   --  1 19   PTT seconds 112* 109* 40* 63* 70* 44* 45*   < > 45*    < > = values in this interval not displayed  ABG:    VBG:    Results from last 7 days   Lab Units 02/24/22  0413 02/23/22  1029 02/21/22  0517 02/19/22  0437   PROCALCITONIN ng/ml 0 08 0 06 0 09 0 22       Micro  Results from last 7 days   Lab Units 02/23/22  1050 02/23/22  1029   BLOOD CULTURE  Received in Microbiology Lab  Culture in Progress  Received in Microbiology Lab  Culture in Progress  EKG: NSR  Imaging:  I have personally reviewed pertinent reports        Intake and Output  I/O       02/22 0701  02/23 0700 02/23 0701  02/24 0700    P  O  960 960    I V  (mL/kg) 686 9 (6 5) 602 (5 7)    Total Intake(mL/kg) 1646 9 (15 5) 1562 (14 7)    Urine (mL/kg/hr) 1650 (0 6) 2500 (1)    Total Output 1650 2500    Net -3 1 938                Height and Weights   Height: 4' 6" (137 2 cm)     Body mass index is 56 34 kg/m²  Weight (last 2 days)     Date/Time Weight    02/24/22 0544 106 (233 69)    02/23/22 0600 106 (233 69)    02/22/22 0600 106 (233 69)            Nutrition       Diet Orders   (From admission, onward)             Start     Ordered    02/18/22 1523  Diet [place]/CHO Controlled; Consistent Carbohydrate Diet Level 2 (5 carb servings/75 grams CHO/meal)  Diet effective now        References:    Nutrtion Support Algorithm Enteral vs  Parenteral   Question Answer Comment   Diet Type Polo/CHO Controlled    Polo/CHO Controlled Consistent Carbohydrate Diet Level 2 (5 carb servings/75 grams CHO/meal)    RD to adjust diet per protocol?  Yes        02/18/22 1522                  Active Medications  Scheduled Meds:  Current Facility-Administered Medications   Medication Dose Route Frequency Provider Last Rate    acetaminophen  650 mg Oral Q6H PRN Inocencia Al MD      ALPRAZolam  1 mg Oral BID PRN Inocencia Al MD      aluminum-magnesium hydroxide-simethicone  30 mL Oral Q4H PRN Inocencia Al MD      benzonatate  200 mg Oral TID PRN EMMA Zaragoza      dexmedetomidine  0 1-0 7 mcg/kg/hr Intravenous Titrated Abel Lancaster PA-C Stopped (02/24/22 0407)    docusate sodium  100 mg Oral BID Inocencia Al MD      furosemide  20 mg Intravenous Once Inocencia Al MD      guaiFENesin  600 mg Oral Q12H [facility] EMMA Ward      heparin (porcine)  3-20 Units/kg/hr (Order-Specific) Intravenous Titrated Ronan Castano MD 15 Units/kg/hr (02/24/22 0559)    heparin (porcine)  2,000 Units Intravenous Q1H PRN Ronan Castano MD      heparin (porcine)  4,000 Units Intravenous Q1H PRN Ronan Castano MD  insulin glargine  12 Units Subcutaneous Q12H [facility] EMMA Galindo      insulin lispro  1-6 Units Subcutaneous HS Ethel Kim MD      insulin lispro  2-12 Units Subcutaneous TID AC Ethel Kim MD      insulin lispro  5 Units Subcutaneous TID With Meals EMMA Galindo      ipratropium  0 5 mg Nebulization 4x Daily Herrera Sampsonsarah ClovisdayEMMA      levalbuterol  1 25 mg Nebulization 4x Daily EMMA Spencer      methylPREDNISolone sodium succinate  40 mg Intravenous Q12H [facility] Ethel Kim MD      metoprolol tartrate  25 mg Oral Q12H [facility] EMMA Anderson      pantoprazole  40 mg Oral Daily Before Breakfast Ethel Kim MD      polyethylene glycol  17 g Oral Daily PRN Ethel Kim MD      senna  1 tablet Oral Daily Ethel Kim MD       Continuous Infusions:  dexmedetomidine, 0 1-0 7 mcg/kg/hr, Last Rate: Stopped (02/24/22 0407)  heparin (porcine), 3-20 Units/kg/hr (Order-Specific), Last Rate: 15 Units/kg/hr (02/24/22 0571)      PRN Meds:   acetaminophen, 650 mg, Q6H PRN  ALPRAZolam, 1 mg, BID PRN  aluminum-magnesium hydroxide-simethicone, 30 mL, Q4H PRN  benzonatate, 200 mg, TID PRN  heparin (porcine), 2,000 Units, Q1H PRN  heparin (porcine), 4,000 Units, Q1H PRN  polyethylene glycol, 17 g, Daily PRN        Allergies   No Known Allergies  ---------------------------------------------------------------------------------------  Advance Directive and Living Will:      Power of :    POLST:    ---------------------------------------------------------------------------------------    Ethel Kim MD      Portions of the record may have been created with voice recognition software  Occasional wrong word or "sound a like" substitutions may have occurred due to the inherent limitations of voice recognition software    Read the chart carefully and recognize, using context, where substitutions have occurred

## 2022-02-25 LAB
ANION GAP SERPL CALCULATED.3IONS-SCNC: 3 MMOL/L (ref 4–13)
APTT PPP: 48 SECONDS (ref 23–37)
APTT PPP: 62 SECONDS (ref 23–37)
APTT PPP: 96 SECONDS (ref 23–37)
BASOPHILS # BLD AUTO: 0.02 THOUSANDS/ΜL (ref 0–0.1)
BASOPHILS NFR BLD AUTO: 0 % (ref 0–1)
BUN SERPL-MCNC: 18 MG/DL (ref 5–25)
CALCIUM SERPL-MCNC: 8.1 MG/DL (ref 8.3–10.1)
CHLORIDE SERPL-SCNC: 102 MMOL/L (ref 100–108)
CO2 SERPL-SCNC: 32 MMOL/L (ref 21–32)
CREAT SERPL-MCNC: 0.86 MG/DL (ref 0.6–1.3)
EOSINOPHIL # BLD AUTO: 0 THOUSAND/ΜL (ref 0–0.61)
EOSINOPHIL NFR BLD AUTO: 0 % (ref 0–6)
ERYTHROCYTE [DISTWIDTH] IN BLOOD BY AUTOMATED COUNT: 14.7 % (ref 11.6–15.1)
GFR SERPL CREATININE-BSD FRML MDRD: 78 ML/MIN/1.73SQ M
GLUCOSE SERPL-MCNC: 151 MG/DL (ref 65–140)
GLUCOSE SERPL-MCNC: 170 MG/DL (ref 65–140)
GLUCOSE SERPL-MCNC: 191 MG/DL (ref 65–140)
GLUCOSE SERPL-MCNC: 208 MG/DL (ref 65–140)
GLUCOSE SERPL-MCNC: 299 MG/DL (ref 65–140)
HCT VFR BLD AUTO: 41.7 % (ref 34.8–46.1)
HGB BLD-MCNC: 13.1 G/DL (ref 11.5–15.4)
IMM GRANULOCYTES # BLD AUTO: 0.21 THOUSAND/UL (ref 0–0.2)
IMM GRANULOCYTES NFR BLD AUTO: 1 % (ref 0–2)
LYMPHOCYTES # BLD AUTO: 1.74 THOUSANDS/ΜL (ref 0.6–4.47)
LYMPHOCYTES NFR BLD AUTO: 10 % (ref 14–44)
MCH RBC QN AUTO: 29.8 PG (ref 26.8–34.3)
MCHC RBC AUTO-ENTMCNC: 31.4 G/DL (ref 31.4–37.4)
MCV RBC AUTO: 95 FL (ref 82–98)
MONOCYTES # BLD AUTO: 0.75 THOUSAND/ΜL (ref 0.17–1.22)
MONOCYTES NFR BLD AUTO: 5 % (ref 4–12)
NEUTROPHILS # BLD AUTO: 14.13 THOUSANDS/ΜL (ref 1.85–7.62)
NEUTS SEG NFR BLD AUTO: 84 % (ref 43–75)
NRBC BLD AUTO-RTO: 0 /100 WBCS
PLATELET # BLD AUTO: 463 THOUSANDS/UL (ref 149–390)
PMV BLD AUTO: 10.2 FL (ref 8.9–12.7)
POTASSIUM SERPL-SCNC: 4.8 MMOL/L (ref 3.5–5.3)
RBC # BLD AUTO: 4.39 MILLION/UL (ref 3.81–5.12)
SODIUM SERPL-SCNC: 137 MMOL/L (ref 136–145)
WBC # BLD AUTO: 16.85 THOUSAND/UL (ref 4.31–10.16)

## 2022-02-25 PROCEDURE — 80048 BASIC METABOLIC PNL TOTAL CA: CPT | Performed by: NURSE PRACTITIONER

## 2022-02-25 PROCEDURE — 85025 COMPLETE CBC W/AUTO DIFF WBC: CPT | Performed by: NURSE PRACTITIONER

## 2022-02-25 PROCEDURE — 85730 THROMBOPLASTIN TIME PARTIAL: CPT | Performed by: NURSE PRACTITIONER

## 2022-02-25 PROCEDURE — 82948 REAGENT STRIP/BLOOD GLUCOSE: CPT

## 2022-02-25 PROCEDURE — 85730 THROMBOPLASTIN TIME PARTIAL: CPT | Performed by: INTERNAL MEDICINE

## 2022-02-25 PROCEDURE — 99291 CRITICAL CARE FIRST HOUR: CPT | Performed by: INTERNAL MEDICINE

## 2022-02-25 PROCEDURE — 94660 CPAP INITIATION&MGMT: CPT

## 2022-02-25 PROCEDURE — 94640 AIRWAY INHALATION TREATMENT: CPT

## 2022-02-25 RX ORDER — METHYLPREDNISOLONE SODIUM SUCCINATE 40 MG/ML
40 INJECTION, POWDER, LYOPHILIZED, FOR SOLUTION INTRAMUSCULAR; INTRAVENOUS DAILY
Status: DISCONTINUED | OUTPATIENT
Start: 2022-02-26 | End: 2022-02-28

## 2022-02-25 RX ADMIN — INSULIN LISPRO 6 UNITS: 100 INJECTION, SOLUTION INTRAVENOUS; SUBCUTANEOUS at 16:33

## 2022-02-25 RX ADMIN — LEVALBUTEROL HYDROCHLORIDE 1.25 MG: 1.25 SOLUTION, CONCENTRATE RESPIRATORY (INHALATION) at 12:55

## 2022-02-25 RX ADMIN — BENZONATATE 200 MG: 100 CAPSULE ORAL at 22:23

## 2022-02-25 RX ADMIN — ALPRAZOLAM 1 MG: 0.5 TABLET ORAL at 09:07

## 2022-02-25 RX ADMIN — METOPROLOL TARTRATE 25 MG: 25 TABLET, FILM COATED ORAL at 08:31

## 2022-02-25 RX ADMIN — METHYLPREDNISOLONE SODIUM SUCCINATE 40 MG: 40 INJECTION, POWDER, FOR SOLUTION INTRAMUSCULAR; INTRAVENOUS at 08:27

## 2022-02-25 RX ADMIN — LEVALBUTEROL HYDROCHLORIDE 1.25 MG: 1.25 SOLUTION, CONCENTRATE RESPIRATORY (INHALATION) at 07:31

## 2022-02-25 RX ADMIN — INSULIN GLARGINE 12 UNITS: 100 INJECTION, SOLUTION SUBCUTANEOUS at 22:23

## 2022-02-25 RX ADMIN — HEPARIN SODIUM 2000 UNITS: 1000 INJECTION INTRAVENOUS; SUBCUTANEOUS at 22:51

## 2022-02-25 RX ADMIN — ACETAMINOPHEN 650 MG: 325 TABLET, FILM COATED ORAL at 22:23

## 2022-02-25 RX ADMIN — IPRATROPIUM BROMIDE 0.5 MG: 0.5 SOLUTION RESPIRATORY (INHALATION) at 12:55

## 2022-02-25 RX ADMIN — IPRATROPIUM BROMIDE 0.5 MG: 0.5 SOLUTION RESPIRATORY (INHALATION) at 07:31

## 2022-02-25 RX ADMIN — IPRATROPIUM BROMIDE 0.5 MG: 0.5 SOLUTION RESPIRATORY (INHALATION) at 20:20

## 2022-02-25 RX ADMIN — DOCUSATE SODIUM 100 MG: 100 CAPSULE ORAL at 08:36

## 2022-02-25 RX ADMIN — PANTOPRAZOLE SODIUM 40 MG: 40 TABLET, DELAYED RELEASE ORAL at 08:27

## 2022-02-25 RX ADMIN — HEPARIN SODIUM 15 UNITS/KG/HR: 10000 INJECTION, SOLUTION INTRAVENOUS; SUBCUTANEOUS at 22:38

## 2022-02-25 RX ADMIN — LEVALBUTEROL HYDROCHLORIDE 1.25 MG: 1.25 SOLUTION, CONCENTRATE RESPIRATORY (INHALATION) at 20:20

## 2022-02-25 RX ADMIN — METHYLPREDNISOLONE SODIUM SUCCINATE 40 MG: 40 INJECTION, POWDER, FOR SOLUTION INTRAMUSCULAR; INTRAVENOUS at 22:23

## 2022-02-25 RX ADMIN — INSULIN LISPRO 2 UNITS: 100 INJECTION, SOLUTION INTRAVENOUS; SUBCUTANEOUS at 12:26

## 2022-02-25 RX ADMIN — ALPRAZOLAM 1 MG: 0.5 TABLET ORAL at 22:23

## 2022-02-25 RX ADMIN — POLYETHYLENE GLYCOL 3350 17 G: 17 POWDER, FOR SOLUTION ORAL at 08:36

## 2022-02-25 RX ADMIN — BENZONATATE 200 MG: 100 CAPSULE ORAL at 08:27

## 2022-02-25 RX ADMIN — HEPARIN SODIUM 15 UNITS/KG/HR: 10000 INJECTION, SOLUTION INTRAVENOUS; SUBCUTANEOUS at 01:32

## 2022-02-25 RX ADMIN — GUAIFENESIN 600 MG: 600 TABLET, EXTENDED RELEASE ORAL at 08:27

## 2022-02-25 RX ADMIN — GUAIFENESIN 600 MG: 600 TABLET, EXTENDED RELEASE ORAL at 22:23

## 2022-02-25 RX ADMIN — METOPROLOL TARTRATE 25 MG: 25 TABLET, FILM COATED ORAL at 22:23

## 2022-02-25 RX ADMIN — INSULIN LISPRO 2 UNITS: 100 INJECTION, SOLUTION INTRAVENOUS; SUBCUTANEOUS at 22:26

## 2022-02-25 RX ADMIN — IPRATROPIUM BROMIDE 0.5 MG: 0.5 SOLUTION RESPIRATORY (INHALATION) at 16:49

## 2022-02-25 RX ADMIN — INSULIN LISPRO 2 UNITS: 100 INJECTION, SOLUTION INTRAVENOUS; SUBCUTANEOUS at 08:28

## 2022-02-25 RX ADMIN — INSULIN GLARGINE 12 UNITS: 100 INJECTION, SOLUTION SUBCUTANEOUS at 08:28

## 2022-02-25 RX ADMIN — LEVALBUTEROL HYDROCHLORIDE 1.25 MG: 1.25 SOLUTION, CONCENTRATE RESPIRATORY (INHALATION) at 16:49

## 2022-02-25 NOTE — PLAN OF CARE
Problem: Potential for Falls  Goal: Patient will remain free of falls  Description: INTERVENTIONS:  - Educate patient/family on patient safety including physical limitations  - Instruct patient to call for assistance with activity   - Consult OT/PT to assist with strengthening/mobility   - Keep Call bell within reach  - Keep bed low and locked with side rails adjusted as appropriate  - Keep care items and personal belongings within reach  - Initiate and maintain comfort rounds  - Make Fall Risk Sign visible to staff  - Apply yellow socks and bracelet for high fall risk patients  - Consider moving patient to room near nurses station  Outcome: Progressing     Problem: MOBILITY - ADULT  Goal: Maintain or return to baseline ADL function  Description: INTERVENTIONS:  -  Assess patient's ability to carry out ADLs; assess patient's baseline for ADL function and identify physical deficits which impact ability to perform ADLs (bathing, care of mouth/teeth, toileting, grooming, dressing, etc )  - Assess/evaluate cause of self-care deficits   - Assess range of motion  - Assess patient's mobility; develop plan if impaired  - Assess patient's need for assistive devices and provide as appropriate  - Encourage maximum independence but intervene and supervise when necessary  - Involve family in performance of ADLs  - Assess for home care needs following discharge   - Consider OT consult to assist with ADL evaluation and planning for discharge  - Provide patient education as appropriate  Outcome: Progressing  Goal: Maintains/Returns to pre admission functional level  Description: INTERVENTIONS:  - Perform BMAT or MOVE assessment daily    - Set and communicate daily mobility goal to care team and patient/family/caregiver     - Collaborate with rehabilitation services on mobility goals if consulted  - Out of bed to chair 3 times a day   - Out of bed for meals 3 times a day  - Out of bed for toileting  - Record patient progress and toleration of activity level   Outcome: Progressing     Problem: Prexisting or High Potential for Compromised Skin Integrity  Goal: Skin integrity is maintained or improved  Description: INTERVENTIONS:  - Identify patients at risk for skin breakdown  - Assess and monitor skin integrity  - Assess and monitor nutrition and hydration status  - Monitor labs   - Assess for incontinence   - Turn and reposition patient  - Assist with mobility/ambulation  - Relieve pressure over bony prominences  - Avoid friction and shearing  - Provide appropriate hygiene as needed including keeping skin clean and dry  - Evaluate need for skin moisturizer/barrier cream  - Collaborate with interdisciplinary team   - Patient/family teaching  - Consider wound care consult   Outcome: Progressing     Problem: RESPIRATORY - ADULT  Goal: Achieves optimal ventilation and oxygenation  Description: INTERVENTIONS:  - Assess for changes in respiratory status  - Assess for changes in mentation and behavior  - Position to facilitate oxygenation and minimize respiratory effort  - Oxygen administered by appropriate delivery if ordered  - Initiate smoking cessation education as indicated  - Encourage broncho-pulmonary hygiene including cough, deep breathe, Incentive Spirometry  - Assess the need for suctioning and aspirate as needed  - Assess and instruct to report SOB or any respiratory difficulty  - Respiratory Therapy support as indicated  Outcome: Progressing     Problem: METABOLIC, FLUID AND ELECTROLYTES - ADULT  Goal: Electrolytes maintained within normal limits  Description: INTERVENTIONS:  - Monitor labs and assess patient for signs and symptoms of electrolyte imbalances  - Administer electrolyte replacement as ordered  - Monitor response to electrolyte replacements, including repeat lab results as appropriate  - Instruct patient on fluid and nutrition as appropriate  Outcome: Progressing  Goal: Fluid balance maintained  Description: INTERVENTIONS:  - Monitor labs   - Monitor I/O and WT  - Instruct patient on fluid and nutrition as appropriate  - Assess for signs & symptoms of volume excess or deficit  Outcome: Progressing  Goal: Glucose maintained within target range  Description: INTERVENTIONS:  - Monitor Blood Glucose as ordered  - Assess for signs and symptoms of hyperglycemia and hypoglycemia  - Administer ordered medications to maintain glucose within target range  - Assess nutritional intake and initiate nutrition service referral as needed  Outcome: Progressing     Problem: MUSCULOSKELETAL - ADULT  Goal: Maintain or return mobility to safest level of function  Description: INTERVENTIONS:  - Assess patient's ability to carry out ADLs; assess patient's baseline for ADL function and identify physical deficits which impact ability to perform ADLs (bathing, care of mouth/teeth, toileting, grooming, dressing, etc )  - Assess/evaluate cause of self-care deficits   - Assess range of motion  - Assess patient's mobility  - Assess patient's need for assistive devices and provide as appropriate  - Encourage maximum independence but intervene and supervise when necessary  - Involve family in performance of ADLs  - Assess for home care needs following discharge   - Consider OT consult to assist with ADL evaluation and planning for discharge  - Provide patient education as appropriate  Outcome: Progressing  Goal: Maintain proper alignment of affected body part  Description: INTERVENTIONS:  - Support, maintain and protect limb and body alignment  - Provide patient/ family with appropriate education  Outcome: Progressing     Problem: Nutrition/Hydration-ADULT  Goal: Nutrient/Hydration intake appropriate for improving, restoring or maintaining nutritional needs  Description: Monitor and assess patient's nutrition/hydration status for malnutrition  Collaborate with interdisciplinary team and initiate plan and interventions as ordered    Monitor patient's weight and dietary intake as ordered or per policy  Utilize nutrition screening tool and intervene as necessary  Determine patient's food preferences and provide high-protein, high-caloric foods as appropriate       INTERVENTIONS:  - Monitor oral intake, urinary output, labs, and treatment plans  - Assess nutrition and hydration status and recommend course of action  - Evaluate amount of meals eaten  - Assist patient with eating if necessary   - Allow adequate time for meals  - Recommend/ encourage appropriate diets, oral nutritional supplements, and vitamin/mineral supplements  - Order, calculate, and assess calorie counts as needed  - Recommend, monitor, and adjust tube feedings and TPN/PPN based on assessed needs  - Assess need for intravenous fluids  - Provide specific nutrition/hydration education as appropriate  - Include patient/family/caregiver in decisions related to nutrition  Outcome: Progressing

## 2022-02-25 NOTE — PROGRESS NOTES
Daily Progress Note - Critical Care   Katie Marquez 46 y o  female MRN: [id number]  Unit/Bed#: ICU 6 Encounter: [id number]        ----------------------------------------------------------------------------------------  HPI/24hr events: 46year old female with recent COVID infection, presenting with increased dyspnea, currently being managed for bacterial pneumonia, volume overload, and possible pneumonitis  Patient went into afib with RVR following an aspiration event  No significant events overnight  She received 1 dose of xanax overnight for anxiety      ---------------------------------------------------------------------------------------  SUBJECTIVE  She feels like her breathing is better, still has shortness of breath and feeling of "cold chest"  She also has ongoing cough which is bothersome when constant    Review of Systems   Constitutional: Positive for fatigue  Negative for activity change, appetite change, chills, diaphoresis and fever  HENT: Negative  Eyes: Negative for photophobia and visual disturbance  Respiratory: Positive for cough and shortness of breath  Negative for chest tightness and wheezing  Cardiovascular: Negative  Negative for chest pain, palpitations and leg swelling  Gastrointestinal: Negative  Negative for abdominal distention, anal bleeding, blood in stool, constipation, diarrhea, nausea and vomiting  Genitourinary: Negative  Negative for dysuria, frequency, hematuria and urgency  Musculoskeletal: Negative  Negative for arthralgias and myalgias  Skin: Negative  Neurological: Negative  Negative for dizziness, light-headedness and headaches  Psychiatric/Behavioral: Negative  All other systems reviewed and are negative  Review of systems was reviewed and negative unless stated above in HPI/24-hour events   ---------------------------------------------------------------------------------------  Assessment and Plan:    1   Acute hypoxic respiratory failure   2  New onset AFib with RVR  3  Sepsis versus SIRS  4  Acute on chronic diastolic heart failure  5  Hypertension  6  Emphysema evidenced by imaging  7  Transaminitis - improving  8  Hypokalemia-resolved  9  New onset fever  10  Leukocytosis   11  Thrombocytosis-improving        Neuro:   · Agitation and anxiety  · Prn Xanax 1 mg  · Consider seroquel if patient continues to have agitation overnight  · CAM ICU        CV:   · New onset AFib with RVR, Thaddeus Vasc 3 (female, CHF, HTN), now in sinus rhythm  · Continue lopressor at 25 mg bid  · Continue IV heparin, transition to eliquis 5mg bid once more stable  · Acute diastolic heart failure  · POA with elevated pro BNP of 1472, CXR of possible pulm congestion   Echo 2/17/21: preserved EF 65%, G1DD, IVC normal  · Appears euvolemic  · Continue to hold diuretics  · Monitor I/O, daily weight  · Monitor electrolytes, maintain Mg>2, K>4  · Monitor renal functions  · Hypertension  · Home amlodipine 10 mg daily on hold  · Continue lopressor 25 mg bid        Pulm:  · Acute hypoxic respiratory failure likely multifactorial secondary to pneumonia vs recent COVID sequale/fibrosis vs volume overload vs possible pneumonitis  · Continue BIPAP at night  · HFNC 55L, 100%, with NRB prn during day  · Maintain SPO2 > 88%  · veletri weaned off on 2/22  · Continue Solumedrol at 40 mg q12h, transition to 40 mg daily tomorrow   · Completed 5/5 days of ceftriaxone on 2/20  · Continue Atrovent and Xopenex nebs  · Mucinex, tessalon perls   · Incentive spirometry  · Vest therapy  · Emphysemia  · Continue nebs as outlined above        GI:   · Mild transaminitis   · Continue to monitor liver enzymes q3 days  · continue protonix 40 mg daily po for GI prophylaxis  · Bowel regimen for constipation  · Continue mylanta  · Diet  · Regular diet, thin liquids while off BIPAP  · Cleared by speech  · Constipation - resolved  · Colace augustus, sennakot augustus, and miralax prn        · :   · None        F/E/N:   · Hypokalemia - resolved        Heme/Onc:   · Leukocytosis - improving off antibiotics, likely steroid induced  Repeat cultures -ve so far  · Will continue to monitor off antibiotics  · Thrombocytosis, likely reactive  · Will continue to monitor  · DVT prophylaxis: on systemic AC with IV heparin gtt        Endo:   · Hyperglycemia - HbA1C 6 6, on steroid taper  · Continue humalog 5U tid with meals lantus to 12 U bid  · ISS algorithm 4   · Monitor accu checks with steroid taper  · May need oral antihyperglycemic on discharge         ID:   · Fever - Resolved off antibiotics  Unclear etiology infection vs medication induced, repeat cultures -ve so far  · Continue to monitor off antibiotics  · Continue to follow blood cultures from 2/23  · Sepsis vs SIRS - resolved  POA with leukocytosis, tachycardia, lactic acidosis  Likely source pulmonary, MRSA +ve, procal -ve  · Azithromycin discontinued on D2  · Completed 5/5 days of IV ceftriaxone today on 2/20        MSK/Skin:   · History of cervical disc herniation  · Prn tylenol      Patient appropriate for transfer out of the ICU today?: No  Disposition: Continue Stepdown Level 1 level of care   Code Status: Level 1 - Full Code  ---------------------------------------------------------------------------------------  ICU CORE MEASURES    Prophylaxis   VTE Pharmacologic Prophylaxis: Heparin Drip  VTE Mechanical Prophylaxis: sequential compression device  Stress Ulcer Prophylaxis: Pantoprazole PO    ABCDE Protocol (if indicated)  Plan to perform spontaneous awakening trial today? Not applicable  Plan to perform spontaneous breathing trial today? Not applicable  Obvious barriers to extubation?  Not applicable  CAM-ICU: Negative    Invasive Devices Review  Invasive Devices  Report    Peripheral Intravenous Line            Peripheral IV 02/20/22 Left Hand 4 days    Peripheral IV 02/21/22 Left Antecubital 3 days          Drain External Urinary Catheter 5 days              Can any invasive devices be discontinued today? Not applicable  ---------------------------------------------------------------------------------------  OBJECTIVE    Vitals   Vitals:    22 0400 22 0500 22 0600 22 0731   BP: 140/74 120/61 132/71    Pulse: 74 68 68    Resp: (!) 32 (!) 32 (!) 30    Temp:       TempSrc:       SpO2: 97% 97% 98% 94%   Weight:       Height:         Temp (24hrs), Av 8 °F (36 6 °C), Min:97 4 °F (36 3 °C), Max:98 6 °F (37 °C)  Current: Temperature: 97 8 °F (36 6 °C)  HR: 68  BP: 132/71  RR: 30  SpO2: 98    Respiratory:  SpO2: SpO2: 94 %, SpO2 Device: O2 Device: High flow nasal cannula       Invasive/non-invasive ventilation settings   Respiratory  Report   Lab Data (Last 4 hours)    None         O2/Vent Data (Last 4 hours)      731          Non-Invasive Ventilation Mode HFNC (High flow)                   Physical Exam  Vitals and nursing note reviewed  Constitutional:       Appearance: She is ill-appearing  Comments: BMI 56 88   HENT:      Head: Normocephalic  Nose: Nose normal       Mouth/Throat:      Mouth: Mucous membranes are moist    Eyes:      Pupils: Pupils are equal, round, and reactive to light  Cardiovascular:      Rate and Rhythm: Normal rate and regular rhythm  Pulses: Normal pulses  Heart sounds: No murmur heard  Pulmonary:      Breath sounds: Rales (B/L scattered) present  No wheezing  Comments: Increased effort  Abdominal:      General: There is no distension  Palpations: Abdomen is soft  Tenderness: There is no abdominal tenderness  There is no guarding  Musculoskeletal:      Right lower leg: No edema  Left lower leg: No edema  Skin:     General: Skin is warm  Capillary Refill: Capillary refill takes less than 2 seconds  Neurological:      General: No focal deficit present        Mental Status: She is alert and oriented to person, place, and time  Mental status is at baseline  Psychiatric:         Mood and Affect: Mood normal             Laboratory and Diagnostics:  Results from last 7 days   Lab Units 02/25/22  0357 02/24/22  0243 02/23/22  0630 02/22/22  0602 02/20/22  0554 02/19/22  0437   WBC Thousand/uL 16 85* 17 66* 20 06* 15 08* 15 99* 16 35*   HEMOGLOBIN g/dL 13 1 14 3 15 1 15 4 13 9 13 5   HEMATOCRIT % 41 7 44 6 46 7* 47 8* 43 1 41 9   PLATELETS Thousands/uL 463* 450* 447* 450* 402* 409*   NEUTROS PCT % 84* 87* 81* 78* 81* 80*   MONOS PCT % 5 4 8 7 8 7     Results from last 7 days   Lab Units 02/25/22  0358 02/24/22  0243 02/23/22  0630 02/22/22  0602 02/20/22  0554 02/19/22  0437   SODIUM mmol/L 137 136 136 138 139 138   POTASSIUM mmol/L 4 8 5 0 5 1 5 1 4 5 4 3   CHLORIDE mmol/L 102 101 100 101 104 104   CO2 mmol/L 32 28 24 29 27 24   ANION GAP mmol/L 3* 7 12 8 8 10   BUN mg/dL 18 26* 27* 28* 29* 27*   CREATININE mg/dL 0 86 0 86 0 77 0 86 0 86 0 85   CALCIUM mg/dL 8 1* 8 2* 8 5 8 7 9 1 8 9   GLUCOSE RANDOM mg/dL 208* 194* 181* 183* 175* 162*   ALT U/L  --   --   --  97* 93* 91*   AST U/L  --   --   --  34 37 57*   ALK PHOS U/L  --   --   --  134* 120* 120*   ALBUMIN g/dL  --   --   --  2 3* 2 3* 2 4*   TOTAL BILIRUBIN mg/dL  --   --   --  0 29 0 27 0 26     Results from last 7 days   Lab Units 02/22/22  0602 02/20/22  0554   MAGNESIUM mg/dL 2 7* 3 0*      Results from last 7 days   Lab Units 02/25/22  0357 02/24/22  1640 02/24/22  0937 02/24/22  0243 02/23/22  1815 02/23/22  1027 02/22/22  0602 02/19/22  2044 02/19/22  1257   INR   --   --   --   --   --   --   --   --  1 19   PTT seconds 96* 82* 79* 112* 109* 40* 63*   < > 45*    < > = values in this interval not displayed                ABG:    VBG:    Results from last 7 days   Lab Units 02/24/22  0413 02/23/22  1029 02/21/22  0517 02/19/22  0437   PROCALCITONIN ng/ml 0 08 0 06 0 09 0 22       Micro  Results from last 7 days   Lab Units 02/23/22  1050 02/23/22  1029   BLOOD CULTURE  No Growth at 24 hrs  No Growth at 24 hrs  EKG: N/A  Imaging:  I have personally reviewed pertinent reports  Intake and Output  I/O       02/23 0701 02/24 0700 02/24 0701 02/25 0700 02/25 0701 02/26 0700    P  O  960      I V  (mL/kg) 602 (5 7) 324 (3)     Total Intake(mL/kg) 1562 (14 7) 324 (3)     Urine (mL/kg/hr) 2500 (1) 1650 (0 6)     Stool  0     Total Output 2500 1650     Net -938 -1326            Unmeasured Stool Occurrence  1 x           Height and Weights   Height: 4' 6" (137 2 cm)     Body mass index is 56 88 kg/m²  Weight (last 2 days)     Date/Time Weight    02/25/22 0330 107 (235 89)    02/24/22 0544 106 (233 69)    02/23/22 0600 106 (233 69)            Nutrition       Diet Orders   (From admission, onward)             Start     Ordered    02/18/22 1523  Diet [place]/CHO Controlled; Consistent Carbohydrate Diet Level 2 (5 carb servings/75 grams CHO/meal)  Diet effective now        References:    Nutrtion Support Algorithm Enteral vs  Parenteral   Question Answer Comment   Diet Type Polo/CHO Controlled    Polo/CHO Controlled Consistent Carbohydrate Diet Level 2 (5 carb servings/75 grams CHO/meal)    RD to adjust diet per protocol?  Yes        02/18/22 1522                  Active Medications  Scheduled Meds:  Current Facility-Administered Medications   Medication Dose Route Frequency Provider Last Rate    acetaminophen  650 mg Oral Q6H PRN Kirsten Fernandes MD      ALPRAZolam  1 mg Oral BID PRN Kirsten Fernandes MD      aluminum-magnesium hydroxide-simethicone  30 mL Oral Q4H PRN Kirsten Fernandes MD      benzonatate  200 mg Oral TID PRN EMMA Stone      docusate sodium  100 mg Oral BID Kirsten Fernandes MD      furosemide  20 mg Intravenous Once Kirsten Fernandes MD      guaiFENesin  600 mg Oral Q12H [address] EMMA Zee      heparin (porcine)  3-20 Units/kg/hr (Order-Specific) Intravenous Titrated Khoa Vaughan MD 15 Units/kg/hr (02/25/22 0132)    heparin (porcine) 2,000 Units Intravenous Q1H PRN Virgil Soares MD      heparin (porcine)  4,000 Units Intravenous Q1H PRN Virgil Soares MD      insulin glargine  12 Units Subcutaneous Q12H [address] EMMA Treviño      insulin lispro  1-6 Units Subcutaneous HS Hailey Paiz MD      insulin lispro  2-12 Units Subcutaneous TID AC Hailey Paiz MD      insulin lispro  5 Units Subcutaneous TID With Meals EMMA Treviño      ipratropium  0 5 mg Nebulization 4x Daily [facility] Malu Sonday, EMMA      levalbuterol  1 25 mg Nebulization 4x Daily [facility] Malu Sonday, EMMA      methylPREDNISolone sodium succinate  40 mg Intravenous Q12H [address] Hailey Paiz MD      metoprolol tartrate  25 mg Oral Q12H [address] EMMA Roy      pantoprazole  40 mg Oral Daily Before Breakfast Hailey Paiz MD      polyethylene glycol  17 g Oral Daily PRN Hailey Paiz MD      senna  1 tablet Oral Daily Hailey Paiz MD       Continuous Infusions:  heparin (porcine), 3-20 Units/kg/hr (Order-Specific), Last Rate: 15 Units/kg/hr (02/25/22 0132)      PRN Meds:   acetaminophen, 650 mg, Q6H PRN  ALPRAZolam, 1 mg, BID PRN  aluminum-magnesium hydroxide-simethicone, 30 mL, Q4H PRN  benzonatate, 200 mg, TID PRN  heparin (porcine), 2,000 Units, Q1H PRN  heparin (porcine), 4,000 Units, Q1H PRN  polyethylene glycol, 17 g, Daily PRN        Allergies   No Known Allergies  ---------------------------------------------------------------------------------------  Advance Directive and Living Will:      Power of :    POLST:    ---------------------------------------------------------------------------------------    Hailey Paiz MD      Portions of the record may have been created with voice recognition software  Occasional wrong word or "sound a like" substitutions may have occurred due to the inherent limitations of voice recognition software    Read the chart carefully and recognize, using context, where substitutions have occurred

## 2022-02-26 PROBLEM — I50.31 ACUTE DIASTOLIC HEART FAILURE (HCC): Status: ACTIVE | Noted: 2022-02-26

## 2022-02-26 LAB
ANION GAP SERPL CALCULATED.3IONS-SCNC: 6 MMOL/L (ref 4–13)
APTT PPP: 86 SECONDS (ref 23–37)
APTT PPP: 95 SECONDS (ref 23–37)
BASOPHILS # BLD AUTO: 0.02 THOUSANDS/ΜL (ref 0–0.1)
BASOPHILS NFR BLD AUTO: 0 % (ref 0–1)
BUN SERPL-MCNC: 16 MG/DL (ref 5–25)
CALCIUM SERPL-MCNC: 8.1 MG/DL (ref 8.3–10.1)
CHLORIDE SERPL-SCNC: 100 MMOL/L (ref 100–108)
CO2 SERPL-SCNC: 30 MMOL/L (ref 21–32)
CREAT SERPL-MCNC: 0.72 MG/DL (ref 0.6–1.3)
EOSINOPHIL # BLD AUTO: 0.03 THOUSAND/ΜL (ref 0–0.61)
EOSINOPHIL NFR BLD AUTO: 0 % (ref 0–6)
ERYTHROCYTE [DISTWIDTH] IN BLOOD BY AUTOMATED COUNT: 14.8 % (ref 11.6–15.1)
GFR SERPL CREATININE-BSD FRML MDRD: 97 ML/MIN/1.73SQ M
GLUCOSE SERPL-MCNC: 177 MG/DL (ref 65–140)
GLUCOSE SERPL-MCNC: 184 MG/DL (ref 65–140)
GLUCOSE SERPL-MCNC: 248 MG/DL (ref 65–140)
GLUCOSE SERPL-MCNC: 255 MG/DL (ref 65–140)
GLUCOSE SERPL-MCNC: 260 MG/DL (ref 65–140)
HCT VFR BLD AUTO: 41.8 % (ref 34.8–46.1)
HGB BLD-MCNC: 12.9 G/DL (ref 11.5–15.4)
IMM GRANULOCYTES # BLD AUTO: 0.26 THOUSAND/UL (ref 0–0.2)
IMM GRANULOCYTES NFR BLD AUTO: 1 % (ref 0–2)
LYMPHOCYTES # BLD AUTO: 2.11 THOUSANDS/ΜL (ref 0.6–4.47)
LYMPHOCYTES NFR BLD AUTO: 12 % (ref 14–44)
MCH RBC QN AUTO: 29.3 PG (ref 26.8–34.3)
MCHC RBC AUTO-ENTMCNC: 30.9 G/DL (ref 31.4–37.4)
MCV RBC AUTO: 95 FL (ref 82–98)
MONOCYTES # BLD AUTO: 0.85 THOUSAND/ΜL (ref 0.17–1.22)
MONOCYTES NFR BLD AUTO: 5 % (ref 4–12)
NEUTROPHILS # BLD AUTO: 15.07 THOUSANDS/ΜL (ref 1.85–7.62)
NEUTS SEG NFR BLD AUTO: 82 % (ref 43–75)
NRBC BLD AUTO-RTO: 0 /100 WBCS
PLATELET # BLD AUTO: 484 THOUSANDS/UL (ref 149–390)
PMV BLD AUTO: 9.7 FL (ref 8.9–12.7)
POTASSIUM SERPL-SCNC: 4.5 MMOL/L (ref 3.5–5.3)
POTASSIUM SERPL-SCNC: 5.6 MMOL/L (ref 3.5–5.3)
RBC # BLD AUTO: 4.41 MILLION/UL (ref 3.81–5.12)
SODIUM SERPL-SCNC: 136 MMOL/L (ref 136–145)
WBC # BLD AUTO: 18.34 THOUSAND/UL (ref 4.31–10.16)

## 2022-02-26 PROCEDURE — 94640 AIRWAY INHALATION TREATMENT: CPT

## 2022-02-26 PROCEDURE — 85025 COMPLETE CBC W/AUTO DIFF WBC: CPT | Performed by: NURSE PRACTITIONER

## 2022-02-26 PROCEDURE — 99291 CRITICAL CARE FIRST HOUR: CPT | Performed by: INTERNAL MEDICINE

## 2022-02-26 PROCEDURE — 85730 THROMBOPLASTIN TIME PARTIAL: CPT | Performed by: NURSE PRACTITIONER

## 2022-02-26 PROCEDURE — 84132 ASSAY OF SERUM POTASSIUM: CPT | Performed by: INTERNAL MEDICINE

## 2022-02-26 PROCEDURE — 80048 BASIC METABOLIC PNL TOTAL CA: CPT | Performed by: NURSE PRACTITIONER

## 2022-02-26 PROCEDURE — 94660 CPAP INITIATION&MGMT: CPT

## 2022-02-26 PROCEDURE — 82948 REAGENT STRIP/BLOOD GLUCOSE: CPT

## 2022-02-26 RX ORDER — INSULIN GLARGINE 100 [IU]/ML
14 INJECTION, SOLUTION SUBCUTANEOUS EVERY 12 HOURS SCHEDULED
Status: DISCONTINUED | OUTPATIENT
Start: 2022-02-26 | End: 2022-03-04

## 2022-02-26 RX ORDER — HEPARIN SODIUM 5000 [USP'U]/ML
5000 INJECTION, SOLUTION INTRAVENOUS; SUBCUTANEOUS EVERY 8 HOURS SCHEDULED
Status: DISCONTINUED | OUTPATIENT
Start: 2022-02-26 | End: 2022-03-16 | Stop reason: HOSPADM

## 2022-02-26 RX ADMIN — HEPARIN SODIUM 5000 UNITS: 5000 INJECTION INTRAVENOUS; SUBCUTANEOUS at 21:34

## 2022-02-26 RX ADMIN — ALPRAZOLAM 1 MG: 0.5 TABLET ORAL at 07:33

## 2022-02-26 RX ADMIN — INSULIN GLARGINE 14 UNITS: 100 INJECTION, SOLUTION SUBCUTANEOUS at 21:34

## 2022-02-26 RX ADMIN — LEVALBUTEROL HYDROCHLORIDE 1.25 MG: 1.25 SOLUTION, CONCENTRATE RESPIRATORY (INHALATION) at 20:35

## 2022-02-26 RX ADMIN — INSULIN GLARGINE 12 UNITS: 100 INJECTION, SOLUTION SUBCUTANEOUS at 09:30

## 2022-02-26 RX ADMIN — METHYLPREDNISOLONE SODIUM SUCCINATE 40 MG: 40 INJECTION, POWDER, FOR SOLUTION INTRAMUSCULAR; INTRAVENOUS at 09:29

## 2022-02-26 RX ADMIN — IPRATROPIUM BROMIDE 0.5 MG: 0.5 SOLUTION RESPIRATORY (INHALATION) at 07:16

## 2022-02-26 RX ADMIN — METOPROLOL TARTRATE 25 MG: 25 TABLET, FILM COATED ORAL at 21:34

## 2022-02-26 RX ADMIN — PANTOPRAZOLE SODIUM 40 MG: 40 TABLET, DELAYED RELEASE ORAL at 07:33

## 2022-02-26 RX ADMIN — HEPARIN SODIUM 5000 UNITS: 5000 INJECTION INTRAVENOUS; SUBCUTANEOUS at 14:16

## 2022-02-26 RX ADMIN — METOPROLOL TARTRATE 25 MG: 25 TABLET, FILM COATED ORAL at 09:32

## 2022-02-26 RX ADMIN — INSULIN LISPRO 1 UNITS: 100 INJECTION, SOLUTION INTRAVENOUS; SUBCUTANEOUS at 21:46

## 2022-02-26 RX ADMIN — IPRATROPIUM BROMIDE 0.5 MG: 0.5 SOLUTION RESPIRATORY (INHALATION) at 20:35

## 2022-02-26 RX ADMIN — DOCUSATE SODIUM 100 MG: 100 CAPSULE ORAL at 09:29

## 2022-02-26 RX ADMIN — IPRATROPIUM BROMIDE 0.5 MG: 0.5 SOLUTION RESPIRATORY (INHALATION) at 13:16

## 2022-02-26 RX ADMIN — GUAIFENESIN 600 MG: 600 TABLET, EXTENDED RELEASE ORAL at 09:29

## 2022-02-26 RX ADMIN — INSULIN LISPRO 4 UNITS: 100 INJECTION, SOLUTION INTRAVENOUS; SUBCUTANEOUS at 07:33

## 2022-02-26 RX ADMIN — INSULIN LISPRO 2 UNITS: 100 INJECTION, SOLUTION INTRAVENOUS; SUBCUTANEOUS at 11:39

## 2022-02-26 RX ADMIN — BENZONATATE 200 MG: 100 CAPSULE ORAL at 09:29

## 2022-02-26 RX ADMIN — GUAIFENESIN 600 MG: 600 TABLET, EXTENDED RELEASE ORAL at 21:34

## 2022-02-26 RX ADMIN — LEVALBUTEROL HYDROCHLORIDE 1.25 MG: 1.25 SOLUTION, CONCENTRATE RESPIRATORY (INHALATION) at 07:16

## 2022-02-26 RX ADMIN — POLYETHYLENE GLYCOL 3350 17 G: 17 POWDER, FOR SOLUTION ORAL at 09:29

## 2022-02-26 RX ADMIN — INSULIN LISPRO 6 UNITS: 100 INJECTION, SOLUTION INTRAVENOUS; SUBCUTANEOUS at 16:16

## 2022-02-26 RX ADMIN — LEVALBUTEROL HYDROCHLORIDE 1.25 MG: 1.25 SOLUTION, CONCENTRATE RESPIRATORY (INHALATION) at 13:17

## 2022-02-26 NOTE — ASSESSMENT & PLAN NOTE
Wt Readings from Last 3 Encounters:   02/26/22 107 kg (236 lb 12 4 oz)   01/06/22 100 kg (221 lb)     § POA with elevated pro BNP of 1472, CXR of possible pulm congestion     § Echo 2/17/21: preserved EF 65%, G1DD, IVC normal, euvolemic today  § Continue to hold diuretics  § Monitor I/O, daily weight  § Monitor electrolytes, maintain Mg>2, K>4  § Monitor renal functions

## 2022-02-26 NOTE — PROGRESS NOTES
Daily Progress Note - Critical Care   Katie Sanchez 46 y o  female MRN: [id number]  Unit/Bed#: ICU 6 Encounter: [id number]        ----------------------------------------------------------------------------------------  HPI/24hr events:  46year old female with recent COVID infection, presenting with increased dyspnea, currently being managed for bacterial pneumonia, volume overload, and possible pneumonitis  Patient went into afib with RVR following an aspiration event     No overnight events  received xanax overnight for agitation     ---------------------------------------------------------------------------------------  SUBJECTIVE  Had hot flashes last night, no fever  Feels like she is improving  Hearing loss L ear    Review of Systems   Constitutional: Positive for fatigue  Negative for activity change, appetite change, chills and fever  HENT: Positive for hearing loss (left)  Negative for ear discharge and ear pain  Eyes: Negative  Respiratory: Positive for cough and shortness of breath  Negative for choking and wheezing  Cardiovascular: Negative  Negative for chest pain, palpitations and leg swelling  Gastrointestinal: Negative  Negative for constipation, diarrhea, nausea and vomiting  Genitourinary: Negative  Negative for dysuria, frequency, genital sores, hematuria and urgency  Musculoskeletal: Negative  Skin: Negative  Neurological: Negative  Negative for dizziness, numbness and headaches  Hematological: Negative  Psychiatric/Behavioral: Negative  All other systems reviewed and are negative  Review of systems was reviewed and negative unless stated above in HPI/24-hour events   ---------------------------------------------------------------------------------------  Assessment and Plan:    1  Acute hypoxic respiratory failure   2  New onset AFib with RVR  3  Sepsis versus SIRS  4  Acute on chronic diastolic heart failure  5  Hypertension  6   Emphysema evidenced by imaging  7  Transaminitis - improving  8  Hypokalemia-resolved  9  New onset fever  10  Leukocytosis   11  Thrombocytosis-improving  12  L hearing loss        Neuro:   · Agitation and anxiety  ? Prn Xanax 1 mg  ? Consider seroquel if patient continues to have agitation overnight  ? CAM ICU        CV:   · New onset AFib with RVR, Thaddeus Vasc 3 (female, CHF, HTN), now in sinus rhythm  ? Continue lopressor at 25 mg bid  ? Continue IV heparin, transition to eliquis 5mg bid once more stable  · Acute diastolic heart failure  ? POA with elevated pro BNP of 1472, CXR of possible pulm congestion  Echo 2/17/21: preserved EF 65%, G1DD, IVC normal, euvolemic today  ? Continue to hold diuretics  ? Monitor I/O, daily weight  ? Monitor electrolytes, maintain Mg>2, K>4  ? Monitor renal functions  · Hypertension  ? Continue to hold home amlodipine, SBP currently 110-130  ? Continue lopressor 25 mg bid        Pulm:  · Acute hypoxic respiratory failure likely multifactorial secondary to pneumonia vs recent COVID sequale/fibrosis vs volume overload vs possible pneumonitis  ? Continue BIPAP at night  ? HFNC 40 L, 100%, with NRB prn during day  ? Maintain SPO2 > 88%  ? veletri weaned off on 2/22  ? IV solumedrol 40 mg daily   ? Completed 5/5 days of ceftriaxone on 2/20  ? Continue Atrovent and Xopenex nebs  ? Mucinex, tessalon perls   ? Incentive spirometry  ? Vest therapy  · Emphysemia  ? Continue nebs as outlined above        GI:   · Mild transaminitis   ? Continue to monitor liver enzymes q3 days  ? continue protonix 40 mg daily po for GI prophylaxis  ? Bowel regimen for constipation  ? Continue mylanta  · Diet  ? Regular diet, thin liquids while off BIPAP  ? Cleared by speech  · Constipation - resolved  ? Colace augustus, sennakot augustus, and miralax prn        · :   ? None        F/E/N:   · Hypokalemia - resolved, K today 5 6, hemolyzed sample  · Repeat K        Heme/Onc:   · Leukocytosis - Repeat cultures -ve so far  ?  Will continue to monitor off antibiotics  · Thrombocytosis, likely reactive  ? Will continue to monitor  · DVT prophylaxis: on systemic AC with IV heparin gtt        Endo:   · Hyperglycemia - HbA1C 6 6, on steroid taper  ? increase humalog to 8U tid with meals lantus to 14 U bid  ? ISS algorithm 4   ? Monitor accu checks with steroid taper  ? May need oral antihyperglycemic on discharge         ID:   · Fever - Resolved off antibiotics  Unclear etiology infection vs medication induced, repeat cultures -ve so far  ? Continue to monitor off antibiotics  ? Continue to follow blood cultures from 2/23  · Sepsis vs SIRS - resolved  POA with leukocytosis, tachycardia, lactic acidosis  Likely source pulmonary, MRSA +ve, procal -ve  ? Azithromycin discontinued on D2  ? Completed 5/5 days of IV ceftriaxone today on 2/20        MSK/Skin:   · History of cervical disc herniation  ? Prn tylenol  · PT/OT recs: post acute rehab on discharge      Patient appropriate for transfer out of the ICU today?: No  Disposition: Continue Stepdown Level 1 level of care   Code Status: Level 1 - Full Code  ---------------------------------------------------------------------------------------  ICU CORE MEASURES    Prophylaxis   VTE Pharmacologic Prophylaxis: Heparin Drip  VTE Mechanical Prophylaxis: sequential compression device  Stress Ulcer Prophylaxis: Pantoprazole PO    ABCDE Protocol (if indicated)  Plan to perform spontaneous awakening trial today? Not applicable  Plan to perform spontaneous breathing trial today? Not applicable  Obvious barriers to extubation? Not applicable  CAM-ICU: Negative    Invasive Devices Review  Invasive Devices  Report    Peripheral Intravenous Line            Peripheral IV 02/20/22 Left Hand 5 days    Peripheral IV 02/21/22 Left Antecubital 4 days          Drain            External Urinary Catheter 6 days              Can any invasive devices be discontinued today?  Not applicable  ---------------------------------------------------------------------------------------  OBJECTIVE    Vitals   Vitals:    22 0500 22 0555 22 0700 22 0716   BP: 115/72  128/66    Pulse: 76  70    Resp: (!)   21    Temp:       TempSrc:       SpO2: 99%  98% 92%   Weight:  107 kg (236 lb 12 4 oz)     Height:         Temp (24hrs), Av 1 °F (36 7 °C), Min:97 5 °F (36 4 °C), Max:98 9 °F (37 2 °C)  Current: Temperature: 97 5 °F (36 4 °C)  HR: 70  BP: 128/66  RR: 21  SpO2: 98%    Respiratory:  SpO2: SpO2: 92 %, SpO2 Device: O2 Device: High flow nasal cannula       Invasive/non-invasive ventilation settings   Respiratory  Report   Lab Data (Last 4 hours)    None         O2/Vent Data (Last 4 hours)       0716          Non-Invasive Ventilation Mode HFNC (High flow)                   Physical Exam  Vitals and nursing note reviewed  Constitutional:       General: She is not in acute distress  Appearance: She is ill-appearing  Comments: BMI 57 09   HENT:      Head: Normocephalic  Right Ear: Tympanic membrane, ear canal and external ear normal  There is no impacted cerumen  Left Ear: Tympanic membrane, ear canal and external ear normal  There is no impacted cerumen  Nose: Nose normal       Mouth/Throat:      Mouth: Mucous membranes are moist    Eyes:      Pupils: Pupils are equal, round, and reactive to light  Cardiovascular:      Rate and Rhythm: Normal rate and regular rhythm  Pulses: Normal pulses  Heart sounds: No murmur heard  Pulmonary:      Breath sounds: Rales (Right lower) present  Comments: On high-flow 100%, 40 L  Abdominal:      Palpations: Abdomen is soft  Tenderness: There is no abdominal tenderness  Musculoskeletal:      Right lower leg: No edema  Left lower leg: No edema  Skin:     Capillary Refill: Capillary refill takes less than 2 seconds  Neurological:      General: No focal deficit present        Mental Status: She is alert and oriented to person, place, and time  Psychiatric:         Mood and Affect: Mood normal              Laboratory and Diagnostics:  Results from last 7 days   Lab Units 02/26/22  0340 02/25/22  0357 02/24/22  0243 02/23/22  0630 02/22/22  0602 02/20/22  0554   WBC Thousand/uL 18 34* 16 85* 17 66* 20 06* 15 08* 15 99*   HEMOGLOBIN g/dL 12 9 13 1 14 3 15 1 15 4 13 9   HEMATOCRIT % 41 8 41 7 44 6 46 7* 47 8* 43 1   PLATELETS Thousands/uL 484* 463* 450* 447* 450* 402*   NEUTROS PCT % 82* 84* 87* 81* 78* 81*   MONOS PCT % 5 5 4 8 7 8     Results from last 7 days   Lab Units 02/26/22 0340 02/25/22  0358 02/24/22  0243 02/23/22  0630 02/22/22  0602 02/20/22  0554   SODIUM mmol/L 136 137 136 136 138 139   POTASSIUM mmol/L 5 6* 4 8 5 0 5 1 5 1 4 5   CHLORIDE mmol/L 100 102 101 100 101 104   CO2 mmol/L 30 32 28 24 29 27   ANION GAP mmol/L 6 3* 7 12 8 8   BUN mg/dL 16 18 26* 27* 28* 29*   CREATININE mg/dL 0 72 0 86 0 86 0 77 0 86 0 86   CALCIUM mg/dL 8 1* 8 1* 8 2* 8 5 8 7 9 1   GLUCOSE RANDOM mg/dL 255* 208* 194* 181* 183* 175*   ALT U/L  --   --   --   --  97* 93*   AST U/L  --   --   --   --  34 37   ALK PHOS U/L  --   --   --   --  134* 120*   ALBUMIN g/dL  --   --   --   --  2 3* 2 3*   TOTAL BILIRUBIN mg/dL  --   --   --   --  0 29 0 27     Results from last 7 days   Lab Units 02/22/22  0602 02/20/22  0554   MAGNESIUM mg/dL 2 7* 3 0*      Results from last 7 days   Lab Units 02/26/22  0340 02/25/22  1952 02/25/22  1405 02/25/22  0357 02/24/22  1640 02/24/22  0937 02/24/22  0243 02/19/22  2044 02/19/22  1257   INR   --   --   --   --   --   --   --   --  1 19   PTT seconds 86* 48* 62* 96* 82* 79* 112*   < > 45*    < > = values in this interval not displayed                ABG:    VBG:    Results from last 7 days   Lab Units 02/24/22  0413 02/23/22  1029 02/21/22  0517   PROCALCITONIN ng/ml 0 08 0 06 0 09       Micro  Results from last 7 days   Lab Units 02/23/22  1050 02/23/22  1029   BLOOD CULTURE No Growth at 48 hrs  No Growth at 48 hrs  EKG: N/A  Imaging:  I have personally reviewed pertinent reports  Intake and Output  I/O       02/24 0701 02/25 0700 02/25 0701 02/26 0700 02/26 0701 02/27 0700    P  O        I V  (mL/kg) 324 (3) 283 9 (2 7)     Total Intake(mL/kg) 324 (3) 283 9 (2 7)     Urine (mL/kg/hr) 1650 (0 6) 700 (0 3)     Stool 0      Total Output 1650 700     Net -1326 -416 1            Unmeasured Urine Occurrence  1 x     Unmeasured Stool Occurrence 1 x               Height and Weights   Height: 4' 6" (137 2 cm)     Body mass index is 57 09 kg/m²  Weight (last 2 days)     Date/Time Weight    02/26/22 0555 107 (236 77)    02/25/22 0330 107 (235 89)    02/24/22 0544 106 (233 69)            Nutrition       Diet Orders   (From admission, onward)             Start     Ordered    02/18/22 1523  Diet [place]/CHO Controlled; Consistent Carbohydrate Diet Level 2 (5 carb servings/75 grams CHO/meal)  Diet effective now        References:    Nutrtion Support Algorithm Enteral vs  Parenteral   Question Answer Comment   Diet Type Polo/CHO Controlled    Polo/CHO Controlled Consistent Carbohydrate Diet Level 2 (5 carb servings/75 grams CHO/meal)    RD to adjust diet per protocol?  Yes        02/18/22 1522                  Active Medications  Scheduled Meds:  Current Facility-Administered Medications   Medication Dose Route Frequency Provider Last Rate    acetaminophen  650 mg Oral Q6H PRN Akua Tipton MD      ALPRAZolam  1 mg Oral BID PRN Akua Tipton MD      aluminum-magnesium hydroxide-simethicone  30 mL Oral Q4H PRN Akua Tipton MD      benzonatate  200 mg Oral TID PRN EMMA Stone      docusate sodium  100 mg Oral BID Akua Tipton MD      furosemide  20 mg Intravenous Once Akua Tipton MD      guaiFENesin  600 mg Oral Q12H [address] EMMA Stone      heparin (porcine)  3-20 Units/kg/hr (Order-Specific) Intravenous Titrated Sylvia Castillo MD 15 Units/kg/hr (02/25/22 2238)    heparin (porcine)  2,000 Units Intravenous Q1H PRN Cydney Thompson MD      heparin (porcine)  4,000 Units Intravenous Q1H PRN Cydney Thompson MD      insulin glargine  12 Units Subcutaneous Q12H [address] Ed Hockey, CRNP      insulin lispro  1-6 Units Subcutaneous HS Claudell Spurling, MD      insulin lispro  2-12 Units Subcutaneous TID AC Claudell Spurling, MD      insulin lispro  5 Units Subcutaneous TID With Meals Edvannesa Menonkey, EMMA      ipratropium  0 5 mg Nebulization 4x Daily Jeff Lapine Sonday, EMMA      levalbuterol  1 25 mg Nebulization 4x Daily Jeff Lapine Sonday, EMMA      methylPREDNISolone sodium succinate  40 mg Intravenous Daily Claudell Spurling, MD      metoprolol tartrate  25 mg Oral Q12H [address] Peggy Abreu, EMMA      pantoprazole  40 mg Oral Daily Before Breakfast Claudell Spurling, MD      polyethylene glycol  17 g Oral Daily PRN Claudell Spurling, MD      senna  1 tablet Oral Daily Claudell Spurling, MD       Continuous Infusions:  heparin (porcine), 3-20 Units/kg/hr (Order-Specific), Last Rate: 15 Units/kg/hr (02/25/22 2238)      PRN Meds:   acetaminophen, 650 mg, Q6H PRN  ALPRAZolam, 1 mg, BID PRN  aluminum-magnesium hydroxide-simethicone, 30 mL, Q4H PRN  benzonatate, 200 mg, TID PRN  heparin (porcine), 2,000 Units, Q1H PRN  heparin (porcine), 4,000 Units, Q1H PRN  polyethylene glycol, 17 g, Daily PRN        Allergies   No Known Allergies  ---------------------------------------------------------------------------------------  Advance Directive and Living Will:      Power of :    POLST:    ---------------------------------------------------------------------------------------  Claudell Spurling, MD      Portions of the record may have been created with voice recognition software  Occasional wrong word or "sound a like" substitutions may have occurred due to the inherent limitations of voice recognition software    Read the chart carefully and recognize, using context, where substitutions have occurred

## 2022-02-27 LAB
ALBUMIN SERPL BCP-MCNC: 2.4 G/DL (ref 3.5–5)
ALP SERPL-CCNC: 70 U/L (ref 46–116)
ALT SERPL W P-5'-P-CCNC: 51 U/L (ref 12–78)
ANION GAP SERPL CALCULATED.3IONS-SCNC: 6 MMOL/L (ref 4–13)
AST SERPL W P-5'-P-CCNC: 15 U/L (ref 5–45)
BASOPHILS # BLD AUTO: 0.03 THOUSANDS/ΜL (ref 0–0.1)
BASOPHILS NFR BLD AUTO: 0 % (ref 0–1)
BILIRUB SERPL-MCNC: 0.26 MG/DL (ref 0.2–1)
BUN SERPL-MCNC: 20 MG/DL (ref 5–25)
CALCIUM ALBUM COR SERPL-MCNC: 9.9 MG/DL (ref 8.3–10.1)
CALCIUM SERPL-MCNC: 8.6 MG/DL (ref 8.3–10.1)
CHLORIDE SERPL-SCNC: 104 MMOL/L (ref 100–108)
CO2 SERPL-SCNC: 31 MMOL/L (ref 21–32)
CREAT SERPL-MCNC: 0.76 MG/DL (ref 0.6–1.3)
EOSINOPHIL # BLD AUTO: 0.21 THOUSAND/ΜL (ref 0–0.61)
EOSINOPHIL NFR BLD AUTO: 1 % (ref 0–6)
ERYTHROCYTE [DISTWIDTH] IN BLOOD BY AUTOMATED COUNT: 14.9 % (ref 11.6–15.1)
GFR SERPL CREATININE-BSD FRML MDRD: 91 ML/MIN/1.73SQ M
GLUCOSE SERPL-MCNC: 140 MG/DL (ref 65–140)
GLUCOSE SERPL-MCNC: 141 MG/DL (ref 65–140)
GLUCOSE SERPL-MCNC: 186 MG/DL (ref 65–140)
GLUCOSE SERPL-MCNC: 230 MG/DL (ref 65–140)
GLUCOSE SERPL-MCNC: 238 MG/DL (ref 65–140)
HCT VFR BLD AUTO: 39.5 % (ref 34.8–46.1)
HGB BLD-MCNC: 12.4 G/DL (ref 11.5–15.4)
IMM GRANULOCYTES # BLD AUTO: 0.31 THOUSAND/UL (ref 0–0.2)
IMM GRANULOCYTES NFR BLD AUTO: 2 % (ref 0–2)
LYMPHOCYTES # BLD AUTO: 5.21 THOUSANDS/ΜL (ref 0.6–4.47)
LYMPHOCYTES NFR BLD AUTO: 30 % (ref 14–44)
MCH RBC QN AUTO: 30.7 PG (ref 26.8–34.3)
MCHC RBC AUTO-ENTMCNC: 31.4 G/DL (ref 31.4–37.4)
MCV RBC AUTO: 98 FL (ref 82–98)
MONOCYTES # BLD AUTO: 1.65 THOUSAND/ΜL (ref 0.17–1.22)
MONOCYTES NFR BLD AUTO: 9 % (ref 4–12)
NEUTROPHILS # BLD AUTO: 10.15 THOUSANDS/ΜL (ref 1.85–7.62)
NEUTS SEG NFR BLD AUTO: 58 % (ref 43–75)
NRBC BLD AUTO-RTO: 0 /100 WBCS
PLATELET # BLD AUTO: 444 THOUSANDS/UL (ref 149–390)
PMV BLD AUTO: 9.9 FL (ref 8.9–12.7)
POTASSIUM SERPL-SCNC: 4.1 MMOL/L (ref 3.5–5.3)
PROT SERPL-MCNC: 5.4 G/DL (ref 6.4–8.2)
RBC # BLD AUTO: 4.04 MILLION/UL (ref 3.81–5.12)
SODIUM SERPL-SCNC: 141 MMOL/L (ref 136–145)
WBC # BLD AUTO: 17.56 THOUSAND/UL (ref 4.31–10.16)

## 2022-02-27 PROCEDURE — 85025 COMPLETE CBC W/AUTO DIFF WBC: CPT | Performed by: INTERNAL MEDICINE

## 2022-02-27 PROCEDURE — 94640 AIRWAY INHALATION TREATMENT: CPT

## 2022-02-27 PROCEDURE — 82948 REAGENT STRIP/BLOOD GLUCOSE: CPT

## 2022-02-27 PROCEDURE — 94660 CPAP INITIATION&MGMT: CPT

## 2022-02-27 PROCEDURE — 80053 COMPREHEN METABOLIC PANEL: CPT | Performed by: INTERNAL MEDICINE

## 2022-02-27 PROCEDURE — 99291 CRITICAL CARE FIRST HOUR: CPT | Performed by: INTERNAL MEDICINE

## 2022-02-27 RX ADMIN — SENNOSIDES 8.6 MG: 8.6 TABLET ORAL at 08:35

## 2022-02-27 RX ADMIN — BENZONATATE 200 MG: 100 CAPSULE ORAL at 07:34

## 2022-02-27 RX ADMIN — LEVALBUTEROL HYDROCHLORIDE 1.25 MG: 1.25 SOLUTION, CONCENTRATE RESPIRATORY (INHALATION) at 13:36

## 2022-02-27 RX ADMIN — INSULIN GLARGINE 14 UNITS: 100 INJECTION, SOLUTION SUBCUTANEOUS at 08:26

## 2022-02-27 RX ADMIN — METHYLPREDNISOLONE SODIUM SUCCINATE 40 MG: 40 INJECTION, POWDER, FOR SOLUTION INTRAMUSCULAR; INTRAVENOUS at 08:35

## 2022-02-27 RX ADMIN — ALPRAZOLAM 1 MG: 0.5 TABLET ORAL at 22:33

## 2022-02-27 RX ADMIN — GUAIFENESIN 600 MG: 600 TABLET, EXTENDED RELEASE ORAL at 22:33

## 2022-02-27 RX ADMIN — ALPRAZOLAM 1 MG: 0.5 TABLET ORAL at 00:14

## 2022-02-27 RX ADMIN — INSULIN GLARGINE 14 UNITS: 100 INJECTION, SOLUTION SUBCUTANEOUS at 22:34

## 2022-02-27 RX ADMIN — INSULIN LISPRO 4 UNITS: 100 INJECTION, SOLUTION INTRAVENOUS; SUBCUTANEOUS at 16:14

## 2022-02-27 RX ADMIN — PANTOPRAZOLE SODIUM 40 MG: 40 TABLET, DELAYED RELEASE ORAL at 07:35

## 2022-02-27 RX ADMIN — POLYETHYLENE GLYCOL 3350 17 G: 17 POWDER, FOR SOLUTION ORAL at 08:27

## 2022-02-27 RX ADMIN — IPRATROPIUM BROMIDE 0.5 MG: 0.5 SOLUTION RESPIRATORY (INHALATION) at 19:54

## 2022-02-27 RX ADMIN — IPRATROPIUM BROMIDE 0.5 MG: 0.5 SOLUTION RESPIRATORY (INHALATION) at 07:19

## 2022-02-27 RX ADMIN — METOPROLOL TARTRATE 25 MG: 25 TABLET, FILM COATED ORAL at 08:26

## 2022-02-27 RX ADMIN — DOCUSATE SODIUM 100 MG: 100 CAPSULE ORAL at 08:27

## 2022-02-27 RX ADMIN — LEVALBUTEROL HYDROCHLORIDE 1.25 MG: 1.25 SOLUTION, CONCENTRATE RESPIRATORY (INHALATION) at 19:54

## 2022-02-27 RX ADMIN — ALPRAZOLAM 1 MG: 0.5 TABLET ORAL at 07:35

## 2022-02-27 RX ADMIN — HEPARIN SODIUM 5000 UNITS: 5000 INJECTION INTRAVENOUS; SUBCUTANEOUS at 22:34

## 2022-02-27 RX ADMIN — HEPARIN SODIUM 5000 UNITS: 5000 INJECTION INTRAVENOUS; SUBCUTANEOUS at 15:14

## 2022-02-27 RX ADMIN — LEVALBUTEROL HYDROCHLORIDE 1.25 MG: 1.25 SOLUTION, CONCENTRATE RESPIRATORY (INHALATION) at 07:18

## 2022-02-27 RX ADMIN — ACETAMINOPHEN 650 MG: 325 TABLET, FILM COATED ORAL at 22:33

## 2022-02-27 RX ADMIN — IPRATROPIUM BROMIDE 0.5 MG: 0.5 SOLUTION RESPIRATORY (INHALATION) at 13:36

## 2022-02-27 RX ADMIN — GUAIFENESIN 600 MG: 600 TABLET, EXTENDED RELEASE ORAL at 08:27

## 2022-02-27 RX ADMIN — HEPARIN SODIUM 5000 UNITS: 5000 INJECTION INTRAVENOUS; SUBCUTANEOUS at 06:44

## 2022-02-27 RX ADMIN — BENZONATATE 200 MG: 100 CAPSULE ORAL at 22:34

## 2022-02-27 RX ADMIN — INSULIN LISPRO 3 UNITS: 100 INJECTION, SOLUTION INTRAVENOUS; SUBCUTANEOUS at 22:39

## 2022-02-27 RX ADMIN — METOPROLOL TARTRATE 25 MG: 25 TABLET, FILM COATED ORAL at 22:33

## 2022-02-27 NOTE — PROGRESS NOTES
Daily Progress Note - Critical Care   Katie Gray Lung 46 y o  female MRN: [id number]  Unit/Bed#: ICU 11 Encounter: [id number]        ----------------------------------------------------------------------------------------  HPI/24hr events: No events    ---------------------------------------------------------------------------------------  SUBJECTIVE  Patient is doing well  Has been eating well  No difficulty breathing  She has also been active with spirometry  Patient is comfortable, no pain, no chest pain  Wants to go home soon  Met with daughter at bedside  Review of Systems  Review of systems was reviewed and negative unless stated above in HPI/24-hour events   ---------------------------------------------------------------------------------------  Assessment and Plan:    Neuro:   Agitation and anxiety  Continue xanax 1 mg BID as needed  Consider Seroquel if patient continues to have agitation at night  Cam ICU negative        CV:   New onset AFib with RVR, Thaddeus Vasc 3 (female, CHF, HTN), now in sinus rhythm  Continue lopressor at 25 mg bid  Continue sc heparin , transition to eliquis 5mg bid once more stable    Acute diastolic heart failure  I/O last 24 hours: In: -   Out: 3048 [[id number]]  POA with elevated pro BNP of 1472, CXR of possible pulm congestion   Echo 2/17/21: preserved EF 65%, G1DD, IVC normal, euvolemic today  Continue to hold diuretics  Monitor I/O, daily weight  Monitor electrolytes, maintain Mg>2, K>4  Monitor renal functions    Hypertension  Continue to hold home amlodipine, SBP currently 110-130  Continue metoprolol tartrate 25 mg every 12 hours          Pulm:  Acute hypoxic respiratory failure likely multifactorial secondary to pneumonia vs recent COVID sequale/fibrosis vs volume overload vs possible pneumonitis  Continue BIPAP at night  HFNC 40 L, 100%, with Bipap overnight  Maintain SPO2 > 88%  veletri weaned off on 2/22  IV solumedrol 40 mg daily   Completed 5/5 days of ceftriaxone on 2/20  Continue Atrovent and Xopenex nebs  Mucinex, tessalon perls   Incentive spirometry  Vest therapy     Emphysemia  Continue nebs as outlined above        GI:   Mild transaminitis   Has trended back to normal  Continue to monitor liver enzymes q3 days  continue protonix 40 mg daily po for GI prophylaxis  Bowel regimen for constipation  Continue mylanta  Diet  Regular diet, thin liquids while off BIPAP  Cleared by speech  Constipation - resolved  Colace augustus, sennakot augustus, and miralax prn        :   Creatine WNL 0 76        F/E/N:   Hypokalemia - resolved, K today 4 1  Repeat K        Heme/Onc:   Leukocytosis - Repeat cultures -ve so far  Will continue to monitor off antibiotics  Thrombocytosis, likely reactive  Will continue to monitor  DVT prophylaxis: on systemic AC with IV heparin gtt        Endo:   Hyperglycemia - HbA1C 6 6, on steroid taper  increase humalog to 8U tid with meals lantus to 14 U bi  Fasting today was 140  Will monitor today if we need to add more     ISS algorithm 4 d  Monitor accu checks with steroid taper  May need oral antihyperglycemic on discharge         ID:   Fever - Resolved off antibiotics  Unclear etiology infection vs medication induced, repeat cultures -ve so far  Continue to monitor off antibiotics  Continue to follow blood cultures from 2/23    Sepsis vs SIRS - resolved  POA with leukocytosis, tachycardia, lactic acidosis  Likely source pulmonary, MRSA +ve, procal -ve  Azithromycin discontinued on D2  Completed 5/5 days of IV ceftriaxone today on 2/20        MSK/Skin:   History of cervical disc herniation  Prn tylenol    PT/OT recs: post acute rehab on discharge       Patient appropriate for transfer out of the ICU today?: No  Disposition: Continue Critical Care   Code Status: Level 1 - Full Code  ---------------------------------------------------------------------------------------  ICU CORE MEASURES    Prophylaxis   VTE Pharmacologic Prophylaxis: Heparin  VTE Mechanical Prophylaxis: sequential compression device  Stress Ulcer Prophylaxis: Pantoprazole PO    ABCDE Protocol (if indicated)  Plan to perform spontaneous awakening trial today? Not applicable  Plan to perform spontaneous breathing trial today? Not applicable  Obvious barriers to extubation? Not applicable  CAM-ICU: Negative    Invasive Devices Review  Invasive Devices  Report    Peripheral Intravenous Line            Peripheral IV 22 Right Forearm <1 day          Drain            External Urinary Catheter 7 days              Can any invasive devices be discontinued today?  No  ---------------------------------------------------------------------------------------  OBJECTIVE    Vitals   Vitals:    22 0600 22 0700 22 0719 22 07   BP:  113/65     BP Location:       Pulse:  74     Resp:  17     Temp:    (!) 97 4 °F (36 3 °C)   TempSrc:       SpO2:  98% 95%    Weight: 106 kg (233 lb 7 5 oz)      Height:         Temp (24hrs), Av 6 °F (36 4 °C), Min:97 4 °F (36 3 °C), Max:97 8 °F (36 6 °C)  Current: Temperature: (!) 97 4 °F (36 3 °C)  Vitals:    22 0600 22 0700 22 0719 22 07   BP:  113/65     BP Location:       Pulse:  74     Resp:  17     Temp:    (!) 97 4 °F (36 3 °C)   TempSrc:       SpO2:  98% 95%    Weight: 106 kg (233 lb 7 5 oz)      Height:         Respiratory:  SpO2: SpO2: 95 %       Invasive/non-invasive ventilation settings   Respiratory  Report   Lab Data (Last 4 hours)    None         O2/Vent Data (Last 4 hours)      719          Non-Invasive Ventilation Mode HFNC (High flow)                   Physical Exam        Laboratory and Diagnostics:  Results from last 7 days   Lab Units 22  0639 22  0340 22  0357 22  0243 22  0630 22  0602   WBC Thousand/uL 17 56* 18 34* 16 85* 17 66* 20 06* 15 08*   HEMOGLOBIN g/dL 12 4 12 9 13 1 14 3 15 1 15 4   HEMATOCRIT % 39 5 41 8 41 7 44 6 46 7* 47 8*   PLATELETS Thousands/uL 444* 484* 463* 450* 447* 450*   NEUTROS PCT % 58 82* 84* 87* 81* 78*   MONOS PCT % 9 5 5 4 8 7     Results from last 7 days   Lab Units 02/27/22  0639 02/26/22  1005 02/26/22  0340 02/25/22  0358 02/24/22  0243 02/23/22  0630 02/22/22  0602   SODIUM mmol/L 141  --  136 137 136 136 138   POTASSIUM mmol/L 4 1 4 5 5 6* 4 8 5 0 5 1 5 1   CHLORIDE mmol/L 104  --  100 102 101 100 101   CO2 mmol/L 31  --  30 32 28 24 29   ANION GAP mmol/L 6  --  6 3* 7 12 8   BUN mg/dL 20  --  16 18 26* 27* 28*   CREATININE mg/dL 0 76  --  0 72 0 86 0 86 0 77 0 86   CALCIUM mg/dL 8 6  --  8 1* 8 1* 8 2* 8 5 8 7   GLUCOSE RANDOM mg/dL 186*  --  255* 208* 194* 181* 183*   ALT U/L 51  --   --   --   --   --  97*   AST U/L 15  --   --   --   --   --  34   ALK PHOS U/L 70  --   --   --   --   --  134*   ALBUMIN g/dL 2 4*  --   --   --   --   --  2 3*   TOTAL BILIRUBIN mg/dL 0 26  --   --   --   --   --  0 29     Results from last 7 days   Lab Units 02/22/22  0602   MAGNESIUM mg/dL 2 7*      Results from last 7 days   Lab Units 02/26/22  1005 02/26/22  0340 02/25/22  1952 02/25/22  1405 02/25/22  0357 02/24/22  1640 02/24/22  0937   PTT seconds 95* 86* 48* 62* 96* 82* 79*              ABG:    VBG:    Results from last 7 days   Lab Units 02/24/22  0413 02/23/22  1029 02/21/22  0517   PROCALCITONIN ng/ml 0 08 0 06 0 09       Micro  Results from last 7 days   Lab Units 02/23/22  1050 02/23/22  1029   BLOOD CULTURE  No Growth at 72 hrs  No Growth at 72 hrs  EKG: No recent  Imaging:  I have personally reviewed pertinent reports  Intake and Output  I/O       02/25 0701 02/26 0700 02/26 0701 02/27 0700 02/27 0701 02/28 0700    I V  (mL/kg) 283 9 (2 7)      Total Intake(mL/kg) 283 9 (2 7)      Urine (mL/kg/hr) 700 (0 3) 1300 (0 5)     Stool       Total Output 700 1300     Net -416 1 -1300            Unmeasured Urine Occurrence 1 x 1 x         Height and Weights   Height: 4' 6" (137 2 cm)     Body mass index is 56 29 kg/m²    Weight (last 2 days) Date/Time Weight    02/27/22 0600 106 (233 47)    02/26/22 0555 107 (236 77)    02/25/22 0330 107 (235 89)            Nutrition       Diet Orders   (From admission, onward)             Start     Ordered    02/18/22 1523  Diet Polo/CHO Controlled; Consistent Carbohydrate Diet Level 2 (5 carb servings/75 grams CHO/meal)  Diet effective now        References:    Nutrtion Support Algorithm Enteral vs  Parenteral   Question Answer Comment   Diet Type Polo/CHO Controlled    Polo/CHO Controlled Consistent Carbohydrate Diet Level 2 (5 carb servings/75 grams CHO/meal)    RD to adjust diet per protocol?  Yes        02/18/22 1522                  Active Medications  Scheduled Meds:  Current Facility-Administered Medications   Medication Dose Route Frequency Provider Last Rate    acetaminophen  650 mg Oral Q6H PRN Jorge Salmeron MD      ALPRAZolam  1 mg Oral BID PRN Jorge Salmeron MD      aluminum-magnesium hydroxide-simethicone  30 mL Oral Q4H PRN Jorge Salmeron MD      benzonatate  200 mg Oral TID PRN EMMA Jarrell      docusate sodium  100 mg Oral BID Jorge Salmeron MD      furosemide  20 mg Intravenous Once Jorge Salmeron MD      guaiFENesin  600 mg Oral Q12H [address] EMMA Jarrell      heparin (porcine)  5,000 Units Subcutaneous Q8H [address] Jorge Salmeron MD      insulin glargine  14 Units Subcutaneous Q12H [address] Jorge Salmeron MD      insulin lispro  1-6 Units Subcutaneous HS Jorge Salmeron MD      insulin lispro  2-12 Units Subcutaneous TID AC Jorge Salmeron MD      insulin lispro  8 Units Subcutaneous TID With Meals Jorge Salmeron MD      ipratropium  0 5 mg Nebulization 4x Daily Glenise Ja Sonday, EMMA      levalbuterol  1 25 mg Nebulization 4x Daily Arandreye London Sonday, EMMA      methylPREDNISolone sodium succinate  40 mg Intravenous Daily Jorge Salmeron MD      metoprolol tartrate  25 mg Oral Q12H [address]      pantoprazole  40 mg Oral Daily Before Breakfast Akua Tipton MD      polyethylene glycol  17 g Oral Daily PRN Akua Tipton MD      senna  1 tablet Oral Daily Akua Tipton MD       Continuous Infusions:     PRN Meds:   acetaminophen, 650 mg, Q6H PRN  ALPRAZolam, 1 mg, BID PRN  aluminum-magnesium hydroxide-simethicone, 30 mL, Q4H PRN  benzonatate, 200 mg, TID PRN  polyethylene glycol, 17 g, Daily PRN        Allergies   No Known Allergies  ---------------------------------------------------------------------------------------  Advance Directive and Living Will:      Power of :    POLST:    ---------------------------------------------------------------------------------------  Care Time Delivered:   No Critical Care time spent     Melita Lucio MD      Portions of the record may have been created with voice recognition software  Occasional wrong word or "sound a like" substitutions may have occurred due to the inherent limitations of voice recognition software    Read the chart carefully and recognize, using context, where substitutions have occurred

## 2022-02-27 NOTE — PLAN OF CARE
Problem: Potential for Falls  Goal: Patient will remain free of falls  Description: INTERVENTIONS:  - Educate patient/family on patient safety including physical limitations  - Instruct patient to call for assistance with activity   - Consult OT/PT to assist with strengthening/mobility   - Keep Call bell within reach  - Keep bed low and locked with side rails adjusted as appropriate  - Keep care items and personal belongings within reach  - Initiate and maintain comfort rounds  - Make Fall Risk Sign visible to staff  - Offer Toileting every 2 Hours, in advance of need  - Initiate/Maintain bed alarm  - Obtain necessary fall risk management equipment: bed a;arm  - Apply yellow socks and bracelet for high fall risk patients  - Consider moving patient to room near nurses station  Outcome: Progressing     Problem: Prexisting or High Potential for Compromised Skin Integrity  Goal: Skin integrity is maintained or improved  Description: INTERVENTIONS:  - Identify patients at risk for skin breakdown  - Assess and monitor skin integrity  - Assess and monitor nutrition and hydration status  - Monitor labs   - Assess for incontinence   - Turn and reposition patient  - Assist with mobility/ambulation  - Relieve pressure over bony prominences  - Avoid friction and shearing  - Provide appropriate hygiene as needed including keeping skin clean and dry  - Evaluate need for skin moisturizer/barrier cream  - Collaborate with interdisciplinary team   - Patient/family teaching  - Consider wound care consult   Outcome: Progressing     Problem: RESPIRATORY - ADULT  Goal: Achieves optimal ventilation and oxygenation  Description: INTERVENTIONS:  - Assess for changes in respiratory status  - Assess for changes in mentation and behavior  - Position to facilitate oxygenation and minimize respiratory effort  - Oxygen administered by appropriate delivery if ordered  - Initiate smoking cessation education as indicated  - Encourage broncho-pulmonary hygiene including cough, deep breathe, Incentive Spirometry  - Assess the need for suctioning and aspirate as needed  - Assess and instruct to report SOB or any respiratory difficulty  - Respiratory Therapy support as indicated  Outcome: Progressing

## 2022-02-28 LAB
BACTERIA BLD CULT: NORMAL
BACTERIA BLD CULT: NORMAL
GLUCOSE SERPL-MCNC: 161 MG/DL (ref 65–140)
GLUCOSE SERPL-MCNC: 175 MG/DL (ref 65–140)
GLUCOSE SERPL-MCNC: 234 MG/DL (ref 65–140)
GLUCOSE SERPL-MCNC: 86 MG/DL (ref 65–140)

## 2022-02-28 PROCEDURE — 97530 THERAPEUTIC ACTIVITIES: CPT

## 2022-02-28 PROCEDURE — 97116 GAIT TRAINING THERAPY: CPT

## 2022-02-28 PROCEDURE — 97110 THERAPEUTIC EXERCISES: CPT

## 2022-02-28 PROCEDURE — 94660 CPAP INITIATION&MGMT: CPT

## 2022-02-28 PROCEDURE — 94640 AIRWAY INHALATION TREATMENT: CPT

## 2022-02-28 PROCEDURE — 82948 REAGENT STRIP/BLOOD GLUCOSE: CPT

## 2022-02-28 PROCEDURE — 99232 SBSQ HOSP IP/OBS MODERATE 35: CPT | Performed by: INTERNAL MEDICINE

## 2022-02-28 PROCEDURE — 97535 SELF CARE MNGMENT TRAINING: CPT

## 2022-02-28 RX ORDER — PREDNISONE 20 MG/1
40 TABLET ORAL DAILY
Status: DISCONTINUED | OUTPATIENT
Start: 2022-02-28 | End: 2022-02-28

## 2022-02-28 RX ORDER — PREDNISONE 20 MG/1
40 TABLET ORAL DAILY
Status: DISCONTINUED | OUTPATIENT
Start: 2022-03-01 | End: 2022-03-02

## 2022-02-28 RX ADMIN — ALPRAZOLAM 1 MG: 0.5 TABLET ORAL at 21:43

## 2022-02-28 RX ADMIN — METOPROLOL TARTRATE 25 MG: 25 TABLET, FILM COATED ORAL at 21:43

## 2022-02-28 RX ADMIN — METHYLPREDNISOLONE SODIUM SUCCINATE 40 MG: 40 INJECTION, POWDER, FOR SOLUTION INTRAMUSCULAR; INTRAVENOUS at 08:01

## 2022-02-28 RX ADMIN — GUAIFENESIN 600 MG: 600 TABLET, EXTENDED RELEASE ORAL at 08:01

## 2022-02-28 RX ADMIN — DOCUSATE SODIUM 100 MG: 100 CAPSULE ORAL at 08:01

## 2022-02-28 RX ADMIN — INSULIN LISPRO 2 UNITS: 100 INJECTION, SOLUTION INTRAVENOUS; SUBCUTANEOUS at 13:05

## 2022-02-28 RX ADMIN — INSULIN LISPRO 4 UNITS: 100 INJECTION, SOLUTION INTRAVENOUS; SUBCUTANEOUS at 16:46

## 2022-02-28 RX ADMIN — IPRATROPIUM BROMIDE 0.5 MG: 0.5 SOLUTION RESPIRATORY (INHALATION) at 13:21

## 2022-02-28 RX ADMIN — BENZONATATE 200 MG: 100 CAPSULE ORAL at 21:43

## 2022-02-28 RX ADMIN — IPRATROPIUM BROMIDE 0.5 MG: 0.5 SOLUTION RESPIRATORY (INHALATION) at 08:06

## 2022-02-28 RX ADMIN — PANTOPRAZOLE SODIUM 40 MG: 40 TABLET, DELAYED RELEASE ORAL at 07:49

## 2022-02-28 RX ADMIN — INSULIN GLARGINE 14 UNITS: 100 INJECTION, SOLUTION SUBCUTANEOUS at 21:43

## 2022-02-28 RX ADMIN — SENNOSIDES 8.6 MG: 8.6 TABLET ORAL at 08:01

## 2022-02-28 RX ADMIN — IPRATROPIUM BROMIDE 0.5 MG: 0.5 SOLUTION RESPIRATORY (INHALATION) at 19:44

## 2022-02-28 RX ADMIN — ACETAMINOPHEN 650 MG: 325 TABLET, FILM COATED ORAL at 21:43

## 2022-02-28 RX ADMIN — LEVALBUTEROL HYDROCHLORIDE 1.25 MG: 1.25 SOLUTION, CONCENTRATE RESPIRATORY (INHALATION) at 19:44

## 2022-02-28 RX ADMIN — GUAIFENESIN 600 MG: 600 TABLET, EXTENDED RELEASE ORAL at 21:43

## 2022-02-28 RX ADMIN — HEPARIN SODIUM 5000 UNITS: 5000 INJECTION INTRAVENOUS; SUBCUTANEOUS at 21:43

## 2022-02-28 RX ADMIN — METOPROLOL TARTRATE 25 MG: 25 TABLET, FILM COATED ORAL at 08:01

## 2022-02-28 RX ADMIN — HEPARIN SODIUM 5000 UNITS: 5000 INJECTION INTRAVENOUS; SUBCUTANEOUS at 06:05

## 2022-02-28 RX ADMIN — HEPARIN SODIUM 5000 UNITS: 5000 INJECTION INTRAVENOUS; SUBCUTANEOUS at 13:04

## 2022-02-28 RX ADMIN — LEVALBUTEROL HYDROCHLORIDE 1.25 MG: 1.25 SOLUTION, CONCENTRATE RESPIRATORY (INHALATION) at 08:06

## 2022-02-28 RX ADMIN — LEVALBUTEROL HYDROCHLORIDE 1.25 MG: 1.25 SOLUTION, CONCENTRATE RESPIRATORY (INHALATION) at 13:22

## 2022-02-28 NOTE — UTILIZATION REVIEW
Continued Stay Review    Date:    2/28/22                          Current Patient Class:   Inpatient  Current Level of Care:   Stepdown    HPI:51 y o  female initially admitted on    2/16   With   Acute  Respiratory failure  With hypoxia     SIRS     2/19 - 2/23    On  precedex infusion    Assessment/Plan:   2/28  IV  S/medrol d/c  This am   Continue  PT/OT  Continue  Nebs and  HFNC  Continue to monitor labs/respiratory status  Sepsis  POA likely secondary to pneumonia  Finished  5 days  Antribiotics  Feels respiratory issues  Due to mold in  Her home  Vital Signs:   -- -- -- -- -- -- -- -- -- HFNC prongs --    02/28/22 0807 -- -- -- -- -- -- -- -- -- HFNC prongs --   02/28/22 0804 -- 74 21 111/67 86 90 % -- -- -- -- --   02/28/22 0700 98 °F (36 7 °C) 64 15 117/74 92 96 % -- -- -- -- --   02/28/22 0600 97 8 °F (36 6 °C) 70 24 Abnormal  121/69 115 100 % -- -- -- -- --   02/28/22 0500 -- 64 16 103/57 75 99 % -- -- -- -- --   02/28/22 0400 -- 64 16 123/65 89 99 % -- -- -- -- --   02/28/22 0326 -- -- -- -- -- -- -- -- -- Face mask --   02/28/22 0300 -- 62 18 114/77 89 99 % -- -- -- -- --   02/28/22 0200 -- 64 16 118/75 93 99 % -- -- -- -- --   02/28/22 0100 -- 64 16 111/62 85 95 % -- -- --           Pertinent Labs/Diagnostic Results:       Results from last 7 days   Lab Units 02/27/22  0639 02/26/22  0340 02/25/22  0357 02/24/22  0243 02/24/22  0243 02/23/22  0630 02/23/22  0630   WBC Thousand/uL 17 56* 18 34* 16 85*   < > 17 66*   < > 20 06*   HEMOGLOBIN g/dL 12 4 12 9 13 1  --  14 3  --  15 1   HEMATOCRIT % 39 5 41 8 41 7  --  44 6  --  46 7*   PLATELETS Thousands/uL 444* 484* 463*   < > 450*   < > 447*   NEUTROS ABS Thousands/µL 10 15* 15 07* 14 13*   < > 15 25*   < > 16 24*    < > = values in this interval not displayed           Results from last 7 days   Lab Units 02/27/22  7955 02/26/22  1005 02/26/22  0340 02/25/22  1408 02/24/22  0243 02/23/22  0630 02/23/22  0630 02/22/22  0602 02/22/22  0602 SODIUM mmol/L 141  --  136 137 136  --  136   < > 138   POTASSIUM mmol/L 4 1 4 5 5 6* 4 8 5 0   < > 5 1   < > 5 1   CHLORIDE mmol/L 104  --  100 102 101  --  100   < > 101   CO2 mmol/L 31  --  30 32 28  --  24   < > 29   ANION GAP mmol/L 6  --  6 3* 7  --  12   < > 8   BUN mg/dL 20  --  16 18 26*  --  27*   < > 28*   CREATININE mg/dL 0 76  --  0 72 0 86 0 86  --  0 77   < > 0 86   EGFR ml/min/1 73sq m 91  --  97 78 78  --  89   < > 78   CALCIUM mg/dL 8 6  --  8 1* 8 1* 8 2*  --  8 5   < > 8 7   MAGNESIUM mg/dL  --   --   --   --   --   --   --   --  2 7*    < > = values in this interval not displayed  Results from last 7 days   Lab Units 02/27/22  0639 02/22/22  0602   AST U/L 15 34   ALT U/L 51 97*   ALK PHOS U/L 70 134*   TOTAL PROTEIN g/dL 5 4* 6 7   ALBUMIN g/dL 2 4* 2 3*   TOTAL BILIRUBIN mg/dL 0 26 0 29     Results from last 7 days   Lab Units 02/28/22  1132 02/28/22  0728 02/27/22  2239 02/27/22  1604 02/27/22  1120 02/27/22  0731 02/26/22  2142 02/26/22  1600 02/26/22  1124 02/26/22  0722 02/25/22  2052 02/25/22  1632   POC GLUCOSE mg/dl 175* 86 238* 230* 141* 140 184* 260* 177* 248* 191* 299*     Results from last 7 days   Lab Units 02/27/22  0639 02/26/22  0340 02/25/22  0358 02/24/22  0243 02/23/22  0630 02/22/22  0602   GLUCOSE RANDOM mg/dL 186* 255* 208* 194* 181* 183*           Results from last 7 days   Lab Units 02/26/22  1005 02/26/22  0340 02/25/22  1952   PTT seconds 95* 86* 48*         Results from last 7 days   Lab Units 02/24/22  0413 02/23/22  1029   PROCALCITONIN ng/ml 0 08 0 06               Results from last 7 days   Lab Units 02/23/22  1050 02/23/22  1029   BLOOD CULTURE  No Growth After 4 Days  No Growth After 4 Days                   Medications:   Scheduled Medications:  docusate sodium, 100 mg, Oral, BID  furosemide, 20 mg, Intravenous, Once  guaiFENesin, 600 mg, Oral, Q12H NED  heparin (porcine), 5,000 Units, Subcutaneous, Q8H NED  insulin glargine, 14 Units, Subcutaneous, Q12H [address]  insulin lispro, 2-12 Units, Subcutaneous, TID AC  insulin lispro, 8 Units, Subcutaneous, TID With Meals  ipratropium, 0 5 mg, Nebulization, 4x Daily  levalbuterol, 1 25 mg, Nebulization, 4x Daily  metoprolol tartrate, 25 mg, Oral, Q12H NED  pantoprazole, 40 mg, Oral, Daily Before Breakfast  predniSONE, 40 mg, Oral, Daily  senna, 1 tablet, Oral, Daily  IV  S/medrol - d/c   2/28      Continuous IV Infusions:  IV  Heparin - d/c  2/26     PRN Meds:  acetaminophen, 650 mg, Oral, Q6H PRN  ALPRAZolam, 1 mg, Oral, BID PRN  aluminum-magnesium hydroxide-simethicone, 30 mL, Oral, Q4H PRN  benzonatate, 200 mg, Oral, TID PRN  polyethylene glycol, 17 g, Oral, Daily PRN        Discharge Plan:    D    Network Utilization Review Department  ATTENTION: Please call with any questions or concerns to [phone] and carefully listen to the prompts so that you are directed to the right person  All voicemails are confidential   Ty Limb all requests for admission clinical reviews, approved or denied determinations and any other requests to dedicated fax number below belonging to the campus where the patient is receiving treatment   List of dedicated fax numbers for the Facilities:  [address] DENIALS (Administrative/Medical Necessity) [phone]   [address] (Maternity/NICU/Pediatrics) [phone]   [address]  [phone]   Oz Mcgowan 50 [address] [facility] [place] [address] [id number] [place] [address] [address] Taryn Donah[phone] [facility] [address] [place] [address] [phone]

## 2022-02-28 NOTE — PHYSICAL THERAPY NOTE
PHYSICAL THERAPY NOTE          Patient Name: Deidre Anthony  MWVRX'[id number] Date: 2/28/2022 02/28/22 1246   Note Type   Note Type Treatment   Pain Assessment   Pain Assessment Tool 0-10   Pain Score No Pain   Restrictions/Precautions   Other Precautions Contact/isolation;Multiple lines;O2;Telemetry; Fall Risk  (HFNC 45%)   General   Chart Reviewed Yes   Family/Caregiver Present No   Cognition   Overall Cognitive Status WFL   Arousal/Participation Alert; Responsive; Cooperative   Attention Within functional limits   Orientation Level Oriented X4   Memory Within functional limits   Following Commands Follows all commands and directions without difficulty   Subjective   Subjective Can we do this again tomorrow? Bed Mobility   Additional Comments pt out of bed upon arrival and remained out of bed at conclusion of PT session     Transfers   Sit to Stand 5  Supervision   Additional items Assist x 1; Armrests; Increased time required;Verbal cues   Stand to Sit 5  Supervision   Additional items Assist x 1; Armrests; Increased time required;Verbal cues   Additional Comments cues for hand placement, backig up and body positioning  Ambulation/Elevation   Gait pattern Excessively slow; Step through pattern;Narrow ZHANG   Gait Assistance 4  Minimal assist   Additional items Assist x 1;Verbal cues   Assistive Device Rolling walker   Distance 8' x2, 12' x2 ( 8' forward & 8' backward 12' forward &12' backward)   Balance   Static Sitting Normal   Dynamic Sitting Good   Static Standing Fair +  (with and without support of rw  )   Dynamic Standing Fair   Ambulatory Fair +  (with support of rw)   Endurance Deficit   Endurance Deficit Description forrester and hypoxia    Activity Tolerance   Activity Tolerance Treatment limited secondary to medical complications (Comment)  (HFNC 45% w mobility 84%-87%, rebounds to 88%-92% )   Nurse Made Aware yes   Exercises   Hip Flexion Sitting;AROM; Bilateral  (x 12 reps   )   Hip Abduction Sitting;AROM; Left  (x12 reps  )   Hip Adduction Sitting;AROM; Bilateral  (x 12 reps   )   Knee AROM Long [employer]; Bilateral  (x 12 reps)   Ankle Pumps Sitting;AROM; Bilateral  (x 12 reps   )   Equipment Use   Comments pt ed on energy conservation techniques, pacing techniques and breathing techniques  Assessment   Prognosis Good   Problem List Decreased endurance;Decreased mobility; Decreased strength; Impaired balance   Assessment Pt  Seen for PT treatment interventions as per PT POC   Pt  Showing great motivation   Pt  Remains on 45% HFNC   Pt  Out of bed upon arrival   Offers no c/o pain   PT is showing improved ability to perform transfers and ambulation this session requiring less assistance supervision assist x1 for transfers and min assist x1 for ambulation with use of Rw  Pt progressed with ambulation distances from previous session to 8' x2 and 12' x2( forward and backward ambulation training performed)pt requiring support of Rw for improved balance, safety, activity tolerance, endurance and mobility   Limitations in mobility due to fatigue, forrester, hypoxia, spo2 drops to 84%-87% on 45% HFNC  Less time for rebound to 88%-92% compared to last session    Cues for proper breathing techniques required, rest breaks, pacing and energy conservation techniques  Pt  Performs seated b/l le arom x 12 reps  Pt  Remained seated out of bed in recliner at conclusion of PT session   The patient's AM-PAC Basic Mobility Inpatient Short Form Raw Score is 16  A Raw score of less than or equal to 16 suggests the patient may benefit from discharge to post-acute rehabilitation services  Please also refer to the recommendation of the Physical Therapist for safe discharge planning   Continue to recommend STR at d/c in order to optimize outcomes and facilitate return to PLOF      Goals   STG Expiration Date 03/02/22   PT Treatment Day 3   Plan Treatment/Interventions Functional transfer training;LE strengthening/ROM; Therapeutic exercise; Endurance training;Patient/family training;Gait training;Equipment eval/education;Spoke to nursing;OT   Progress Slow progress, medical status limitations   PT Frequency 3-5x/wk   Recommendation   PT Discharge Recommendation Post acute rehabilitation services   AM-PAC Basic Mobility Inpatient   Turning in Bed Without Bedrails 3   Lying on Back to Sitting on Edge of Flat Bed 3   Moving Bed to Chair 3   Standing Up From Chair 3   Walk in Room 3   Climb 3-5 Stairs 1   Basic Mobility Inpatient Raw Score 16   Basic Mobility Standardized Score 38 32   Highest Level Of Mobility   -HL Goal 5: Stand one or more mins   -[facility] Highest Level of Mobility 6: Walk 10 steps or more   -HLM Goal Achieved Yes   End of Consult   Patient Position at End of Consult Bedside chair; All needs within reach   Cielo N[place], [place]

## 2022-02-28 NOTE — QUICK NOTE
Updated daughter Ashley Castillor over the phone regarding patient's continued care in the ICU for acute hypoxic respiratory failure  All questions answered

## 2022-02-28 NOTE — CASE MANAGEMENT
Case Management Progress Note    Patient name Graciela Nicolas  Location ICU 11/ICU 11 MRN [id number]  : 1970 Date 2022       LOS (days): 12  Geometric Mean LOS (GMLOS) (days): 4 80  Days to GMLOS:-6 9        OBJECTIVE:        Current admission status: Inpatient  Preferred Pharmacy:   PATIENT/FAMILY REPORTS NO PREFERRED PHARMACY  No address on file      [address]  Phone: [phone] Fax: [phone]    Primary Care Provider: Flakita Nickerson MD    Primary Insurance: [employer] [facility]  Secondary Insurance:     PROGRESS NOTE:  TYRESE m/w the pt per her request regarding her housing situation  TYRESE provided the pt with the phone numbers to [address]  Pt has no other needs at this time

## 2022-02-28 NOTE — PROGRESS NOTES
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 emphysema  Patient also has sepsis present on admission likely secondary to pneumonia (MRSA cultures + on 02/16, sputum cultures 2/16 multiple species)  Finished 5 days of antibiotics  Currently on steroid taper and nebs on HFNC for acute hypoxic respiratory failure        ----------------------------------------------------------------------------------------  HPI/24hr events:  No acute events  Lab holiday today  On HFNC 71%, 10 L      ---------------------------------------------------------------------------------------  SUBJECTIVE  Patient wishes to see pulmonologist and   She states that her current state of health is due to mold in her house  She also requests that there are no more home inspections while she is gone  Also she wishes to be more active  Review of Systems  Review of systems was reviewed and negative unless stated above in HPI/24-hour events   ---------------------------------------------------------------------------------------  Assessment and Plan:    Neuro:    Agitation and anxiety  - P r n   Xanax 1 mg -used frequently  o Consider Seroquel q h s /melatonin if patient continues if needs sleep aid  o Cam ICU  o Delirium precautions      CV:    Resolved -New onset AFib with RVR,   - Chads Vasc 3 (CHF, female, hypertension)   o Metoprolol tartrate 25 q 12  o Consider Eliquis when patient more stable, currently only on DVT prophylaxis   Acute on chronic CHF?  o POA- proBNP 1 472, CXR possible palm congestion, echo 02/17/2021 EF 65, G1 DD, IVC normal  o Status post Lasix x several doses  o Off diuretics  o I/O, daily weight  o Monitor electrolytes, maintain mg > 2, K> 4  o Monitor creatinine      Pulm:   Acute hypoxic respiratory failure  - 2/2 due to COVID fibrosis versus pneumonitis with underlying emphysema  - CT showed diffuse GGO, emphysema  - Witnessed aspiration event  - Weaned off fully to on 02/22  - Status post ceftriaxone 5/5 on 02/20  - Continue HFNC 70%, 40 L, continue as patient desaturates to 86 will talk, wean when able  - Maintain SpO2 greater than 88%  - Tessalon Perles 200 t i d  P r n , Mucinex  - Atrovent 0 5 q i d   - Xopenex 1 25 q i d   - Solu-Medrol 40 daily, wean Q 3 d suggested, however patient drops down to high 80s while talking  Continue Solu-Medrol 40 daily   Emphysema? , unknown COPD status  - See above  - Consider outpatient PFT      GI:    Constipation  o Colace 100 mg b i d   o Senna daily  o MiraLax 17 g p r n   Resolved -Mild transaminitis   GI prophylaxis  o Protonix 40 daily  o Mylanta      :   o None      F/E/N:    CCD diet      Heme/Onc:    DVT prophylaxis  - Heparin 5000 Q 8   Thrombocytosis  - Likely reactive  - Monitor, CBC tomorrow? · Leukocytosis  · Secondary to steroids  · Blood cultures negative    Endo:    Diabetes  o HbA1c 6 6   o Humalog 8 units t i d   With SSI,  o Lantus 14 units q 12  o SSI q h s , consider discontinuation if morning hypoglycemia  o Steroids weaning, so decrease insulin if sugars marginally low  o Hypoglycemia protocol  o Glucose checks t i d    Morbid obesity  o Will require lifestyle modification once acute illness improved      ID:    Sepsis 2/2 pneumonia  o Off antibiotics now  Fever resolved      MSK/Skin:   o  None    Patient appropriate for transfer out of the ICU today?: No  Disposition: Continue Critical Care   Code Status: Level 1 - Full Code  ---------------------------------------------------------------------------------------  ICU CORE MEASURES    Prophylaxis   VTE Pharmacologic Prophylaxis: Heparin  Stress Ulcer Prophylaxis: Pantoprazole PO    ABCDE Protocol (if indicated)  Plan to perform spontaneous awakening trial today? Not applicable  Plan to perform spontaneous breathing trial today? Not applicable  Obvious barriers to extubation? Not applicable  CAM-ICU: Positive    Invasive Devices Review  Invasive Devices  Report    Peripheral Intravenous Line            Peripheral IV 22 Right Forearm 1 day              Can any invasive devices be discontinued today? No  ---------------------------------------------------------------------------------------  OBJECTIVE    Vitals   Vitals:    22 0400 22 0500 22 0600 22 0700   BP: 123/65 103/57 121/69    Pulse: 64 64 70    Resp: 16 16 (!) 24    Temp:   97 8 °F (36 6 °C) 98 °F (36 7 °C)   TempSrc:   Temporal Temporal   SpO2: 99% 99% 100%    Weight:   106 kg (234 lb 9 1 oz)    Height:         Temp (24hrs), Av 7 °F (36 5 °C), Min:97 2 °F (36 2 °C), Max:98 °F (36 7 °C)  Current: Temperature: 98 °F (36 7 °C)  HR:  64  BP:  117/74, maps greater than 65  RR:  15  SpO2:  96%, drops down to 88% when talking    Respiratory:  SpO2: SpO2: 96 %, SpO2 Device: O2 Device: BiPAP==> flow nasal cannula       Invasive/non-invasive ventilation settings   Respiratory  Report   Lab Data (Last 4 hours)    None         O2/Vent Data (Last 4 hours)    None                Physical Exam  Vitals and nursing note reviewed  Constitutional:       General: She is not in acute distress  Appearance: She is well-developed  She is obese  She is not ill-appearing, toxic-appearing or diaphoretic  Comments: Conversational dyspnea on HFNC   HENT:      Head: Normocephalic and atraumatic  Eyes:      Extraocular Movements: Extraocular movements intact  Conjunctiva/sclera: Conjunctivae normal    Cardiovascular:      Rate and Rhythm: Normal rate and regular rhythm  Heart sounds: No murmur heard  Pulmonary:      Effort: Pulmonary effort is normal  No respiratory distress  Breath sounds: No wheezing or rales  Abdominal:      General: There is no distension  Palpations: Abdomen is soft  Tenderness: There is no abdominal tenderness  Musculoskeletal:      Cervical back: Neck supple  Skin:     General: Skin is warm and dry  Neurological:      General: No focal deficit present  Mental Status: She is alert and oriented to person, place, and time  Psychiatric:      Comments: Patient is worked up about being sedentary and home inspections               Laboratory and Diagnostics:  Results from last 7 days   Lab Units 02/27/22  0639 02/26/22  0340 02/25/22  0357 02/24/22  0243 02/23/22  0630 02/22/22  0602   WBC Thousand/uL 17 56* 18 34* 16 85* 17 66* 20 06* 15 08*   HEMOGLOBIN g/dL 12 4 12 9 13 1 14 3 15 1 15 4   HEMATOCRIT % 39 5 41 8 41 7 44 6 46 7* 47 8*   PLATELETS Thousands/uL 444* 484* 463* 450* 447* 450*   NEUTROS PCT % 58 82* 84* 87* 81* 78*   MONOS PCT % 9 5 5 4 8 7     Results from last 7 days   Lab Units 02/27/22  0639 02/26/22  1005 02/26/22  0340 02/25/22  0358 02/24/22  0243 02/23/22  0630 02/22/22  0602   SODIUM mmol/L 141  --  136 137 136 136 138   POTASSIUM mmol/L 4 1 4 5 5 6* 4 8 5 0 5 1 5 1   CHLORIDE mmol/L 104  --  100 102 101 100 101   CO2 mmol/L 31  --  30 32 28 24 29   ANION GAP mmol/L 6  --  6 3* 7 12 8   BUN mg/dL 20  --  16 18 26* 27* 28*   CREATININE mg/dL 0 76  --  0 72 0 86 0 86 0 77 0 86   CALCIUM mg/dL 8 6  --  8 1* 8 1* 8 2* 8 5 8 7   GLUCOSE RANDOM mg/dL 186*  --  255* 208* 194* 181* 183*   ALT U/L 51  --   --   --   --   --  97*   AST U/L 15  --   --   --   --   --  34   ALK PHOS U/L 70  --   --   --   --   --  134*   ALBUMIN g/dL 2 4*  --   --   --   --   --  2 3*   TOTAL BILIRUBIN mg/dL 0 26  --   --   --   --   --  0 29     Results from last 7 days   Lab Units 02/22/22  0602   MAGNESIUM mg/dL 2 7*      Results from last 7 days   Lab Units 02/26/22  1005 02/26/22  0340 02/25/22  1952 02/25/22  1405 02/25/22  0357 02/24/22  1640 02/24/22  0937   PTT seconds 95* 86* 48* 62* 96* 82* 79*              ABG:    VBG:    Results from last 7 days   Lab Units 02/24/22  0413 02/23/22  1029   PROCALCITONIN ng/ml 0 08 0 06       Micro  Results from last 7 days   Lab Units 02/23/22  1050 02/23/22  1029   BLOOD CULTURE  No Growth After 4 Days  No Growth After 4 Days  EKG:  NSR  Imaging:  I have personally reviewed pertinent reports  Intake and Output  I/O       02/26 0701 02/27 0700 02/27 0701 02/28 0700 02/28 0701 03/01 0700    I V  (mL/kg)       Total Intake(mL/kg)       Urine (mL/kg/hr) 1300 (0 5) 350 (0 1)     Stool  0     Total Output 1300 350     Net -1300 -350            Unmeasured Urine Occurrence 1 x 2 x     Unmeasured Stool Occurrence  1 x           Height and Weights   Height: 4' 6" (137 2 cm)     Body mass index is 56 56 kg/m²  Weight (last 2 days)     Date/Time Weight    02/28/22 0600 106 (234 57)    02/27/22 0600 106 (233 47)    02/26/22 0555 107 (236 77)            Nutrition       Diet Orders   (From admission, onward)             Start     Ordered    02/18/22 1523  Diet Polo/CHO Controlled; Consistent Carbohydrate Diet Level 2 (5 carb servings/75 grams CHO/meal)  Diet effective now        References:    Nutrtion Support Algorithm Enteral vs  Parenteral   Question Answer Comment   Diet Type Polo/CHO Controlled    Polo/CHO Controlled Consistent Carbohydrate Diet Level 2 (5 carb servings/75 grams CHO/meal)    RD to adjust diet per protocol?  Yes        02/18/22 1522                Active Medications  Scheduled Meds:  Current Facility-Administered Medications   Medication Dose Route Frequency Provider Last Rate    acetaminophen  650 mg Oral Q6H PRN Akua Tipton MD      ALPRAZolam  1 mg Oral BID PRN Akua Tipton MD      aluminum-magnesium hydroxide-simethicone  30 mL Oral Q4H PRN Akua Tipton MD      benzonatate  200 mg Oral TID PRN EMMA Stone      docusate sodium  100 mg Oral BID Akua Tipton MD      furosemide  20 mg Intravenous Once Kaylie Stallings MD      guaiFENesin  600 mg Oral Q12H [address] EMMA Jernigan      heparin (porcine)  5,000 Units Subcutaneous [facility] Kaylie Stallings MD      insulin glargine  14 Units Subcutaneous Q12H [address] Kaylie Stallings MD      insulin lispro  1-6 Units Subcutaneous HS Kaylie Stallings MD      insulin lispro  2-12 Units Subcutaneous TID AC Kaylie Stallings MD      insulin lispro  8 Units Subcutaneous TID With Meals Kaylie Stallings MD      ipratropium  0 5 mg Nebulization 4x Daily EMMA Sanchez      levalbuterol  1 25 mg Nebulization 4x Daily EMMA Sanchez      methylPREDNISolone sodium succinate  40 mg Intravenous Daily Kaylie Stallings MD      metoprolol tartrate  25 mg Oral Q12H [address] EMMA Pascual      pantoprazole  40 mg Oral Daily Before Breakfast Kaylie Stallings MD      polyethylene glycol  17 g Oral Daily PRN Kaylie Stallings MD      senna  1 tablet Oral Daily Kaylie Stallings MD       Continuous Infusions:     PRN Meds:   acetaminophen, 650 mg, Q6H PRN  ALPRAZolam, 1 mg, BID PRN  aluminum-magnesium hydroxide-simethicone, 30 mL, Q4H PRN  benzonatate, 200 mg, TID PRN  polyethylene glycol, 17 g, Daily PRN        Allergies   No Known Allergies  ---------------------------------------------------------------------------------------  Advance Directive and Living Will:      Power of :    POLST:    ---------------------------------------------------------------------------------------  Care Time Delivered:   Upon my evaluation, this patient had a high probability of imminent or life-threatening deterioration due to Acute hypoxic respiratory failure, which required my direct attention, intervention, and personal management  I have personally provided 20 minutes (10 to 25) of critical care time, exclusive of procedures, teaching, family meetings, and any prior time recorded by providers other than myself       Analisa Devi Kahn MD      Portions of the record may have been created with voice recognition software  Occasional wrong word or "sound a like" substitutions may have occurred due to the inherent limitations of voice recognition software    Read the chart carefully and recognize, using context, where substitutions have occurred

## 2022-02-28 NOTE — OCCUPATIONAL THERAPY NOTE
Occupational Therapy Treatment Note:         02/28/22 1255   OT Last Visit   OT Visit Date 02/28/22   Note Type   Note Type Treatment   Restrictions/Precautions   Weight Bearing Precautions Per Order No   Other Precautions Contact/isolation;Multiple lines;O2;Telemetry; Fall Risk  (HFNC 45%)   Pain Assessment   Pain Assessment Tool 0-10   Pain Score No Pain   Pain Location/Orientation Location: Neck;Orientation: Right   ADL   Where Assessed Chair   Grooming Assistance 5  Supervision/Setup   Grooming Deficit Setup   LB Dressing Assistance 3  Moderate Assistance   LB Dressing Deficit Setup;Steadying; Requires assistive device for steadying;Verbal cueing;Supervision/safety; Don/doff L sock; Don/doff R sock; Increased time to complete   LB Dressing Comments limited functional reach to BLE  Toileting Assistance  3  Moderate Assistance   Toileting Deficit Setup;Steadying;Verbal cueing;Supervison/safety; Increased time to complete;Perineal hygiene;Clothing management up;Clothing management down   Toileting Comments with simulation  Functional Standing Tolerance   Time 3 mins   Activity dynamic stand balance activity  Comments Cues for safety required with functional tasks  Bed Mobility   Supine to Sit Unable to assess   Additional Comments Pt OOB in bedside chair upon greeting this tx session  Transfers   Sit to Stand 5  Supervision   Additional items Armrests; Increased time required;Verbal cues; Assist x 1   Stand to Sit 5  Supervision   Additional items Assist x 1; Armrests; Increased time required;Verbal cues   Stand pivot 4  Minimal assistance   Additional items Assist x 1; Armrests; Increased time required;Verbal cues   Additional Comments cues for safe hand placement and footing required  Functional Mobility   Functional Mobility 4  Minimal assistance   Additional Comments x1   Additional items Rolling walker   Cognition   Overall Cognitive Status WFL   Arousal/Participation Alert; Responsive; Cooperative Attention Within functional limits   Orientation Level Oriented X4   Memory Within functional limits   Following Commands Follows multistep commands with increased time or repetition   Comments Pt able to follow commands with good carry over  Additional Activities   Additional Activities Other (Comment)  (reviewed current plan of care, e-cons, comp breathing)   Additional Activities Comments Pt report having better understanding, able to return demonstration with good carry over  Activity Tolerance   Activity Tolerance Patient limited by fatigue   Medical Staff Made Aware Increased SOB with activities, SpO2 between 84-89% on high flow NC  Assessment   Assessment The Pt was seen for skilled OT with focus on completion of dynamic stand balance activities, review of LE dressing techs, self care tasks, functional transfers, review of compensatory breathing, energy conservation techs and review of current plan of care  Pt was greeted instance with nursing staff following use of bedside commode  SpO2 ranging between 84-89% on HFNC this tx session with all functional tasks instance  Pt pleasant and anxious to participate in tx sessions  Min A x2 for completion of functional transfers  Pt reports using the edge of her bed to lift her leg for LE dressing activities at home  The Pt currently requires increased A for LE dressing due to limited functional reach and increased fatigue and SOB with activities  See above levels of A required for all functional tasks  The Pt reports having good understanding of reviewed energy conservation and compensatory breathing techs with written information provided  The patient's raw score on the AM-PAC Daily Activity inpatient short form is 15, standardized score is 34 69, less than 39 4  Patients at this level are likely to benefit from discharge to post-acute rehabilitation services      Plan   Treatment Interventions ADL retraining;Functional transfer training;UE strengthening/ROM; Endurance training;Cognitive reorientation;Patient/family training   Goal Expiration Date 03/09/22   OT Treatment Day 1   OT Frequency 3-5x/wk   Recommendation   OT Discharge Recommendation Post acute rehabilitation services   AM-PAC Daily Activity Inpatient   Lower Body Dressing 2   Bathing 2   Toileting 2   Upper Body Dressing 2   Grooming 3   Eating 4   Daily Activity Raw Score 15   Daily Activity Standardized Score (Calc for Raw Score >=11) 34 69   AM-PAC Applied Cognition Inpatient   Following a Speech/Presentation 4   Understanding Ordinary Conversation 4   Taking Medications 4   Remembering Where Things Are Placed or Put Away 4   Remembering List of 4-5 Errands 4   Taking Care of Complicated Tasks 4   Applied Cognition Raw Score 24   Applied Cognition Standardized Score 62 21   Dannie Vazquez, [address]

## 2022-02-28 NOTE — PLAN OF CARE
Problem: OCCUPATIONAL THERAPY ADULT  Goal: Performs self-care activities at highest level of function for planned discharge setting  See evaluation for individualized goals  Description: Occupational Therapy    Outcome: Progressing  Note: Limitation: Decreased ADL status,Decreased UE strength,Decreased Safe judgement during ADL,Decreased endurance,Decreased high-level ADLs  Prognosis: Fair  Assessment: The Pt was seen for skilled OT with focus on completion of dynamic stand balance activities, review of LE dressing techs, self care tasks, functional transfers, review of compensatory breathing, energy conservation techs and review of current plan of care  Pt was greeted instance with nursing staff following use of bedside commode  SpO2 ranging between 84-89% on HFNC this tx session with all functional tasks instance  Pt pleasant and anxious to participate in tx sessions  Min A x2 for completion of functional transfers  Pt reports using the edge of her bed to lift her leg for LE dressing activities at home  The Pt currently requires increased A for LE dressing due to limited functional reach and increased fatigue and SOB with activities  See above levels of A required for all functional tasks  The Pt reports having good understanding of reviewed energy conservation and compensatory breathing techs with written information provided  The patient's raw score on the AM-PAC Daily Activity inpatient short form is 15, standardized score is 34 69, less than 39 4  Patients at this level are likely to benefit from discharge to post-acute rehabilitation services        OT Discharge Recommendation: Post acute rehabilitation services

## 2022-02-28 NOTE — PLAN OF CARE
Problem: Potential for Falls  Goal: Patient will remain free of falls  Description: INTERVENTIONS:  - Educate patient/family on patient safety including physical limitations  - Instruct patient to call for assistance with activity   - Consult OT/PT to assist with strengthening/mobility   - Keep Call bell within reach  - Keep bed low and locked with side rails adjusted as appropriate  - Keep care items and personal belongings within reach  - Initiate and maintain comfort rounds  - Make Fall Risk Sign visible to staff  - Apply yellow socks and bracelet for high fall risk patients  - Consider moving patient to room near nurses station  Outcome: Progressing     Problem: MOBILITY - ADULT  Goal: Maintain or return to baseline ADL function  Description: INTERVENTIONS:  -  Assess patient's ability to carry out ADLs; assess patient's baseline for ADL function and identify physical deficits which impact ability to perform ADLs (bathing, care of mouth/teeth, toileting, grooming, dressing, etc )  - Assess/evaluate cause of self-care deficits   - Assess range of motion  - Assess patient's mobility; develop plan if impaired  - Assess patient's need for assistive devices and provide as appropriate  - Encourage maximum independence but intervene and supervise when necessary  - Involve family in performance of ADLs  - Assess for home care needs following discharge   - Consider OT consult to assist with ADL evaluation and planning for discharge  - Provide patient education as appropriate  Outcome: Progressing  Goal: Maintains/Returns to pre admission functional level  Description: INTERVENTIONS:  - Perform BMAT or MOVE assessment daily    - Set and communicate daily mobility goal to care team and patient/family/caregiver     - Collaborate with rehabilitation services on mobility goals if consulted  - Out of bed for toileting  - Record patient progress and toleration of activity level   Outcome: Progressing     Problem: Prexisting or High Potential for Compromised Skin Integrity  Goal: Skin integrity is maintained or improved  Description: INTERVENTIONS:  - Identify patients at risk for skin breakdown  - Assess and monitor skin integrity  - Assess and monitor nutrition and hydration status  - Monitor labs   - Assess for incontinence   - Turn and reposition patient  - Assist with mobility/ambulation  - Relieve pressure over bony prominences  - Avoid friction and shearing  - Provide appropriate hygiene as needed including keeping skin clean and dry  - Evaluate need for skin moisturizer/barrier cream  - Collaborate with interdisciplinary team   - Patient/family teaching  - Consider wound care consult   Outcome: Progressing     Problem: RESPIRATORY - ADULT  Goal: Achieves optimal ventilation and oxygenation  Description: INTERVENTIONS:  - Assess for changes in respiratory status  - Assess for changes in mentation and behavior  - Position to facilitate oxygenation and minimize respiratory effort  - Oxygen administered by appropriate delivery if ordered  - Initiate smoking cessation education as indicated  - Encourage broncho-pulmonary hygiene including cough, deep breathe, Incentive Spirometry  - Assess the need for suctioning and aspirate as needed  - Assess and instruct to report SOB or any respiratory difficulty  - Respiratory Therapy support as indicated  Outcome: Progressing     Problem: METABOLIC, FLUID AND ELECTROLYTES - ADULT  Goal: Electrolytes maintained within normal limits  Description: INTERVENTIONS:  - Monitor labs and assess patient for signs and symptoms of electrolyte imbalances  - Administer electrolyte replacement as ordered  - Monitor response to electrolyte replacements, including repeat lab results as appropriate  - Instruct patient on fluid and nutrition as appropriate  Outcome: Progressing  Goal: Fluid balance maintained  Description: INTERVENTIONS:  - Monitor labs   - Monitor I/O and WT  - Instruct patient on fluid and nutrition as appropriate  - Assess for signs & symptoms of volume excess or deficit  Outcome: Progressing  Goal: Glucose maintained within target range  Description: INTERVENTIONS:  - Monitor Blood Glucose as ordered  - Assess for signs and symptoms of hyperglycemia and hypoglycemia  - Administer ordered medications to maintain glucose within target range  - Assess nutritional intake and initiate nutrition service referral as needed  Outcome: Progressing     Problem: MUSCULOSKELETAL - ADULT  Goal: Maintain or return mobility to safest level of function  Description: INTERVENTIONS:  - Assess patient's ability to carry out ADLs; assess patient's baseline for ADL function and identify physical deficits which impact ability to perform ADLs (bathing, care of mouth/teeth, toileting, grooming, dressing, etc )  - Assess/evaluate cause of self-care deficits   - Assess range of motion  - Assess patient's mobility  - Assess patient's need for assistive devices and provide as appropriate  - Encourage maximum independence but intervene and supervise when necessary  - Involve family in performance of ADLs  - Assess for home care needs following discharge   - Consider OT consult to assist with ADL evaluation and planning for discharge  - Provide patient education as appropriate  Outcome: Progressing  Goal: Maintain proper alignment of affected body part  Description: INTERVENTIONS:  - Support, maintain and protect limb and body alignment  - Provide patient/ family with appropriate education  Outcome: Progressing     Problem: Nutrition/Hydration-ADULT  Goal: Nutrient/Hydration intake appropriate for improving, restoring or maintaining nutritional needs  Description: Monitor and assess patient's nutrition/hydration status for malnutrition  Collaborate with interdisciplinary team and initiate plan and interventions as ordered  Monitor patient's weight and dietary intake as ordered or per policy   Utilize nutrition screening tool and intervene as necessary  Determine patient's food preferences and provide high-protein, high-caloric foods as appropriate       INTERVENTIONS:  - Monitor oral intake, urinary output, labs, and treatment plans  - Assess nutrition and hydration status and recommend course of action  - Evaluate amount of meals eaten  - Assist patient with eating if necessary   - Allow adequate time for meals  - Recommend/ encourage appropriate diets, oral nutritional supplements, and vitamin/mineral supplements  - Order, calculate, and assess calorie counts as needed  - Recommend, monitor, and adjust tube feedings and TPN/PPN based on assessed needs  - Assess need for intravenous fluids  - Provide specific nutrition/hydration education as appropriate  - Include patient/family/caregiver in decisions related to nutrition  Outcome: Progressing

## 2022-02-28 NOTE — PLAN OF CARE
Problem: PHYSICAL THERAPY ADULT  Goal: Performs mobility at highest level of function for planned discharge setting  See evaluation for individualized goals  Description: Treatment/Interventions: Functional transfer training,LE strengthening/ROM,Therapeutic exercise,Endurance training,Patient/family training,Gait training,Equipment eval/education,Spoke to nursing,OT  Equipment Recommended: Obie Castleman       See flowsheet documentation for full assessment, interventions and recommendations  Outcome: Progressing  Note: Prognosis: Good  Problem List: Decreased endurance,Decreased mobility,Decreased strength,Impaired balance  Assessment: Pt  Seen for PT treatment interventions as per PT POC  Pt  Showing great motivation  Pt  Remains on 45% HFNC  Pt  Out of bed upon arrival   Offers no c/o pain  PT is showing improved ability to perform transfers and ambulation this session requiring less assistance supervision assist x1 for transfers and min assist x1 for ambulation with use of Rw  Pt progressed with ambulation distances from previous session to 8' x2 and 12' x2( forward and backward ambulation training performed)pt requiring support of Rw for improved balance, safety, activity tolerance, endurance and mobility  Limitations in mobility due to fatigue, forrester, hypoxia, spo2 drops to 84%-87% on 45% HFNC  Less time for rebound to 88%-92% compared to last session  Cues for proper breathing techniques required, rest breaks, pacing and energy conservation techniques  Pt  Performs seated b/l le arom x 12 reps  Pt  Remained seated out of bed in recliner at conclusion of PT session  The patient's AM-PAC Basic Mobility Inpatient Short Form Raw Score is 16  A Raw score of less than or equal to 16 suggests the patient may benefit from discharge to post-acute rehabilitation services  Please also refer to the recommendation of the Physical Therapist for safe discharge planning   Continue to recommend STR at d/c in order to optimize outcomes and facilitate return to PLOF  Barriers to Discharge: Inaccessible home environment,Decreased caregiver support  Barriers to Discharge Comments: LOUIE, alone     PT Discharge Recommendation: Post acute rehabilitation services          See flowsheet documentation for full assessment

## 2022-03-01 ENCOUNTER — TELEPHONE (OUTPATIENT)
Dept: PULMONOLOGY | Facility: CLINIC | Age: 52
End: 2022-03-01

## 2022-03-01 LAB
GLUCOSE SERPL-MCNC: 133 MG/DL (ref 65–140)
GLUCOSE SERPL-MCNC: 157 MG/DL (ref 65–140)
GLUCOSE SERPL-MCNC: 196 MG/DL (ref 65–140)
GLUCOSE SERPL-MCNC: 95 MG/DL (ref 65–140)

## 2022-03-01 PROCEDURE — 94640 AIRWAY INHALATION TREATMENT: CPT

## 2022-03-01 PROCEDURE — 99232 SBSQ HOSP IP/OBS MODERATE 35: CPT | Performed by: INTERNAL MEDICINE

## 2022-03-01 PROCEDURE — 82948 REAGENT STRIP/BLOOD GLUCOSE: CPT

## 2022-03-01 RX ADMIN — INSULIN LISPRO 2 UNITS: 100 INJECTION, SOLUTION INTRAVENOUS; SUBCUTANEOUS at 16:50

## 2022-03-01 RX ADMIN — ALPRAZOLAM 1 MG: 0.5 TABLET ORAL at 21:27

## 2022-03-01 RX ADMIN — LEVALBUTEROL HYDROCHLORIDE 1.25 MG: 1.25 SOLUTION, CONCENTRATE RESPIRATORY (INHALATION) at 08:07

## 2022-03-01 RX ADMIN — GUAIFENESIN 600 MG: 600 TABLET, EXTENDED RELEASE ORAL at 09:21

## 2022-03-01 RX ADMIN — IPRATROPIUM BROMIDE 0.5 MG: 0.5 SOLUTION RESPIRATORY (INHALATION) at 13:10

## 2022-03-01 RX ADMIN — HEPARIN SODIUM 5000 UNITS: 5000 INJECTION INTRAVENOUS; SUBCUTANEOUS at 21:27

## 2022-03-01 RX ADMIN — LEVALBUTEROL HYDROCHLORIDE 1.25 MG: 1.25 SOLUTION, CONCENTRATE RESPIRATORY (INHALATION) at 19:45

## 2022-03-01 RX ADMIN — ACETAMINOPHEN 650 MG: 325 TABLET, FILM COATED ORAL at 21:27

## 2022-03-01 RX ADMIN — METOPROLOL TARTRATE 25 MG: 25 TABLET, FILM COATED ORAL at 21:27

## 2022-03-01 RX ADMIN — METOPROLOL TARTRATE 25 MG: 25 TABLET, FILM COATED ORAL at 09:21

## 2022-03-01 RX ADMIN — GUAIFENESIN 600 MG: 600 TABLET, EXTENDED RELEASE ORAL at 21:27

## 2022-03-01 RX ADMIN — HEPARIN SODIUM 5000 UNITS: 5000 INJECTION INTRAVENOUS; SUBCUTANEOUS at 16:48

## 2022-03-01 RX ADMIN — LEVALBUTEROL HYDROCHLORIDE 1.25 MG: 1.25 SOLUTION, CONCENTRATE RESPIRATORY (INHALATION) at 13:10

## 2022-03-01 RX ADMIN — HEPARIN SODIUM 5000 UNITS: 5000 INJECTION INTRAVENOUS; SUBCUTANEOUS at 05:06

## 2022-03-01 RX ADMIN — IPRATROPIUM BROMIDE 0.5 MG: 0.5 SOLUTION RESPIRATORY (INHALATION) at 08:07

## 2022-03-01 RX ADMIN — PANTOPRAZOLE SODIUM 40 MG: 40 TABLET, DELAYED RELEASE ORAL at 07:13

## 2022-03-01 RX ADMIN — IPRATROPIUM BROMIDE 0.5 MG: 0.5 SOLUTION RESPIRATORY (INHALATION) at 19:45

## 2022-03-01 RX ADMIN — PREDNISONE 40 MG: 20 TABLET ORAL at 09:21

## 2022-03-01 RX ADMIN — INSULIN LISPRO 2 UNITS: 100 INJECTION, SOLUTION INTRAVENOUS; SUBCUTANEOUS at 11:24

## 2022-03-01 RX ADMIN — BENZONATATE 200 MG: 100 CAPSULE ORAL at 21:27

## 2022-03-01 RX ADMIN — INSULIN GLARGINE 14 UNITS: 100 INJECTION, SOLUTION SUBCUTANEOUS at 09:21

## 2022-03-01 RX ADMIN — INSULIN GLARGINE 14 UNITS: 100 INJECTION, SOLUTION SUBCUTANEOUS at 21:28

## 2022-03-01 NOTE — PROGRESS NOTES
Daily Progress Note - Critical Care   Katie Kent Cam 46 y o  female MRN: [id number]  Unit/Bed#: ICU 6 Encounter: [id number]    Summary:  63-year-old female with a history of obesity was diagnosed with COVID on 01/06/22 currently being treated for hyper hypoxic respiratory failure secondary to COVID pneumonitis/fibrosis vs emphysema  Patient also has sepsis present on admission likely secondary to pneumonia (MRSA cultures + on 02/16, sputum cultures 2/16 multiple species)  Finished 5 days of antibiotics  Currently on steroid taper and nebs on HFNC for acute hypoxic respiratory failure        ----------------------------------------------------------------------------------------  HPI/24hr events:  No acute events  Lab holiday today  On HFNC 85%, 45 L  satting 90s, down to 88 when talking      ---------------------------------------------------------------------------------------  SUBJECTIVE  Pt wants to talk to the hospital  to establish how they can help her  She wants to establish care w/ pulmonologist for sleep apnea    Review of Systems  Review of systems was reviewed and negative unless stated above in HPI/24-hour events   ---------------------------------------------------------------------------------------  Assessment and Plan:    Neuro:    Agitation and anxiety  - P r n  Xanax 1 mg -used frequently  o Consider Seroquel q h s /melatonin if patient continues if needs sleep aid  o [facility] ICU  o Delirium precautions      CV:    Resolved -New onset AFib with RVR,   - Chads Vasc 3 (CHF, female, hypertension)   o Metoprolol tartrate 25 q 12  o currently only on DVT prophylaxis, out of A fib, do not think pt needs Eliquis   Will discuss w/ attending   Acute on chronic CHF?  o POA- proBNP 1 472, CXR possible palm congestion, echo 02/17/2021 EF 65, G1 DD, IVC normal  o Status post Lasix x several doses  o Off diuretics  o I/O, daily weight  o Monitor electrolytes, maintain mg > 2, K> 4  o Monitor creatinine      Pulm:   Acute hypoxic respiratory failure  - 2/2 due to COVID fibrosis versus pneumonitis with underlying emphysema  - CT showed diffuse GGO, emphysema  - Witnessed aspiration event  - Weaned off fully to on 02/22  - Status post ceftriaxone 5/5 on 02/20  - Continue HFNC 85%, 45 L, continue as patient desaturates to 86 will talk, wean when able  - Maintain SpO2 greater than 88%  - Tessalon Perles 200 t i d  P r n , Mucinex  - Atrovent 0 5 q i d   - Xopenex 1 25 q i d   - Solu-Medrol 30 daily- Day 2, continue wean w/ plan to decrease 10 mg q3   Emphysema? , unknown COPD status  - See above  - Consider outpatient PFT  · Sleep apnea   · Pt requests to see pulm, will refer her to outpatient pulm to disuss Sleep apnea Rx due to recall of machines    GI:    Constipation  o Colace 100 mg b i d   o Senna daily  o MiraLax 17 g p r n   Resolved -Mild transaminitis   GI prophylaxis  o Protonix 40 daily  o Mylanta      :   o None      F/E/N:    CCD diet      Heme/Onc:    DVT prophylaxis  - Heparin 5000 Q 8   Thrombocytosis  - Likely reactive  - CBC derek  · Leukocytosis  · Secondary to steroids  · Blood cultures negative    Endo:    Diabetes  o HbA1c 6 6   o Humalog 8 units t i d   With SSI,  o Lantus 14 units q 12  o SSI q h s , consider discontinuation if morning hypoglycemia  o Steroids weaning, morning sugars well controlled, decrease night time lantus of pts sugars became marginal or hypoglycemic  o Hypoglycemia protocol  o Glucose checks t i d    Morbid obesity  o Will require lifestyle modification once acute illness improved      ID:    Sepsis 2/2 pneumonia  o Off antibiotics now  Fever resolved      MSK/Skin:   o Pt/Ot   - Post acute rehab   - Continue Pt/OT care - was given pulm exercises to pt as well    Patient appropriate for transfer out of the ICU today?: No  Disposition: Continue Critical Care   Code Status: Level 1 - Full Code  ---------------------------------------------------------------------------------------  ICU CORE MEASURES    Prophylaxis   VTE Pharmacologic Prophylaxis: Heparin  Stress Ulcer Prophylaxis: Pantoprazole PO    ABCDE Protocol (if indicated)  Plan to perform spontaneous awakening trial today? Not applicable  Plan to perform spontaneous breathing trial today? Not applicable  Obvious barriers to extubation? Not applicable  CAM-ICU: Positive    Invasive Devices Review  Invasive Devices  Report    Peripheral Intravenous Line            Peripheral IV 22 Right Forearm 2 days              Can any invasive devices be discontinued today? No  ---------------------------------------------------------------------------------------  OBJECTIVE    Vitals   Vitals:    22 0300 22 0400 22 0500 22 0700   BP: (!) 90/47 99/60 129/70 133/76   BP Location:    Left arm   Pulse: 74 74 84 72   Resp: 16 19 (!) 31 17   Temp:    97 5 °F (36 4 °C)   TempSrc:    Temporal   SpO2:    96%   Weight:   105 kg (230 lb 9 6 oz)    Height:         Temp (24hrs), Av 2 °F (36 8 °C), Min:97 5 °F (36 4 °C), Max:99 4 °F (37 4 °C)  Current: Temperature: 97 5 °F (36 4 °C)  HR:  64  BP:  117/74, maps greater than 65  RR:  15  SpO2:  96%, drops down to 88% when talking    Respiratory:  SpO2: SpO2: 96 %, SpO2 Device: O2 Device: BiPAP==> flow nasal cannula       Invasive/non-invasive ventilation settings   Respiratory  Report   Lab Data (Last 4 hours)    None         O2/Vent Data (Last 4 hours)       0809          Non-Invasive Ventilation Mode HFNC (High flow)                   Physical Exam  Vitals and nursing note reviewed  Constitutional:       General: She is not in acute distress  Appearance: She is well-developed  She is obese  She is not ill-appearing, toxic-appearing or diaphoretic  Comments:   Pt is still very talkaive but Conversational dyspnea improved on HFNC      HENT:      Head: Normocephalic and atraumatic  Eyes:      Extraocular Movements: Extraocular movements intact  Conjunctiva/sclera: Conjunctivae normal    Cardiovascular:      Rate and Rhythm: Normal rate and regular rhythm  Heart sounds: No murmur heard  Pulmonary:      Effort: Pulmonary effort is normal  No respiratory distress  Breath sounds: No wheezing or rales  Abdominal:      General: There is no distension  Palpations: Abdomen is soft  Tenderness: There is no abdominal tenderness  There is no guarding  Musculoskeletal:      Cervical back: Neck supple  Skin:     General: Skin is warm and dry  Neurological:      General: No focal deficit present  Mental Status: She is alert and oriented to person, place, and time  Psychiatric:      Comments: Pt is calmer               Laboratory and Diagnostics:  Results from last 7 days   Lab Units 02/27/22  0639 02/26/22  0340 02/25/22  0357 02/24/22  0243 02/23/22  0630   WBC Thousand/uL 17 56* 18 34* 16 85* 17 66* 20 06*   HEMOGLOBIN g/dL 12 4 12 9 13 1 14 3 15 1   HEMATOCRIT % 39 5 41 8 41 7 44 6 46 7*   PLATELETS Thousands/uL 444* 484* 463* 450* 447*   NEUTROS PCT % 58 82* 84* 87* 81*   MONOS PCT % 9 5 5 4 8     Results from last 7 days   Lab Units 02/27/22  0639 02/26/22  1005 02/26/22  0340 02/25/22  0358 02/24/22  0243 02/23/22  0630   SODIUM mmol/L 141  --  136 137 136 136   POTASSIUM mmol/L 4 1 4 5 5 6* 4 8 5 0 5 1   CHLORIDE mmol/L 104  --  100 102 101 100   CO2 mmol/L 31  --  30 32 28 24   ANION GAP mmol/L 6  --  6 3* 7 12   BUN mg/dL 20  --  16 18 26* 27*   CREATININE mg/dL 0 76  --  0 72 0 86 0 86 0 77   CALCIUM mg/dL 8 6  --  8 1* 8 1* 8 2* 8 5   GLUCOSE RANDOM mg/dL 186*  --  255* 208* 194* 181*   ALT U/L 51  --   --   --   --   --    AST U/L 15  --   --   --   --   --    ALK PHOS U/L 70  --   --   --   --   --    ALBUMIN g/dL 2 4*  --   --   --   --   --    TOTAL BILIRUBIN mg/dL 0 26  --   --   --   --   --           Results from last 7 days   Lab Units 02/26/22  1005 02/26/22  0340 02/25/22  1952 02/25/22  1405 02/25/22  0357 02/24/22  1640 02/24/22  0937   PTT seconds 95* 86* 48* 62* 96* 82* 79*              ABG:    VBG:    Results from last 7 days   Lab Units 02/24/22  0413 02/23/22  1029   PROCALCITONIN ng/ml 0 08 0 06       Micro  Results from last 7 days   Lab Units 02/23/22  1050 02/23/22  1029   BLOOD CULTURE  No Growth After 5 Days  No Growth After 5 Days  EKG:  NSR  Imaging:  I have personally reviewed pertinent reports  Intake and Output  I/O       02/26 0701 02/27 0700 02/27 0701 02/28 0700 02/28 0701 03/01 0700    I V  (mL/kg)       Total Intake(mL/kg)       Urine (mL/kg/hr) 1300 (0 5) 350 (0 1)     Stool  0     Total Output 1300 350     Net -1300 -350            Unmeasured Urine Occurrence 1 x 2 x     Unmeasured Stool Occurrence  1 x           Height and Weights   Height: 4' 6" (137 2 cm)     Body mass index is 55 6 kg/m²  Weight (last 2 days)     Date/Time Weight    03/01/22 0500 105 (230 6)    02/28/22 0600 106 (234 57)    02/27/22 0600 106 (233 47)            Nutrition       Diet Orders   (From admission, onward)             Start     Ordered    02/28/22 1016  Diet Polo/CHO Controlled; Consistent Carbohydrate Diet Level 2 (5 carb servings/75 grams CHO/meal); Consistent Carbohydrate Diet Level 3 (6 carb servings/90 grams CHO/meal)  Diet effective now        References:    Nutrtion Support Algorithm Enteral vs  Parenteral   Question Answer Comment   Diet Type Polo/CHO Controlled    Polo/CHO Controlled Consistent Carbohydrate Diet Level 2 (5 carb servings/75 grams CHO/meal)    Other Restriction(s): Consistent Carbohydrate Diet Level 3 (6 carb servings/90 grams CHO/meal)    RD to adjust diet per protocol?  Yes        02/28/22 1015                Active Medications  Scheduled Meds:  Current Facility-Administered Medications   Medication Dose Route Frequency Provider Last Rate    acetaminophen  650 mg Oral Q6H PRN Ethel Kim MD  ALPRAZolam  1 mg Oral BID PRN Solo Sterling, MD      aluminum-magnesium hydroxide-simethicone  30 mL Oral Q4H PRN Solo Sterling, MD      benzonatate  200 mg Oral TID PRN Gina Sterling, EMMA      docusate sodium  100 mg Oral BID Solo Sterling, MD      furosemide  20 mg Intravenous Once Solo Sterling, MD      guaiFENesin  600 mg Oral Q12H [address] Gina Sterling, EMMA      heparin (porcine)  5,000 Units Subcutaneous Q8H [address] Solo tSerling, MD      insulin glargine  14 Units Subcutaneous Q12H [address] Solo Sterling, MD      insulin lispro  2-12 Units Subcutaneous TID AC Solo Sterling, MD      insulin lispro  8 Units Subcutaneous TID With Meals Solo Sterling, MD      ipratropium  0 5 mg Nebulization 4x Daily [facility] Sonday, EMMA      levalbuterol  1 25 mg Nebulization 4x Daily [facility] Sonday, EMMA      metoprolol tartrate  25 mg Oral Q12H [address] Darren Mcbride, EMMA      pantoprazole  40 mg Oral Daily Before Breakfast Solo Setrling, MD      polyethylene glycol  17 g Oral Daily PRN Solo Sterling, MD      predniSONE  40 mg Oral Daily Ronan Calderon MD      senna  1 tablet Oral Daily Solo Sterling MD       Continuous Infusions:     PRN Meds:   acetaminophen, 650 mg, Q6H PRN  ALPRAZolam, 1 mg, BID PRN  aluminum-magnesium hydroxide-simethicone, 30 mL, Q4H PRN  benzonatate, 200 mg, TID PRN  polyethylene glycol, 17 g, Daily PRN        Allergies   No Known Allergies  ---------------------------------------------------------------------------------------  Advance Directive and Living Will:      Power of :    POLST:    ---------------------------------------------------------------------------------------  Care Time Delivered:   Upon my evaluation, this patient had a high probability of imminent or life-threatening deterioration due to Acute hypoxic respiratory failure, which required my direct attention, intervention, and personal management  I have personally provided 20 minutes (10 to 25) of critical care time, exclusive of procedures, teaching, family meetings, and any prior time recorded by providers other than myself  Patience Magallanes MD      Portions of the record may have been created with voice recognition software  Occasional wrong word or "sound a like" substitutions may have occurred due to the inherent limitations of voice recognition software    Read the chart carefully and recognize, using context, where substitutions have occurred

## 2022-03-01 NOTE — QUICK NOTE
Spoke to Ke, patient's daughter  Update your her that patient is currently still on high-flow requiring steroid  Answered all questions

## 2022-03-01 NOTE — TELEPHONE ENCOUNTER
Called Dr Darya Chaudhari office [phone] opt [address] [facility]  Requested sleep records on patient to be faxed

## 2022-03-02 LAB
ANION GAP SERPL CALCULATED.3IONS-SCNC: 8 MMOL/L (ref 4–13)
BUN SERPL-MCNC: 18 MG/DL (ref 5–25)
CALCIUM SERPL-MCNC: 8.7 MG/DL (ref 8.3–10.1)
CHLORIDE SERPL-SCNC: 100 MMOL/L (ref 100–108)
CO2 SERPL-SCNC: 31 MMOL/L (ref 21–32)
CREAT SERPL-MCNC: 0.7 MG/DL (ref 0.6–1.3)
GFR SERPL CREATININE-BSD FRML MDRD: 100 ML/MIN/1.73SQ M
GLUCOSE SERPL-MCNC: 104 MG/DL (ref 65–140)
GLUCOSE SERPL-MCNC: 105 MG/DL (ref 65–140)
GLUCOSE SERPL-MCNC: 133 MG/DL (ref 65–140)
GLUCOSE SERPL-MCNC: 136 MG/DL (ref 65–140)
GLUCOSE SERPL-MCNC: 158 MG/DL (ref 65–140)
POTASSIUM SERPL-SCNC: 4.1 MMOL/L (ref 3.5–5.3)
SODIUM SERPL-SCNC: 139 MMOL/L (ref 136–145)

## 2022-03-02 PROCEDURE — 94660 CPAP INITIATION&MGMT: CPT

## 2022-03-02 PROCEDURE — 97535 SELF CARE MNGMENT TRAINING: CPT

## 2022-03-02 PROCEDURE — 94640 AIRWAY INHALATION TREATMENT: CPT

## 2022-03-02 PROCEDURE — 80048 BASIC METABOLIC PNL TOTAL CA: CPT | Performed by: NURSE PRACTITIONER

## 2022-03-02 PROCEDURE — 97530 THERAPEUTIC ACTIVITIES: CPT

## 2022-03-02 PROCEDURE — 82948 REAGENT STRIP/BLOOD GLUCOSE: CPT

## 2022-03-02 PROCEDURE — 99232 SBSQ HOSP IP/OBS MODERATE 35: CPT | Performed by: INTERNAL MEDICINE

## 2022-03-02 RX ORDER — FLUTICASONE PROPIONATE 50 MCG
1 SPRAY, SUSPENSION (ML) NASAL DAILY
Status: DISCONTINUED | OUTPATIENT
Start: 2022-03-02 | End: 2022-03-11

## 2022-03-02 RX ORDER — LIDOCAINE HYDROCHLORIDE 20 MG/ML
15 SOLUTION OROPHARYNGEAL 4 TIMES DAILY PRN
Status: DISCONTINUED | OUTPATIENT
Start: 2022-03-02 | End: 2022-03-16 | Stop reason: HOSPADM

## 2022-03-02 RX ORDER — SODIUM CHLORIDE/ALOE VERA
1 GEL (GRAM) NASAL
Status: DISCONTINUED | OUTPATIENT
Start: 2022-03-02 | End: 2022-03-11

## 2022-03-02 RX ORDER — LIDOCAINE 50 MG/G
1 PATCH TOPICAL DAILY
Status: DISCONTINUED | OUTPATIENT
Start: 2022-03-02 | End: 2022-03-16 | Stop reason: HOSPADM

## 2022-03-02 RX ADMIN — HEPARIN SODIUM 5000 UNITS: 5000 INJECTION INTRAVENOUS; SUBCUTANEOUS at 13:18

## 2022-03-02 RX ADMIN — HEPARIN SODIUM 5000 UNITS: 5000 INJECTION INTRAVENOUS; SUBCUTANEOUS at 22:31

## 2022-03-02 RX ADMIN — GUAIFENESIN 600 MG: 600 TABLET, EXTENDED RELEASE ORAL at 22:31

## 2022-03-02 RX ADMIN — BENZONATATE 200 MG: 100 CAPSULE ORAL at 08:20

## 2022-03-02 RX ADMIN — IPRATROPIUM BROMIDE 0.5 MG: 0.5 SOLUTION RESPIRATORY (INHALATION) at 19:35

## 2022-03-02 RX ADMIN — INSULIN GLARGINE 14 UNITS: 100 INJECTION, SOLUTION SUBCUTANEOUS at 22:31

## 2022-03-02 RX ADMIN — METOPROLOL TARTRATE 25 MG: 25 TABLET, FILM COATED ORAL at 22:31

## 2022-03-02 RX ADMIN — PREDNISONE 40 MG: 20 TABLET ORAL at 08:20

## 2022-03-02 RX ADMIN — BENZONATATE 200 MG: 100 CAPSULE ORAL at 22:31

## 2022-03-02 RX ADMIN — IPRATROPIUM BROMIDE 0.5 MG: 0.5 SOLUTION RESPIRATORY (INHALATION) at 08:07

## 2022-03-02 RX ADMIN — INSULIN LISPRO 2 UNITS: 100 INJECTION, SOLUTION INTRAVENOUS; SUBCUTANEOUS at 17:56

## 2022-03-02 RX ADMIN — DOCUSATE SODIUM 100 MG: 100 CAPSULE ORAL at 08:19

## 2022-03-02 RX ADMIN — LEVALBUTEROL HYDROCHLORIDE 1.25 MG: 1.25 SOLUTION, CONCENTRATE RESPIRATORY (INHALATION) at 08:07

## 2022-03-02 RX ADMIN — ALPRAZOLAM 1 MG: 0.5 TABLET ORAL at 22:31

## 2022-03-02 RX ADMIN — PANTOPRAZOLE SODIUM 40 MG: 40 TABLET, DELAYED RELEASE ORAL at 08:20

## 2022-03-02 RX ADMIN — LEVALBUTEROL HYDROCHLORIDE 1.25 MG: 1.25 SOLUTION, CONCENTRATE RESPIRATORY (INHALATION) at 12:47

## 2022-03-02 RX ADMIN — INSULIN GLARGINE 14 UNITS: 100 INJECTION, SOLUTION SUBCUTANEOUS at 08:39

## 2022-03-02 RX ADMIN — LEVALBUTEROL HYDROCHLORIDE 1.25 MG: 1.25 SOLUTION, CONCENTRATE RESPIRATORY (INHALATION) at 19:35

## 2022-03-02 RX ADMIN — HEPARIN SODIUM 5000 UNITS: 5000 INJECTION INTRAVENOUS; SUBCUTANEOUS at 05:01

## 2022-03-02 RX ADMIN — GUAIFENESIN 600 MG: 600 TABLET, EXTENDED RELEASE ORAL at 08:20

## 2022-03-02 RX ADMIN — FLUTICASONE PROPIONATE 1 SPRAY: 50 SPRAY, METERED NASAL at 13:17

## 2022-03-02 RX ADMIN — ACETAMINOPHEN 650 MG: 325 TABLET, FILM COATED ORAL at 22:31

## 2022-03-02 RX ADMIN — IPRATROPIUM BROMIDE 0.5 MG: 0.5 SOLUTION RESPIRATORY (INHALATION) at 12:47

## 2022-03-02 RX ADMIN — SENNOSIDES 8.6 MG: 8.6 TABLET ORAL at 08:19

## 2022-03-02 RX ADMIN — LIDOCAINE 1 PATCH: 50 PATCH CUTANEOUS at 11:10

## 2022-03-02 RX ADMIN — METOPROLOL TARTRATE 25 MG: 25 TABLET, FILM COATED ORAL at 08:19

## 2022-03-02 NOTE — OCCUPATIONAL THERAPY NOTE
Occupational Therapy Treatment Note:         03/02/22 1500   OT Last Visit   OT Visit Date 03/02/22   Note Type   Note Type Treatment   Restrictions/Precautions   Weight Bearing Precautions Per Order No   Other Precautions Contact/isolation; Fall Risk;Pain;Multiple lines;O2;Telemetry   Pain Assessment   Pain Assessment Tool 0-10   Pain Score No Pain   Pain Location/Orientation Location: Generalized   ADL   Where Assessed Chair   Grooming Assistance 4  Minimal Assistance   Grooming Deficit Steadying;Setup;Supervision/safety;Verbal cueing; Increased time to complete;Wash/dry hands   Grooming Comments Pt with need for hands on A required for hand hygiene at sink  Poor safety awareness noted  SpO2 dipped to 71% on HFNC with activities   UB Bathing Assistance 4  Minimal Assistance   UB Bathing Deficit Setup   LB Bathing Assistance 4  Minimal Assistance   LB Bathing Deficit Setup;Steadying;Verbal cueing;Supervision/safety; Increased time to complete;Perineal area; Buttocks   LB Bathing Comments Hands on A required for jaguar care instance  UB Dressing Assistance 5  Supervision/Setup   UB Dressing Deficit Setup   LB Dressing Assistance 4  Minimal Assistance   LB Dressing Deficit Steadying;Setup; Requires assistive device for steadying;Verbal cueing;Supervision/safety; Increased time to complete; Don/doff R sock; Don/doff L sock;Pull up over hips   LB Dressing Comments Pt with need for hands on A due to poor safety awareness and limited functional reach  Toileting Assistance  4  Minimal Assistance   Toileting Deficit Setup;Steadying;Verbal cueing;Supervison/safety; Increased time to complete; Bedside commode   Toileting Comments hands on A required for safety with activity  Functional Standing Tolerance   Time 3 mins   Activity self care tasks, self toileting, hand hygiene at sink      Comments Pt with poor insight to current activity tolerance with SpO2 dipping to 71% on HFNC   Bed Mobility   Supine to Sit Unable to assess Additional Comments Pt OOB in bedside recliner upon greeting this tx session  Transfers   Sit to Stand 5  Supervision   Additional items Assist x 1; Armrests; Increased time required;Verbal cues   Stand to Sit 4  Minimal assistance   Additional items Assist x 1; Armrests; Increased time required;Verbal cues   Stand pivot 4  Minimal assistance   Additional items Assist x 1; Armrests; Increased time required;Verbal cues   Toilet transfer 4  Minimal assistance   Additional items Assist x 1; Armrests; Increased time required;Verbal cues; Commode   Additional Comments increased A required for line management  Poor safety awareness noted   Functional Mobility   Functional Mobility 4  Minimal assistance   Additional Comments x1   Additional items Rolling walker   Cognition   Overall Cognitive Status WFL   Arousal/Participation Alert; Responsive; Cooperative   Attention Within functional limits   Orientation Level Oriented X4   Memory Decreased recall of precautions   Following Commands Follows multistep commands with increased time or repetition   Comments poor safety awareness with off topic conversation regarding home set up  Redirection required for optimal safety and line management  Additional Activities   Additional Activities Other (Comment)   Activity Tolerance   Activity Tolerance Patient limited by fatigue   Medical Staff Made Aware Reported all findings to nursing staff  Assessment   Assessment Pt was seen for skilled OT with focus on completion of self care tasks, functional transfers, self toileting, review of energy conservation techs, compensatory breathing techs and review of current plan of care  Pt began tx session talking about that her current residence, "made me sick', regarding having breathing problems  Pt agreeable to tx session with focus on review of energy conservation techs   Pt with poor safety awareness through out tx session warranting need for redirection due to self removal of SpO2 and telemetry monitor  See above levels of A required for all functional tasks  Pt with improved mood and participation following review of fall prevention and need for A with transfers to sink area in room  Reported all findings to nursing staff  The patient's raw score on the AM-PAC Daily Activity inpatient short form is 15, standardized score is 34 69, less than 39 4  Patients at this level are likely to benefit from discharge to post-acute rehabilitation services  Plan   Treatment Interventions ADL retraining;Functional transfer training;Cognitive reorientation; Endurance training;UE strengthening/ROM; Patient/family training   Goal Expiration Date 03/09/22   OT Treatment Day 2   OT Frequency 3-5x/wk   Recommendation   OT Discharge Recommendation Post acute rehabilitation services   AM-[facility] Daily Activity Inpatient   Lower Body Dressing 2   Bathing 2   Toileting 2   Upper Body Dressing 2   Grooming 3   Eating 4   Daily Activity Raw Score 15   Daily Activity Standardized Score (Calc for Raw Score >=11) 34 69   AM-PAC Applied Cognition Inpatient   Following a Speech/Presentation 4   Understanding Ordinary Conversation 4   Taking Medications 4   Remembering Where Things Are Placed or Put Away 4   Remembering List of 4-5 Errands 4   Taking Care of Complicated Tasks 4   Applied Cognition Raw Score 24   Applied Cognition Standardized Score 62 21   [facility], [address]

## 2022-03-02 NOTE — PROGRESS NOTES
Daily Progress Note - Critical Care   Katie Headley 46 y o  female MRN: [id number]  Unit/Bed#: ICU 6 Encounter: [id number]    Summary:  59-year-old female with a history of obesity was diagnosed with COVID on 01/06/22 currently being treated for hyper hypoxic respiratory failure secondary to COVID pneumonitis/fibrosis vs emphysema  Patient also has sepsis present on admission likely secondary to pneumonia (MRSA cultures + on 02/16, sputum cultures 2/16 multiple species)  Finished 5 days of antibiotics  Currently on steroid taper and nebs on HFNC for acute hypoxic respiratory failure        ----------------------------------------------------------------------------------------  HPI/24hr events:  No acute events  Lab holiday today  On HFNC 97%, 40 L  satting 90s, down to high 80s when talking  5x BM daily x 2 days  ---------------------------------------------------------------------------------------  SUBJECTIVE  Complains of chest burning x 1 week  Review of Systems  Review of systems was reviewed and negative unless stated above in HPI/24-hour events   ---------------------------------------------------------------------------------------  Assessment and Plan:    Neuro:    Agitation and anxiety  - P r n  Xanax 1 mg -used frequently  o Consider Seroquel q h s /melatonin if patient continues if needs sleep aid  o Cam ICU  o Delirium precautions      CV:    Resolved -New onset AFib with RVR,   - Chads Vasc 3 (CHF, female, hypertension)   o Metoprolol tartrate 25 q 12  o currently only on DVT prophylaxis, out of A fib, do not think pt needs Eliquis   Will discuss w/ attending   Acute on chronic CHF?  o POA- proBNP 1 472, CXR possible palm congestion, echo 02/17/2021 EF 65, G1 DD, IVC normal  o Status post Lasix x several doses  o Off diuretics  o I/O, daily weight  o Monitor electrolytes, maintain mg > 2, K> 4  o Monitor creatinine      Pulm:   Acute hypoxic respiratory failure  - 2/2 due to COVID fibrosis versus pneumonitis with underlying emphysema  - CT showed diffuse GGO, emphysema  - Witnessed aspiration event  - Weaned off fully to on 02/22  - Status post ceftriaxone 5/5 on 02/20  - Continue HFNC, continue to wean as tolerated  - Maintain SpO2 greater than 88%  - Tessalon Perles 200 t i d  P r n , Mucinex  - Atrovent 0 5 q i d   - Xopenex 1 25 q i d   - Solu-Medrol 30 daily- Day 3, continue wean w/ plan to decrease 10 mg q3    Emphysema? , unknown COPD status  - See above  - Consider outpatient PFT  · Sleep apnea   · Pt requests to see pulm, will refer her to outpatient pulm to disuss Sleep apnea Rx due to recall of machines    GI:    Constipation- now Diarrhea   o Colace 100 mg b i d   o Senna daily dced   o MiraLax 17 g p r n   Resolved -Mild transaminitis   GI prophylaxis  o Protonix 40 daily  o Mylanta      :   o None      F/E/N:    CCD diet      Heme/Onc:    DVT prophylaxis  - Heparin 5000 Q 8   Thrombocytosis  - Likely reactive  · Leukocytosis  · Secondary to steroids  · Blood cultures negative    Endo:    Diabetes  o HbA1c 6 6   o Humalog 8 units t i d   With SSI,  o Lantus 14 units q 12  o Hypoglycemia protocol  o Glucose checks t i d    Morbid obesity  o Will require lifestyle modification once acute illness improved      ID:    Sepsis 2/2 pneumonia  o Off antibiotics now  Fever resolved      MSK/Skin:   o Pt/Ot   - Post acute rehab   - Continue Pt/OT care - was given pulm exercises to pt as well  · Costochondritis   · 2/2 to cough  · Lidocaine patch ant chest   Patient appropriate for transfer out of the ICU today?: No  Disposition: Continue Critical Care   Code Status: Level 1 - Full Code  ---------------------------------------------------------------------------------------  ICU CORE MEASURES     Prophylaxis   VTE Pharmacologic Prophylaxis: Heparin  Stress Ulcer Prophylaxis: Pantoprazole PO    ABCDE Protocol (if indicated)  Plan to perform spontaneous awakening trial today? Not applicable  Plan to perform spontaneous breathing trial today? Not applicable  Obvious barriers to extubation? Not applicable  CAM-ICU: Positive    Invasive Devices Review  Invasive Devices  Report    Peripheral Intravenous Line            Peripheral IV 22 Right Forearm 3 days              Can any invasive devices be discontinued today? No  ---------------------------------------------------------------------------------------  OBJECTIVE    Vitals   Vitals:    22 0630 22 0700 22 0800 22 0808   BP:   114/65    Pulse:   90    Resp:   (!) 29    Temp:  97 8 °F (36 6 °C)     TempSrc:  Temporal     SpO2: 93%  96% 96%   Weight:       Height:         Temp (24hrs), Av 8 °F (36 6 °C), Min:97 1 °F (36 2 °C), Max:98 5 °F (36 9 °C)  Current: Temperature: 97 8 °F (36 6 °C)  HR:  64  BP:  117/74, maps greater than 65  RR:  15  SpO2:  96%, drops down to 88% when talking    Respiratory:  SpO2: SpO2: 96 %, SpO2 Device: O2 Device: High flow nasal cannula==> flow nasal cannula       Invasive/non-invasive ventilation settings   Respiratory  Report   Lab Data (Last 4 hours)    None         O2/Vent Data (Last 4 hours)       08        Non-Invasive Ventilation Mode HFNC (High flow) HFNC (High flow)                  Physical Exam  Vitals and nursing note reviewed  Constitutional:       General: She is not in acute distress  Appearance: She is well-developed  She is obese  She is not ill-appearing, toxic-appearing or diaphoretic  Comments:   Pt is still very talkaive but Conversational dyspnea improved on HFNC  HENT:      Head: Normocephalic and atraumatic  Eyes:      Extraocular Movements: Extraocular movements intact  Conjunctiva/sclera: Conjunctivae normal    Cardiovascular:      Rate and Rhythm: Normal rate and regular rhythm  Heart sounds: No murmur heard  Pulmonary:      Effort: Pulmonary effort is normal  No respiratory distress        Breath sounds: No wheezing or rales  Abdominal:      General: There is no distension  Palpations: Abdomen is soft  Tenderness: There is no abdominal tenderness  There is no guarding  Musculoskeletal:      Cervical back: Neck supple  Skin:     General: Skin is warm and dry  Neurological:      General: No focal deficit present  Mental Status: She is alert and oriented to person, place, and time  Psychiatric:      Comments: Pt is calmer               Laboratory and Diagnostics:  Results from last 7 days   Lab Units 02/27/22  0639 02/26/22  0340 02/25/22  0357 02/24/22  0243   WBC Thousand/uL 17 56* 18 34* 16 85* 17 66*   HEMOGLOBIN g/dL 12 4 12 9 13 1 14 3   HEMATOCRIT % 39 5 41 8 41 7 44 6   PLATELETS Thousands/uL 444* 484* 463* 450*   NEUTROS PCT % 58 82* 84* 87*   MONOS PCT % 9 5 5 4     Results from last 7 days   Lab Units 03/02/22  0456 02/27/22  0639 02/26/22  1005 02/26/22  0340 02/25/22  0358 02/24/22  0243   SODIUM mmol/L 139 141  --  136 137 136   POTASSIUM mmol/L 4 1 4 1 4 5 5 6* 4 8 5 0   CHLORIDE mmol/L 100 104  --  100 102 101   CO2 mmol/L 31 31  --  30 32 28   ANION GAP mmol/L 8 6  --  6 3* 7   BUN mg/dL 18 20  --  16 18 26*   CREATININE mg/dL 0 70 0 76  --  0 72 0 86 0 86   CALCIUM mg/dL 8 7 8 6  --  8 1* 8 1* 8 2*   GLUCOSE RANDOM mg/dL 105 186*  --  255* 208* 194*   ALT U/L  --  51  --   --   --   --    AST U/L  --  15  --   --   --   --    ALK PHOS U/L  --  70  --   --   --   --    ALBUMIN g/dL  --  2 4*  --   --   --   --    TOTAL BILIRUBIN mg/dL  --  0 26  --   --   --   --           Results from last 7 days   Lab Units 02/26/22  1005 02/26/22  0340 02/25/22  1952 02/25/22  1405 02/25/22  0357 02/24/22  1640 02/24/22  0937   PTT seconds 95* 86* 48* 62* 96* 82* 79*              ABG:    VBG:    Results from last 7 days   Lab Units 02/24/22  0413 02/23/22  1029   PROCALCITONIN ng/ml 0 08 0 06       Micro  Results from last 7 days   Lab Units 02/23/22  1050 02/23/22  1029   BLOOD CULTURE  No Growth After 5 Days  No Growth After 5 Days  EKG:  NSR  Imaging:  I have personally reviewed pertinent reports  Intake and Output  I/O       02/26 0701 02/27 0700 02/27 0701 02/28 0700 02/28 0701 03/01 0700    I V  (mL/kg)       Total Intake(mL/kg)       Urine (mL/kg/hr) 1300 (0 5) 350 (0 1)     Stool  0     Total Output 1300 350     Net -1300 -350            Unmeasured Urine Occurrence 1 x 2 x     Unmeasured Stool Occurrence  1 x           Height and Weights   Height: 4' 6" (137 2 cm)     Body mass index is 54 86 kg/m²  Weight (last 2 days)     Date/Time Weight    03/02/22 0400 103 (227 52)    03/01/22 0500 105 (230 6)    02/28/22 0600 106 (234 57)            Nutrition       Diet Orders   (From admission, onward)             Start     Ordered    02/28/22 1016  Diet Polo/CHO Controlled; Consistent Carbohydrate Diet Level 2 (5 carb servings/75 grams CHO/meal); Consistent Carbohydrate Diet Level 3 (6 carb servings/90 grams CHO/meal)  Diet effective now        References:    Nutrtion Support Algorithm Enteral vs  Parenteral   Question Answer Comment   Diet Type Polo/CHO Controlled    Polo/CHO Controlled Consistent Carbohydrate Diet Level 2 (5 carb servings/75 grams CHO/meal)    Other Restriction(s): Consistent Carbohydrate Diet Level 3 (6 carb servings/90 grams CHO/meal)    RD to adjust diet per protocol?  Yes        02/28/22 1015                Active Medications  Scheduled Meds:  Current Facility-Administered Medications   Medication Dose Route Frequency Provider Last Rate    acetaminophen  650 mg Oral Q6H PRN Kirstne Fernandes MD      ALPRAZolam  1 mg Oral BID PRN Kirsten Fernandes MD      aluminum-magnesium hydroxide-simethicone  30 mL Oral Q4H PRN Kirsten Fernandes MD      benzonatate  200 mg Oral TID PRN EMMA Stone      docusate sodium  100 mg Oral BID Kirsten Fernandes MD      guaiFENesin  600 mg Oral Q12H [address] EMMA Sotne      heparin (porcine)  5,000 Units Subcutaneous Q8H [facility] Nat Gtz MD      insulin glargine  14 Units Subcutaneous Q12H [facility] Nat Gtz MD      insulin lispro  2-12 Units Subcutaneous TID AC Nat Gtz MD      insulin lispro  8 Units Subcutaneous TID With Meals Nat Gtz MD      ipratropium  0 5 mg Nebulization 4x Daily Danielajane Slight Sonday, EMMA      levalbuterol  1 25 mg Nebulization 4x Daily Danielajane Slight Sonday, EMMA      metoprolol tartrate  25 mg Oral Q12H [facility] Lovegwyn Verdugo, EMMA      pantoprazole  40 mg Oral Daily Before Breakfast Nat Gtz MD      polyethylene glycol  17 g Oral Daily PRN Nat Gtz MD      predniSONE  40 mg Oral Daily Bozena Freeman MD      senna  1 tablet Oral Daily Nat Gtz MD       Continuous Infusions:     PRN Meds:   acetaminophen, 650 mg, Q6H PRN  ALPRAZolam, 1 mg, BID PRN  aluminum-magnesium hydroxide-simethicone, 30 mL, Q4H PRN  benzonatate, 200 mg, TID PRN  polyethylene glycol, 17 g, Daily PRN        Allergies   No Known Allergies  ---------------------------------------------------------------------------------------  Advance Directive and Living Will:      Power of :    POLST:    ---------------------------------------------------------------------------------------  Care Time Delivered:   Upon my evaluation, this patient had a high probability of imminent or life-threatening deterioration due to Acute hypoxic respiratory failure, which required my direct attention, intervention, and personal management  I have personally provided 20 minutes (10 to 25) of critical care time, exclusive of procedures, teaching, family meetings, and any prior time recorded by providers other than myself  Bozena Freeman MD      Portions of the record may have been created with voice recognition software    Occasional wrong word or "sound a like" substitutions may have occurred due to the inherent limitations of voice recognition software    Read the chart carefully and recognize, using context, where substitutions have occurred

## 2022-03-02 NOTE — PLAN OF CARE
Problem: OCCUPATIONAL THERAPY ADULT  Goal: Performs self-care activities at highest level of function for planned discharge setting  See evaluation for individualized goals  Description: Occupational Therapy    Outcome: Progressing  Note: Limitation: Decreased ADL status,Decreased UE strength,Decreased Safe judgement during ADL,Decreased endurance,Decreased high-level ADLs  Prognosis: Fair  Assessment: Pt was seen for skilled OT with focus on completion of self care tasks, functional transfers, self toileting, review of energy conservation techs, compensatory breathing techs and review of current plan of care  Pt began tx session talking about that her current residence, "made me sick', regarding having breathing problems  Pt agreeable to tx session with focus on review of energy conservation techs  Pt with poor safety awareness through out tx session warranting need for redirection due to self removal of SpO2 and telemetry monitor  See above levels of A required for all functional tasks  Pt with improved mood and participation following review of fall prevention and need for A with transfers to sink area in room  Reported all findings to nursing staff  The patient's raw score on the AM-PAC Daily Activity inpatient short form is 15, standardized score is 34 69, less than 39 4  Patients at this level are likely to benefit from discharge to post-acute rehabilitation services       OT Discharge Recommendation: Post acute rehabilitation services

## 2022-03-03 ENCOUNTER — APPOINTMENT (INPATIENT)
Dept: RADIOLOGY | Facility: HOSPITAL | Age: 52
DRG: 720 | End: 2022-03-03
Payer: MEDICARE

## 2022-03-03 LAB
GLUCOSE SERPL-MCNC: 176 MG/DL (ref 65–140)
GLUCOSE SERPL-MCNC: 210 MG/DL (ref 65–140)
GLUCOSE SERPL-MCNC: 88 MG/DL (ref 65–140)
GLUCOSE SERPL-MCNC: 92 MG/DL (ref 65–140)

## 2022-03-03 PROCEDURE — 97110 THERAPEUTIC EXERCISES: CPT

## 2022-03-03 PROCEDURE — 97116 GAIT TRAINING THERAPY: CPT

## 2022-03-03 PROCEDURE — 94640 AIRWAY INHALATION TREATMENT: CPT

## 2022-03-03 PROCEDURE — 71045 X-RAY EXAM CHEST 1 VIEW: CPT

## 2022-03-03 PROCEDURE — 82948 REAGENT STRIP/BLOOD GLUCOSE: CPT

## 2022-03-03 PROCEDURE — 97530 THERAPEUTIC ACTIVITIES: CPT

## 2022-03-03 PROCEDURE — 99233 SBSQ HOSP IP/OBS HIGH 50: CPT | Performed by: INTERNAL MEDICINE

## 2022-03-03 PROCEDURE — 94660 CPAP INITIATION&MGMT: CPT

## 2022-03-03 RX ORDER — PREDNISONE 20 MG/1
40 TABLET ORAL DAILY
Status: COMPLETED | OUTPATIENT
Start: 2022-03-03 | End: 2022-03-03

## 2022-03-03 RX ORDER — DOCUSATE SODIUM 100 MG/1
100 CAPSULE, LIQUID FILLED ORAL 2 TIMES DAILY PRN
Status: DISCONTINUED | OUTPATIENT
Start: 2022-03-03 | End: 2022-03-16 | Stop reason: HOSPADM

## 2022-03-03 RX ORDER — ALPRAZOLAM 0.5 MG/1
0.5 TABLET ORAL 2 TIMES DAILY PRN
Status: DISCONTINUED | OUTPATIENT
Start: 2022-03-03 | End: 2022-03-16 | Stop reason: HOSPADM

## 2022-03-03 RX ORDER — XYLITOL/YERBA SANTA
5 AEROSOL, SPRAY WITH PUMP (ML) MUCOUS MEMBRANE 4 TIMES DAILY PRN
Status: DISCONTINUED | OUTPATIENT
Start: 2022-03-03 | End: 2022-03-16 | Stop reason: HOSPADM

## 2022-03-03 RX ORDER — PREDNISONE 20 MG/1
40 TABLET ORAL DAILY
Status: DISCONTINUED | OUTPATIENT
Start: 2022-03-03 | End: 2022-03-03

## 2022-03-03 RX ADMIN — HEPARIN SODIUM 5000 UNITS: 5000 INJECTION INTRAVENOUS; SUBCUTANEOUS at 21:09

## 2022-03-03 RX ADMIN — LIDOCAINE HYDROCHLORIDE 15 ML: 20 SOLUTION ORAL; TOPICAL at 08:59

## 2022-03-03 RX ADMIN — LEVALBUTEROL HYDROCHLORIDE 1.25 MG: 1.25 SOLUTION, CONCENTRATE RESPIRATORY (INHALATION) at 18:52

## 2022-03-03 RX ADMIN — HEPARIN SODIUM 5000 UNITS: 5000 INJECTION INTRAVENOUS; SUBCUTANEOUS at 13:00

## 2022-03-03 RX ADMIN — BENZONATATE 200 MG: 100 CAPSULE ORAL at 09:01

## 2022-03-03 RX ADMIN — INSULIN GLARGINE 14 UNITS: 100 INJECTION, SOLUTION SUBCUTANEOUS at 21:05

## 2022-03-03 RX ADMIN — METOPROLOL TARTRATE 25 MG: 25 TABLET, FILM COATED ORAL at 09:24

## 2022-03-03 RX ADMIN — FLUTICASONE PROPIONATE 1 SPRAY: 50 SPRAY, METERED NASAL at 08:59

## 2022-03-03 RX ADMIN — LIDOCAINE 1 PATCH: 50 PATCH CUTANEOUS at 09:02

## 2022-03-03 RX ADMIN — IPRATROPIUM BROMIDE 0.5 MG: 0.5 SOLUTION RESPIRATORY (INHALATION) at 14:04

## 2022-03-03 RX ADMIN — HEPARIN SODIUM 5000 UNITS: 5000 INJECTION INTRAVENOUS; SUBCUTANEOUS at 05:20

## 2022-03-03 RX ADMIN — BENZONATATE 200 MG: 100 CAPSULE ORAL at 21:18

## 2022-03-03 RX ADMIN — LEVALBUTEROL HYDROCHLORIDE 1.25 MG: 1.25 SOLUTION, CONCENTRATE RESPIRATORY (INHALATION) at 14:04

## 2022-03-03 RX ADMIN — LEVALBUTEROL HYDROCHLORIDE 1.25 MG: 1.25 SOLUTION, CONCENTRATE RESPIRATORY (INHALATION) at 08:08

## 2022-03-03 RX ADMIN — PREDNISONE 40 MG: 20 TABLET ORAL at 09:01

## 2022-03-03 RX ADMIN — METOPROLOL TARTRATE 25 MG: 25 TABLET, FILM COATED ORAL at 21:05

## 2022-03-03 RX ADMIN — IPRATROPIUM BROMIDE 0.5 MG: 0.5 SOLUTION RESPIRATORY (INHALATION) at 08:08

## 2022-03-03 RX ADMIN — CARBAMIDE PEROXIDE 6.5% 5 DROP: 6.5 LIQUID AURICULAR (OTIC) at 18:23

## 2022-03-03 RX ADMIN — PANTOPRAZOLE SODIUM 40 MG: 40 TABLET, DELAYED RELEASE ORAL at 09:00

## 2022-03-03 RX ADMIN — IPRATROPIUM BROMIDE 0.5 MG: 0.5 SOLUTION RESPIRATORY (INHALATION) at 18:52

## 2022-03-03 RX ADMIN — GUAIFENESIN 600 MG: 600 TABLET, EXTENDED RELEASE ORAL at 09:00

## 2022-03-03 RX ADMIN — GUAIFENESIN 600 MG: 600 TABLET, EXTENDED RELEASE ORAL at 21:05

## 2022-03-03 RX ADMIN — INSULIN GLARGINE 14 UNITS: 100 INJECTION, SOLUTION SUBCUTANEOUS at 08:59

## 2022-03-03 NOTE — PROGRESS NOTES
Daily Progress Note - Critical Care   Katie Estrada 46 y o  female MRN: [id number]  Unit/Bed#: ICU 6 Encounter: [id number]    Summary:  71-year-old female with a history of obesity was diagnosed with COVID on 01/06/22 currently being treated for hyper hypoxic respiratory failure secondary to COVID pneumonitis/fibrosis vs emphysema  Patient also has sepsis present on admission likely secondary to pneumonia (MRSA cultures + on 02/16, sputum cultures 2/16 multiple species)  Finished 5 days of antibiotics  Currently on steroid taper and nebs on HFNC for acute hypoxic respiratory failure  S  Will repeat today  Still requiring chest x-ray shows possible small right apical pneumothorax        ----------------------------------------------------------------------------------------  HPI/24hr events:   On HFNC 96%, 40 L  Had diarrhea x3 days on laxatives       ---------------------------------------------------------------------------------------  SUBJECTIVE  Pt is upset having to stay in the room all day  She wishes to leave her room and go to the cafeteria with oxygen tank buy some food and come back  She is also upset that no one is there to help her with the [employer] home inspection for mold-her cousin had to return to [facility] as he has gotten a job  She continued to have diarrhea yesterday  Lidocaine patch to help with costochondritis pain  She wishes to use the patch during incentive spirometer exercises  She also said the Flonase helped with bloody nose  Review of Systems  Review of systems was reviewed and negative unless stated above in HPI/24-hour events   ---------------------------------------------------------------------------------------  Assessment and Plan:    Neuro:    Agitation and anxiety  - P r n   Xanax 1 mg -used frequently  o Consider Seroquel q h s /melatonin if patient continues if needs sleep aid  o Cam ICU  o Delirium precautions  o Considering prolonged hospital course, can consider starting antidepressant w/ knowledge this will likely take effect in 4-6 weeks  CV:    Resolved -New onset AFib with RVR,   - Chads Vasc 3 (CHF, female, hypertension)   o Metoprolol tartrate 25 q 12  o currently only on DVT prophylaxis, out of A fib, do not think pt needs Eliquis  Will discuss w/ attending   Acute on chronic CHF?  o POA- proBNP 1 472, CXR possible palm congestion, echo 02/17/2021 EF 65, G1 DD, IVC normal  o Status post Lasix x several doses  o Off diuretics  o I/O, daily weight  o Monitor electrolytes, maintain mg > 2, K> 4  o Monitor creatinine      Pulm:   Acute hypoxic respiratory failure  - 2/2 due to COVID fibrosis versus pneumonitis with underlying emphysema  - CT showed diffuse GGO, emphysema  - Witnessed aspiration event  - Weaned off fully to on 02/22  - Status post ceftriaxone 5/5 on 02/20  - Maintain SpO2 greater than 88%  - Tessalon Perles 200 t i d  P r n , Mucinex  - Atrovent 0 5 q i d   - Xopenex 1 25 q i d   - Prednisone 40, decrease to 30 tm,   - Continue HFNC, continue to wean as tolerated   Emphysema? , unknown COPD status  - See above  - Consider outpatient PFT  · Sleep apnea   · Pt requests to see pulm, will refer her to outpatient pulm to [facility] Sleep apnea Rx due to recall of machines  · Possible R apical Pneumothrox   · Repeat CXR at 12 today    GI:    Constipation- now Diarrhea   o Colace 100 mg b i d augustus to prn  o Senna daily dced   o MiraLax 17 g p r n   Resolved -Mild transaminitis   GI prophylaxis  o Protonix 40 daily  o Mylanta      :   o None      F/E/N:    CCD diet      Heme/Onc:    DVT prophylaxis  - Heparin 5000 Q 8   Thrombocytosis  - Likely reactive  · Leukocytosis  · Secondary to steroids  · Blood cultures negative    Endo:    Diabetes  o HbA1c 6 6   o Humalog 8 units t i d   With SSI,  o Lantus 14 units q 12  o Hypoglycemia protocol  o Glucose checks t i d    Morbid obesity  o Will require lifestyle modification once acute illness improved      ID:    Sepsis 2/2 pneumonia  o Off antibiotics now  Fever resolved      MSK/Skin:   o Pt/Ot   - Post acute rehab   - Continue Pt/OT care - was given pulm exercises to pt as well  · Costochondritis   · 2/2 to cough  · Lidocaine patch ant chest, continue as it helps  Patient appropriate for transfer out of the ICU today?: No  Disposition: Continue Critical Care   Code Status: Level 1 - Full Code  ---------------------------------------------------------------------------------------  ICU CORE MEASURES     Prophylaxis   VTE Pharmacologic Prophylaxis: Heparin  Stress Ulcer Prophylaxis: Pantoprazole PO    ABCDE Protocol (if indicated)  Plan to perform spontaneous awakening trial today? Not applicable  Plan to perform spontaneous breathing trial today? Not applicable  Obvious barriers to extubation? Not applicable  CAM-ICU: Positive    Invasive Devices Review  Invasive Devices  Report    Peripheral Intravenous Line            Peripheral IV 22 Right Antecubital <1 day              Can any invasive devices be discontinued today?  No  ---------------------------------------------------------------------------------------  OBJECTIVE    Vitals   Vitals:    22 0430 22 0500 22 0530 22 0630   BP: 123/61  112/64 (!) 101/48   Pulse: 70  72 70   Resp: 18  19 19   Temp:       TempSrc:       SpO2:   99% 98%   Weight:  101 kg (221 lb 9 oz)     Height:         Temp (24hrs), Av °F (36 7 °C), Min:97 5 °F (36 4 °C), Max:98 6 °F (37 °C)  Current: Temperature: 97 5 °F (36 4 °C)  HR:  64  BP:  117/74, maps greater than 65  RR:  15  SpO2:  96%, drops down to 88% when talking    Respiratory:  SpO2: SpO2: 98 %, SpO2 Device: O2 Device: High flow nasal cannula==> flow nasal cannula       Invasive/non-invasive ventilation settings   Respiratory  Report   Lab Data (Last 4 hours)    None         O2/Vent Data (Last 4 hours)    None                Physical Exam  Vitals and nursing note reviewed  Constitutional:       General: She is not in acute distress  Appearance: She is well-developed  She is obese  She is not ill-appearing, toxic-appearing or diaphoretic  Comments:   Pt is still very talkaive but Conversational dyspnea improved on HFNC  Wax in ears L side  Mouth - no ulcers or gross tongue abd  Underside of tongue normal      Right LL rales   HENT:      Head: Normocephalic and atraumatic  Eyes:      Extraocular Movements: Extraocular movements intact  Conjunctiva/sclera: Conjunctivae normal    Cardiovascular:      Rate and Rhythm: Normal rate and regular rhythm  Heart sounds: No murmur heard  Pulmonary:      Effort: Pulmonary effort is normal  No respiratory distress  Breath sounds: Rales present  No wheezing  Abdominal:      General: There is no distension  Palpations: Abdomen is soft  Tenderness: There is no abdominal tenderness  There is no guarding  Musculoskeletal:      Cervical back: Neck supple  Skin:     General: Skin is warm and dry  Neurological:      General: No focal deficit present  Mental Status: She is alert and oriented to person, place, and time  Psychiatric:      Comments: Pt seems sad due to prolonged hospitalization and no physical support from family as they are busy               Laboratory and Diagnostics:  Results from last 7 days   Lab Units 02/27/22  0639 02/26/22  0340 02/25/22  0357   WBC Thousand/uL 17 56* 18 34* 16 85*   HEMOGLOBIN g/dL 12 4 12 9 13 1   HEMATOCRIT % 39 5 41 8 41 7   PLATELETS Thousands/uL 444* 484* 463*   NEUTROS PCT % 58 82* 84*   MONOS PCT % 9 5 5     Results from last 7 days   Lab Units 03/02/22  0456 02/27/22  0639 02/26/22  1005 02/26/22  0340 02/25/22  0358   SODIUM mmol/L 139 141  --  136 137   POTASSIUM mmol/L 4 1 4 1 4 5 5 6* 4 8   CHLORIDE mmol/L 100 104  --  100 102   CO2 mmol/L 31 31  --  30 32   ANION GAP mmol/L 8 6  --  6 3*   BUN mg/dL 18 20  --  16 18   CREATININE mg/dL 0 70 0 76  --  0 72 0 86   CALCIUM mg/dL 8 7 8 6  --  8 1* 8 1*   GLUCOSE RANDOM mg/dL 105 186*  --  255* 208*   ALT U/L  --  51  --   --   --    AST U/L  --  15  --   --   --    ALK PHOS U/L  --  70  --   --   --    ALBUMIN g/dL  --  2 4*  --   --   --    TOTAL BILIRUBIN mg/dL  --  0 26  --   --   --           Results from last 7 days   Lab Units 02/26/22  1005 02/26/22  0340 02/25/22  1952 02/25/22  1405 02/25/22  0357 02/24/22  1640 02/24/22  0937   PTT seconds 95* 86* 48* 62* 96* 82* 79*              ABG:    VBG:          Micro        EKG:  NSR  Imaging:  I have personally reviewed pertinent reports  Intake and Output  I/O       02/26 0701 02/27 0700 02/27 0701 02/28 0700 02/28 0701 03/01 0700    I V  (mL/kg)       Total Intake(mL/kg)       Urine (mL/kg/hr) 1300 (0 5) 350 (0 1)     Stool  0     Total Output 1300 350     Net -1300 -350            Unmeasured Urine Occurrence 1 x 2 x     Unmeasured Stool Occurrence  1 x           Height and Weights   Height: 4' 6" (137 2 cm)     Body mass index is 53 42 kg/m²  Weight (last 2 days)     Date/Time Weight    03/03/22 0500 101 (221 56)    03/02/22 0400 103 (227 52)    03/01/22 0500 105 (230 6)            Nutrition       Diet Orders   (From admission, onward)             Start     Ordered    02/28/22 1016  Diet Polo/CHO Controlled; Consistent Carbohydrate Diet Level 2 (5 carb servings/75 grams CHO/meal); Consistent Carbohydrate Diet Level 3 (6 carb servings/90 grams CHO/meal)  Diet effective now        References:    Nutrtion Support Algorithm Enteral vs  Parenteral   Question Answer Comment   Diet Type Polo/CHO Controlled    Polo/CHO Controlled Consistent Carbohydrate Diet Level 2 (5 carb servings/75 grams CHO/meal)    Other Restriction(s): Consistent Carbohydrate Diet Level 3 (6 carb servings/90 grams CHO/meal)    RD to adjust diet per protocol?  Yes        02/28/22 1015                Active Medications  Scheduled Meds:  Current Facility-Administered Medications   Medication Dose Route Frequency Provider Last Rate    acetaminophen  650 mg Oral Q6H PRN Kyra Castaneda MD      ALPRAZolam  1 mg Oral BID PRN Kyra Castaneda MD      aluminum-magnesium hydroxide-simethicone  30 mL Oral Q4H PRN Kyra Castaneda MD      benzonatate  200 mg Oral TID PRN EMMA Patel      docusate sodium  100 mg Oral BID PRN Edil Ibarra MD      fluticasone  1 spray Each Nare Daily Edil Ibarra MD      guaiFENesin  600 mg Oral Q12H [facility] EMMA Patel      heparin (porcine)  5,000 Units Subcutaneous Q8H [facility] Kyra Castaneda MD      insulin glargine  14 Units Subcutaneous Q12H [facility] Kyra Castaneda MD      insulin lispro  2-12 Units Subcutaneous TID AC Kyra Castaneda MD      insulin lispro  8 Units Subcutaneous TID With Meals Kyra Castaneda MD      ipratropium  0 5 mg Nebulization 4x Daily EMMA Carlson      levalbuterol  1 25 mg Nebulization 4x Daily EMMA Carlson      lidocaine  1 patch Topical Daily Edil Ibarra MD      Lidocaine Viscous HCl  15 mL Swish & Spit 4x Daily PRN Vijaya EMMA Ocampo      metoprolol tartrate  25 mg Oral Q12H [facility] Phyllistine EMMA Grider      pantoprazole  40 mg Oral Daily Before Breakfast Kyra Castaneda MD      polyethylene glycol  17 g Oral Daily PRN MD Alta Awad ON 3/4/2022] predniSONE  30 mg Oral Daily Edil Ibarra MD      predniSONE  40 mg Oral Daily Edil Ibarra MD      sodium chloride  1 application Nasal Z7R PRN Edil Ibarra MD       Continuous Infusions:     PRN Meds:   acetaminophen, 650 mg, Q6H PRN  ALPRAZolam, 1 mg, BID PRN  aluminum-magnesium hydroxide-simethicone, 30 mL, Q4H PRN  benzonatate, 200 mg, TID PRN  docusate sodium, 100 mg, BID PRN  Lidocaine Viscous HCl, 15 mL, 4x Daily PRN  polyethylene glycol, 17 g, Daily PRN  sodium chloride, 1 application, F5T PRN        Allergies   No Known Allergies  ---------------------------------------------------------------------------------------  Advance Directive and Living Will:      Power of :    POLST:    ---------------------------------------------------------------------------------------  Care Time Delivered:   Upon my evaluation, this patient had a high probability of imminent or life-threatening deterioration due to Acute hypoxic respiratory failure, which required my direct attention, intervention, and personal management  I have personally provided 20 minutes (10 to 25) of critical care time, exclusive of procedures, teaching, family meetings, and any prior time recorded by providers other than myself  Naeem Gee MD      Portions of the record may have been created with voice recognition software  Occasional wrong word or "sound a like" substitutions may have occurred due to the inherent limitations of voice recognition software    Read the chart carefully and recognize, using context, where substitutions have occurred

## 2022-03-03 NOTE — PHYSICAL THERAPY NOTE
PHYSICAL THERAPY NOTE          Patient Name: Graciela QUINTANILLA Date: 3/3/2022     03/03/22 0993   Note Type   Note Type Treatment   Pain Assessment   Pain Assessment Tool 0-10   Pain Score No Pain   Restrictions/Precautions   Other Precautions Contact/isolation;O2;Fall Risk;Multiple lines  (pt on HFNC at 40%)   General   Chart Reviewed Yes   Subjective   Subjective I need to get out of this room  i am tired of looking a these 4 walls  i want to go down to the cafeteria just for 15 minutes  Bed Mobility   Additional Comments pt  out of bed in recliner upon arrival      Transfers   Sit to Stand 5  Supervision   Additional items Assist x 1; Armrests; Increased time required   Stand to Sit 5  Supervision   Additional items Assist x 1; Armrests; Increased time required;Verbal cues   Stand pivot 5  Supervision   Additional items Assist x 1   Toilet transfer 5  Supervision   Additional items Assist x 1; Armrests;Commode   Ambulation/Elevation   Gait pattern Narrow ZHANG  (fluent reciprocal gait lateral sway  )   Gait Assistance 4  Minimal assist   Additional items Assist x 1;Verbal cues   Assistive Device None; Other (Comment)  (arm hold to hha x1)   Distance 12' x2 & 15' x2 9 forwrd and backward walking  Ambulation/Elevation Additional Comments spo2 on 40% HFNC 99%-100% at rest and 94%-95% with mobility  Balance   Static Sitting Normal   Dynamic Sitting Good   Static Standing Fair +  (withou support)   Dynamic Standing Fair   Ambulatory Fair  (without device)   Activity Tolerance   Activity Tolerance Patient tolerated treatment well   Medical Staff Made Aware Randy BANKS   Exercises   Hip Flexion Sitting;AROM; Bilateral  (x 12 reps)   Hip Abduction Sitting;Bilateral  (iosmetric hip add x 12 reps)   Knee AROM Long [employer]; Bilateral  (x 12 reps)   Ankle Pumps Sitting;AROM; Bilateral;15 reps   Squat Standing;5 reps;Bilateral   Marching Standing;10 reps;AROM; Bilateral   Balance training  lateral side steps x 5 l and r  Assessment   Prognosis Good   Problem List Decreased endurance; Impaired balance;Obesity   Assessment  Pt  Seen for PT treatment interventions as per PT POC   Pt asking to get out of the room and go to the cafeteria     Pt on 40% HFNC   Pt  Out of bed upon arrival   Offers no c/o pain   PT is showing improved ability to perform transfers and ambulation this session requiring less assistance supervision assist x1 for transfers and min assist x1 for ambulation without use of rw    Pt progressed with ambulation distances from previous session to 12' x3 and 15' x2( forward and backward ambulation training performed)pt demonstrates slow reciprocal gait with nbos and mild lateral sway at times requiring hha x1 for safety  Mild forrester noted at times  Pt's spo2 remained WNL at 94%-95% with mobility and 98%-100% at rest on 40% HFNC     cues for seated or standing rest breaks, pacing and energy conservation techniques required  Pt  Performs seated b/l le arom x 12 reps   and standing exercises with min assist for steadying assist of marching, mini squats and lateral side steps L and R x5-12 reps  Pt remains highly motivated, though continues to be limited by decreased activity tolerance, balance and endurance  Pt  Remained seated out of bed in recliner at conclusion of PT session  The patient's AM-PAC Basic Mobility Inpatient Short Form Raw Score is 19  A Raw score of greater than 16 suggests the patient may benefit from discharge to home  Please also refer to the recommendation of the Physical Therapist for safe discharge planning   Despite Am pac score and d/c recommendations,  due to functional limitations and impairments in balance, activity tolerance endurance and social and enviornmental barriers and ncreased risk for falls STR is recommended at this time in order to optimize outcomes and facilitate return to PLOF     Goals   Patient Goals To get out of room and go to the cafeteria  STG Expiration Date 03/02/22   PT Treatment Day 4   Plan   PT Frequency 3-5x/wk   Recommendation   PT Discharge Recommendation Post acute rehabilitation services  (vs str pending progress and achievement of PT goals  )   Additional Comments STR vs home wth hhPT and hha pending progress and achievement of PT goals  AM-PAC Basic Mobility Inpatient   Turning in Bed Without Bedrails 3   Lying on Back to Sitting on Edge of Flat Bed 3   Moving Bed to Chair 4   Standing Up From Chair 4   Walk in Room 3   Climb 3-5 Stairs 2   Basic Mobility Inpatient Raw Score 19   Basic Mobility Standardized Score 42 48   Highest Level Of Mobility   JH-HLM Goal 6: Walk 10 steps or more   JH-HLM Highest Level of Mobility 7: Walk 25 feet or more   JH-HLM Goal Achieved Yes   End of Consult   Patient Position at End of Consult Bedside chair; All needs within reach        03/03/22 1532   Note Type   Note Type Treatment   Pain Assessment   Pain Assessment Tool 0-10   Pain Score No Pain   Restrictions/Precautions   Other Precautions Contact/isolation;O2;Fall Risk;Multiple lines  (pt on HFNC at 40%)   General   Chart Reviewed Yes   Subjective   Subjective I need to get out of this room  i am tired of looking a these 4 walls  i want to go down to the cafeteria just for 15 minutes  Bed Mobility   Additional Comments pt  out of bed in recliner upon arrival      Transfers   Sit to Stand 5  Supervision   Additional items Assist x 1; Armrests; Increased time required   Stand to Sit 5  Supervision   Additional items Assist x 1; Armrests; Increased time required;Verbal cues   Stand pivot 5  Supervision   Additional items Assist x 1   Toilet transfer 5  Supervision   Additional items Assist x 1; Armrests;Commode   Ambulation/Elevation   Gait pattern Narrow ZHANG  (fluent reciprocal gait lateral sway  )   Gait Assistance 4  Minimal assist   Additional items Assist x 1;Verbal cues Assistive Device None; Other (Comment)  (arm hold to hha x1)   Distance 12' x2 & 15' x2 9 forwrd and backward walking  Ambulation/Elevation Additional Comments spo2 on 40% HFNC 99%-100% at rest and 94%-95% with mobility  Balance   Static Sitting Normal   Dynamic Sitting Good   Static Standing Fair +  (withou support)   Dynamic Standing Fair   Ambulatory Fair  (without device)   Activity Tolerance   Activity Tolerance Patient tolerated treatment well   Medical Staff Made Aware Randy BANKS   Exercises   Hip Flexion Sitting;AROM; Bilateral  (x 12 reps)   Hip Abduction Sitting;Bilateral  (iosmetric hip add x 12 reps)   Knee AROM Long [employer]; Bilateral  (x 12 reps)   Ankle Pumps Sitting;AROM; Bilateral;15 reps   Squat Standing;5 reps;Bilateral   Marching Standing;10 reps;AROM; Bilateral   Balance training  lateral side steps x 5 l and r  Assessment   Prognosis Good   Problem List Decreased endurance; Impaired balance;Obesity   Assessment  Pt  Seen for PT treatment interventions as per PT POC   Pt asking to get out of the room and go to the cafeteria     Pt on 40% HFNC   Pt  Out of bed upon arrival   Offers no c/o pain   PT is showing improved ability to perform transfers and ambulation this session requiring less assistance supervision assist x1 for transfers and min assist x1 for ambulation without use of rw    Pt progressed with ambulation distances from previous session to 12' x3 and 15' x2( forward and backward ambulation training performed)pt demonstrates slow reciprocal gait with nbos and mild lateral sway at times requiring hha x1 for safety  Mild forrester noted at times  Pt's spo2 remained WNL at 94%-95% with mobility and 98%-100% at rest on 40% HFNC     cues for seated or standing rest breaks, pacing and energy conservation techniques required   Pt  Performs seated b/l le arom x 12 reps   and standing exercises with min assist for steadying assist of marching, mini squats and lateral side steps L and R x5-12 reps  Pt remains highly motivated, though continues to be limited by decreased activity tolerance, balance and endurance  Pt  Remained seated out of bed in recliner at conclusion of PT session  The patient's AM-PAC Basic Mobility Inpatient Short Form Raw Score is 19  A Raw score of greater than 16 suggests the patient may benefit from discharge to home  Please also refer to the recommendation of the Physical Therapist for safe discharge planning  Despite Am pac score and d/c recommendations,  due to functional limitations and impairments in balance, activity tolerance endurance and social and enviornmental barriers and ncreased risk for falls STR is recommended at this time in order to optimize outcomes and facilitate return to PLOF  Goals   Patient Goals To get out of room and go to the cafeteria  STG Expiration Date 03/02/22   PT Treatment Day 4   Plan   PT Frequency 3-5x/wk   Recommendation   PT Discharge Recommendation Post acute rehabilitation services  (vs str pending progress and achievement of PT goals  )   Additional Comments STR vs home wth hhPT and hha pending progress and achievement of PT goals  AM-PAC Basic Mobility Inpatient   Turning in Bed Without Bedrails 3   Lying on Back to Sitting on Edge of Flat Bed 3   Moving Bed to Chair 4   Standing Up From Chair 4   Walk in Room 3   Climb 3-5 Stairs 2   Basic Mobility Inpatient Raw Score 19   Basic Mobility Standardized Score 42 48   Highest Level Of Mobility   JH-HLM Goal 6: Walk 10 steps or more   JH-HLM Highest Level of Mobility 7: Walk 25 feet or more   JH-HLM Goal Achieved Yes   End of Consult   Patient Position at End of Consult Bedside chair; All needs within reach   [place], [place]

## 2022-03-04 ENCOUNTER — APPOINTMENT (INPATIENT)
Dept: RADIOLOGY | Facility: HOSPITAL | Age: 52
DRG: 720 | End: 2022-03-04
Payer: MEDICARE

## 2022-03-04 LAB
GLUCOSE SERPL-MCNC: 125 MG/DL (ref 65–140)
GLUCOSE SERPL-MCNC: 159 MG/DL (ref 65–140)
GLUCOSE SERPL-MCNC: 175 MG/DL (ref 65–140)
GLUCOSE SERPL-MCNC: 193 MG/DL (ref 65–140)
GLUCOSE SERPL-MCNC: 76 MG/DL (ref 65–140)

## 2022-03-04 PROCEDURE — 82948 REAGENT STRIP/BLOOD GLUCOSE: CPT

## 2022-03-04 PROCEDURE — 97530 THERAPEUTIC ACTIVITIES: CPT

## 2022-03-04 PROCEDURE — 97535 SELF CARE MNGMENT TRAINING: CPT

## 2022-03-04 PROCEDURE — 97116 GAIT TRAINING THERAPY: CPT

## 2022-03-04 PROCEDURE — 94640 AIRWAY INHALATION TREATMENT: CPT

## 2022-03-04 PROCEDURE — 99232 SBSQ HOSP IP/OBS MODERATE 35: CPT | Performed by: INTERNAL MEDICINE

## 2022-03-04 PROCEDURE — 94660 CPAP INITIATION&MGMT: CPT

## 2022-03-04 PROCEDURE — 71045 X-RAY EXAM CHEST 1 VIEW: CPT

## 2022-03-04 RX ORDER — INSULIN GLARGINE 100 [IU]/ML
10 INJECTION, SOLUTION SUBCUTANEOUS EVERY 12 HOURS SCHEDULED
Status: DISCONTINUED | OUTPATIENT
Start: 2022-03-04 | End: 2022-03-10

## 2022-03-04 RX ADMIN — GUAIFENESIN 600 MG: 600 TABLET, EXTENDED RELEASE ORAL at 08:16

## 2022-03-04 RX ADMIN — IPRATROPIUM BROMIDE 0.5 MG: 0.5 SOLUTION RESPIRATORY (INHALATION) at 07:57

## 2022-03-04 RX ADMIN — INSULIN GLARGINE 14 UNITS: 100 INJECTION, SOLUTION SUBCUTANEOUS at 08:16

## 2022-03-04 RX ADMIN — INSULIN LISPRO 2 UNITS: 100 INJECTION, SOLUTION INTRAVENOUS; SUBCUTANEOUS at 16:27

## 2022-03-04 RX ADMIN — LEVALBUTEROL HYDROCHLORIDE 1.25 MG: 1.25 SOLUTION, CONCENTRATE RESPIRATORY (INHALATION) at 07:56

## 2022-03-04 RX ADMIN — PANTOPRAZOLE SODIUM 40 MG: 40 TABLET, DELAYED RELEASE ORAL at 06:35

## 2022-03-04 RX ADMIN — PREDNISONE 30 MG: 20 TABLET ORAL at 08:16

## 2022-03-04 RX ADMIN — FLUTICASONE PROPIONATE 1 SPRAY: 50 SPRAY, METERED NASAL at 08:17

## 2022-03-04 RX ADMIN — GUAIFENESIN 600 MG: 600 TABLET, EXTENDED RELEASE ORAL at 20:43

## 2022-03-04 RX ADMIN — LEVALBUTEROL HYDROCHLORIDE 1.25 MG: 1.25 SOLUTION, CONCENTRATE RESPIRATORY (INHALATION) at 20:10

## 2022-03-04 RX ADMIN — LIDOCAINE 1 PATCH: 50 PATCH CUTANEOUS at 08:17

## 2022-03-04 RX ADMIN — CARBAMIDE PEROXIDE 6.5% 5 DROP: 6.5 LIQUID AURICULAR (OTIC) at 08:16

## 2022-03-04 RX ADMIN — HEPARIN SODIUM 5000 UNITS: 5000 INJECTION INTRAVENOUS; SUBCUTANEOUS at 21:24

## 2022-03-04 RX ADMIN — INSULIN GLARGINE 10 UNITS: 100 INJECTION, SOLUTION SUBCUTANEOUS at 21:18

## 2022-03-04 RX ADMIN — POLYETHYLENE GLYCOL 3350 17 G: 17 POWDER, FOR SOLUTION ORAL at 08:17

## 2022-03-04 RX ADMIN — HEPARIN SODIUM 5000 UNITS: 5000 INJECTION INTRAVENOUS; SUBCUTANEOUS at 06:35

## 2022-03-04 RX ADMIN — DIPHENHYDRAMINE HYDROCHLORIDE 10 ML: 12.5 LIQUID ORAL at 20:43

## 2022-03-04 RX ADMIN — HEPARIN SODIUM 5000 UNITS: 5000 INJECTION INTRAVENOUS; SUBCUTANEOUS at 14:49

## 2022-03-04 RX ADMIN — LIDOCAINE HYDROCHLORIDE 15 ML: 20 SOLUTION ORAL; TOPICAL at 06:34

## 2022-03-04 RX ADMIN — BENZONATATE 200 MG: 100 CAPSULE ORAL at 21:42

## 2022-03-04 RX ADMIN — IPRATROPIUM BROMIDE 0.5 MG: 0.5 SOLUTION RESPIRATORY (INHALATION) at 13:06

## 2022-03-04 RX ADMIN — METOPROLOL TARTRATE 25 MG: 25 TABLET, FILM COATED ORAL at 21:21

## 2022-03-04 RX ADMIN — CARBAMIDE PEROXIDE 6.5% 5 DROP: 6.5 LIQUID AURICULAR (OTIC) at 18:15

## 2022-03-04 RX ADMIN — METOPROLOL TARTRATE 25 MG: 25 TABLET, FILM COATED ORAL at 08:16

## 2022-03-04 RX ADMIN — LEVALBUTEROL HYDROCHLORIDE 1.25 MG: 1.25 SOLUTION, CONCENTRATE RESPIRATORY (INHALATION) at 13:06

## 2022-03-04 RX ADMIN — IPRATROPIUM BROMIDE 0.5 MG: 0.5 SOLUTION RESPIRATORY (INHALATION) at 20:10

## 2022-03-04 RX ADMIN — ALPRAZOLAM 0.5 MG: 0.5 TABLET ORAL at 21:43

## 2022-03-04 NOTE — OCCUPATIONAL THERAPY NOTE
Occupational Therapy Progress Note     Patient Name: Jennifer Fernandez  [id number] Date: 3/4/2022     OT Treatment Time:  7170-1435    Problem List  Principal Problem:    Acute respiratory failure with hypoxia ([facility] )  Active Problems:    Elevated brain natriuretic peptide (BNP) level    SIRS (systemic inflammatory response syndrome) (HCC)    Thrombocytosis    Hyperglycemia    BMI 50 0-59 9, adult (HCC)    Anxiety    HTN (hypertension)    Transaminitis    New onset a-fib (HCC)    Acute diastolic heart failure ([facility] )          03/04/22 1450   OT Last Visit   OT Visit Date 03/04/22   Note Type   Note Type Treatment   Restrictions/Precautions   Weight Bearing Precautions Per Order No   Other Precautions Contact/isolation;Telemetry;O2   Pain Assessment   Pain Assessment Tool 0-10   Pain Score No Pain   ADL   Eating Assistance 7  Independent   Eating Deficit Setup   Grooming Assistance 5  Supervision/Setup   Grooming Deficit Wash/dry hands  (at sink level)   LB Dressing Assistance 5  Supervision/Setup   LB Dressing Deficit Don/doff R sock; Don/doff L sock   Toileting Assistance  5  Supervision/Setup   Toileting Deficit Supervison/safety  (assist for line management)   Transfers   Sit to Stand 5  Supervision   Stand to Sit 5  Supervision   Stand pivot 5  Supervision   Toilet transfer 5  Supervision   Additional items Commode   Functional Mobility   Functional Mobility 5  Supervision   Additional Comments assist for O2/tele line management   Therapeutic Exercise - ROM   UE-ROM Yes   ROM- Right Upper Extremities   R Shoulder AROM; Flexion; Extension;Horizontal ABduction   R Elbow AROM;Elbow flexion;Elbow extension   R Weight/Reps/Sets 1 set, 10 reps   ROM - Left Upper Extremities    L Shoulder AROM; Flexion; Extension;Horizontal ABduction   L Elbow AROM;Elbow flexion;Elbow extension   L Weight/Reps/Sets 1 set, 10 reps   Cognition   Overall Cognitive Status WFL   Arousal/Participation Alert; Cooperative   Attention Within functional limits   Orientation Level Oriented X4   Memory Within functional limits   Following Commands Follows all commands and directions without difficulty   Activity Tolerance   Activity Tolerance Patient tolerated treatment well  (SpO2 maintained 89-96% on 75% FiO2 @40L)   Medical Staff Made Aware PT JOCELYN Klein Will   Assessment   Assessment Pt seen this date for skilled OT session focused on ADLs, functional transfers and mobility, safety education  The patient was received seated in bed side chair, NAD, on HFNC 75% @40L, PIV access, tele  She was highly motivated and participated in functional mobility, ambulation, toileting ADL, and seated UB/breathing exercises  The patient was supervision for all mobility and demonstrated good balance and mildly decreased activity tolerance  SpO2 ranged 89-96% with ambulation  At end of session the patient was located in bed side chair with call bell in reach and all needs met  Overall the patient remains below her functional baseline, and is primarily limited at this time due to increased oxygen requirements  OT will continue to follow while acute to address POC  The patient's raw score on the AM-PAC Daily Activity inpatient short form is 22, standardized score is 47 1, greater than 39 4  Patients at this level are likely to benefit from discharge to home  At this time, recommend inpatient rehab vs HHOT upon d/c, pending O2 requirements  Plan   Treatment Interventions ADL retraining;Functional transfer training;UE strengthening/ROM; Endurance training;Patient/family training   Goal Expiration Date 03/09/22   OT Treatment Day 3   OT Frequency 3-5x/wk   Recommendation   OT Discharge Recommendation Post acute rehabilitation services  (vs HHOT pending O2 requirements)   OT - OK to Discharge   (once medically cleared)   AM-PAC Daily Activity Inpatient   Lower Body Dressing 3   Bathing 3   Toileting 4   Upper Body Dressing 4   Grooming 4   Eating 4   Daily Activity Raw Score 22 Daily Activity Standardized Score (Calc for Raw Score >=11) 47  1   AM-PAC Applied Cognition Inpatient   Following a Speech/Presentation 4   Understanding Ordinary Conversation 4   Taking Medications 4   Remembering Where Things Are Placed or Put Away 4   Remembering List of 4-5 Errands 4   Taking Care of Complicated Tasks 4   Applied Cognition Raw Score 24   Applied Cognition Standardized Score 62 21       Joyce Shoulder, OTR/L

## 2022-03-04 NOTE — PLAN OF CARE
Problem: OCCUPATIONAL THERAPY ADULT  Goal: Performs self-care activities at highest level of function for planned discharge setting  See evaluation for individualized goals  Description: Treatment Interventions: ADL retraining,Functional transfer training,UE strengthening/ROM,Endurance training,Patient/family training          See flowsheet documentation for full assessment, interventions and recommendations  Outcome: Progressing  Note: Limitation: Decreased ADL status,Decreased UE strength,Decreased Safe judgement during ADL,Decreased endurance,Decreased high-level ADLs  Prognosis: Fair  Assessment: Pt seen this date for skilled OT session focused on ADLs, functional transfers and mobility, safety education  The patient was received seated in bed side chair, NAD, on HFNC 75% @40L, PIV access, tele  She was highly motivated and participated in functional mobility, ambulation, toileting ADL, and seated UB/breathing exercises  The patient was supervision for all mobility and demonstrated good balance and mildly decreased activity tolerance  SpO2 ranged 89-96% with ambulation  At end of session the patient was located in bed side chair with call bell in reach and all needs met  Overall the patient remains below her functional baseline, and is primarily limited at this time due to increased oxygen requirements  OT will continue to follow while acute to address POC  The patient's raw score on the AM-PAC Daily Activity inpatient short form is 22, standardized score is 47 1, greater than 39 4  Patients at this level are likely to benefit from discharge to home  At this time, recommend inpatient rehab vs HHOT upon d/c, pending O2 requirements        OT Discharge Recommendation: Post acute rehabilitation services (vs HHOT pending O2 requirements)  OT - OK to Discharge:  (once medically cleared)     Bobby Duron OTR/L

## 2022-03-04 NOTE — PROGRESS NOTES
Daily Progress Note - Critical Care   Katie Headley 46 y o  female MRN: [id number]  Unit/Bed#: ICU 11 Encounter: [id number]        ----------------------------------------------------------------------------------------  HPI/24hr events: No acute events overnight     ---------------------------------------------------------------------------------------  SUBJECTIVE  Seen in NAD, on HFNC, complaining of sore gums  States that she is hungry  Review of Systems   Constitutional: Negative for appetite change, chills, fatigue and fever  Respiratory: Negative for cough and shortness of breath  Cardiovascular: Negative for chest pain, palpitations and leg swelling  Gastrointestinal: Negative for abdominal pain, constipation, diarrhea, nausea and vomiting  Genitourinary: Negative for dysuria  Musculoskeletal: Negative for neck pain and neck stiffness  Skin: Negative for rash  Neurological: Negative for dizziness, light-headedness and headaches  Review of systems was reviewed and negative unless stated above in HPI/24-hour events   ---------------------------------------------------------------------------------------  Assessment and Plan:    Neuro:   1  Anxiety  · P r n  Xanax 1 mg   · Cam ICU  · Delirium precautions     CV:   1  New onset AFib with RVR  · RESOLVED  · Chads Vasc 3 (CHF, female, hypertension)   · Metoprolol tartrate 25 q 12  · currently only on DVT prophylaxis  2  Acute on chronic CHF  · POA- proBNP 1 472, CXR with possible pulm congestion  · Echo 02/17/2021 EF 65, G1 DD, IVC normal  · Status post Lasix x several doses  · Off diuretics  · I/O, daily weight  · Monitor electrolytes, maintain mg > 2, K> 4  · Monitor creatinine     Pulm:  1  Acute hypoxic respiratory failure  · 2/2 due to COVID fibrosis versus pneumonitis with underlying emphysema    · CT showed diffuse GGO, emphysema  · S/p ceftriaxone 5/5 on 2/20  · Maintain SpO2 greater than 88%  · Tessalon Perles 200 t i d  P r n , Mucinex  · Atrovent 0 5 q i d  · Xopenex 1 25 q i d   · Prednisone 30 mg  · Continue HFNC, continue to wean as tolerated  · Consider outpatient PFT and sleep study  · Follow up CXR today     GI:   1  Constipation  · Colace 100 mg b i d prn  · MiraLax 17 g p r n   2  Mild transaminitis  · Resolved  · GI prophylaxis  · Protonix 40 daily     :   · None     F/E/N:   · None     Heme/Onc:   · DVT prophylaxis with Heparin  1  Thrombocytosis  · Likely reactive  2  Leukocytosis  · Secondary to steroids  · Blood cultures negative     Endo:   1  Diabetes  · HbA1c 6 6   · Humalog 8 units t i d    · SSI  · Lantus 14 units q 12  · Hypoglycemia protocol  · Glucose checks t i d   2  Morbid obesity  · Encouraged lifestyle modifications      ID:   · Sepsis 2/2 pneumonia  · Off antibiotics now  Fever resolved        MSK/Skin:   · PT/OT rec: Post acute rehab   1  Costochondritis   · 2/2 to cough  · Lidocaine patch ant chest, continue as it helps    Patient appropriate for transfer out of the ICU today?: No  Disposition: Continue Stepdown Level 1 level of care   Code Status: Level 1 - Full Code  ---------------------------------------------------------------------------------------  ICU CORE MEASURES    Prophylaxis   VTE Pharmacologic Prophylaxis: Heparin  VTE Mechanical Prophylaxis: sequential compression device  Stress Ulcer Prophylaxis: Pantoprazole PO      Invasive Devices Review  Invasive Devices  Report    Peripheral Intravenous Line            Peripheral IV 03/02/22 Right Antecubital 1 day              Can any invasive devices be discontinued today? NA  ---------------------------------------------------------------------------------------  OBJECTIVE    Vitals   Vitals:    03/04/22 0600 03/04/22 0700 03/04/22 0732 03/04/22 0757   BP: 120/68      BP Location: Left arm      Pulse: 70 76     Resp: 22 (!) 34     Temp:   (!) 97 1 °F (36 2 °C)    TempSrc:   Temporal    SpO2: 99%   97%   Weight:       Height:         Temp (24hrs), Av 7 °F (36 5 °C), Min:97 °F (36 1 °C), Max:98 6 °F (37 °C)  Current: Temperature: (!) 97 1 °F (36 2 °C)    Respiratory:  SpO2: SpO2: 97 %       Invasive/non-invasive ventilation settings   Respiratory  Report   Lab Data (Last 4 hours)    None         O2/Vent Data (Last 4 hours)    None                Physical Exam  Vitals reviewed  Constitutional:       General: She is not in acute distress  Appearance: She is obese  She is not toxic-appearing  HENT:      Head: Normocephalic and atraumatic  Mouth/Throat:      Mouth: Mucous membranes are dry  Pharynx: Oropharynx is clear  Eyes:      Extraocular Movements: Extraocular movements intact  Pupils: Pupils are equal, round, and reactive to light  Cardiovascular:      Rate and Rhythm: Normal rate and regular rhythm  Pulses: Normal pulses  Heart sounds: Normal heart sounds  Pulmonary:      Effort: Pulmonary effort is normal  No respiratory distress  Comments: Decreased breath sounds b/l  Abdominal:      General: Bowel sounds are normal  There is no distension  Palpations: Abdomen is soft  Tenderness: There is no abdominal tenderness  Musculoskeletal:         General: No swelling or tenderness  Normal range of motion  Cervical back: Normal range of motion and neck supple  Skin:     General: Skin is warm and dry  Neurological:      General: No focal deficit present  Mental Status: She is alert and oriented to person, place, and time  Psychiatric:         Mood and Affect: Mood normal          Behavior: Behavior normal          Thought Content:  Thought content normal          Judgment: Judgment normal          Laboratory and Diagnostics:  Results from last 7 days   Lab Units 22  0639 22  0340   WBC Thousand/uL 17 56* 18 34*   HEMOGLOBIN g/dL 12 4 12 9   HEMATOCRIT % 39 5 41 8   PLATELETS Thousands/uL 444* 484*   NEUTROS PCT % 58 82*   MONOS PCT % 9 5     Results from last 7 days   Lab Units 03/02/22  0456 02/27/22  0639 02/26/22  1005 02/26/22  0340   SODIUM mmol/L 139 141  --  136   POTASSIUM mmol/L 4 1 4 1 4 5 5 6*   CHLORIDE mmol/L 100 104  --  100   CO2 mmol/L 31 31  --  30   ANION GAP mmol/L 8 6  --  6   BUN mg/dL 18 20  --  16   CREATININE mg/dL 0 70 0 76  --  0 72   CALCIUM mg/dL 8 7 8 6  --  8 1*   GLUCOSE RANDOM mg/dL 105 186*  --  255*   ALT U/L  --  51  --   --    AST U/L  --  15  --   --    ALK PHOS U/L  --  70  --   --    ALBUMIN g/dL  --  2 4*  --   --    TOTAL BILIRUBIN mg/dL  --  0 26  --   --           Results from last 7 days   Lab Units 02/26/22  1005 02/26/22  0340 02/25/22  1952 02/25/22  1405   PTT seconds 95* 86* 48* 62*              ABG:    VBG:          Micro        Imaging:  I have personally reviewed pertinent films in PACS    Intake and Output  I/O       03/02 0701  03/03 0700 03/03 0701  03/04 0700 03/04 0701  03/05 0700    P  O   960     I V  (mL/kg)  20 (0 2)     Total Intake(mL/kg)  980 (9 8)     Urine (mL/kg/hr) 275 (0 1) 2445 (1)     Stool       Total Output 275 2445     Net -275 -1465            Unmeasured Urine Occurrence 1 x 1 x     Unmeasured Stool Occurrence 1 x            Height and Weights   Height: 4' 6" (137 2 cm)     Body mass index is 53 42 kg/m²  Weight (last 2 days)     Date/Time Weight    03/03/22 0500 101 (221 56)    03/02/22 0400 103 (227 52)            Nutrition       Diet Orders   (From admission, onward)             Start     Ordered    02/28/22 1016  Diet Polo/CHO Controlled; Consistent Carbohydrate Diet Level 2 (5 carb servings/75 grams CHO/meal);  Consistent Carbohydrate Diet Level 3 (6 carb servings/90 grams CHO/meal)  Diet effective now        References:    Nutrtion Support Algorithm Enteral vs  Parenteral   Question Answer Comment   Diet Type Polo/CHO Controlled    Polo/CHO Controlled Consistent Carbohydrate Diet Level 2 (5 carb servings/75 grams CHO/meal)    Other Restriction(s): Consistent Carbohydrate Diet Level 3 (6 carb servings/90 grams CHO/meal)    RD to adjust diet per protocol?  Yes        02/28/22 1015                  Active Medications  Scheduled Meds:  Current Facility-Administered Medications   Medication Dose Route Frequency Provider Last Rate    acetaminophen  650 mg Oral Q6H PRN Tiffany Cherry MD      ALPRAZolam  0 5 mg Oral BID PRN Aurora Doshi MD      aluminum-magnesium hydroxide-simethicone  30 mL Oral Q4H PRN Tiffany Cherry MD      benzonatate  200 mg Oral TID PRN EMMA Hopkins      carbamide peroxide  5 drop Left Ear BID Aurora Doshi MD      docusate sodium  100 mg Oral BID PRN Aurora Doshi MD      fluticasone  1 spray Each Nare Daily Aurora oDshi MD      guaiFENesin  600 mg Oral Q12H [address] EMMA Hopkins      heparin (porcine)  5,000 Units Subcutaneous Q8H [address] Tiffany Cherry MD      insulin glargine  14 Units Subcutaneous Q12H [address] Tiffany Cherry MD      insulin lispro  2-12 Units Subcutaneous TID AC Tiffany Cherry MD      insulin lispro  8 Units Subcutaneous TID With Meals Tiffany Cherry MD      ipratropium  0 5 mg Nebulization 4x Daily Delvin Shear SondayEMMA      levalbuterol  1 25 mg Nebulization 4x Daily Delvin Shear Sonday, EMMA      lidocaine  1 patch Topical Daily Aurora Doshi MD      Lidocaine Viscous HCl  15 mL Swish & Spit 4x Daily PRN EMMA Packer      metoprolol tartrate  25 mg Oral Q12H [address] EMMA García      pantoprazole  40 mg Oral Daily Before Breakfast Tiffany Cherry MD      polyethylene glycol  17 g Oral Daily PRN Tiffany Cherry MD      predniSONE  30 mg Oral Daily Aurora Doshi MD      saliva substitute  5 spray Mouth/Throat 4x Daily PRN Aurora Doshi MD      sodium chloride  1 application Nasal O4Z PRN Aurora Doshi MD       Continuous Infusions:     PRN Meds:   acetaminophen, 650 mg, Q6H PRN  ALPRAZolam, 0 5 mg, BID PRN  aluminum-magnesium hydroxide-simethicone, 30 mL, Q4H PRN  benzonatate, 200 mg, TID PRN  docusate sodium, 100 mg, BID PRN  Lidocaine Viscous HCl, 15 mL, 4x Daily PRN  polyethylene glycol, 17 g, Daily PRN  saliva substitute, 5 spray, 4x Daily PRN  sodium chloride, 1 application, X6Q PRN        Allergies   No Known Allergies  ----------------------------------------------------------------------------------------------------------  Care Time Delivered:   See attending attestation note    Ivone Back MD      Portions of the record may have been created with voice recognition software  Occasional wrong word or "sound a like" substitutions may have occurred due to the inherent limitations of voice recognition software    Read the chart carefully and recognize, using context, where substitutions have occurred

## 2022-03-04 NOTE — PLAN OF CARE
Problem: PHYSICAL THERAPY ADULT  Goal: Performs mobility at highest level of function for planned discharge setting  See evaluation for individualized goals  Description: Treatment/Interventions: Functional transfer training,LE strengthening/ROM,Elevations,Therapeutic exercise,Endurance training,Patient/family training,Equipment eval/education,Bed mobility,Gait training,Compensatory technique education,Spoke to MD,Spoke to nursing,Continued evaluation,OT  Equipment Recommended:  (monitor)       See flowsheet documentation for full assessment, interventions and recommendations  Outcome: Progressing  Note: Prognosis: Good  Problem List: Decreased strength,Decreased endurance,Impaired balance,Decreased mobility,Obesity  Assessment: Seen for progression of PT  Goals updated with exp date 3/17  Since initial evaluation improved in strength, balance, activity tolerance and functional mobility  Less assistance for transfers and to ambulate short distances without AD  No LOB  Improved balance with ability to ambulate forward/backward, directional changes without LOB  Standing tolerance with walking and stand rests tolerating approx 7 minutes  O2 saturations on 40L HFNC at 75% 89-98%  Increased respiratory rate, but tolerates well with rests  Making functional progress, however respiratory status limits tolerance  The patient's AM-PAC Basic Mobility Inpatient Short Form Raw Score is 19  A Raw score of greater than 16 suggests the patient may benefit from discharge to home  Please also refer to the recommendation of the Physical Therapist for safe discharge planning  Despite AM PAC score, given resides alone, flight LOUIE and must achieve full independence, may still benefit from STR  Will monitor disposition with medical progress as pt goal to return home  Barriers to Discharge: Inaccessible home environment,Decreased caregiver support  Barriers to Discharge Comments: lives alone    14 L[address]IE     PT Discharge Recommendation: Post acute rehabilitation services (monitor disposition with progress )          See flowsheet documentation for full assessment

## 2022-03-04 NOTE — PLAN OF CARE
Problem: MOBILITY - ADULT  Goal: Maintain or return to baseline ADL function  Description: INTERVENTIONS:  -  Assess patient's ability to carry out ADLs; assess patient's baseline for ADL function and identify physical deficits which impact ability to perform ADLs (bathing, care of mouth/teeth, toileting, grooming, dressing, etc )  - Assess/evaluate cause of self-care deficits   - Assess range of motion  - Assess patient's mobility; develop plan if impaired  - Assess patient's need for assistive devices and provide as appropriate  - Encourage maximum independence but intervene and supervise when necessary  - Involve family in performance of ADLs  - Assess for home care needs following discharge   - Consider OT consult to assist with ADL evaluation and planning for discharge  - Provide patient education as appropriate  Outcome: Progressing     Problem: RESPIRATORY - ADULT  Goal: Achieves optimal ventilation and oxygenation  Description: INTERVENTIONS:  - Assess for changes in respiratory status  - Assess for changes in mentation and behavior  - Position to facilitate oxygenation and minimize respiratory effort  - Oxygen administered by appropriate delivery if ordered  - Initiate smoking cessation education as indicated  - Encourage broncho-pulmonary hygiene including cough, deep breathe, Incentive Spirometry  - Assess the need for suctioning and aspirate as needed  - Assess and instruct to report SOB or any respiratory difficulty  - Respiratory Therapy support as indicated  Outcome: Progressing     Problem: MUSCULOSKELETAL - ADULT  Goal: Maintain or return mobility to safest level of function  Description: INTERVENTIONS:  - Assess patient's ability to carry out ADLs; assess patient's baseline for ADL function and identify physical deficits which impact ability to perform ADLs (bathing, care of mouth/teeth, toileting, grooming, dressing, etc )  - Assess/evaluate cause of self-care deficits   - Assess range of motion  - Assess patient's mobility  - Assess patient's need for assistive devices and provide as appropriate  - Encourage maximum independence but intervene and supervise when necessary  - Involve family in performance of ADLs  - Assess for home care needs following discharge   - Consider OT consult to assist with ADL evaluation and planning for discharge  - Provide patient education as appropriate  Outcome: Progressing     Problem: Nutrition/Hydration-ADULT  Goal: Nutrient/Hydration intake appropriate for improving, restoring or maintaining nutritional needs  Description: Monitor and assess patient's nutrition/hydration status for malnutrition  Collaborate with interdisciplinary team and initiate plan and interventions as ordered  Monitor patient's weight and dietary intake as ordered or per policy  Utilize nutrition screening tool and intervene as necessary  Determine patient's food preferences and provide high-protein, high-caloric foods as appropriate       INTERVENTIONS:  - Monitor oral intake, urinary output, labs, and treatment plans  - Assess nutrition and hydration status and recommend course of action  - Evaluate amount of meals eaten  - Assist patient with eating if necessary   - Allow adequate time for meals  - Recommend/ encourage appropriate diets, oral nutritional supplements, and vitamin/mineral supplements  - Order, calculate, and assess calorie counts as needed  - Recommend, monitor, and adjust tube feedings and TPN/PPN based on assessed needs  - Assess need for intravenous fluids  - Provide specific nutrition/hydration education as appropriate  - Include patient/family/caregiver in decisions related to nutrition  Outcome: Progressing

## 2022-03-04 NOTE — PLAN OF CARE
Problem: Potential for Falls  Goal: Patient will remain free of falls  Description: INTERVENTIONS:  - Educate patient/family on patient safety including physical limitations  - Instruct patient to call for assistance with activity   - Consult OT/PT to assist with strengthening/mobility   - Keep Call bell within reach  - Keep bed low and locked with side rails adjusted as appropriate  - Keep care items and personal belongings within reach  - Initiate and maintain comfort rounds  - Make Fall Risk Sign visible to staff  - Apply yellow socks and bracelet for high fall risk patients  - Consider moving patient to room near nurses station  Outcome: Progressing     Problem: MOBILITY - ADULT  Goal: Maintain or return to baseline ADL function  Description: INTERVENTIONS:  -  Assess patient's ability to carry out ADLs; assess patient's baseline for ADL function and identify physical deficits which impact ability to perform ADLs (bathing, care of mouth/teeth, toileting, grooming, dressing, etc )  - Assess/evaluate cause of self-care deficits   - Assess range of motion  - Assess patient's mobility; develop plan if impaired  - Assess patient's need for assistive devices and provide as appropriate  - Encourage maximum independence but intervene and supervise when necessary  - Involve family in performance of ADLs  - Assess for home care needs following discharge   - Consider OT consult to assist with ADL evaluation and planning for discharge  - Provide patient education as appropriate  Outcome: Progressing    Problem: METABOLIC, FLUID AND ELECTROLYTES - ADULT  Goal: Electrolytes maintained within normal limits  Description: INTERVENTIONS:  - Monitor labs and assess patient for signs and symptoms of electrolyte imbalances  - Administer electrolyte replacement as ordered  - Monitor response to electrolyte replacements, including repeat lab results as appropriate  - Instruct patient on fluid and nutrition as appropriate  Outcome: Progressing  Goal: Fluid balance maintained  Description: INTERVENTIONS:  - Monitor labs   - Monitor I/O and WT  - Instruct patient on fluid and nutrition as appropriate  - Assess for signs & symptoms of volume excess or deficit  Outcome: Progressing  Goal: Glucose maintained within target range  Description: INTERVENTIONS:  - Monitor Blood Glucose as ordered  - Assess for signs and symptoms of hyperglycemia and hypoglycemia  - Administer ordered medications to maintain glucose within target range  - Assess nutritional intake and initiate nutrition service referral as needed  Outcome: Progressing     Problem: MUSCULOSKELETAL - ADULT  Goal: Maintain or return mobility to safest level of function  Description: INTERVENTIONS:  - Assess patient's ability to carry out ADLs; assess patient's baseline for ADL function and identify physical deficits which impact ability to perform ADLs (bathing, care of mouth/teeth, toileting, grooming, dressing, etc )  - Assess/evaluate cause of self-care deficits   - Assess range of motion  - Assess patient's mobility  - Assess patient's need for assistive devices and provide as appropriate  - Encourage maximum independence but intervene and supervise when necessary  - Involve family in performance of ADLs  - Assess for home care needs following discharge   - Consider OT consult to assist with ADL evaluation and planning for discharge  - Provide patient education as appropriate  Outcome: Progressing  Goal: Maintain proper alignment of affected body part  Description: INTERVENTIONS:  - Support, maintain and protect limb and body alignment  - Provide patient/ family with appropriate education  Outcome: Progressing     Problem: Nutrition/Hydration-ADULT  Goal: Nutrient/Hydration intake appropriate for improving, restoring or maintaining nutritional needs  Description: Monitor and assess patient's nutrition/hydration status for malnutrition   Collaborate with interdisciplinary team and initiate plan and interventions as ordered  Monitor patient's weight and dietary intake as ordered or per policy  Utilize nutrition screening tool and intervene as necessary  Determine patient's food preferences and provide high-protein, high-caloric foods as appropriate       INTERVENTIONS:  - Monitor oral intake, urinary output, labs, and treatment plans  - Assess nutrition and hydration status and recommend course of action  - Evaluate amount of meals eaten  - Assist patient with eating if necessary   - Allow adequate time for meals  - Recommend/ encourage appropriate diets, oral nutritional supplements, and vitamin/mineral supplements  - Order, calculate, and assess calorie counts as needed  - Recommend, monitor, and adjust tube feedings and TPN/PPN based on assessed needs  - Assess need for intravenous fluids  - Provide specific nutrition/hydration education as appropriate  - Include patient/family/caregiver in decisions related to nutrition  Outcome: Progressing

## 2022-03-04 NOTE — PHYSICAL THERAPY NOTE
PHYSICAL THERAPY NOTE  14:50-15:20          Patient Name: Tamir Amin  IKYHX'N Date: 3/4/2022     03/04/22 1520   PT Last Visit   PT Visit Date 03/04/22   Note Type   Note Type Treatment   Pain Assessment   Pain Score No Pain   Restrictions/Precautions   Weight Bearing Precautions Per Order No   Other Precautions Contact/isolation;Multiple lines;Telemetry;O2;Fall Risk  (HFNC 40L at 75%)   General   Chart Reviewed Yes   Response to Previous Treatment Patient with no complaints from previous session  Family/Caregiver Present No   Cognition   Overall Cognitive Status WFL   Comments Motivated   Subjective   Subjective "Motivated for therapy  I would like to walk, feels so good  My goal is to discharge directly to home from here!"   Bed Mobility   Additional Comments OOB in recliner upon arrival   Resting O2 sat on HFNC 40L 75% 96%   Transfers   Sit to Stand 5  Supervision   Additional items Increased time required;Verbal cues;Armrests   Stand to Sit 5  Supervision   Additional items Armrests; Verbal cues   Additional Comments performed sit to stand x 4 during session   Ambulation/Elevation   Gait pattern Improper Weight shift;Decreased foot clearance; Inconsistent becky; Short stride   Gait Assistance 5  Supervision   Additional items Verbal cues   Assistive Device Rolling walker;None  (RW-->no AD)   Distance 10'x2 with RW, seated rest, then amb 12'x4  Standing rest   O2 sats 89-98% with ambulation on HFNC  Backward walking 10'x2 performed twice  Balance   Static Sitting Good   Dynamic Sitting Fair +   Static Standing Fair +   Dynamic Standing Fair   Ambulatory Fair   Endurance Deficit   Endurance Deficit Yes   Endurance Deficit Description fatigue, MCDOWELL, increased respiratory rate    HFNC range 89-98%   Activity Tolerance   Activity Tolerance Treatment limited secondary to medical complications (Comment)   Medical Staff Made Aware Nurse, [place] OT-Rebeca Green yes   Exercises   Squat 10 reps;AROM; Bilateral;Standing  (miniquats)   Marching 10 reps;Standing;Bilateral   Balance training  total standing tolerance with walking and standing rests x 7 minutes  Assessment   Prognosis Good   Problem List Decreased strength;Decreased endurance; Impaired balance;Decreased mobility;Obesity   Assessment Seen for progression of PT  Goals updated with exp date 3/17  Since initial evaluation improved in strength, balance, activity tolerance and functional mobility  Less assistance for transfers and to ambulate short distances without AD  No LOB  Improved balance with ability to ambulate forward/backward, directional changes without LOB  Standing tolerance with walking and stand rests tolerating approx 7 minutes  O2 saturations on 40L HFNC at 75% 89-98%  Increased respiratory rate, but tolerates well with rests  Making functional progress, however respiratory status limits tolerance  The patient's AM-PAC Basic Mobility Inpatient Short Form Raw Score is 19  A Raw score of greater than 16 suggests the patient may benefit from discharge to home  Please also refer to the recommendation of the Physical Therapist for safe discharge planning  Despite AM PAC score, given resides alone, flight LOUIE and must achieve full independence, may still benefit from STR  Will monitor disposition with medical progress as pt goal to return home   Barriers to Discharge Inaccessible home environment;Decreased caregiver support   Barriers to Discharge Comments lives alone  14 LOUIE   Goals   Patient Goals get better and go home  STG Expiration Date 03/17/22   Short Term Goal #1  1)  Pt will perform bed mobility with Jocelin demonstrating appropriate technique 100% of the time in order to improve function  2)  Perform all transfers with Jocelin demonstrating safe and appropriate technique 100% of the time in order to improve ability to negotiate safely in home environment  3) Amb with least restrictive AD prn > 100'x1 with mod I to I in order to demonstrate ability to negotiate in home environment  4)  Improve overall strength and balance 1/2 grade in order to optimize ability to perform functional tasks and reduce fall risk  5) Increase activity tolerance to 45 minutes in order to improve endurance to functional tasks  6)  Negotiate stairs using most appropriate technique and S in order to be able to negotiate safely into home environment  7) PT for ongoing patient and family/caregiver education, DME needs and d/c planning in order to promote highest level of function in least restrictive environment  Significant   Plan   Treatment/Interventions Functional transfer training;LE strengthening/ROM; Elevations; Therapeutic exercise; Endurance training;Patient/family training;Equipment eval/education; Bed mobility;Gait training; Compensatory technique education;Spoke to MD;Spoke to nursing;Continued evaluation;OT   Progress Progressing toward goals   PT Frequency 4-6x/wk   Recommendation   PT Discharge Recommendation Post acute rehabilitation services  (monitor disposition with progress )   Equipment Recommended   (monitor)   AM-PAC Basic Mobility Inpatient   Turning in Bed Without Bedrails 3   Lying on Back to Sitting on Edge of Flat Bed 3   Moving Bed to Chair 4   Standing Up From Chair 4   Walk in Room 3   Climb 3-5 Stairs 2   Basic Mobility Inpatient Raw Score 19   Basic Mobility Standardized Score 42 48   Highest Level Of Mobility   JH-HLM Goal 6: Walk 10 steps or more   JH-HLM Highest Level of Mobility 6: Walk 10 steps or more   JH-HLM Goal Achieved Yes   Education   Education Provided Home exercise program;Mobility training   Patient Demonstrates acceptance/verbal understanding   End of Consult   Patient Position at End of Consult Bedside chair; All needs within reach   Bobby Gardner, PT

## 2022-03-04 NOTE — QUICK NOTE
Called and updated patient's daughter Ashleigh Barahona on plan of care  Answered all questions and concerns

## 2022-03-05 LAB
GLUCOSE SERPL-MCNC: 134 MG/DL (ref 65–140)
GLUCOSE SERPL-MCNC: 134 MG/DL (ref 65–140)
GLUCOSE SERPL-MCNC: 202 MG/DL (ref 65–140)
GLUCOSE SERPL-MCNC: 97 MG/DL (ref 65–140)

## 2022-03-05 PROCEDURE — 82948 REAGENT STRIP/BLOOD GLUCOSE: CPT

## 2022-03-05 PROCEDURE — 94660 CPAP INITIATION&MGMT: CPT

## 2022-03-05 PROCEDURE — 94640 AIRWAY INHALATION TREATMENT: CPT

## 2022-03-05 PROCEDURE — 99232 SBSQ HOSP IP/OBS MODERATE 35: CPT | Performed by: INTERNAL MEDICINE

## 2022-03-05 RX ADMIN — GUAIFENESIN 600 MG: 600 TABLET, EXTENDED RELEASE ORAL at 09:56

## 2022-03-05 RX ADMIN — PANTOPRAZOLE SODIUM 40 MG: 40 TABLET, DELAYED RELEASE ORAL at 06:09

## 2022-03-05 RX ADMIN — CARBAMIDE PEROXIDE 6.5% 5 DROP: 6.5 LIQUID AURICULAR (OTIC) at 21:52

## 2022-03-05 RX ADMIN — HEPARIN SODIUM 5000 UNITS: 5000 INJECTION INTRAVENOUS; SUBCUTANEOUS at 21:51

## 2022-03-05 RX ADMIN — BENZONATATE 200 MG: 100 CAPSULE ORAL at 10:48

## 2022-03-05 RX ADMIN — INSULIN LISPRO 4 UNITS: 100 INJECTION, SOLUTION INTRAVENOUS; SUBCUTANEOUS at 16:36

## 2022-03-05 RX ADMIN — METOPROLOL TARTRATE 25 MG: 25 TABLET, FILM COATED ORAL at 21:51

## 2022-03-05 RX ADMIN — HEPARIN SODIUM 5000 UNITS: 5000 INJECTION INTRAVENOUS; SUBCUTANEOUS at 05:50

## 2022-03-05 RX ADMIN — DIPHENHYDRAMINE HYDROCHLORIDE 10 ML: 12.5 LIQUID ORAL at 05:42

## 2022-03-05 RX ADMIN — INSULIN GLARGINE 10 UNITS: 100 INJECTION, SOLUTION SUBCUTANEOUS at 21:52

## 2022-03-05 RX ADMIN — LEVALBUTEROL HYDROCHLORIDE 1.25 MG: 1.25 SOLUTION, CONCENTRATE RESPIRATORY (INHALATION) at 07:50

## 2022-03-05 RX ADMIN — METOPROLOL TARTRATE 25 MG: 25 TABLET, FILM COATED ORAL at 09:56

## 2022-03-05 RX ADMIN — LIDOCAINE HYDROCHLORIDE 15 ML: 20 SOLUTION ORAL; TOPICAL at 21:57

## 2022-03-05 RX ADMIN — IPRATROPIUM BROMIDE 0.5 MG: 0.5 SOLUTION RESPIRATORY (INHALATION) at 07:50

## 2022-03-05 RX ADMIN — LEVALBUTEROL HYDROCHLORIDE 1.25 MG: 1.25 SOLUTION, CONCENTRATE RESPIRATORY (INHALATION) at 19:34

## 2022-03-05 RX ADMIN — CARBAMIDE PEROXIDE 6.5% 5 DROP: 6.5 LIQUID AURICULAR (OTIC) at 10:40

## 2022-03-05 RX ADMIN — FLUTICASONE PROPIONATE 1 SPRAY: 50 SPRAY, METERED NASAL at 10:40

## 2022-03-05 RX ADMIN — LIDOCAINE 1 PATCH: 50 PATCH CUTANEOUS at 09:55

## 2022-03-05 RX ADMIN — GUAIFENESIN 600 MG: 600 TABLET, EXTENDED RELEASE ORAL at 21:51

## 2022-03-05 RX ADMIN — IPRATROPIUM BROMIDE 0.5 MG: 0.5 SOLUTION RESPIRATORY (INHALATION) at 12:07

## 2022-03-05 RX ADMIN — LEVALBUTEROL HYDROCHLORIDE 1.25 MG: 1.25 SOLUTION, CONCENTRATE RESPIRATORY (INHALATION) at 12:07

## 2022-03-05 RX ADMIN — HEPARIN SODIUM 5000 UNITS: 5000 INJECTION INTRAVENOUS; SUBCUTANEOUS at 15:56

## 2022-03-05 RX ADMIN — IPRATROPIUM BROMIDE 0.5 MG: 0.5 SOLUTION RESPIRATORY (INHALATION) at 19:34

## 2022-03-05 RX ADMIN — PREDNISONE 30 MG: 20 TABLET ORAL at 09:56

## 2022-03-05 RX ADMIN — BENZONATATE 200 MG: 100 CAPSULE ORAL at 21:57

## 2022-03-05 RX ADMIN — INSULIN GLARGINE 10 UNITS: 100 INJECTION, SOLUTION SUBCUTANEOUS at 09:55

## 2022-03-05 NOTE — PROGRESS NOTES
[address] [facility]  [facility] 1970[facility] 46 [facility]  [facility] MRN: [id number]  Unit/Bed#: ICU 11 Encounter: [id number]  Primary Care Provider: Lacey Meyers MD   Date and time admitted to hospital: 2/16/2022  7:01 PM    * Acute respiratory failure with hypoxia [facility])  Assessment & Plan  ·  likely multifactorial secondary to pneumonia vs recent COVID sequale/fibrosis vs volume overload vs possible pneumonitis  § Continue BIPAP at night  § tenuous respiratory status  § Maintain SPO2 > 88%  § veletri weaned off on 2/22  § Prednisone taper - 30 mg daily  § Completed 5/5 days of ceftriaxone on 2/20  § Continue Atrovent and Xopenex nebs  § Mucinex, tessalon perls   § Incentive spirometry  § Vest therapy      SIRS (systemic inflammatory response syndrome) (HCC)  Assessment & Plan  · POA as evidenced by: tachycardia, leukocytosis (20 59), and lactic acidosis (2 7)  · BC NG  · PCT 0 38-0 09 ceftriaxone 5 day course completed    Elevated brain natriuretic peptide (BNP) level  Assessment & Plan  · NT- proBNP elevated on admission at 1472  · ECHO 2/17: Left ventricular cavity size is normal  LVEF 65% by visual estimation  Systolic function is normal  Wall motion is normal  There is mild asymmetric hypertrophy  Diastolic function is mildly abnormal, consistent with grade I (abnormal) relaxation    · IV Lasix PRN, try negative 500cc fluid balance    Hyperglycemia  Assessment & Plan  Lab Results   Component Value Date    HGBA1C 6 6 (H) 02/16/2022     Recent Labs     02/21/22  2236   POCGLU 247*     · New onset DM vs steroids in covid over last 30 days  · Lantus increase to 25U  · Humalog increase to 8 U  · SSI  · ADJUST WITH TAPER STEROIDS      Thrombocytosis  Assessment & Plan  · Suspect secondary to post COVID infection  · improving      Acute diastolic heart failure (HCC)  Assessment & Plan  Wt Readings from Last 3 Encounters:   02/26/22 107 kg (236 lb 12 4 oz)   01/06/22 100 kg (221 lb)     § POA with elevated pro BNP of 1472, CXR of possible pulm congestion  § Echo 2/17/21: preserved EF 65%, G1DD, IVC normal, euvolemic today  § Continue to hold diuretics  § Monitor I/O, daily weight  § Monitor electrolytes, maintain Mg>2, K>4  § Monitor renal functions          New onset a-fib ([address] )  Assessment & Plan  · Karen Estrada 3 (female, CHF, HTN), now in sinus rhythm, following an aspiration event  § Continue lopressor at 25 mg bid  § AC discontinued given this was a transient event - on prophylactic AC      Transaminitis  Assessment & Plan  · Mild transaminitis   § Continue to monitor liver enzymes q3 days  § continue protonix 40 mg daily po for GI prophylaxis  § Bowel regimen for constipation  § Continue mylanta      HTN (hypertension)  Assessment & Plan  § Continue to hold home amlodipine, SBP currently 110-130  § Continue lopressor 25 mg bid      Anxiety  Assessment & Plan  § Prn Xanax 1 mg  § Consider seroquel if patient continues to have agitation overnight  § [facility] ICU      BMI 50 0-59 9, adult (HCC)  Assessment & Plan  · BMI 53 on admission  · Will benefit from lifestyle modifications on discharge     Hypokalemia-resolved as of 2/23/2022  Assessment & Plan  · Potassium 3 1 on admission  · Replete as appropriate         ----------------------------------------------------------------------------------------  HPI/24hr events: Patient remains on HFNC  She is in no acute distress  She does complain of mouth sores    Patient appropriate for transfer out of the ICU today?: Patient meets criteria for referral to the ICU Follow-up Clinic; referral has been made  Disposition: Transfer to Stepdown Level 2  Code Status: Level 1 - Full Code  ---------------------------------------------------------------------------------------  SUBJECTIVE  Complaints of ongoing mouth sores - magic mouthwash ordered    Review of Systems   Respiratory: Negative for shortness of breath      All other systems reviewed and are negative  Review of systems was reviewed and negative unless stated above in HPI/24-hour events   ---------------------------------------------------------------------------------------  OBJECTIVE    Vitals   Vitals:    22 2356 22 0000 22 0400 22 0404   BP:  114/58  148/72   BP Location:  Left arm     Pulse:   82 80   Resp:   21 20   Temp: (!) 97 1 °F (36 2 °C)      TempSrc: Temporal      SpO2:  99% 97% 97%   Weight:       Height:         Temp (24hrs), Av 4 °F (36 3 °C), Min:97 °F (36 1 °C), Max:98 2 °F (36 8 °C)  Current: Temperature: (!) 97 1 °F (36 2 °C)          Respiratory:  SpO2: SpO2: 97 %       Invasive/non-invasive ventilation settings   Respiratory  Report   Lab Data (Last 4 hours)    None         O2/Vent Data (Last 4 hours)      318          Non-Invasive Ventilation Mode HFNC (High flow)                   Physical Exam  Vitals reviewed  HENT:      Head: Normocephalic and atraumatic  Nose: Nose normal    Eyes:      Extraocular Movements: Extraocular movements intact  Pupils: Pupils are equal, round, and reactive to light  Cardiovascular:      Rate and Rhythm: Normal rate and regular rhythm  Pulses: Normal pulses  Heart sounds: Normal heart sounds  Pulmonary:      Effort: Pulmonary effort is normal       Breath sounds: Normal breath sounds  Abdominal:      General: Abdomen is flat  Bowel sounds are normal  There is no distension  Palpations: Abdomen is soft  Tenderness: There is no abdominal tenderness  Musculoskeletal:         General: Normal range of motion  Cervical back: Normal range of motion and neck supple  Skin:     General: Skin is warm  Neurological:      General: No focal deficit present  Mental Status: She is alert and oriented to person, place, and time               Laboratory and Diagnostics:  Results from last 7 days   Lab Units 22  0639   WBC Thousand/uL 17 56*   HEMOGLOBIN g/dL 12 4 HEMATOCRIT % 39 5   PLATELETS Thousands/uL 444*   NEUTROS PCT % 58   MONOS PCT % 9     Results from last 7 days   Lab Units 03/02/22  0456 02/27/22  0639 02/26/22  1005   SODIUM mmol/L 139 141  --    POTASSIUM mmol/L 4 1 4 1 4 5   CHLORIDE mmol/L 100 104  --    CO2 mmol/L 31 31  --    ANION GAP mmol/L 8 6  --    BUN mg/dL 18 20  --    CREATININE mg/dL 0 70 0 76  --    CALCIUM mg/dL 8 7 8 6  --    GLUCOSE RANDOM mg/dL 105 186*  --    ALT U/L  --  51  --    AST U/L  --  15  --    ALK PHOS U/L  --  70  --    ALBUMIN g/dL  --  2 4*  --    TOTAL BILIRUBIN mg/dL  --  0 26  --           Results from last 7 days   Lab Units 02/26/22  1005   PTT seconds 95*              ABG:    VBG:          Micro        EKG:   Imaging: I have personally reviewed pertinent reports  Intake and Output  I/O       03/03 0701  03/04 0700 03/04 0701  03/05 0700    P  O  960 1300    I V  (mL/kg) 20 (0 2)     Total Intake(mL/kg) 980 (9 8) 1300 (13)    Urine (mL/kg/hr) 2445 (1) 1275 (0 5)    Total Output 2445 1275    Net -1465 +25          Unmeasured Urine Occurrence 1 x           Height and Weights   Height: 4' 6" (137 2 cm)     Body mass index is 53 42 kg/m²  Weight (last 2 days)     Date/Time Weight    03/03/22 0500 101 (221 56)            Nutrition       Diet Orders   (From admission, onward)             Start     Ordered    02/28/22 1016  Diet Polo/CHO Controlled; Consistent Carbohydrate Diet Level 2 (5 carb servings/75 grams CHO/meal); Consistent Carbohydrate Diet Level 3 (6 carb servings/90 grams CHO/meal)  Diet effective now        References:    Nutrtion Support Algorithm Enteral vs  Parenteral   Question Answer Comment   Diet Type Polo/CHO Controlled    Polo/CHO Controlled Consistent Carbohydrate Diet Level 2 (5 carb servings/75 grams CHO/meal)    Other Restriction(s): Consistent Carbohydrate Diet Level 3 (6 carb servings/90 grams CHO/meal)    RD to adjust diet per protocol?  Yes        02/28/22 1015                  Active Medications  Scheduled Meds:  Current Facility-Administered Medications   Medication Dose Route Frequency Provider Last Rate    acetaminophen  650 mg Oral Q6H PRN Lynn Reynolds MD      al mag oxide-diphenhydramine-lidocaine viscous  10 mL Swish & Swallow Q6H PRN EMMA Johnson      ALPRAZolam  0 5 mg Oral BID PRN Bernita Apley, MD      aluminum-magnesium hydroxide-simethicone  30 mL Oral Q4H PRN Lynn Reynolds MD      benzonatate  200 mg Oral TID PRN EMMA Stone      carbamide peroxide  5 drop Left Ear BID Bernita Apley, MD      docusate sodium  100 mg Oral BID PRN Bernita Apley, MD      fluticasone  1 spray Each Nare Daily Bernita Apley, MD      guaiFENesin  600 mg Oral Q12H [address] EMMA Stone      heparin (porcine)  5,000 Units Subcutaneous [facility] Lynn Reynolds MD      insulin glargine  10 Units Subcutaneous Q12H [address] Nini Herndon MD      insulin lispro  2-12 Units Subcutaneous TID AC Lynn Reynolds MD      insulin lispro  8 Units Subcutaneous TID With Meals Lynn Reynolds MD      ipratropium  0 5 mg Nebulization 4x Daily Santino Fine Sonday, EMMA      levalbuterol  1 25 mg Nebulization 4x Daily Santino Fine Sonday, EMMA      lidocaine  1 patch Topical Daily Bernita Apley, MD      Lidocaine Viscous HCl  15 mL Swish & Spit 4x Daily PRN EMMA Kamara      metoprolol tartrate  25 mg Oral Q12H [address] EMMA Connell      pantoprazole  40 mg Oral Daily Before Breakfast Lynn Reynolds MD      polyethylene glycol  17 g Oral Daily PRN Lynn Reynolds MD      predniSONE  30 mg Oral Daily Bernita Apley, MD      saliva substitute  5 spray Mouth/Throat 4x Daily PRN Bernita Apley, MD      sodium chloride  1 application Nasal K4H PRN Bernita Apley, MD       Continuous Infusions:     PRN Meds:   acetaminophen, 650 mg, Q6H PRN  al mag oxide-diphenhydramine-lidocaine viscous, 10 mL, Q6H PRN  ALPRAZolam, 0 5 mg, BID PRN  aluminum-magnesium hydroxide-simethicone, 30 mL, Q4H PRN  benzonatate, 200 mg, TID PRN  docusate sodium, 100 mg, BID PRN  Lidocaine Viscous HCl, 15 mL, 4x Daily PRN  polyethylene glycol, 17 g, Daily PRN  saliva substitute, 5 spray, 4x Daily PRN  sodium chloride, 1 application, U5U PRN        Invasive Devices Review  Invasive Devices  Report    Peripheral Intravenous Line            Peripheral IV 03/02/22 Right Antecubital 2 days                Rationale for remaining devices:   ---------------------------------------------------------------------------------------  Advance Directive and Living Will:      Power of :    POLST:    ---------------------------------------------------------------------------------------  Care Time Delivered:   No Critical Care time spent       EMMA Quinones      Portions of the record may have been created with voice recognition software  Occasional wrong word or "sound a like" substitutions may have occurred due to the inherent limitations of voice recognition software    Read the chart carefully and recognize, using context, where substitutions have occurred

## 2022-03-05 NOTE — PLAN OF CARE
Problem: Potential for Falls  Goal: Patient will remain free of falls  Description: INTERVENTIONS:  - Educate patient/family on patient safety including physical limitations  - Instruct patient to call for assistance with activity   - Consult OT/PT to assist with strengthening/mobility   - Keep Call bell within reach  - Keep bed low and locked with side rails adjusted as appropriate  - Keep care items and personal belongings within reach  - Initiate and maintain comfort rounds  - Make Fall Risk Sign visible to staff  - Offer Toileting every 2 Hours, in advance of need  - Apply yellow socks and bracelet for high fall risk patients  - Consider moving patient to room near nurses station  Outcome: Progressing     Problem: MOBILITY - ADULT  Goal: Maintain or return to baseline ADL function  Description: INTERVENTIONS:  -  Assess patient's ability to carry out ADLs; assess patient's baseline for ADL function and identify physical deficits which impact ability to perform ADLs (bathing, care of mouth/teeth, toileting, grooming, dressing, etc )  - Assess/evaluate cause of self-care deficits   - Assess range of motion  - Assess patient's mobility; develop plan if impaired  - Assess patient's need for assistive devices and provide as appropriate  - Encourage maximum independence but intervene and supervise when necessary  - Involve family in performance of ADLs  - Assess for home care needs following discharge   - Consider OT consult to assist with ADL evaluation and planning for discharge  - Provide patient education as appropriate  Outcome: Progressing  Goal: Maintains/Returns to pre admission functional level  Description: INTERVENTIONS:  - Perform BMAT or MOVE assessment daily    - Set and communicate daily mobility goal to care team and patient/family/caregiver  - Collaborate with rehabilitation services on mobility goals if consulted  - Perform Range of Motion 3 times a day  - Reposition patient every 2 hours    - Dangle patient 3 times a day  - Stand patient 3 times a day  - Ambulate patient 3 times a day  - Out of bed to chair 3 times a day   - Out of bed for meals 3 times a day  - Out of bed for toileting  - Record patient progress and toleration of activity level   Outcome: Progressing     Problem: Prexisting or High Potential for Compromised Skin Integrity  Goal: Skin integrity is maintained or improved  Description: INTERVENTIONS:  - Identify patients at risk for skin breakdown  - Assess and monitor skin integrity  - Assess and monitor nutrition and hydration status  - Monitor labs   - Assess for incontinence   - Turn and reposition patient  - Assist with mobility/ambulation  - Relieve pressure over bony prominences  - Avoid friction and shearing  - Provide appropriate hygiene as needed including keeping skin clean and dry  - Evaluate need for skin moisturizer/barrier cream  - Collaborate with interdisciplinary team   - Patient/family teaching  - Consider wound care consult   Outcome: Progressing     Problem: RESPIRATORY - ADULT  Goal: Achieves optimal ventilation and oxygenation  Description: INTERVENTIONS:  - Assess for changes in respiratory status  - Assess for changes in mentation and behavior  - Position to facilitate oxygenation and minimize respiratory effort  - Oxygen administered by appropriate delivery if ordered  - Initiate smoking cessation education as indicated  - Encourage broncho-pulmonary hygiene including cough, deep breathe, Incentive Spirometry  - Assess the need for suctioning and aspirate as needed  - Assess and instruct to report SOB or any respiratory difficulty  - Respiratory Therapy support as indicated  Outcome: Progressing     Problem: METABOLIC, FLUID AND ELECTROLYTES - ADULT  Goal: Electrolytes maintained within normal limits  Description: INTERVENTIONS:  - Monitor labs and assess patient for signs and symptoms of electrolyte imbalances  - Administer electrolyte replacement as ordered  - Monitor response to electrolyte replacements, including repeat lab results as appropriate  - Instruct patient on fluid and nutrition as appropriate  Outcome: Progressing  Goal: Fluid balance maintained  Description: INTERVENTIONS:  - Monitor labs   - Monitor I/O and WT  - Instruct patient on fluid and nutrition as appropriate  - Assess for signs & symptoms of volume excess or deficit  Outcome: Progressing  Goal: Glucose maintained within target range  Description: INTERVENTIONS:  - Monitor Blood Glucose as ordered  - Assess for signs and symptoms of hyperglycemia and hypoglycemia  - Administer ordered medications to maintain glucose within target range  - Assess nutritional intake and initiate nutrition service referral as needed  Outcome: Progressing     Problem: MUSCULOSKELETAL - ADULT  Goal: Maintain or return mobility to safest level of function  Description: INTERVENTIONS:  - Assess patient's ability to carry out ADLs; assess patient's baseline for ADL function and identify physical deficits which impact ability to perform ADLs (bathing, care of mouth/teeth, toileting, grooming, dressing, etc )  - Assess/evaluate cause of self-care deficits   - Assess range of motion  - Assess patient's mobility  - Assess patient's need for assistive devices and provide as appropriate  - Encourage maximum independence but intervene and supervise when necessary  - Involve family in performance of ADLs  - Assess for home care needs following discharge   - Consider OT consult to assist with ADL evaluation and planning for discharge  - Provide patient education as appropriate  Outcome: Progressing  Goal: Maintain proper alignment of affected body part  Description: INTERVENTIONS:  - Support, maintain and protect limb and body alignment  - Provide patient/ family with appropriate education  Outcome: Progressing     Problem: Nutrition/Hydration-ADULT  Goal: Nutrient/Hydration intake appropriate for improving, restoring or maintaining nutritional needs  Description: Monitor and assess patient's nutrition/hydration status for malnutrition  Collaborate with interdisciplinary team and initiate plan and interventions as ordered  Monitor patient's weight and dietary intake as ordered or per policy  Utilize nutrition screening tool and intervene as necessary  Determine patient's food preferences and provide high-protein, high-caloric foods as appropriate       INTERVENTIONS:  - Monitor oral intake, urinary output, labs, and treatment plans  - Assess nutrition and hydration status and recommend course of action  - Evaluate amount of meals eaten  - Assist patient with eating if necessary   - Allow adequate time for meals  - Recommend/ encourage appropriate diets, oral nutritional supplements, and vitamin/mineral supplements  - Order, calculate, and assess calorie counts as needed  - Recommend, monitor, and adjust tube feedings and TPN/PPN based on assessed needs  - Assess need for intravenous fluids  - Provide specific nutrition/hydration education as appropriate  - Include patient/family/caregiver in decisions related to nutrition  Outcome: Progressing

## 2022-03-05 NOTE — PROGRESS NOTES
Patient ambulated to [place] [address] [address], room 222, without assistance wearing a NRB  Patient tolerated walk well  Patient oriented to new room, belongings at bedside  Report was called to JOCELYN Calles, who met us at bedside   Patient was placed back on HFNC, FiO2 50 % at 40LPM

## 2022-03-06 LAB
GLUCOSE SERPL-MCNC: 100 MG/DL (ref 65–140)
GLUCOSE SERPL-MCNC: 125 MG/DL (ref 65–140)
GLUCOSE SERPL-MCNC: 140 MG/DL (ref 65–140)
GLUCOSE SERPL-MCNC: 150 MG/DL (ref 65–140)

## 2022-03-06 PROCEDURE — 99232 SBSQ HOSP IP/OBS MODERATE 35: CPT | Performed by: INTERNAL MEDICINE

## 2022-03-06 PROCEDURE — 82948 REAGENT STRIP/BLOOD GLUCOSE: CPT

## 2022-03-06 PROCEDURE — 94640 AIRWAY INHALATION TREATMENT: CPT

## 2022-03-06 RX ORDER — GINSENG 100 MG
1 CAPSULE ORAL 2 TIMES DAILY
Status: DISCONTINUED | OUTPATIENT
Start: 2022-03-06 | End: 2022-03-16 | Stop reason: HOSPADM

## 2022-03-06 RX ORDER — PREDNISONE 20 MG/1
20 TABLET ORAL DAILY
Status: DISCONTINUED | OUTPATIENT
Start: 2022-03-06 | End: 2022-03-06

## 2022-03-06 RX ORDER — PREDNISONE 20 MG/1
20 TABLET ORAL DAILY
Status: COMPLETED | OUTPATIENT
Start: 2022-03-07 | End: 2022-03-11

## 2022-03-06 RX ADMIN — GUAIFENESIN 600 MG: 600 TABLET, EXTENDED RELEASE ORAL at 21:10

## 2022-03-06 RX ADMIN — LEVALBUTEROL HYDROCHLORIDE 1.25 MG: 1.25 SOLUTION, CONCENTRATE RESPIRATORY (INHALATION) at 08:33

## 2022-03-06 RX ADMIN — METOPROLOL TARTRATE 25 MG: 25 TABLET, FILM COATED ORAL at 21:10

## 2022-03-06 RX ADMIN — HEPARIN SODIUM 5000 UNITS: 5000 INJECTION INTRAVENOUS; SUBCUTANEOUS at 13:23

## 2022-03-06 RX ADMIN — PANTOPRAZOLE SODIUM 40 MG: 40 TABLET, DELAYED RELEASE ORAL at 06:47

## 2022-03-06 RX ADMIN — IPRATROPIUM BROMIDE 0.5 MG: 0.5 SOLUTION RESPIRATORY (INHALATION) at 15:42

## 2022-03-06 RX ADMIN — IPRATROPIUM BROMIDE 0.5 MG: 0.5 SOLUTION RESPIRATORY (INHALATION) at 12:18

## 2022-03-06 RX ADMIN — LEVALBUTEROL HYDROCHLORIDE 1.25 MG: 1.25 SOLUTION, CONCENTRATE RESPIRATORY (INHALATION) at 15:42

## 2022-03-06 RX ADMIN — INSULIN GLARGINE 10 UNITS: 100 INJECTION, SOLUTION SUBCUTANEOUS at 08:06

## 2022-03-06 RX ADMIN — BACITRACIN 1 SMALL APPLICATION: 500 OINTMENT TOPICAL at 12:42

## 2022-03-06 RX ADMIN — BENZONATATE 200 MG: 100 CAPSULE ORAL at 08:01

## 2022-03-06 RX ADMIN — FLUTICASONE PROPIONATE 1 SPRAY: 50 SPRAY, METERED NASAL at 08:02

## 2022-03-06 RX ADMIN — LIDOCAINE HYDROCHLORIDE 15 ML: 20 SOLUTION ORAL; TOPICAL at 08:03

## 2022-03-06 RX ADMIN — PREDNISONE 30 MG: 20 TABLET ORAL at 08:01

## 2022-03-06 RX ADMIN — CARBAMIDE PEROXIDE 6.5% 5 DROP: 6.5 LIQUID AURICULAR (OTIC) at 08:02

## 2022-03-06 RX ADMIN — IPRATROPIUM BROMIDE 0.5 MG: 0.5 SOLUTION RESPIRATORY (INHALATION) at 20:23

## 2022-03-06 RX ADMIN — METOPROLOL TARTRATE 25 MG: 25 TABLET, FILM COATED ORAL at 08:02

## 2022-03-06 RX ADMIN — IPRATROPIUM BROMIDE 0.5 MG: 0.5 SOLUTION RESPIRATORY (INHALATION) at 08:33

## 2022-03-06 RX ADMIN — INSULIN GLARGINE 10 UNITS: 100 INJECTION, SOLUTION SUBCUTANEOUS at 21:10

## 2022-03-06 RX ADMIN — LEVALBUTEROL HYDROCHLORIDE 1.25 MG: 1.25 SOLUTION, CONCENTRATE RESPIRATORY (INHALATION) at 20:23

## 2022-03-06 RX ADMIN — HEPARIN SODIUM 5000 UNITS: 5000 INJECTION INTRAVENOUS; SUBCUTANEOUS at 06:47

## 2022-03-06 RX ADMIN — ALPRAZOLAM 0.5 MG: 0.5 TABLET ORAL at 21:57

## 2022-03-06 RX ADMIN — BENZONATATE 200 MG: 100 CAPSULE ORAL at 21:10

## 2022-03-06 RX ADMIN — GUAIFENESIN 600 MG: 600 TABLET, EXTENDED RELEASE ORAL at 08:02

## 2022-03-06 RX ADMIN — Medication 5 SPRAY: at 08:02

## 2022-03-06 RX ADMIN — LEVALBUTEROL HYDROCHLORIDE 1.25 MG: 1.25 SOLUTION, CONCENTRATE RESPIRATORY (INHALATION) at 12:18

## 2022-03-06 RX ADMIN — CARBAMIDE PEROXIDE 6.5% 5 DROP: 6.5 LIQUID AURICULAR (OTIC) at 17:42

## 2022-03-06 RX ADMIN — HEPARIN SODIUM 5000 UNITS: 5000 INJECTION INTRAVENOUS; SUBCUTANEOUS at 21:10

## 2022-03-06 RX ADMIN — LIDOCAINE 1 PATCH: 50 PATCH CUTANEOUS at 08:03

## 2022-03-06 RX ADMIN — DIPHENHYDRAMINE HYDROCHLORIDE 10 ML: 12.5 LIQUID ORAL at 08:03

## 2022-03-06 RX ADMIN — BACITRACIN 1 SMALL APPLICATION: 500 OINTMENT TOPICAL at 21:09

## 2022-03-06 NOTE — PROGRESS NOTES
Progress Note - Graciela Nicolas 46 y o  female MRN: [id number]    Unit/Bed#: [place] 2 -01 Encounter: [id number]      Subjective: The patient states that she feels better than yesterday  She has a slight cough with small amounts of clear sputum  She feels less short of breath  She denies any chest pain  She said that her appetite is better  She slept well  She has no abdominal pain, nausea, or vomiting  Physical Exam:   Temp:  [98 3 °F (36 8 °C)-100 °F (37 8 °C)] 98 4 °F (36 9 °C)  HR:  [] 75  Resp:  [18-40] 18  BP: (111-149)/(62-93) 149/82  FiO2 (%):  [60-70] 70    Gen:  Well-developed, severely obese, in no distress  Neck:  Supple  No lymphadenopathy, goiter, or bruit  Heart:  Regular rhythm  No murmur, gallop, or rub  Lungs:  Clear to auscultation and percussion  No wheezing, rales, or rhonchi  Abd:  Soft with active bowel sounds  No mass, tenderness, or organomegaly  Extremities:  No clubbing, cyanosis, or edema  There is a small amount of redness purulence drainage from an IV site in the right antecubital fossa  Neuro:  Alert and oriented  No focal sign  Skin:  Warm and dry  LABS:  No new labs  Assessment/Plan:  1  Acute respiratory failure with hypoxia, multifactorial  2  Recent COVID pneumonia  3  Systemic inflammatory response syndrome on admission  4  Acute diastolic congestive heart failure  5  Paroxysmal atrial fibrillation  6  Severe obesity  7  Hypertension  8  Hyperglycemia, likely secondary to underlying pre diabetes with current steroid therapy  9  Thrombocytosis  10  Abnormal liver enzymes    11  Anxiety  12  IV site infection    The patient's respiratory failure was thought to be related to possible pneumonia, fluid overload, and the aftermath of previous COVID-19 with possible fibrosis  The patient was treated for pneumonia  She is on a tapering dose of steroids  Her oxygen requirements have diminished  She is being treated with bronchodilators    She was thought to have an element of heart failure at the time of admission  This appears to be compensated  She had a brief episode of atrial fibrillation but has remained in sinus rhythm since then  She is not fully anticoagulated since this was thought to be a transient event related to her acute illness  The patient is on insulin therapy  Whether this will be necessary as her steroid dose is reduced is not clear  Obviously, the patient would benefit in a variety of ways from weight loss  The patient is IV site infection will be treated with warm compresses and topical antibiotics for now  This will require careful observation      VTE Pharmacologic Prophylaxis: Heparin  VTE Mechanical Prophylaxis: sequential compression device

## 2022-03-06 NOTE — PLAN OF CARE
Problem: Potential for Falls  Goal: Patient will remain free of falls  Description: INTERVENTIONS:  - Educate patient/family on patient safety including physical limitations  - Instruct patient to call for assistance with activity   - Consult OT/PT to assist with strengthening/mobility   - Keep Call bell within reach  - Keep bed low and locked with side rails adjusted as appropriate  - Keep care items and personal belongings within reach  - Initiate and maintain comfort rounds  - Make Fall Risk Sign visible to staff  - Offer Toileting every 2 Hours, in advance of need  - Initiate/Maintain alarm  - Obtain necessary fall risk management equipment  - Apply yellow socks and bracelet for high fall risk patients  - Consider moving patient to room near nurses station  Outcome: Progressing     Problem: MOBILITY - ADULT  Goal: Maintain or return to baseline ADL function  Description: INTERVENTIONS:  -  Assess patient's ability to carry out ADLs; assess patient's baseline for ADL function and identify physical deficits which impact ability to perform ADLs (bathing, care of mouth/teeth, toileting, grooming, dressing, etc )  - Assess/evaluate cause of self-care deficits   - Assess range of motion  - Assess patient's mobility; develop plan if impaired  - Assess patient's need for assistive devices and provide as appropriate  - Encourage maximum independence but intervene and supervise when necessary  - Involve family in performance of ADLs  - Assess for home care needs following discharge   - Consider OT consult to assist with ADL evaluation and planning for discharge  - Provide patient education as appropriate  Outcome: Progressing  Goal: Maintains/Returns to pre admission functional level  Description: INTERVENTIONS:  - Perform BMAT or MOVE assessment daily    - Set and communicate daily mobility goal to care team and patient/family/caregiver     - Collaborate with rehabilitation services on mobility goals if consulted  - Perform Range of Motion 3 times a day  - Reposition patient every 3 hours    - Dangle patient 3 times a day  - Stand patient 3 times a day  - Ambulate patient 3 times a day  - Out of bed to chair 3 times a day   - Out of bed for meals 3 times a day  - Out of bed for toileting  - Record patient progress and toleration of activity level   Outcome: Progressing     Problem: Prexisting or High Potential for Compromised Skin Integrity  Goal: Skin integrity is maintained or improved  Description: INTERVENTIONS:  - Identify patients at risk for skin breakdown  - Assess and monitor skin integrity  - Assess and monitor nutrition and hydration status  - Monitor labs   - Assess for incontinence   - Turn and reposition patient  - Assist with mobility/ambulation  - Relieve pressure over bony prominences  - Avoid friction and shearing  - Provide appropriate hygiene as needed including keeping skin clean and dry  - Evaluate need for skin moisturizer/barrier cream  - Collaborate with interdisciplinary team   - Patient/family teaching  - Consider wound care consult   Outcome: Progressing     Problem: RESPIRATORY - ADULT  Goal: Achieves optimal ventilation and oxygenation  Description: INTERVENTIONS:  - Assess for changes in respiratory status  - Assess for changes in mentation and behavior  - Position to facilitate oxygenation and minimize respiratory effort  - Oxygen administered by appropriate delivery if ordered  - Initiate smoking cessation education as indicated  - Encourage broncho-pulmonary hygiene including cough, deep breathe, Incentive Spirometry  - Assess the need for suctioning and aspirate as needed  - Assess and instruct to report SOB or any respiratory difficulty  - Respiratory Therapy support as indicated  Outcome: Progressing     Problem: METABOLIC, FLUID AND ELECTROLYTES - ADULT  Goal: Glucose maintained within target range  Description: INTERVENTIONS:  - Monitor Blood Glucose as ordered  - Assess for signs and symptoms of hyperglycemia and hypoglycemia  - Administer ordered medications to maintain glucose within target range  - Assess nutritional intake and initiate nutrition service referral as needed  Outcome: Progressing     Problem: MUSCULOSKELETAL - ADULT  Goal: Maintain or return mobility to safest level of function  Description: INTERVENTIONS:  - Assess patient's ability to carry out ADLs; assess patient's baseline for ADL function and identify physical deficits which impact ability to perform ADLs (bathing, care of mouth/teeth, toileting, grooming, dressing, etc )  - Assess/evaluate cause of self-care deficits   - Assess range of motion  - Assess patient's mobility  - Assess patient's need for assistive devices and provide as appropriate  - Encourage maximum independence but intervene and supervise when necessary  - Involve family in performance of ADLs  - Assess for home care needs following discharge   - Consider OT consult to assist with ADL evaluation and planning for discharge  - Provide patient education as appropriate  Outcome: Progressing  Goal: Maintain proper alignment of affected body part  Description: INTERVENTIONS:  - Support, maintain and protect limb and body alignment  - Provide patient/ family with appropriate education  Outcome: Progressing     Problem: Nutrition/Hydration-ADULT  Goal: Nutrient/Hydration intake appropriate for improving, restoring or maintaining nutritional needs  Description: Monitor and assess patient's nutrition/hydration status for malnutrition  Collaborate with interdisciplinary team and initiate plan and interventions as ordered  Monitor patient's weight and dietary intake as ordered or per policy  Utilize nutrition screening tool and intervene as necessary  Determine patient's food preferences and provide high-protein, high-caloric foods as appropriate       INTERVENTIONS:  - Monitor oral intake, urinary output, labs, and treatment plans  - Assess nutrition and hydration status and recommend course of action  - Evaluate amount of meals eaten  - Assist patient with eating if necessary   - Allow adequate time for meals  - Recommend/ encourage appropriate diets, oral nutritional supplements, and vitamin/mineral supplements  - Order, calculate, and assess calorie counts as needed  - Recommend, monitor, and adjust tube feedings and TPN/PPN based on assessed needs  - Assess need for intravenous fluids  - Provide specific nutrition/hydration education as appropriate  - Include patient/family/caregiver in decisions related to nutrition  Outcome: Progressing     Problem: DISCHARGE PLANNING  Goal: Discharge to home or other facility with appropriate resources  Description: INTERVENTIONS:  - Identify barriers to discharge w/patient and caregiver  - Arrange for needed discharge resources and transportation as appropriate  - Identify discharge learning needs (meds, wound care, etc )  - Arrange for interpretive services to assist at discharge as needed  - Refer to Case Management Department for coordinating discharge planning if the patient needs post-hospital services based on physician/advanced practitioner order or complex needs related to functional status, cognitive ability, or social support system  Outcome: Progressing     Problem: Knowledge Deficit  Goal: Patient/family/caregiver demonstrates understanding of disease process, treatment plan, medications, and discharge instructions  Description: Complete learning assessment and assess knowledge base    Interventions:  - Provide teaching at level of understanding  - Provide teaching via preferred learning methods  Outcome: Progressing     Problem: METABOLIC, FLUID AND ELECTROLYTES - ADULT  Goal: Electrolytes maintained within normal limits  Description: INTERVENTIONS:  - Monitor labs and assess patient for signs and symptoms of electrolyte imbalances  - Administer electrolyte replacement as ordered  - Monitor response to electrolyte replacements, including repeat lab results as appropriate  - Instruct patient on fluid and nutrition as appropriate  Outcome: Completed  Goal: Fluid balance maintained  Description: INTERVENTIONS:  - Monitor labs   - Monitor I/O and WT  - Instruct patient on fluid and nutrition as appropriate  - Assess for signs & symptoms of volume excess or deficit  Outcome: Completed

## 2022-03-06 NOTE — PLAN OF CARE
Problem: Potential for Falls  Goal: Patient will remain free of falls  Description: INTERVENTIONS:  - Educate patient/family on patient safety including physical limitations  - Instruct patient to call for assistance with activity   - Consult OT/PT to assist with strengthening/mobility   - Keep Call bell within reach  - Keep bed low and locked with side rails adjusted as appropriate  - Keep care items and personal belongings within reach  - Initiate and maintain comfort rounds  - Make Fall Risk Sign visible to staff  - Offer Toileting every 2 Hours, in advance of need  - Obtain necessary fall risk management equipment: bed rails  - Apply yellow socks and bracelet for high fall risk patients  - Consider moving patient to room near nurses station  Outcome: Progressing     Problem: MOBILITY - ADULT  Goal: Maintain or return to baseline ADL function  Description: INTERVENTIONS:  -  Assess patient's ability to carry out ADLs; assess patient's baseline for ADL function and identify physical deficits which impact ability to perform ADLs (bathing, care of mouth/teeth, toileting, grooming, dressing, etc )  - Assess/evaluate cause of self-care deficits   - Assess range of motion  - Assess patient's mobility; develop plan if impaired  - Assess patient's need for assistive devices and provide as appropriate  - Encourage maximum independence but intervene and supervise when necessary  - Involve family in performance of ADLs  - Assess for home care needs following discharge   - Consider OT consult to assist with ADL evaluation and planning for discharge  - Provide patient education as appropriate  Outcome: Progressing  Goal: Maintains/Returns to pre admission functional level  Description: INTERVENTIONS:  - Perform BMAT or MOVE assessment daily    - Set and communicate daily mobility goal to care team and patient/family/caregiver     - Collaborate with rehabilitation services on mobility goals if consulted  - Stand patient 3 times a day  - Ambulate patient 3 times a day  - Out of bed to chair 2 times a day   - Out of bed for meals 2 times a day  - Out of bed for toileting  - Record patient progress and toleration of activity level   Outcome: Progressing     Problem: Prexisting or High Potential for Compromised Skin Integrity  Goal: Skin integrity is maintained or improved  Description: INTERVENTIONS:  - Identify patients at risk for skin breakdown  - Assess and monitor skin integrity  - Assess and monitor nutrition and hydration status  - Monitor labs   - Assess for incontinence   - Turn and reposition patient  - Assist with mobility/ambulation  - Relieve pressure over bony prominences  - Avoid friction and shearing  - Provide appropriate hygiene as needed including keeping skin clean and dry  - Evaluate need for skin moisturizer/barrier cream  - Collaborate with interdisciplinary team   - Patient/family teaching  - Consider wound care consult   Outcome: Progressing     Problem: RESPIRATORY - ADULT  Goal: Achieves optimal ventilation and oxygenation  Description: INTERVENTIONS:  - Assess for changes in respiratory status  - Assess for changes in mentation and behavior  - Position to facilitate oxygenation and minimize respiratory effort  - Oxygen administered by appropriate delivery if ordered  - Initiate smoking cessation education as indicated  - Encourage broncho-pulmonary hygiene including cough, deep breathe, Incentive Spirometry  - Assess the need for suctioning and aspirate as needed  - Assess and instruct to report SOB or any respiratory difficulty  - Respiratory Therapy support as indicated  Outcome: Progressing     Problem: METABOLIC, FLUID AND ELECTROLYTES - ADULT  Goal: Electrolytes maintained within normal limits  Description: INTERVENTIONS:  - Monitor labs and assess patient for signs and symptoms of electrolyte imbalances  - Administer electrolyte replacement as ordered  - Monitor response to electrolyte replacements, including repeat lab results as appropriate  - Instruct patient on fluid and nutrition as appropriate  Outcome: Progressing  Goal: Fluid balance maintained  Description: INTERVENTIONS:  - Monitor labs   - Monitor I/O and WT  - Instruct patient on fluid and nutrition as appropriate  - Assess for signs & symptoms of volume excess or deficit  Outcome: Progressing  Goal: Glucose maintained within target range  Description: INTERVENTIONS:  - Monitor Blood Glucose as ordered  - Assess for signs and symptoms of hyperglycemia and hypoglycemia  - Administer ordered medications to maintain glucose within target range  - Assess nutritional intake and initiate nutrition service referral as needed  Outcome: Progressing     Problem: MUSCULOSKELETAL - ADULT  Goal: Maintain or return mobility to safest level of function  Description: INTERVENTIONS:  - Assess patient's ability to carry out ADLs; assess patient's baseline for ADL function and identify physical deficits which impact ability to perform ADLs (bathing, care of mouth/teeth, toileting, grooming, dressing, etc )  - Assess/evaluate cause of self-care deficits   - Assess range of motion  - Assess patient's mobility  - Assess patient's need for assistive devices and provide as appropriate  - Encourage maximum independence but intervene and supervise when necessary  - Involve family in performance of ADLs  - Assess for home care needs following discharge   - Consider OT consult to assist with ADL evaluation and planning for discharge  - Provide patient education as appropriate  Outcome: Progressing  Goal: Maintain proper alignment of affected body part  Description: INTERVENTIONS:  - Support, maintain and protect limb and body alignment  - Provide patient/ family with appropriate education  Outcome: Progressing     Problem: Nutrition/Hydration-ADULT  Goal: Nutrient/Hydration intake appropriate for improving, restoring or maintaining nutritional needs  Description: Monitor and assess patient's nutrition/hydration status for malnutrition  Collaborate with interdisciplinary team and initiate plan and interventions as ordered  Monitor patient's weight and dietary intake as ordered or per policy  Utilize nutrition screening tool and intervene as necessary  Determine patient's food preferences and provide high-protein, high-caloric foods as appropriate       INTERVENTIONS:  - Monitor oral intake, urinary output, labs, and treatment plans  - Assess nutrition and hydration status and recommend course of action  - Evaluate amount of meals eaten  - Assist patient with eating if necessary   - Allow adequate time for meals  - Recommend/ encourage appropriate diets, oral nutritional supplements, and vitamin/mineral supplements  - Order, calculate, and assess calorie counts as needed  - Recommend, monitor, and adjust tube feedings and TPN/PPN based on assessed needs  - Assess need for intravenous fluids  - Provide specific nutrition/hydration education as appropriate  - Include patient/family/caregiver in decisions related to nutrition  Outcome: Progressing

## 2022-03-07 LAB
ALBUMIN SERPL BCP-MCNC: 2.7 G/DL (ref 3.5–5)
ALP SERPL-CCNC: 59 U/L (ref 46–116)
ALT SERPL W P-5'-P-CCNC: 42 U/L (ref 12–78)
ANION GAP SERPL CALCULATED.3IONS-SCNC: 9 MMOL/L (ref 4–13)
AST SERPL W P-5'-P-CCNC: 17 U/L (ref 5–45)
BASOPHILS # BLD AUTO: 0.04 THOUSANDS/ΜL (ref 0–0.1)
BASOPHILS NFR BLD AUTO: 0 % (ref 0–1)
BILIRUB SERPL-MCNC: 0.31 MG/DL (ref 0.2–1)
BUN SERPL-MCNC: 19 MG/DL (ref 5–25)
CALCIUM ALBUM COR SERPL-MCNC: 10 MG/DL (ref 8.3–10.1)
CALCIUM SERPL-MCNC: 9 MG/DL (ref 8.3–10.1)
CHLORIDE SERPL-SCNC: 105 MMOL/L (ref 100–108)
CO2 SERPL-SCNC: 29 MMOL/L (ref 21–32)
CREAT SERPL-MCNC: 0.62 MG/DL (ref 0.6–1.3)
EOSINOPHIL # BLD AUTO: 0.12 THOUSAND/ΜL (ref 0–0.61)
EOSINOPHIL NFR BLD AUTO: 1 % (ref 0–6)
ERYTHROCYTE [DISTWIDTH] IN BLOOD BY AUTOMATED COUNT: 14.8 % (ref 11.6–15.1)
GFR SERPL CREATININE-BSD FRML MDRD: 104 ML/MIN/1.73SQ M
GLUCOSE SERPL-MCNC: 128 MG/DL (ref 65–140)
GLUCOSE SERPL-MCNC: 128 MG/DL (ref 65–140)
GLUCOSE SERPL-MCNC: 91 MG/DL (ref 65–140)
GLUCOSE SERPL-MCNC: 94 MG/DL (ref 65–140)
GLUCOSE SERPL-MCNC: 99 MG/DL (ref 65–140)
HCT VFR BLD AUTO: 36.9 % (ref 34.8–46.1)
HCV AB SER QL: NORMAL
HGB BLD-MCNC: 11.9 G/DL (ref 11.5–15.4)
IMM GRANULOCYTES # BLD AUTO: 0.09 THOUSAND/UL (ref 0–0.2)
IMM GRANULOCYTES NFR BLD AUTO: 1 % (ref 0–2)
LYMPHOCYTES # BLD AUTO: 4.19 THOUSANDS/ΜL (ref 0.6–4.47)
LYMPHOCYTES NFR BLD AUTO: 30 % (ref 14–44)
MCH RBC QN AUTO: 29.3 PG (ref 26.8–34.3)
MCHC RBC AUTO-ENTMCNC: 32.2 G/DL (ref 31.4–37.4)
MCV RBC AUTO: 91 FL (ref 82–98)
MONOCYTES # BLD AUTO: 1 THOUSAND/ΜL (ref 0.17–1.22)
MONOCYTES NFR BLD AUTO: 7 % (ref 4–12)
NEUTROPHILS # BLD AUTO: 8.73 THOUSANDS/ΜL (ref 1.85–7.62)
NEUTS SEG NFR BLD AUTO: 61 % (ref 43–75)
NRBC BLD AUTO-RTO: 0 /100 WBCS
PLATELET # BLD AUTO: 216 THOUSANDS/UL (ref 149–390)
PMV BLD AUTO: 10.3 FL (ref 8.9–12.7)
POTASSIUM SERPL-SCNC: 3.6 MMOL/L (ref 3.5–5.3)
PROT SERPL-MCNC: 6.1 G/DL (ref 6.4–8.2)
RBC # BLD AUTO: 4.06 MILLION/UL (ref 3.81–5.12)
SODIUM SERPL-SCNC: 143 MMOL/L (ref 136–145)
WBC # BLD AUTO: 14.17 THOUSAND/UL (ref 4.31–10.16)

## 2022-03-07 PROCEDURE — 97530 THERAPEUTIC ACTIVITIES: CPT

## 2022-03-07 PROCEDURE — 82948 REAGENT STRIP/BLOOD GLUCOSE: CPT

## 2022-03-07 PROCEDURE — 94640 AIRWAY INHALATION TREATMENT: CPT

## 2022-03-07 PROCEDURE — 80053 COMPREHEN METABOLIC PANEL: CPT | Performed by: INTERNAL MEDICINE

## 2022-03-07 PROCEDURE — 85025 COMPLETE CBC W/AUTO DIFF WBC: CPT | Performed by: INTERNAL MEDICINE

## 2022-03-07 PROCEDURE — 87389 HIV-1 AG W/HIV-1&-2 AB AG IA: CPT | Performed by: INTERNAL MEDICINE

## 2022-03-07 PROCEDURE — 97535 SELF CARE MNGMENT TRAINING: CPT

## 2022-03-07 PROCEDURE — 99232 SBSQ HOSP IP/OBS MODERATE 35: CPT | Performed by: INTERNAL MEDICINE

## 2022-03-07 PROCEDURE — 86803 HEPATITIS C AB TEST: CPT | Performed by: INTERNAL MEDICINE

## 2022-03-07 RX ORDER — NYSTATIN 100000 [USP'U]/G
1 POWDER TOPICAL 3 TIMES DAILY
Status: DISCONTINUED | OUTPATIENT
Start: 2022-03-07 | End: 2022-03-07

## 2022-03-07 RX ORDER — NYSTATIN 100000 [USP'U]/G
1 POWDER TOPICAL 3 TIMES DAILY
Status: DISCONTINUED | OUTPATIENT
Start: 2022-03-07 | End: 2022-03-16 | Stop reason: HOSPADM

## 2022-03-07 RX ORDER — PREDNISONE 10 MG/1
10 TABLET ORAL DAILY
Status: COMPLETED | OUTPATIENT
Start: 2022-03-08 | End: 2022-03-12

## 2022-03-07 RX ADMIN — NYSTATIN 1 APPLICATION: 100000 POWDER TOPICAL at 08:27

## 2022-03-07 RX ADMIN — METOPROLOL TARTRATE 25 MG: 25 TABLET, FILM COATED ORAL at 21:17

## 2022-03-07 RX ADMIN — DIPHENHYDRAMINE HYDROCHLORIDE 10 ML: 12.5 LIQUID ORAL at 08:28

## 2022-03-07 RX ADMIN — LEVALBUTEROL HYDROCHLORIDE 1.25 MG: 1.25 SOLUTION, CONCENTRATE RESPIRATORY (INHALATION) at 16:16

## 2022-03-07 RX ADMIN — NYSTATIN 1 APPLICATION: 100000 POWDER TOPICAL at 21:18

## 2022-03-07 RX ADMIN — GUAIFENESIN 600 MG: 600 TABLET, EXTENDED RELEASE ORAL at 08:28

## 2022-03-07 RX ADMIN — IPRATROPIUM BROMIDE 0.5 MG: 0.5 SOLUTION RESPIRATORY (INHALATION) at 16:16

## 2022-03-07 RX ADMIN — NYSTATIN 1 APPLICATION: 100000 POWDER TOPICAL at 17:44

## 2022-03-07 RX ADMIN — INSULIN GLARGINE 10 UNITS: 100 INJECTION, SOLUTION SUBCUTANEOUS at 08:28

## 2022-03-07 RX ADMIN — HEPARIN SODIUM 5000 UNITS: 5000 INJECTION INTRAVENOUS; SUBCUTANEOUS at 05:01

## 2022-03-07 RX ADMIN — PANTOPRAZOLE SODIUM 40 MG: 40 TABLET, DELAYED RELEASE ORAL at 05:01

## 2022-03-07 RX ADMIN — LEVALBUTEROL HYDROCHLORIDE 1.25 MG: 1.25 SOLUTION, CONCENTRATE RESPIRATORY (INHALATION) at 12:23

## 2022-03-07 RX ADMIN — LIDOCAINE 1 PATCH: 50 PATCH CUTANEOUS at 08:27

## 2022-03-07 RX ADMIN — FLUTICASONE PROPIONATE 1 SPRAY: 50 SPRAY, METERED NASAL at 08:27

## 2022-03-07 RX ADMIN — HEPARIN SODIUM 5000 UNITS: 5000 INJECTION INTRAVENOUS; SUBCUTANEOUS at 21:18

## 2022-03-07 RX ADMIN — HEPARIN SODIUM 5000 UNITS: 5000 INJECTION INTRAVENOUS; SUBCUTANEOUS at 13:00

## 2022-03-07 RX ADMIN — IPRATROPIUM BROMIDE 0.5 MG: 0.5 SOLUTION RESPIRATORY (INHALATION) at 20:02

## 2022-03-07 RX ADMIN — PREDNISONE 20 MG: 20 TABLET ORAL at 08:28

## 2022-03-07 RX ADMIN — LEVALBUTEROL HYDROCHLORIDE 1.25 MG: 1.25 SOLUTION, CONCENTRATE RESPIRATORY (INHALATION) at 20:02

## 2022-03-07 RX ADMIN — BACITRACIN 1 SMALL APPLICATION: 500 OINTMENT TOPICAL at 17:44

## 2022-03-07 RX ADMIN — CARBAMIDE PEROXIDE 6.5% 5 DROP: 6.5 LIQUID AURICULAR (OTIC) at 08:27

## 2022-03-07 RX ADMIN — LEVALBUTEROL HYDROCHLORIDE 1.25 MG: 1.25 SOLUTION, CONCENTRATE RESPIRATORY (INHALATION) at 07:08

## 2022-03-07 RX ADMIN — BENZONATATE 200 MG: 100 CAPSULE ORAL at 17:46

## 2022-03-07 RX ADMIN — IPRATROPIUM BROMIDE 0.5 MG: 0.5 SOLUTION RESPIRATORY (INHALATION) at 12:23

## 2022-03-07 RX ADMIN — GUAIFENESIN 600 MG: 600 TABLET, EXTENDED RELEASE ORAL at 21:17

## 2022-03-07 RX ADMIN — INSULIN GLARGINE 10 UNITS: 100 INJECTION, SOLUTION SUBCUTANEOUS at 21:18

## 2022-03-07 RX ADMIN — ALPRAZOLAM 0.5 MG: 0.5 TABLET ORAL at 21:17

## 2022-03-07 RX ADMIN — BACITRACIN 1 SMALL APPLICATION: 500 OINTMENT TOPICAL at 08:28

## 2022-03-07 RX ADMIN — METOPROLOL TARTRATE 25 MG: 25 TABLET, FILM COATED ORAL at 08:28

## 2022-03-07 RX ADMIN — BENZONATATE 200 MG: 100 CAPSULE ORAL at 08:28

## 2022-03-07 RX ADMIN — BENZONATATE 200 MG: 100 CAPSULE ORAL at 21:17

## 2022-03-07 RX ADMIN — IPRATROPIUM BROMIDE 0.5 MG: 0.5 SOLUTION RESPIRATORY (INHALATION) at 07:08

## 2022-03-07 NOTE — PROGRESS NOTES
Progress Note - Pulmonary   Katie Headley 46 y o  female MRN: [id number]  Unit/Bed#: [place] [address] -01 Encounter: [id number]      Assessment/Plan:    1  Acute respiratory failure likely secondary to PNA and recent COVID vs volume overload or pneumonitis  1  Currently on 50 L 50% HFNC  2  Titrate oxygen as needed to maintain SpO2>/=86%  3  Pulmonary toilet: IS, cough deep breathe  4  Continue slow prednisone taper  5  Continue atrovent/xopenex nebs as ordered  6  Supportive care with: tessalon and mucinex, VEST therapy  7  S/p 5 day course of rocephin  2  Elevated BNP-1472 with acute diastolic heart failure  1  Echo 2/17 with EF 65% with grade 1 diastolic dysfunction  2  Lasix PRN  3  Daily weights  4  Strict I &O  3  HTN-management per IM  4  obesity  5  anxiety        Subjective:     Katie was seen in the chair upon entering the room  Denies acute overnight events  Continues to experience desaturation with exertion into the 70's but denies significant change in  symptoms during periods of desaturation  + dyspnea on exertion  +discomfort right ribs with cough and deep inspiration She is anxious for discharge    Objective:         Vitals: Blood pressure 124/89, pulse 89, temperature 98 9 °F (37 2 °C), resp  rate 22, height 4' 6" (1 372 m), weight 101 kg (222 lb 7 1 oz), SpO2 90 %  , 50% HFNC/ 50L PM, Body mass index is 53 63 kg/m²        Intake/Output Summary (Last 24 hours) at 3/7/2022 1130  Last data filed at 3/7/2022 0901  Gross per 24 hour   Intake 300 ml   Output --   Net 300 ml         Physical Exam  Gen: Awake, alert, oriented x 3, no acute distress  HEENT: Mucous membranes moist, no oral lesions, no thrush,   NECK: noaccessory muscle use, JVP not elevated  Cardiac: Regular, single S1, single S2, no murmurs, no rubs, no gallops  Lungs: decreased, clear breath sounds  Abdomen: normoactive bowel sounds, soft nontender, nondistended, no rebound or rigidity, no guarding  Extremities: no cyanosis, no clubbing, no edema    Labs: I have personally reviewed pertinent lab results  , CBC:   Lab Results   Component Value Date    WBC 14 17 (H) 03/07/2022    HGB 11 9 03/07/2022    HCT 36 9 03/07/2022    MCV 91 03/07/2022     03/07/2022    MCH 29 3 03/07/2022    MCHC 32 2 03/07/2022    RDW 14 8 03/07/2022    MPV 10 3 03/07/2022    NRBC 0 03/07/2022   , CMP:   Lab Results   Component Value Date    SODIUM 143 03/07/2022    K 3 6 03/07/2022     03/07/2022    CO2 29 03/07/2022    BUN 19 03/07/2022    CREATININE 0 62 03/07/2022    CALCIUM 9 0 03/07/2022    AST 17 03/07/2022    ALT 42 03/07/2022    ALKPHOS 59 03/07/2022    EGFR 104 03/07/2022     Imaging and other studies: none      EMMA Valladares

## 2022-03-07 NOTE — ASSESSMENT & PLAN NOTE
42-year-old female prolonged stay and ICU for acute respiratory failure/hypoxia poly factorial secondary to COVID pneumonia/fibrosis pulmonary edema pneumonitis  Remains on high-flow nasal cannula  · Pulmonary following    Attempting to wean to mid flow today if possible  · On prednisone taper

## 2022-03-07 NOTE — PLAN OF CARE
Problem: Potential for Falls  Goal: Patient will remain free of falls  Description: INTERVENTIONS:  - Educate patient/family on patient safety including physical limitations  - Instruct patient to call for assistance with activity   - Consult OT/PT to assist with strengthening/mobility   - Keep Call bell within reach  - Keep bed low and locked with side rails adjusted as appropriate  - Keep care items and personal belongings within reach  - Initiate and maintain comfort rounds  - Make Fall Risk Sign visible to staff  - Offer Toileting every 2 Hours, in advance of need  - Initiate/Maintain alarm  - Obtain necessary fall risk management equipment  - Apply yellow socks and bracelet for high fall risk patients  - Consider moving patient to room near nurses station  Outcome: Progressing     Problem: MOBILITY - ADULT  Goal: Maintain or return to baseline ADL function  Description: INTERVENTIONS:  -  Assess patient's ability to carry out ADLs; assess patient's baseline for ADL function and identify physical deficits which impact ability to perform ADLs (bathing, care of mouth/teeth, toileting, grooming, dressing, etc )  - Assess/evaluate cause of self-care deficits   - Assess range of motion  - Assess patient's mobility; develop plan if impaired  - Assess patient's need for assistive devices and provide as appropriate  - Encourage maximum independence but intervene and supervise when necessary  - Involve family in performance of ADLs  - Assess for home care needs following discharge   - Consider OT consult to assist with ADL evaluation and planning for discharge  - Provide patient education as appropriate  Outcome: Progressing  Goal: Maintains/Returns to pre admission functional level  Description: INTERVENTIONS:  - Perform BMAT or MOVE assessment daily    - Set and communicate daily mobility goal to care team and patient/family/caregiver     - Collaborate with rehabilitation services on mobility goals if consulted  - Perform Range of Motion 3 times a day  - Reposition patient every 3 hours    - Dangle patient 3 times a day  - Stand patient 3 times a day  - Ambulate patient 3 times a day  - Out of bed to chair 3 times a day   - Out of bed for meals 3 times a day  - Out of bed for toileting  - Record patient progress and toleration of activity level   Outcome: Progressing     Problem: Prexisting or High Potential for Compromised Skin Integrity  Goal: Skin integrity is maintained or improved  Description: INTERVENTIONS:  - Identify patients at risk for skin breakdown  - Assess and monitor skin integrity  - Assess and monitor nutrition and hydration status  - Monitor labs   - Assess for incontinence   - Turn and reposition patient  - Assist with mobility/ambulation  - Relieve pressure over bony prominences  - Avoid friction and shearing  - Provide appropriate hygiene as needed including keeping skin clean and dry  - Evaluate need for skin moisturizer/barrier cream  - Collaborate with interdisciplinary team   - Patient/family teaching  - Consider wound care consult   Outcome: Progressing     Problem: RESPIRATORY - ADULT  Goal: Achieves optimal ventilation and oxygenation  Description: INTERVENTIONS:  - Assess for changes in respiratory status  - Assess for changes in mentation and behavior  - Position to facilitate oxygenation and minimize respiratory effort  - Oxygen administered by appropriate delivery if ordered  - Initiate smoking cessation education as indicated  - Encourage broncho-pulmonary hygiene including cough, deep breathe, Incentive Spirometry  - Assess the need for suctioning and aspirate as needed  - Assess and instruct to report SOB or any respiratory difficulty  - Respiratory Therapy support as indicated  Outcome: Progressing     Problem: METABOLIC, FLUID AND ELECTROLYTES - ADULT  Goal: Glucose maintained within target range  Description: INTERVENTIONS:  - Monitor Blood Glucose as ordered  - Assess for signs and symptoms of hyperglycemia and hypoglycemia  - Administer ordered medications to maintain glucose within target range  - Assess nutritional intake and initiate nutrition service referral as needed  Outcome: Progressing     Problem: MUSCULOSKELETAL - ADULT  Goal: Maintain or return mobility to safest level of function  Description: INTERVENTIONS:  - Assess patient's ability to carry out ADLs; assess patient's baseline for ADL function and identify physical deficits which impact ability to perform ADLs (bathing, care of mouth/teeth, toileting, grooming, dressing, etc )  - Assess/evaluate cause of self-care deficits   - Assess range of motion  - Assess patient's mobility  - Assess patient's need for assistive devices and provide as appropriate  - Encourage maximum independence but intervene and supervise when necessary  - Involve family in performance of ADLs  - Assess for home care needs following discharge   - Consider OT consult to assist with ADL evaluation and planning for discharge  - Provide patient education as appropriate  Outcome: Progressing  Goal: Maintain proper alignment of affected body part  Description: INTERVENTIONS:  - Support, maintain and protect limb and body alignment  - Provide patient/ family with appropriate education  Outcome: Progressing     Problem: Nutrition/Hydration-ADULT  Goal: Nutrient/Hydration intake appropriate for improving, restoring or maintaining nutritional needs  Description: Monitor and assess patient's nutrition/hydration status for malnutrition  Collaborate with interdisciplinary team and initiate plan and interventions as ordered  Monitor patient's weight and dietary intake as ordered or per policy  Utilize nutrition screening tool and intervene as necessary  Determine patient's food preferences and provide high-protein, high-caloric foods as appropriate       INTERVENTIONS:  - Monitor oral intake, urinary output, labs, and treatment plans  - Assess nutrition and hydration status and recommend course of action  - Evaluate amount of meals eaten  - Assist patient with eating if necessary   - Allow adequate time for meals  - Recommend/ encourage appropriate diets, oral nutritional supplements, and vitamin/mineral supplements  - Order, calculate, and assess calorie counts as needed  - Recommend, monitor, and adjust tube feedings and TPN/PPN based on assessed needs  - Assess need for intravenous fluids  - Provide specific nutrition/hydration education as appropriate  - Include patient/family/caregiver in decisions related to nutrition  Outcome: Progressing     Problem: DISCHARGE PLANNING  Goal: Discharge to home or other facility with appropriate resources  Description: INTERVENTIONS:  - Identify barriers to discharge w/patient and caregiver  - Arrange for needed discharge resources and transportation as appropriate  - Identify discharge learning needs (meds, wound care, etc )  - Arrange for interpretive services to assist at discharge as needed  - Refer to Case Management Department for coordinating discharge planning if the patient needs post-hospital services based on physician/advanced practitioner order or complex needs related to functional status, cognitive ability, or social support system  Outcome: Progressing     Problem: Knowledge Deficit  Goal: Patient/family/caregiver demonstrates understanding of disease process, treatment plan, medications, and discharge instructions  Description: Complete learning assessment and assess knowledge base    Interventions:  - Provide teaching at level of understanding  - Provide teaching via preferred learning methods  Outcome: Progressing

## 2022-03-07 NOTE — PLAN OF CARE
Problem: RESPIRATORY - ADULT  Goal: Achieves optimal ventilation and oxygenation  Description: INTERVENTIONS:  - Assess for changes in respiratory status  - Assess for changes in mentation and behavior  - Position to facilitate oxygenation and minimize respiratory effort  - Oxygen administered by appropriate delivery if ordered  - Initiate smoking cessation education as indicated  - Encourage broncho-pulmonary hygiene including cough, deep breathe, Incentive Spirometry  - Assess the need for suctioning and aspirate as needed  - Assess and instruct to report SOB or any respiratory difficulty  - Respiratory Therapy support as indicated  Outcome: Progressing     Problem: METABOLIC, FLUID AND ELECTROLYTES - ADULT  Goal: Glucose maintained within target range  Description: INTERVENTIONS:  - Monitor Blood Glucose as ordered  - Assess for signs and symptoms of hyperglycemia and hypoglycemia  - Administer ordered medications to maintain glucose within target range  - Assess nutritional intake and initiate nutrition service referral as needed  Outcome: Progressing     Problem: DISCHARGE PLANNING  Goal: Discharge to home or other facility with appropriate resources  Description: INTERVENTIONS:  - Identify barriers to discharge w/patient and caregiver  - Arrange for needed discharge resources and transportation as appropriate  - Identify discharge learning needs (meds, wound care, etc )  - Arrange for interpretive services to assist at discharge as needed  - Refer to Case Management Department for coordinating discharge planning if the patient needs post-hospital services based on physician/advanced practitioner order or complex needs related to functional status, cognitive ability, or social support system  Outcome: Progressing

## 2022-03-07 NOTE — PROGRESS NOTES
[address] [facility]  Progress Note - Eusebio Licea 1970, 46 y o  female MRN: [id number]  Unit/Bed#: [address]-01 Encounter: [id number]  Primary Care Provider: Max Li MD   Date and time admitted to hospital: 2/16/2022  7:01 PM    * Acute respiratory failure with hypoxia [facility])  Assessment & Plan  54-year-old female prolonged stay and ICU for acute respiratory failure/hypoxia poly factorial secondary to COVID pneumonia/fibrosis pulmonary edema pneumonitis  Remains on high-flow nasal cannula  · Pulmonary following  Attempting to wean to mid flow today if possible  · On prednisone taper    New onset a-fib [facility])  Assessment & Plan  · Transient atrial fibrillation secondary to respiratory failure  No further events  · Remains on metoprolol  Not on therapeutic anticoagulation    HTN (hypertension)  Assessment & Plan  · Continue metoprolol    BMI 50 0-59 9, adult (HCC)  Assessment & Plan  · Body mass index is 53 63 kg/m²  Hyperglycemia  Assessment & Plan  · Steroid induced hyperglycemia with no history of diabetes mellitus  · Continue glargine and sliding scale    Lab Results   Component Value Date    HGBA1C 6 6 (H) 02/16/2022     Results from last 7 days   Lab Units 03/07/22  1613 03/07/22  1125 03/07/22  0716 03/06/22  2122 03/06/22  1628 03/06/22  1123 03/06/22  0747 03/05/22  2138   POC GLUCOSE mg/dl 128 94 91 150* 140 125 100 134       VTE Pharmacologic Prophylaxis: VTE Score: 7 High Risk (Score >/= 5) - Pharmacological DVT Prophylaxis Ordered: Heparin  Sequential Compression Devices Ordered  Patient Centered Rounds: I have performed bedside rounds with nursing staff today  Discussions with Specialists or Other Care Team Provider:  Case management    Education and Discussions with Family / Patient:  Left voicemail for daughter    Time Spent for Care: 25 mins    More than 50% of total time spent on counseling and coordination of care as described above     Current Length of Stay: 19 day(s)  Current Patient Status: Inpatient   Certification Statement: The patient will continue to require additional inpatient hospital stay due to hypoxia  Discharge Plan / Estimated Discharge Date: Anticipate discharge in > 72 hrs to be determined    Code Status: Level 1 - Full Code      Subjective:   Patient seen and examined  Walking halls with high-flow nasal cannula  Still hypoxic    Objective:   Vitals: Blood pressure 123/85, pulse 85, temperature 99 6 °F (37 6 °C), temperature source Oral, resp  rate 16, height 4' 6" (1 372 m), weight 101 kg (222 lb 7 1 oz), SpO2 96 %  Physical Exam  Vitals reviewed  Constitutional:       General: She is not in acute distress  Appearance: Normal appearance  She is obese  Eyes:      General: No scleral icterus  Cardiovascular:      Rate and Rhythm: Regular rhythm  Pulmonary:      Breath sounds: Decreased breath sounds present  No wheezing  Abdominal:      General: Bowel sounds are normal       Tenderness: There is no guarding or rebound  Musculoskeletal:         General: No swelling  Skin:     General: Skin is warm  Neurological:      Mental Status: She is alert  Mental status is at baseline     Psychiatric:         Mood and Affect: Mood normal        Additional Data:   Labs:  Results from last 7 days   Lab Units 03/07/22  0444   WBC Thousand/uL 14 17*   HEMOGLOBIN g/dL 11 9   HEMATOCRIT % 36 9   MCV fL 91   PLATELETS Thousands/uL 216     Results from last 7 days   Lab Units 03/07/22  0444 03/02/22  0456   SODIUM mmol/L 143 139   POTASSIUM mmol/L 3 6 4 1   CHLORIDE mmol/L 105 100   CO2 mmol/L 29 31   ANION GAP mmol/L 9 8   BUN mg/dL 19 18   CREATININE mg/dL 0 62 0 70   CALCIUM mg/dL 9 0 8 7   ALBUMIN g/dL 2 7*  --    TOTAL BILIRUBIN mg/dL 0 31  --    ALK PHOS U/L 59  --    ALT U/L 42  --    AST U/L 17  --    EGFR ml/min/1 73sq m 104 100   GLUCOSE RANDOM mg/dL 99 105                          Results from last 7 days Lab Units 03/07/22  1613 03/07/22  1125 03/07/22  0716 03/06/22 2122 03/06/22  1628 03/06/22  1123 03/06/22  0747 03/05/22  2138 03/05/22  1549 03/05/22  1100 03/05/22  0554 03/04/22  2104   POC GLUCOSE mg/dl 128 94 91 150* 140 125 100 134 202* 134 97 175*             * I Have Reviewed All Lab Data Listed Above  Cultures:                   Lines/Drains:  Invasive Devices  Report    Peripheral Intravenous Line            Peripheral IV 03/05/22 Left;Ventral (anterior); Medial Forearm 2 days              Telemetry:      Imaging:  Imaging Reports Reviewed Today Include:   XR chest 1 view portable    Result Date: 2/17/2022  Impression: Multifocal groundglass infiltrates concerning for  Covid  19 infection  Workstation performed: [id number]     CTA ED chest PE Study    Result Date: 2/16/2022  Impression: No evidence of pulmonary artery embolus  Scattered airspace opacities within the lungs which may represent infection  Recommend short-term follow-up chest CT scan in 3 months to evaluate for resolution  The study was marked in [facility] for immediate notification   Workstation performed: [id number]       Scheduled Meds:  Current Facility-Administered Medications   Medication Dose Route Frequency Provider Last Rate    acetaminophen  650 mg Oral Q6H PRN Charls Nedrow, CRNP      al mag oxide-diphenhydramine-lidocaine viscous  10 mL Swish & Swallow Q6H PRN Charls Nedrow, CRNP      ALPRAZolam  0 5 mg Oral BID PRN Charls Nedrow, CRNP      aluminum-magnesium hydroxide-simethicone  30 mL Oral Q4H PRN Charls Nedrow, CRNP      bacitracin  1 small application Topical BID Crissy Bacon MD      benzonatate  200 mg Oral TID PRN Charls Nedrow, CRNP      carbamide peroxide  5 drop Left Ear BID Charls Nedrow, CRNP      docusate sodium  100 mg Oral BID PRN Charls Nedrow, CRNP      fluticasone  1 spray Each Nare Daily Charls Nedrow, CRNP      guaiFENesin  600 mg Oral Q12H [address], CRNP      heparin (porcine)  5,000 Units Subcutaneous Q8H [facility] EMMA [place]      insulin glargine  10 Units Subcutaneous Q12H [facility]MA [place]      insulin lispro  2-12 Units Subcutaneous TID AC [facility] [place]      insulin lispro  8 Units Subcutaneous TID With Meals [facility] [place]      ipratropium  0 5 mg Nebulization 4x Daily [facility] [place]      levalbuterol  1 25 mg Nebulization 4x Daily [facility] [place]      lidocaine  1 patch Topical Daily [facility] [place]      Lidocaine Viscous HCl  15 mL Swish & Spit 4x Daily PRN EM[facility] [place]      metoprolol tartrate  25 mg Oral Q12H [facility]MA [place]      nystatin  1 application Topical TID Zina Garrett DO      pantoprazole  40 mg Oral Daily Before Breakfast EM[facility] [place]      polyethylene glycol  17 g Oral Daily PRN [facility] [place]      [START ON 3/8/2022] predniSONE  10 mg Oral Daily Larri EMMA Cruz      predniSONE  20 mg Oral Daily Larri MonieEMMA      saliva substitute  5 spray Mouth/Throat 4x Daily PRN EMMA [place]      sodium chloride  1 application Nasal E2A PRN [facility] [place]         Today, Patient Was Seen By: Zina Garrett DO    ** Please Note: Dictation voice to text software may have been used in the creation of this document   **

## 2022-03-07 NOTE — APP STUDENT NOTE
DOUGLAS STUDENT  Inpatient Progress Note for TRAINING ONLY  Not Part of Legal Medical Record     Progress Note - Gabriel Reyna 46 y o  female MRN: [id number]  Unit/Bed#: [facility] 2 -01 Encounter: [id number]    Assessment and Plan  1  Acute Respiratory Failure with Hypoxia, multifactorial (DDS: pneumonia, COVID-19 sequale, volume overload, pneumonitis)  · Patient presented with respiratory distress and hypoxia  · Patient continues on high flow oxygen sating at 93%  · Patient has MCDOWELL and drops to the high 70s with exertion  · Continue to help patient walk halls for increased stamina  · Will continue to wean oxygen keeping patients SPO2 >88%  · Continue prednisone taper  · Patient had 5 days ceftriaxone completed on 2/20/22  · Continue nebulizers   · Continue mucinex and tessalon perls   · Encourage incentive spirometry    2  Systemic Inflammatory Response Syndrome (SIRS) on admission  · On admission patient had leukocytosis of 20 59 and she was tachycardic  · Leukocytosis improved, today, 3/7/22, it is 14 17  · Patient had lactic acidosis of 2 7 on admission  · Patient was treated with 5 days ceftriaxone   · Continue to monitor patients labs and vitals  3  Acute Diastolic Congestive Heart Failure  · Patient had 15 lb increase from January to February  · On admission patient had elevated pro BNP of 1472  · CXR on 2/16/22 revealed multifocal groundglass infiltrates concerning for COVID-19 infection  · ECHO on 2/17/22 revealed EF of 65%  · Continue to monitor daily weights and I/Os  · Continue to monitor electrolytes and replete as needed  · Continue to monitor renal functions    4  Paroxysmal Atrial Fibrillation  · Patient had a brief episode of a-fib with reversal back to sinus rhythm without intervention  · Patient currently remains in normal sinus rhythm  · Continue Heparin while inpatient  Will discuss need for further outpatient anticoagulation since patient only had one episode a-fib      5  Severe Obesity  · BMI of 53 63 kg/m^2  · Patient states she has good outpatient routine for exercise and diet  · Encouraged patient to continue this plan once out of the hospital     6  Hypertension  · SBP have been between 110-130  · Continue to hold amlodipine  · Continue metoprolol tartrate 25 mg po BID  · Continue to monitor vitals    7  Hyperglycemia (new onset DM vs steroid use)  · Hemoglobin A1C on 2/16/22 was 6 6  · BG fingersticks have been fluctuating while inpatient  · Currently being treated with insulin therapy, continue to adjust based on steroids  · Continue Lantus 10 Units BID  · Continue Humalog 8 Units TID and sliding scale with meals  · Continue fingerstick glucose before meals    8  Thrombocytosis, improved  · Platelets 777 on admission, 2/16/22  · Platelets today, 3/6/3891, is 216  · Continue to monitor  9  Transaminitis, improved   · Noted mild elevation on 2/19/22  · Returned to normal today, 3/7/22  · Continue to monitor liver enzymes    10  Anxiety  · Patient is worried and anxious about her housing and need for things to get done while she is in the hospital    · Continue Xanax 1 mg prn   · Continue Seroquel for agitation    11  IV site infection  · Patient noted to have redness and drainage from right antecubital fossa after IV pulled on 3/5/22  · Patient and nursing note it looks slightly better today  · Patient notes decreased pain  · Continue to monitor closely  · Continue warm compresses and topical antibiotics     VTE Pharmacologic Prophylaxis:   Pharmacologic: Heparin  Mechanical VTE Prophylaxis in Place: No    Patient Centered Rounds: I have performed bedside rounds with nursing staff today  Discussions with Specialists or Other Care Team Provider: Pulmonology at bedside upon my arrival to the patient room  Time Spent for Care: 45 minutes  More than 50% of total time spent on counseling and coordination of care as described above      Current Length of Stay: 23 day(s)    Current Patient Status: Inpatient   Certification Statement: The patient will continue to require additional inpatient hospital stay due to oxygen saturation dropping with exertion, currently on continuous high flow oxygen, and having MCDOWELL  Discharge Plan: To be determined  Code Status: Level 1 - Full Code    Subjective: This is a 46year old female with PMH of COVID-19 infection in 2022 and HTN who is seen hospital day 19 due to admission diagnosis of respiratory distress and hypoxia  Patient continues to report MCDOWELL and here oxygen sat drops to high 70s with exertion  She states she still feels fine at that time  Patient reports having a good appetite  She notes she has not been sleeping very well recently as she has a lot on her mind with her apartment needing an inspection and being in the hospital  She states that she moved here from [place] in 2021 and has no family in the area  She states she would like something to help her sleep for now while trying to sort everything out, but not for long term  Patient notes her right antecubial fossa has improved  She notes decreased pain and redness  She denies any problems with ambulation  Patient notes she quit smoking on 2022 when she was diagnosed with COVID  She also notes that she was maintaining a healthy lifestyle at home after her COVID diagnosis and doesn't know why she became so sick when she thought she was doing everything right  Patient expresses to this provider her wish to walk to the cafeteria to get snacks, and does not understand why she cannot do this  She denies any CP, SOB at rest, N/V/D, or abdominal pain  She has no other complaints at this time       Objective:     Vitals:   Temp (24hrs), Av 7 °F (37 1 °C), Min:98 1 °F (36 7 °C), Max:99 °F (37 2 °C)    Temp:  [98 1 °F (36 7 °C)-99 °F (37 2 °C)] 98 9 °F (37 2 °C)  HR:  [76-94] 89  Resp:  [16-22] 22  BP: (116-138)/(70-89) 124/89  SpO2:  [89 %-98 %] 93 %  Body mass index is 53 63 kg/m²  Input and Output Summary (last 24 hours): Intake/Output Summary (Last 24 hours) at 3/7/2022 1333  Last data filed at 3/7/2022 1201  Gross per 24 hour   Intake 660 ml   Output --   Net 660 ml       Physical Exam:   Physical Exam  Vitals and nursing note reviewed  Constitutional:       General: She is not in acute distress  Appearance: She is obese  She is not ill-appearing  Comments: On high flow oxygen   HENT:      Head: Normocephalic and atraumatic  Nose: Rhinorrhea (clear) present  Mouth/Throat:      Mouth: Mucous membranes are moist    Eyes:      Conjunctiva/sclera: Conjunctivae normal    Cardiovascular:      Rate and Rhythm: Normal rate and regular rhythm  Heart sounds: No murmur heard  No friction rub  No gallop  Pulmonary:      Effort: Pulmonary effort is normal       Breath sounds: Decreased air movement (bilaterally) present  Decreased breath sounds (bilaterally) present  Abdominal:      General: Bowel sounds are normal       Palpations: Abdomen is soft  Musculoskeletal:      Cervical back: Neck supple  Skin:     General: Skin is warm and dry  Findings: Erythema (right antecubital fossa) present  Neurological:      General: No focal deficit present  Mental Status: She is alert  Gait: Gait is intact  Psychiatric:         Mood and Affect: Mood is anxious (worried about her apartment inspection and getting things taken care of while she is in the hospital )  Speech: Speech is not tangential (but is circumferential)  Behavior: Behavior is hyperactive  Behavior is cooperative         Historical Information   Past Medical History:   Diagnosis Date    Herniated cervical disc     Hypertension      Past Surgical History:   Procedure Laterality Date     SECTION      EYE SURGERY      SHOULDER SURGERY       Social History   Social History     Substance and Sexual Activity   Alcohol Use Never Social History     Substance and Sexual Activity   Drug Use Not on file     Social History     Tobacco Use   Smoking Status Current Every Day Smoker    Packs/day: 0 50   Smokeless Tobacco Never Used     Family History: History reviewed  No pertinent family history  Meds/Allergies   all medications and allergies reviewed  No Known Allergies    Additional Data:   Labs:    Results from last 7 days   Lab Units 03/07/22  0444   WBC Thousand/uL 14 17*   HEMOGLOBIN g/dL 11 9   HEMATOCRIT % 36 9   PLATELETS Thousands/uL 216   NEUTROS PCT % 61   LYMPHS PCT % 30   MONOS PCT % 7   EOS PCT % 1     Results from last 7 days   Lab Units 03/07/22  0444   SODIUM mmol/L 143   POTASSIUM mmol/L 3 6   CHLORIDE mmol/L 105   CO2 mmol/L 29   BUN mg/dL 19   CREATININE mg/dL 0 62   ANION GAP mmol/L 9   CALCIUM mg/dL 9 0   ALBUMIN g/dL 2 7*   TOTAL BILIRUBIN mg/dL 0 31   ALK PHOS U/L 59   ALT U/L 42   AST U/L 17   GLUCOSE RANDOM mg/dL 99         Results from last 7 days   Lab Units 03/07/22  1125 03/07/22  0716 03/06/22  2122 03/06/22  1628 03/06/22  1123 03/06/22  0747 03/05/22  2138 03/05/22  1549 03/05/22  1100 03/05/22  0554 03/04/22  2104 03/04/22  1556   POC GLUCOSE mg/dl 94 91 150* 140 125 100 134 202* 134 97 175* 159*             * I Have Reviewed All Lab Data Listed Above  * Additional Pertinent Lab Tests Reviewed: All Labs Within Last 24 Hours Reviewed    Imaging:  No new imaging to review       Recent Cultures (last 7 days):           Last 24 Hours Medication List:   Current Facility-Administered Medications   Medication Dose Route Frequency Provider Last Rate    acetaminophen  650 mg Oral Q6H PRN Catie Bright, CRNP      al mag oxide-diphenhydramine-lidocaine viscous  10 mL Swish & Swallow Q6H PRN Catie Bright, CRNP      ALPRAZolam  0 5 mg Oral BID PRN Catie Bright, CRNP      aluminum-magnesium hydroxide-simethicone  30 mL Oral Q4H PRN Catie Bright, CRNP      bacitracin  1 small application Topical BID Gustavo Wood MD      benzonatate  200 mg Oral TID PRN Glorietta Pouch, CRNP      carbamide peroxide  5 drop Left Ear BID Glorietta Pouch, CRNP      docusate sodium  100 mg Oral BID PRN Glorietta Pouch, CRNP      fluticasone  1 spray Each Nare Daily Glorietta Pouch, CRNP      guaiFENesin  600 mg Oral Q12H [address] Glorietta Pouch, CRNP      heparin (porcine)  5,000 Units Subcutaneous [facility] Glorietta Pouch, CRNP      insulin glargine  10 Units Subcutaneous Q12H [address] Glorietta Pouch, CRNP      insulin lispro  2-12 Units Subcutaneous TID AC Glorietta Pouch, CRNP      insulin lispro  8 Units Subcutaneous TID With Meals Glorietta Pouch, CRNP      ipratropium  0 5 mg Nebulization 4x Daily Glorietta Pouch, CRNP      levalbuterol  1 25 mg Nebulization 4x Daily Glorietta Pouch, CRNP      lidocaine  1 patch Topical Daily Glorietta Pouch, CRNP      Lidocaine Viscous HCl  15 mL Swish & Spit 4x Daily PRN Glorietta Pouch, CRNP      metoprolol tartrate  25 mg Oral Q12H [address] Glorietta Pouch, CRNP      nystatin  1 application Topical TID Marya Coles DO      pantoprazole  40 mg Oral Daily Before Breakfast Glorietta Pouch, CRNP      polyethylene glycol  17 g Oral Daily PRN Glorietta Pouch, CRNP      predniSONE  20 mg Oral Daily Gustavo Wood MD      saliva substitute  5 spray Mouth/Throat 4x Daily PRN Glorietta Pouch, CRNP      sodium chloride  1 application Nasal P6K PRN Glorietta Pouch, CRNP          Today, Patient Was Seen By: Paula Armando    ** Please Note: Dictation voice to text software may have been used in the creation of this document   **

## 2022-03-07 NOTE — ASSESSMENT & PLAN NOTE
· Steroid induced hyperglycemia with no history of diabetes mellitus  · Continue glargine and sliding scale    Lab Results   Component Value Date    HGBA1C 6 6 (H) 02/16/2022     Results from last 7 days   Lab Units 03/07/22  1613 03/07/22  1125 03/07/22  0716 03/06/22 2122 03/06/22  1628 03/06/22  1123 03/06/22  0747 03/05/22  2138   POC GLUCOSE mg/dl 128 94 91 150* 140 125 100 134

## 2022-03-07 NOTE — OCCUPATIONAL THERAPY NOTE
Occupational Therapy Progress Note(time=5256-1538)     Patient Name: Jyoti Sin  APCBP'V Date: 3/7/2022  Problem List  Principal Problem:    Acute respiratory failure with hypoxia ([facility] [address] )  Active Problems:    Elevated brain natriuretic peptide (BNP) level    SIRS (systemic inflammatory response syndrome) (HCC)    Thrombocytosis    Hyperglycemia    BMI 50 0-59 9, adult (HCC)    Anxiety    HTN (hypertension)    Transaminitis    New onset a-fib (HCC)    Acute diastolic heart failure (HCC)          03/07/22 1525   Note Type   Note Type Treatment   Restrictions/Precautions   Weight Bearing Precautions Per Order No   Other Precautions O2;Fall Risk;Contact/isolation;Multiple lines   Pain Assessment   Pain Assessment Tool FLACC   Pain Rating: FLACC (Rest) - Face 0   Pain Rating: FLACC (Rest) - Legs 0   Pain Rating: FLACC (Rest) - Activity 0   Pain Rating: FLACC (Rest) - Cry 0   Pain Rating: FLACC (Rest) - Consolability 0   Score: FLACC (Rest) 0   ADL   Where Assessed Chair   Equipment Provided Reacher;Sock aid;Long-handled sponge;Long-handled shoe horn;Dressing stick   LB Dressing Assistance 5  Supervision/Setup   LB Dressing Deficit Don/doff R sock; Don/doff L sock; Thread RLE into pants; Thread LLE into pants   Functional Standing Tolerance   Time 5-7mins   Transfers   Sit to Stand 7  Independent   Stand to Sit 7  Independent   Functional Mobility   Functional Mobility 5  Supervision  (2* lines)   Cognition   Overall Cognitive Status WFL   Arousal/Participation Alert   Attention Attends with cues to redirect   Memory Within functional limits   Following Commands Follows all commands and directions without difficulty   Comments required verbal cues for re-direction   Activity Tolerance   Activity Tolerance Patient limited by fatigue   Medical Staff Made Aware nsg, P T , NP   Assessment   Assessment Pt seen for 71min tx session with focus on functional balance, functional mobility, ADL status, transfer safety, energy conservation, cognition, and education  Pt able to tolerate OOB mobility; sitting balance=g/g-, standing balance=g-/f+  Therapist reviewed energy conservation techniques pertaining to LE ADLs, breathing techniques, and posture  Pt able to effectively use "hip kit" for LE ADLs  Pt able to demonstrate improved functional mobility w/o device; SPO2 levels=84-94%(with ambulation--at rest) on hi-flow  Therapist reviewed methods to improve overall endurance(i e daily walks with [facility] staff, performing her own AM care, standing activities, breathing exercises); pt able to demonstrate good carrover with all information reviewed  May best benefit from a pulmonary rehab program  Tx tolerated well  Goals stated on initial eval remain appropriate  Will extend goal date  Plan   Treatment Interventions ADL retraining;Functional transfer training;UE strengthening/ROM; Endurance training;Patient/family training;Equipment evaluation/education; Compensatory technique education   Goal Expiration Date 03/14/22   OT Treatment Day 4   Recommendation   OT Discharge Recommendation   (pulmonary rehab)   AM-PAC Daily Activity Inpatient   Lower Body Dressing 3   Bathing 3   Toileting 4   Upper Body Dressing 4   Grooming 4   Eating 4   Daily Activity Raw Score 22   Daily Activity Standardized Score (Calc for Raw Score >=11) 47  [address] [facility]   Following a Speech/Presentation 4   Understanding Ordinary Conversation 4   Taking Medications 4   Remembering Where Things Are Placed or Put Away 4   Remembering List of 4-5 Errands 4   Taking Care of Complicated Tasks 4   Applied Cognition Raw Score 24   Applied Cognition Standardized Score 62 21   Anna Willard, OT

## 2022-03-07 NOTE — PLAN OF CARE
Problem: OCCUPATIONAL THERAPY ADULT  Goal: Performs self-care activities at highest level of function for planned discharge setting  See evaluation for individualized goals  Description: Treatment Interventions: ADL retraining,Functional transfer training,UE strengthening/ROM,Endurance training,Patient/family training,Equipment evaluation/education,Compensatory technique education          See flowsheet documentation for full assessment, interventions and recommendations  Outcome: Progressing  Note: Limitation: Decreased ADL status,Decreased UE strength,Decreased Safe judgement during ADL,Decreased endurance,Decreased high-level ADLs  Prognosis: Fair  Assessment: Pt seen for 71min tx session with focus on functional balance, functional mobility, ADL status, transfer safety, energy conservation, cognition, and education  Pt able to tolerate OOB mobility; sitting balance=g/g-, standing balance=g-/f+  Therapist reviewed energy conservation techniques pertaining to LE ADLs, breathing techniques, and posture  Pt able to effectively use "hip kit" for LE ADLs  Pt able to demonstrate improved functional mobility w/o device; SPO2 levels=84-94%(with ambulation--at rest) on hi-flow  Therapist reviewed methods to improve overall endurance(i e daily walks with [facility] staff, performing her own AM care, standing activities, breathing exercises); pt able to demonstrate good carrover with all information reviewed  May best benefit from a pulmonary rehab program  Tx tolerated well  Goals stated on initial eval remain appropriate  Will extend goal date        OT Discharge Recommendation:  (pulmonary rehab)  OT - OK to Discharge:  (once medically cleared)

## 2022-03-08 PROBLEM — A41.89 SEPSIS DUE TO COVID-19 (HCC): Status: ACTIVE | Noted: 2022-02-16

## 2022-03-08 PROBLEM — U07.1 SEPSIS DUE TO COVID-19 (HCC): Status: ACTIVE | Noted: 2022-02-16

## 2022-03-08 LAB
GLUCOSE SERPL-MCNC: 106 MG/DL (ref 65–140)
GLUCOSE SERPL-MCNC: 119 MG/DL (ref 65–140)
GLUCOSE SERPL-MCNC: 134 MG/DL (ref 65–140)
GLUCOSE SERPL-MCNC: 181 MG/DL (ref 65–140)
HIV 1+2 AB+HIV1 P24 AG SERPL QL IA: NORMAL

## 2022-03-08 PROCEDURE — 97116 GAIT TRAINING THERAPY: CPT

## 2022-03-08 PROCEDURE — 94640 AIRWAY INHALATION TREATMENT: CPT

## 2022-03-08 PROCEDURE — 82948 REAGENT STRIP/BLOOD GLUCOSE: CPT

## 2022-03-08 PROCEDURE — 97530 THERAPEUTIC ACTIVITIES: CPT

## 2022-03-08 PROCEDURE — 99232 SBSQ HOSP IP/OBS MODERATE 35: CPT | Performed by: INTERNAL MEDICINE

## 2022-03-08 RX ADMIN — HEPARIN SODIUM 5000 UNITS: 5000 INJECTION INTRAVENOUS; SUBCUTANEOUS at 13:21

## 2022-03-08 RX ADMIN — METOPROLOL TARTRATE 25 MG: 25 TABLET, FILM COATED ORAL at 21:23

## 2022-03-08 RX ADMIN — ALPRAZOLAM 0.5 MG: 0.5 TABLET ORAL at 21:23

## 2022-03-08 RX ADMIN — METOPROLOL TARTRATE 25 MG: 25 TABLET, FILM COATED ORAL at 09:55

## 2022-03-08 RX ADMIN — INSULIN LISPRO 2 UNITS: 100 INJECTION, SOLUTION INTRAVENOUS; SUBCUTANEOUS at 16:30

## 2022-03-08 RX ADMIN — GUAIFENESIN 600 MG: 600 TABLET, EXTENDED RELEASE ORAL at 09:55

## 2022-03-08 RX ADMIN — IPRATROPIUM BROMIDE 0.5 MG: 0.5 SOLUTION RESPIRATORY (INHALATION) at 11:52

## 2022-03-08 RX ADMIN — FLUTICASONE PROPIONATE 1 SPRAY: 50 SPRAY, METERED NASAL at 10:08

## 2022-03-08 RX ADMIN — NYSTATIN 1 APPLICATION: 100000 POWDER TOPICAL at 21:24

## 2022-03-08 RX ADMIN — BACITRACIN 1 SMALL APPLICATION: 500 OINTMENT TOPICAL at 17:06

## 2022-03-08 RX ADMIN — PREDNISONE 20 MG: 20 TABLET ORAL at 09:55

## 2022-03-08 RX ADMIN — BENZONATATE 200 MG: 100 CAPSULE ORAL at 21:23

## 2022-03-08 RX ADMIN — HEPARIN SODIUM 5000 UNITS: 5000 INJECTION INTRAVENOUS; SUBCUTANEOUS at 21:22

## 2022-03-08 RX ADMIN — IPRATROPIUM BROMIDE 0.5 MG: 0.5 SOLUTION RESPIRATORY (INHALATION) at 19:41

## 2022-03-08 RX ADMIN — INSULIN GLARGINE 10 UNITS: 100 INJECTION, SOLUTION SUBCUTANEOUS at 08:30

## 2022-03-08 RX ADMIN — IPRATROPIUM BROMIDE 0.5 MG: 0.5 SOLUTION RESPIRATORY (INHALATION) at 07:29

## 2022-03-08 RX ADMIN — LEVALBUTEROL HYDROCHLORIDE 1.25 MG: 1.25 SOLUTION, CONCENTRATE RESPIRATORY (INHALATION) at 07:29

## 2022-03-08 RX ADMIN — BACITRACIN 1 SMALL APPLICATION: 500 OINTMENT TOPICAL at 09:55

## 2022-03-08 RX ADMIN — GUAIFENESIN 600 MG: 600 TABLET, EXTENDED RELEASE ORAL at 21:23

## 2022-03-08 RX ADMIN — LEVALBUTEROL HYDROCHLORIDE 1.25 MG: 1.25 SOLUTION, CONCENTRATE RESPIRATORY (INHALATION) at 19:41

## 2022-03-08 RX ADMIN — HEPARIN SODIUM 5000 UNITS: 5000 INJECTION INTRAVENOUS; SUBCUTANEOUS at 05:45

## 2022-03-08 RX ADMIN — PREDNISONE 10 MG: 10 TABLET ORAL at 09:55

## 2022-03-08 RX ADMIN — LEVALBUTEROL HYDROCHLORIDE 1.25 MG: 1.25 SOLUTION, CONCENTRATE RESPIRATORY (INHALATION) at 11:52

## 2022-03-08 RX ADMIN — PANTOPRAZOLE SODIUM 40 MG: 40 TABLET, DELAYED RELEASE ORAL at 05:45

## 2022-03-08 RX ADMIN — INSULIN GLARGINE 10 UNITS: 100 INJECTION, SOLUTION SUBCUTANEOUS at 21:22

## 2022-03-08 RX ADMIN — LIDOCAINE 1 PATCH: 50 PATCH CUTANEOUS at 09:55

## 2022-03-08 NOTE — CASE MANAGEMENT
Case Management Discharge Planning Note    Patient name Brynn Kong [place] [address] [address] Melina Meyers* MRN [id number]  : 1970 Date 3/8/2022       Current Admission Date: 2022  Current Admission Diagnosis:Acute respiratory failure with hypoxia [facility])   Patient Active Problem List    Diagnosis Date Noted    [facility] ) 2022    Anxiety 2022    HTN (hypertension) 2022    Transaminitis 2022    New onset a-fib ([facility] ) 2022    Acute respiratory failure with hypoxia ([facility] ) 2022    Elevated brain natriuretic peptide (BNP) level 2022    SIRS (systemic inflammatory response syndrome) ([place] [address] ) 2022    Thrombocytosis 2022    Hyperglycemia 2022    BMI 50 0-59 9, adult ([facility] ) 2022    Dysfunction of right eustachian tube 2022    SOB (shortness of breath) on exertion 2022    Assistance needed with transportation 2022    Insufficient supply of food 2022    Pneumonia due to COVID-19 virus 2022    COVID-19 virus infection 2022      LOS (days): 20  Geometric Mean LOS (GMLOS) (days): 4 80  Days to GMLOS:-14 8     OBJECTIVE:  Risk of Unplanned Readmission Score: 16         Current admission status: Inpatient   Preferred Pharmacy:   PATIENT/FAMILY REPORTS NO PREFERRED PHARMACY  No address on file      [place] [address]  Phone: [phone] Fax: [phone]    Primary Care Provider: Jaz Balderas MD    Primary Insurance: [facility]  Secondary Insurance:     DISCHARGE DETAILS:  CM spoke to pt per her request- She is need of obtaining paperwork from her  Jannette Ramos [phone]- CM called with fax number for CM office as well as email address provided so that forms can be obtained on behalf of pt  Will have sent back once completed      Discharge planning discussed with[de-identified] pt  Freedom of Choice: Yes  Comments - Freedom of Choice: Pt declines need for STR however is interested in an outpatient type respiratory therapy if available  CM contacted family/caregiver?: Yes  Were Treatment Team discharge recommendations reviewed with patient/caregiver?: Yes  Did patient/caregiver verbalize understanding of patient care needs?: Yes  Were patient/caregiver advised of the risks associated with not following Treatment Team discharge recommendations?: Yes (Pt feels from a functional standpoint she does well but is agreeable to an outpatient respiratory therapy)         [facility]         Is the patient interested in [place] [address] at discharge?: No

## 2022-03-08 NOTE — PLAN OF CARE
Problem: Potential for Falls  Goal: Patient will remain free of falls  Description: INTERVENTIONS:  - Educate patient/family on patient safety including physical limitations  - Instruct patient to call for assistance with activity   - Consult OT/PT to assist with strengthening/mobility   - Keep Call bell within reach  - Keep bed low and locked with side rails adjusted as appropriate  - Keep care items and personal belongings within reach  - Initiate and maintain comfort rounds  - Make Fall Risk Sign visible to staff  - Offer Toileting  - Apply yellow socks and bracelet for high fall risk patients  - Consider moving patient to room near nurses station  Outcome: Progressing     Problem: MOBILITY - ADULT  Goal: Maintain or return to baseline ADL function  Description: INTERVENTIONS:  -  Assess patient's ability to carry out ADLs; assess patient's baseline for ADL function and identify physical deficits which impact ability to perform ADLs (bathing, care of mouth/teeth, toileting, grooming, dressing, etc )  - Assess/evaluate cause of self-care deficits   - Assess range of motion  - Assess patient's mobility; develop plan if impaired  - Assess patient's need for assistive devices and provide as appropriate  - Encourage maximum independence but intervene and supervise when necessary  - Involve family in performance of ADLs  - Assess for home care needs following discharge   - Consider OT consult to assist with ADL evaluation and planning for discharge  - Provide patient education as appropriate  Outcome: Progressing  Goal: Maintains/Returns to pre admission functional level  Description: INTERVENTIONS:  - Perform BMAT or MOVE assessment daily    - Set and communicate daily mobility goal to care team and patient/family/caregiver     - Collaborate with rehabilitation services on mobility goals if consulted  - Perform Range of Motion  - Record patient progress and toleration of activity level   Outcome: Progressing Problem: Prexisting or High Potential for Compromised Skin Integrity  Goal: Skin integrity is maintained or improved  Description: INTERVENTIONS:  - Identify patients at risk for skin breakdown  - Assess and monitor skin integrity  - Assess and monitor nutrition and hydration status  - Monitor labs   - Assess for incontinence   - Turn and reposition patient  - Assist with mobility/ambulation  - Relieve pressure over bony prominences  - Avoid friction and shearing  - Provide appropriate hygiene as needed including keeping skin clean and dry  - Evaluate need for skin moisturizer/barrier cream  - Collaborate with interdisciplinary team   - Patient/family teaching  - Consider wound care consult   Outcome: Progressing     Problem: RESPIRATORY - ADULT  Goal: Achieves optimal ventilation and oxygenation  Description: INTERVENTIONS:  - Assess for changes in respiratory status  - Assess for changes in mentation and behavior  - Position to facilitate oxygenation and minimize respiratory effort  - Oxygen administered by appropriate delivery if ordered  - Initiate smoking cessation education as indicated  - Encourage broncho-pulmonary hygiene including cough, deep breathe, Incentive Spirometry  - Assess the need for suctioning and aspirate as needed  - Assess and instruct to report SOB or any respiratory difficulty  - Respiratory Therapy support as indicated  Outcome: Progressing     Problem: METABOLIC, FLUID AND ELECTROLYTES - ADULT  Goal: Glucose maintained within target range  Description: INTERVENTIONS:  - Monitor Blood Glucose as ordered  - Assess for signs and symptoms of hyperglycemia and hypoglycemia  - Administer ordered medications to maintain glucose within target range  - Assess nutritional intake and initiate nutrition service referral as needed  Outcome: Progressing     Problem: MUSCULOSKELETAL - ADULT  Goal: Maintain or return mobility to safest level of function  Description: INTERVENTIONS:  - Assess patient's ability to carry out ADLs; assess patient's baseline for ADL function and identify physical deficits which impact ability to perform ADLs (bathing, care of mouth/teeth, toileting, grooming, dressing, etc )  - Assess/evaluate cause of self-care deficits   - Assess range of motion  - Assess patient's mobility  - Assess patient's need for assistive devices and provide as appropriate  - Encourage maximum independence but intervene and supervise when necessary  - Involve family in performance of ADLs  - Assess for home care needs following discharge   - Consider OT consult to assist with ADL evaluation and planning for discharge  - Provide patient education as appropriate  Outcome: Progressing  Goal: Maintain proper alignment of affected body part  Description: INTERVENTIONS:  - Support, maintain and protect limb and body alignment  - Provide patient/ family with appropriate education  Outcome: Progressing     Problem: Nutrition/Hydration-ADULT  Goal: Nutrient/Hydration intake appropriate for improving, restoring or maintaining nutritional needs  Description: Monitor and assess patient's nutrition/hydration status for malnutrition  Collaborate with interdisciplinary team and initiate plan and interventions as ordered  Monitor patient's weight and dietary intake as ordered or per policy  Utilize nutrition screening tool and intervene as necessary  Determine patient's food preferences and provide high-protein, high-caloric foods as appropriate       INTERVENTIONS:  - Monitor oral intake, urinary output, labs, and treatment plans  - Assess nutrition and hydration status and recommend course of action  - Evaluate amount of meals eaten  - Assist patient with eating if necessary   - Allow adequate time for meals  - Recommend/ encourage appropriate diets, oral nutritional supplements, and vitamin/mineral supplements  - Order, calculate, and assess calorie counts as needed  - Recommend, monitor, and adjust tube feedings and TPN/PPN based on assessed needs  - Assess need for intravenous fluids  - Provide specific nutrition/hydration education as appropriate  - Include patient/family/caregiver in decisions related to nutrition  Outcome: Progressing     Problem: DISCHARGE PLANNING  Goal: Discharge to home or other facility with appropriate resources  Description: INTERVENTIONS:  - Identify barriers to discharge w/patient and caregiver  - Arrange for needed discharge resources and transportation as appropriate  - Identify discharge learning needs (meds, wound care, etc )  - Arrange for interpretive services to assist at discharge as needed  - Refer to Case Management Department for coordinating discharge planning if the patient needs post-hospital services based on physician/advanced practitioner order or complex needs related to functional status, cognitive ability, or social support system  Outcome: Progressing     Problem: Knowledge Deficit  Goal: Patient/family/caregiver demonstrates understanding of disease process, treatment plan, medications, and discharge instructions  Description: Complete learning assessment and assess knowledge base    Interventions:  - Provide teaching at level of understanding  - Provide teaching via preferred learning methods  Outcome: Progressing

## 2022-03-08 NOTE — APP STUDENT NOTE
DOUGLAS STUDENT  Inpatient Progress Note for TRAINING ONLY  Not Part of Legal Medical Record     Progress Note - Angela Guevara 46 y o  female MRN: [id number]  Unit/Bed#: [facility] [facility] 2 -01 Encounter: [id number]    Assessment and Plan  1  Acute Respiratory Failure with Hypoxia, multifactorial (DDS: pneumonia, COVID-19 sequale, volume overload, pneumonitis)  · Patient presented with respiratory distress and hypoxia  · Patient continues on high flow oxygen sating at 93-95%  · Patient has MCDOWELL and desaturation with exertion  · Continue to help patient walk halls for increased stamina  · Pulmonary following  · Will continue to wean oxygen keeping patients SPO2 >88%  · Try to get patient to mid flow today  · Continue prednisone taper  · Patient had 5 days ceftriaxone completed on 2/20/22  · Continue nebulizers   · Continue mucinex and tessalon perls   · Encourage incentive spirometry    2  Systemic Inflammatory Response Syndrome (SIRS) on admission  · On admission patient had leukocytosis of 20 59 and she was tachycardic  · Leukocytosis improved on 3/7/22 it was 14 17  · Patient had lactic acidosis of 2 7 on admission  · Patient remains afebrile  · Patient was treated with 5 days ceftriaxone   · Continue to monitor patients labs and vitals  3  Acute Diastolic Congestive Heart Failure  · Patient had 15 lb increase from January to February  · On admission patient had elevated pro BNP of 1472  · CXR on 2/16/22 revealed multifocal groundglass infiltrates concerning for COVID-19 infection  · ECHO on 2/17/22 revealed EF of 65%  · Continue to monitor daily weights and I/Os  · Continue to monitor electrolytes and replete as needed  · Continue to monitor renal functions     4  Paroxysmal Atrial Fibrillation  · Patient had a brief episode of a-fib with reversal back to sinus rhythm without intervention  · Patient currently remains in normal sinus rhythm  · Continue Heparin while inpatient   Since patient only had one episode a-fib no need for permanent anticoagulation therapy     5  Severe Obesity  · BMI of 53 63 kg/m^2  · Patient states she has good outpatient routine for exercise and diet  · Encouraged patient to continue this plan once out of the hospital     6  Hypertension  · SBP have been between 115-135  · Continue to hold amlodipine  · Continue metoprolol tartrate 25 mg po BID  · Continue to monitor vitals    7  Hyperglycemia (new onset DM vs steroid use)  · Hemoglobin A1C on 2/16/22 was 6 6  · BG fingersticks have been fluctuating while inpatient  · Currently being treated with insulin therapy, continue to adjust based on steroids  · Continue Lantus 10 Units BID  · Continue Humalog 8 Units TID and sliding scale with meals  · Continue fingerstick glucose before meals    8  Thrombocytosis, resolved  · Platelets 815 on admission, 2/16/22  · Platelets on 8/5/0856 were 216  · Continue to monitor    9  Transaminitis, resolved  · Noted mild elevation on 2/19/22  · Returned to normal on 3/7/22  · Continue to monitor liver enzymes    10  Anxiety  · Patient's anxiety is improved today about her housing and need for things to get done while she is in the hospital    · Continue Xanax 1 mg prn anxiety     11  IV site infection  · Patient noted to have redness and drainage from right antecubital fossa after IV pulled on 3/5/22  · Patient and nursing note it looks significantly better today  · Patient notes slight pain  · Continue to monitor   · Continue warm compresses        VTE Pharmacologic Prophylaxis:   Pharmacologic: Heparin  Mechanical VTE Prophylaxis in Place: No    Patient Centered Rounds: I have performed bedside rounds with nursing staff today  Time Spent for Care: 45 minutes  More than 50% of total time spent on counseling and coordination of care as described above      Current Length of Stay: 20 day(s)    Current Patient Status: Inpatient   Certification Statement: The patient will continue to require additional inpatient hospital stay due to desaturation with exertion and need for step down from highflow oxygen  Discharge Plan: To be determined  Code Status: Level 1 - Full Code    Subjective: This is a 46year old female with PMH of COVID-19 infection in 2022 and HTN who is seen hospital day 20 due to admission diagnosis of respiratory distress and hypoxia  Patient continues to report slight MCDOWELL and desaturation with exertion  She states she still feels great today and notes improvement in her anxiety due to the Xanax she received  She notes she slept better last night as she was able to make arrangements for the inspection of her apartment for Friday  Patient notes she continues with a good appetite  She states that today she was able to go into the bathroom and perform her own personal hygiene  She states she wishes to go for a walk around the floor again today  She notes significant improvement of her antecubital fossa erythema and edema  She does state that her IV site on her left forearm is starting to bother her, but she knows that it does not come out until tomorrow  She denies any CP, SOB, N/V/D, or abdominal pain  Objective:   Vitals:   Temp (24hrs), Av 2 °F (37 3 °C), Min:98 8 °F (37 1 °C), Max:99 6 °F (37 6 °C)    Temp:  [98 8 °F (37 1 °C)-99 6 °F (37 6 °C)] 99 °F (37 2 °C)  HR:  [76-90] 76  Resp:  [16-22] 22  BP: (123-134)/(74-90) 124/74  SpO2:  [90 %-97 %] 93 %  Body mass index is 52 68 kg/m²  Input and Output Summary (last 24 hours): Intake/Output Summary (Last 24 hours) at 3/8/2022 1027  Last data filed at 3/7/2022 1701  Gross per 24 hour   Intake 780 ml   Output --   Net 780 ml       Physical Exam:   Physical Exam  Vitals and nursing note reviewed  Constitutional:       General: She is not in acute distress  Appearance: She is obese  She is not ill-appearing  Interventions: Nasal cannula in place        Comments: High flow oxygen    HENT:      Head: Normocephalic and atraumatic  Eyes:      General: No scleral icterus  Conjunctiva/sclera: Conjunctivae normal    Cardiovascular:      Rate and Rhythm: Normal rate and regular rhythm  Heart sounds: No murmur heard  No friction rub  No gallop  Pulmonary:      Breath sounds: Decreased air movement present  Examination of the right-upper field reveals rhonchi  Examination of the right-middle field reveals rhonchi  Examination of the right-lower field reveals rhonchi  Decreased breath sounds and rhonchi present  No wheezing or rales  Abdominal:      General: Bowel sounds are normal       Palpations: Abdomen is soft  Tenderness: There is no abdominal tenderness  Musculoskeletal:      Cervical back: Neck supple  Skin:     General: Skin is warm and dry  Findings: No erythema (right antecubital fossa)  Neurological:      General: No focal deficit present  Mental Status: She is alert  Gait: Gait is intact  Psychiatric:         Mood and Affect: Mood normal  Mood is not anxious  Behavior: Behavior is cooperative  Historical Information   Past Medical History:   Diagnosis Date    Herniated cervical disc     Hypertension      Past Surgical History:   Procedure Laterality Date     SECTION      EYE SURGERY      SHOULDER SURGERY       Social History   Social History     Substance and Sexual Activity   Alcohol Use Never     Social History     Substance and Sexual Activity   Drug Use Not on file     Social History     Tobacco Use   Smoking Status Current Every Day Smoker    Packs/day: 0 50   Smokeless Tobacco Never Used     Family History: History reviewed  No pertinent family history      Meds/Allergies   all medications and allergies reviewed  No Known Allergies    Additional Data:   Labs:    Results from last 7 days   Lab Units 22  0444   WBC Thousand/uL 14 17*   HEMOGLOBIN g/dL 11 9   HEMATOCRIT % 36 9   PLATELETS Thousands/uL 216   NEUTROS PCT % 61   LYMPHS PCT % 30 MONOS PCT % 7   EOS PCT % 1     Results from last 7 days   Lab Units 03/07/22  0444   SODIUM mmol/L 143   POTASSIUM mmol/L 3 6   CHLORIDE mmol/L 105   CO2 mmol/L 29   BUN mg/dL 19   CREATININE mg/dL 0 62   ANION GAP mmol/L 9   CALCIUM mg/dL 9 0   ALBUMIN g/dL 2 7*   TOTAL BILIRUBIN mg/dL 0 31   ALK PHOS U/L 59   ALT U/L 42   AST U/L 17   GLUCOSE RANDOM mg/dL 99         Results from last 7 days   Lab Units 03/08/22  0713 03/07/22  2053 03/07/22  1613 03/07/22  1125 03/07/22  0716 03/06/22  2122 03/06/22  1628 03/06/22  1123 03/06/22  0747 03/05/22  2138 03/05/22  1549 03/05/22  1100   POC GLUCOSE mg/dl 106 128 128 94 91 150* 140 125 100 134 202* 134             * I Have Reviewed All Lab Data Listed Above  * Additional Pertinent Lab Tests Reviewed: All Labs Within Last 24 Hours Reviewed    Imaging:  No new imaging to review       Recent Cultures (last 7 days):         Last 24 Hours Medication List:   Current Facility-Administered Medications   Medication Dose Route Frequency Provider Last Rate    acetaminophen  650 mg Oral Q6H PRN EMMA Roy      al mag oxide-diphenhydramine-lidocaine viscous  10 mL Swish & Swallow Q6H PRN EMMA Roy      ALPRAZolam  0 5 mg Oral BID PRN EMMA Roy      aluminum-magnesium hydroxide-simethicone  30 mL Oral Q4H PRN EMMA Roy      bacitracin  1 small application Topical BID Pamela Aquino MD      benzonatate  200 mg Oral TID PRN EMMA Roy      carbamide peroxide  5 drop Left Ear BID EMMA Roy      docusate sodium  100 mg Oral BID PRN EMMA Roy      fluticasone  1 spray Each Nare Daily EMMA Roy      guaiFENesin  600 mg Oral Q12H [facility] EMMA Roy      heparin (porcine)  5,000 Units Subcutaneous Replaced by [facility] Joenathan Ghee, CRNP      insulin glargine  10 Units Subcutaneous Q12H [facility] EMMA Roy      insulin lispro  2-12 Units Subcutaneous TID AC Yanet Silva Linda, EMMA      insulin lispro  8 Units Subcutaneous TID With Meals Gladys Jauregui, EMMA      ipratropium  0 5 mg Nebulization 4x Daily Gladys Jauregui, EMMA      levalbuterol  1 25 mg Nebulization 4x Daily Gladys Jauregui, LENINP      lidocaine  1 patch Topical Daily Gladys Jauregui, EMMA      Lidocaine Viscous HCl  15 mL Swish & Spit 4x Daily PRN Gladys Jauregui, LENINP      metoprolol tartrate  25 mg Oral Q12H [address] Gladys Jauregui, EMMA      nystatin  1 application Topical TID Aline Zapien DO      pantoprazole  40 mg Oral Daily Before Breakfast Gladys Jauregui, EMMA      polyethylene glycol  17 g Oral Daily PRN Gladys Jauregui, EMMA      predniSONE  10 mg Oral Daily [address] [facility] [address]      predniSONE  20 mg Oral Daily [facility], CRNP      saliva substitute  5 spray Mouth/Throat 4x Daily PRN Gladys Jauregui, LENINP      sodium chloride  1 application Nasal U4Q PRN Gladys Jauregui, EMMA          Today, Patient Was Seen By: Chuck Sanchez    ** Please Note: Dictation voice to text software may have been used in the creation of this document   **

## 2022-03-08 NOTE — PHYSICAL THERAPY NOTE
Physical Therapy Treatment Note     03/08/22 1522   PT Last Visit   PT Visit Date 03/08/22   Note Type   Note Type Treatment   Pain Assessment   Pain Assessment Tool 0-10   Pain Score No Pain   Restrictions/Precautions   Weight Bearing Precautions Per Order No   Other Precautions O2;Telemetry; Fall Risk   General   Chart Reviewed Yes   Family/Caregiver Present No   Subjective   Subjective Pt  seated on BSC upon entry  Pt  agreeable to PT  Pt  on 10L [place] O2  SpO2 stats 88-97% with rest and talking  Transfers   Sit to Stand 7  Independent   Additional items Armrests   Stand to Sit 7  Independent   Additional items Armrests   Stand pivot 7  Independent   Ambulation/Elevation   Gait pattern Decreased foot clearance;Decreased heel strike  (lateral sways, body habitus)   Gait Assistance 5  Supervision   Additional items Verbal cues; Assist x 1   Assistive Device None   Distance 130ft x 4   Stair Management Assistance 5  Supervision   Additional items Assist x 1;Verbal cues; Increased time required   Stair Management Technique One rail R;Alternating pattern; Foreward;Reciprocal   Number of Stairs 10   Balance   Static Sitting Normal   Dynamic Sitting Good   Static Standing Fair +   Dynamic Standing Fair   Ambulatory Fair   Endurance Deficit   Endurance Deficit No   Activity Tolerance   Activity Tolerance Patient tolerated treatment well   Nurse Made Aware Yes   Assessment   Prognosis Good   Problem List Decreased strength;Decreased mobility; Impaired judgement;Decreased safety awareness   Assessment pt  able to tolerate all activities this session  Pt  was given repepated max cues to pace herself in doing activities  pt  very eager to ambulate  Educated patient to pace her activities  pt  was on 15 L of O2 as 10L was not avialbale for the O2 tank  Spo2 stats during ambualtion with 15L was 88-92% and HR  bpm  No LOB or unsteadiness noted during ambulation or stair negotiation   Though patient was recommended to sit and rest post stair negotiation bossman insisted on ambulating again  O2 tank had to be switched out as it got empty and patient's O2 stats dropped to 67-65%  However improved to 90% within seconds of attaching 15L supplemental O2 back on  Pt was standing and resting while O2tank was switched out  pt  able to ambulate back to room on 15L  SpO2 stats fluctated at 10L between 88%-97% when patient was seated and talking post session  Educated patient in energy conservation and pacing her activities  This therapist managed the O2 tank t/o session  Reported to RN regarding PT session and patient's status  pt  seated on chair post session and back on10L O2  Will continue to follow during the stay to maximize functional mobility and endurance  Noted occ  unproductive cough during session  Barriers to Discharge Inaccessible home environment;Decreased caregiver support   Goals   Patient Goals I am very happy I did the walking and steps   STG Expiration Date 03/17/22   PT Treatment Day 5   Plan   Treatment/Interventions Functional transfer training;Elevations; Patient/family training;Gait training;Equipment eval/education;Spoke to nursing   Progress Progressing toward goals   PT Frequency 4-6x/wk   Recommendation   PT Discharge Recommendation Post acute rehabilitation services  (Monitor disposition with progress)   Equipment Recommended   (TBD)   AM-PAC Basic Mobility Inpatient   Turning in Bed Without Bedrails 3   Lying on Back to Sitting on Edge of Flat Bed 3   Moving Bed to Chair 4   Standing Up From Chair 4   Walk in Room 4   Climb 3-5 Stairs 4   Basic Mobility Inpatient Raw Score 22   Basic Mobility Standardized Score 47 4   Turning Head Towards Sound 4   Follow Simple Instructions 4   Low Function Basic Mobility Raw Score 30   Low Function Basic Mobility Standardized Score 47 58   Highest Level Of Mobility   JH-HLM Goal 7: Walk 25 feet or more   JH-HLM Highest Level of Mobility 8: Walk 250 feet ot more   JH-HLM Goal Achieved Yes   End of Consult   Patient Position at End of Consult Bedside chair; All needs within reach         Ramesh Pritchard, PTA    An AM-PAC basic mobility standardized score less than 42 9 suggest the patient may benefit from discharge to post-acute rehab services

## 2022-03-08 NOTE — PLAN OF CARE
Problem: MOBILITY - ADULT  Goal: Maintain or return to baseline ADL function  Description: INTERVENTIONS:  -  Assess patient's ability to carry out ADLs; assess patient's baseline for ADL function and identify physical deficits which impact ability to perform ADLs (bathing, care of mouth/teeth, toileting, grooming, dressing, etc )  - Assess/evaluate cause of self-care deficits   - Assess range of motion  - Assess patient's mobility; develop plan if impaired  - Assess patient's need for assistive devices and provide as appropriate  - Encourage maximum independence but intervene and supervise when necessary  - Involve family in performance of ADLs  - Assess for home care needs following discharge   - Consider OT consult to assist with ADL evaluation and planning for discharge  - Provide patient education as appropriate  Outcome: Progressing  Goal: Maintains/Returns to pre admission functional level  Description: INTERVENTIONS:  - Perform BMAT or MOVE assessment daily    - Set and communicate daily mobility goal to care team and patient/family/caregiver  - Collaborate with rehabilitation services on mobility goals if consulted  - Perform Range of Motion 3 times a day  - Reposition patient every 2 hours    - Dangle patient 3 times a day  - Stand patient 3 times a day  - Ambulate patient 3 times a day  - Out of bed to chair 3 times a day   - Out of bed for meals 3  times a day  - Out of bed for toileting  - Record patient progress and toleration of activity level   Outcome: Progressing     Problem: RESPIRATORY - ADULT  Goal: Achieves optimal ventilation and oxygenation  Description: INTERVENTIONS:  - Assess for changes in respiratory status  - Assess for changes in mentation and behavior  - Position to facilitate oxygenation and minimize respiratory effort  - Oxygen administered by appropriate delivery if ordered  - Initiate smoking cessation education as indicated  - Encourage broncho-pulmonary hygiene including cough, deep breathe, Incentive Spirometry  - Assess the need for suctioning and aspirate as needed  - Assess and instruct to report SOB or any respiratory difficulty  - Respiratory Therapy support as indicated  Outcome: Progressing     Problem: DISCHARGE PLANNING  Goal: Discharge to home or other facility with appropriate resources  Description: INTERVENTIONS:  - Identify barriers to discharge w/patient and caregiver  - Arrange for needed discharge resources and transportation as appropriate  - Identify discharge learning needs (meds, wound care, etc )  - Arrange for interpretive services to assist at discharge as needed  - Refer to Case Management Department for coordinating discharge planning if the patient needs post-hospital services based on physician/advanced practitioner order or complex needs related to functional status, cognitive ability, or social support system  Outcome: Progressing

## 2022-03-08 NOTE — ASSESSMENT & PLAN NOTE
· Steroid induced hyperglycemia with no history of diabetes mellitus  · Continue glargine and sliding scale    Lab Results   Component Value Date    HGBA1C 6 6 (H) 02/16/2022     Results from last 7 days   Lab Units 03/08/22  1612 03/08/22  1055 03/08/22  0713 03/07/22 2053 03/07/22  1613 03/07/22  1125 03/07/22  0716 03/06/22  2122   POC GLUCOSE mg/dl 181* 119 106 128 128 94 91 150*

## 2022-03-08 NOTE — ASSESSMENT & PLAN NOTE
70-year-old female prolonged stay and ICU for acute respiratory failure/hypoxia poly factorial secondary to COVID pneumonia/fibrosis pulmonary edema pneumonitis  Was on high-flow nasal cannula for 19 days  · Pulmonary following    Has been weaned to mid flow today  · On prednisone taper

## 2022-03-08 NOTE — PROGRESS NOTES
[address] [facility]  Progress Note - Sathish Cherry 1970, 46 y o  female MRN: [id number]  Unit/Bed#: [facility] 68 2 ite [place] 87 [id number] Encounter: [id number]  Primary Care Provider: Med Caro MD   Date and time admitted to hospital: 2/16/2022  7:01 PM    * Acute respiratory failure with hypoxia [facility])  Assessment & Plan  49-year-old female prolonged stay and ICU for acute respiratory failure/hypoxia poly factorial secondary to COVID pneumonia/fibrosis pulmonary edema pneumonitis  Was on high-flow nasal cannula for 19 days  · Pulmonary following  Has been weaned to mid flow today  · On prednisone taper    New onset a-fib [facility])  Assessment & Plan  · Transient atrial fibrillation secondary to respiratory failure  No further events  · Remains on metoprolol  Not on therapeutic anticoagulation    HTN (hypertension)  Assessment & Plan  · Continue metoprolol    BMI 50 0-59 9, adult (HCC)  Assessment & Plan  · Body mass index is 52 68 kg/m²  Hyperglycemia  Assessment & Plan  · Steroid induced hyperglycemia with no history of diabetes mellitus  · Continue glargine and sliding scale    Lab Results   Component Value Date    HGBA1C 6 6 (H) 02/16/2022     Results from last 7 days   Lab Units 03/08/22  1612 03/08/22  1055 03/08/22  0713 03/07/22  2053 03/07/22  1613 03/07/22  1125 03/07/22  0716 03/06/22  2122   POC GLUCOSE mg/dl 181* 119 106 128 128 94 91 150*       Sepsis due to COVID-19 [facility])  Assessment & Plan  · Present on admission  Has resolved      VTE Pharmacologic Prophylaxis: VTE Score: 7 High Risk (Score >/= 5) - Pharmacological DVT Prophylaxis Ordered: Heparin  Sequential Compression Devices Ordered  Patient Centered Rounds: I have performed bedside rounds with nursing staff today  Discussions with Specialists or Other Care Team Provider:  Case management    Education and Discussions with Family / Patient:  Family on face time    Time Spent for Care: 25 mins    More than 50% of total time spent on counseling and coordination of care as described above  Current Length of Stay: 20 day(s)  Current Patient Status: Inpatient   Certification Statement: The patient will continue to require additional inpatient hospital stay due to hypoxia  Discharge Plan / Estimated Discharge Date: Anticipate discharge in > 72 hrs to home with home services  Code Status: Level 1 - Full Code      Subjective:   Patient seen and examined  Feeling better, walking the halls on mid flow    Objective:   Vitals: Blood pressure 127/88, pulse 87, temperature 98 8 °F (37 1 °C), resp  rate 22, height 4' 6" (1 372 m), weight 99 1 kg (218 lb 7 6 oz), SpO2 96 %  Physical Exam  Vitals reviewed  Constitutional:       General: She is not in acute distress  Appearance: Normal appearance  Eyes:      General: No scleral icterus  Cardiovascular:      Rate and Rhythm: Regular rhythm  Heart sounds: Normal heart sounds  Pulmonary:      Breath sounds: Decreased breath sounds and rhonchi present  No wheezing  Abdominal:      Palpations: Abdomen is soft  Tenderness: There is no guarding or rebound  Musculoskeletal:         General: No swelling  Skin:     General: Skin is warm  Neurological:      Mental Status: She is alert and oriented to person, place, and time     Psychiatric:         Mood and Affect: Mood normal        Additional Data:   Labs:  Results from last 7 days   Lab Units 03/07/22  0444   WBC Thousand/uL 14 17*   HEMOGLOBIN g/dL 11 9   HEMATOCRIT % 36 9   MCV fL 91   PLATELETS Thousands/uL 216     Results from last 7 days   Lab Units 03/07/22  0444 03/02/22  0456   SODIUM mmol/L 143 139   POTASSIUM mmol/L 3 6 4 1   CHLORIDE mmol/L 105 100   CO2 mmol/L 29 31   ANION GAP mmol/L 9 8   BUN mg/dL 19 18   CREATININE mg/dL 0 62 0 70   CALCIUM mg/dL 9 0 8 7   ALBUMIN g/dL 2 7*  --    TOTAL BILIRUBIN mg/dL 0 31  --    ALK PHOS U/L 59  --    ALT U/L 42  --    AST U/L 17  --    EGFR ml/min/1 73sq m 104 100 GLUCOSE RANDOM mg/dL 99 105           Results from last 7 days   Lab Units 03/08/22  1612 03/08/22  1055 03/08/22  0713 03/07/22  2053 03/07/22  1613 03/07/22  1125 03/07/22  0716 03/06/22 2122 03/06/22  1628 03/06/22  1123 03/06/22  0747 03/05/22  2138   POC GLUCOSE mg/dl 181* 119 106 128 128 94 91 150* 140 125 100 134             * I Have Reviewed All Lab Data Listed Above  Cultures:                   Lines/Drains:  Invasive Devices  Report    Peripheral Intravenous Line            Peripheral IV 03/05/22 Left;Ventral (anterior); Medial Forearm 3 days              Telemetry:      Imaging:  Imaging Reports Reviewed Today Include:   XR chest 1 view portable    Result Date: 2/17/2022  Impression: Multifocal groundglass infiltrates concerning for  Covid  19 infection  Workstation performed: [id number]     CTA ED chest PE Study    Result Date: 2/16/2022  Impression: No evidence of pulmonary artery embolus  Scattered airspace opacities within the lungs which may represent infection  Recommend short-term follow-up chest CT scan in 3 months to evaluate for resolution  The study was marked in [facility] for immediate notification   Workstation performed: [id number]       Scheduled Meds:  Current Facility-Administered Medications   Medication Dose Route Frequency Provider Last Rate    acetaminophen  650 mg Oral Q6H PRN Clarance Hodgkins, CRNP      al mag oxide-diphenhydramine-lidocaine viscous  10 mL Swish & Swallow Q6H PRN Clarance Hodgkins, CRNP      ALPRAZolam  0 5 mg Oral BID PRN Clarance Hodgkins, CRNP      aluminum-magnesium hydroxide-simethicone  30 mL Oral Q4H PRN Clarance Hodgkins, CRNP      bacitracin  1 small application Topical BID Brittany Garcia MD      benzonatate  200 mg Oral TID PRN Clarance Hodgkins, CRNP      carbamide peroxide  5 drop Left Ear BID Clarance Hodgkins, CRNP      docusate sodium  100 mg Oral BID PRN Clarance Hodgkins, CRNP      fluticasone  1 spray Each Nare Daily Clarance Hodgkins, CRNP      guaiFENesin  600 mg Oral Q12H [address] EMMA Alves      heparin (porcine)  5,000 Units Subcutaneous [facility] EMMA Alves      insulin glargine  10 Units Subcutaneous Q12H [address] EMMA Alves      insulin lispro  2-12 Units Subcutaneous TID AC EMMA Alves      insulin lispro  8 Units Subcutaneous TID With Meals EMMA Alves      ipratropium  0 5 mg Nebulization 4x Daily EMMA Alves      levalbuterol  1 25 mg Nebulization 4x Daily EMMA Alves      lidocaine  1 patch Topical Daily EMMA Alves      Lidocaine Viscous HCl  15 mL Swish & Spit 4x Daily PRN EMMA Alves      metoprolol tartrate  25 mg Oral Q12H [address] EMMA Alves      nystatin  1 application Topical TID Rasheeda Weinberg DO      pantoprazole  40 mg Oral Daily Before Breakfast EMMA Alves      polyethylene glycol  17 g Oral Daily PRN EMMA Alves      predniSONE  10 mg Oral Daily Miles Mix, [place]      predniSONE  20 mg Oral Daily Miles MixEMMA      saliva substitute  5 spray Mouth/Throat 4x Daily PRN EMMA Alves      sodium chloride  1 application Nasal I5K PRN EMMA Alves         Today, Patient Was Seen By: Rasheeda Weinberg DO    ** Please Note: Dictation voice to text software may have been used in the creation of this document   **

## 2022-03-08 NOTE — ASSESSMENT & PLAN NOTE
· Transient atrial fibrillation secondary to respiratory failure  No further events  · Remains on metoprolol    Not on therapeutic anticoagulation

## 2022-03-08 NOTE — RESPIRATORY THERAPY NOTE
Pt transitioned from  40% hfnc to 10 lpm mid flow current saturation 94% nursing and SLIM aware will monitor

## 2022-03-08 NOTE — PLAN OF CARE
Problem: PHYSICAL THERAPY ADULT  Goal: Performs mobility at highest level of function for planned discharge setting  See evaluation for individualized goals  Description: Treatment/Interventions: Functional transfer training,LE strengthening/ROM,Elevations,Therapeutic exercise,Endurance training,Patient/family training,Equipment eval/education,Bed mobility,Gait training,Compensatory technique education,Spoke to MD,Spoke to nursing,Continued evaluation,OT  Equipment Recommended:  (monitor)       See flowsheet documentation for full assessment, interventions and recommendations  Outcome: Progressing  Note: Prognosis: Good  Problem List: Decreased strength,Decreased mobility,Impaired judgement,Decreased safety awareness  Assessment: pt  able to tolerate all activities this session  Pt  was given repepated max cues to pace herself in doing activities  pt  very eager to ambulate  Educated patient to pace her activities  pt  was on 15 L of O2 as 10L was not avialbale for the O2 tank  Spo2 stats during ambualtion with 15L was 88-92% and HR  bpm  No LOB or unsteadiness noted during ambulation or stair negotiation  Though patient was recommended to sit and rest post stair negotiation bossman insisted on ambulating again  O2 tank had to be switched out as it got empty and patient's O2 stats dropped to 67-65%  However improved to 90% within seconds of attaching 15L supplemental O2 back on  Pt was standing and resting while O2tank was switched out  pt  able to ambulate back to room on 15L  SpO2 stats fluctated at 10L between 88%-97% when patient was seated and talking post session  Educated patient in energy conservation and pacing her activities  This therapist managed the O2 tank t/o session  Reported to RN regarding PT session and patient's status  pt  seated on chair post session and back on10L O2  Will continue to follow during the stay to maximize functional mobility and endurance  Noted occ   unproductive cough during session  Barriers to Discharge: Inaccessible home environment,Decreased caregiver support  Barriers to Discharge Comments: lives alone  14 [address]     PT Discharge Recommendation: Post acute rehabilitation services (Monitor disposition with progress)          See flowsheet documentation for full assessment

## 2022-03-09 LAB
GLUCOSE SERPL-MCNC: 100 MG/DL (ref 65–140)
GLUCOSE SERPL-MCNC: 112 MG/DL (ref 65–140)
GLUCOSE SERPL-MCNC: 117 MG/DL (ref 65–140)
GLUCOSE SERPL-MCNC: 234 MG/DL (ref 65–140)

## 2022-03-09 PROCEDURE — 82948 REAGENT STRIP/BLOOD GLUCOSE: CPT

## 2022-03-09 PROCEDURE — 99232 SBSQ HOSP IP/OBS MODERATE 35: CPT | Performed by: INTERNAL MEDICINE

## 2022-03-09 PROCEDURE — 94640 AIRWAY INHALATION TREATMENT: CPT

## 2022-03-09 RX ADMIN — FLUTICASONE PROPIONATE 1 SPRAY: 50 SPRAY, METERED NASAL at 09:13

## 2022-03-09 RX ADMIN — BACITRACIN 1 SMALL APPLICATION: 500 OINTMENT TOPICAL at 09:11

## 2022-03-09 RX ADMIN — LEVALBUTEROL HYDROCHLORIDE 1.25 MG: 1.25 SOLUTION, CONCENTRATE RESPIRATORY (INHALATION) at 07:40

## 2022-03-09 RX ADMIN — HEPARIN SODIUM 5000 UNITS: 5000 INJECTION INTRAVENOUS; SUBCUTANEOUS at 05:11

## 2022-03-09 RX ADMIN — IPRATROPIUM BROMIDE 0.5 MG: 0.5 SOLUTION RESPIRATORY (INHALATION) at 07:40

## 2022-03-09 RX ADMIN — ALPRAZOLAM 0.5 MG: 0.5 TABLET ORAL at 21:46

## 2022-03-09 RX ADMIN — INSULIN LISPRO 4 UNITS: 100 INJECTION, SOLUTION INTRAVENOUS; SUBCUTANEOUS at 17:19

## 2022-03-09 RX ADMIN — HEPARIN SODIUM 5000 UNITS: 5000 INJECTION INTRAVENOUS; SUBCUTANEOUS at 17:19

## 2022-03-09 RX ADMIN — LEVALBUTEROL HYDROCHLORIDE 1.25 MG: 1.25 SOLUTION, CONCENTRATE RESPIRATORY (INHALATION) at 20:20

## 2022-03-09 RX ADMIN — PANTOPRAZOLE SODIUM 40 MG: 40 TABLET, DELAYED RELEASE ORAL at 05:11

## 2022-03-09 RX ADMIN — BENZONATATE 200 MG: 100 CAPSULE ORAL at 21:46

## 2022-03-09 RX ADMIN — NYSTATIN 1 APPLICATION: 100000 POWDER TOPICAL at 15:19

## 2022-03-09 RX ADMIN — NYSTATIN 1 APPLICATION: 100000 POWDER TOPICAL at 09:22

## 2022-03-09 RX ADMIN — BACITRACIN 1 SMALL APPLICATION: 500 OINTMENT TOPICAL at 23:59

## 2022-03-09 RX ADMIN — PREDNISONE 20 MG: 20 TABLET ORAL at 09:18

## 2022-03-09 RX ADMIN — GUAIFENESIN 600 MG: 600 TABLET, EXTENDED RELEASE ORAL at 21:39

## 2022-03-09 RX ADMIN — INSULIN GLARGINE 10 UNITS: 100 INJECTION, SOLUTION SUBCUTANEOUS at 09:12

## 2022-03-09 RX ADMIN — LEVALBUTEROL HYDROCHLORIDE 1.25 MG: 1.25 SOLUTION, CONCENTRATE RESPIRATORY (INHALATION) at 13:15

## 2022-03-09 RX ADMIN — PREDNISONE 10 MG: 10 TABLET ORAL at 09:18

## 2022-03-09 RX ADMIN — LIDOCAINE 1 PATCH: 50 PATCH CUTANEOUS at 09:11

## 2022-03-09 RX ADMIN — IPRATROPIUM BROMIDE 0.5 MG: 0.5 SOLUTION RESPIRATORY (INHALATION) at 13:15

## 2022-03-09 RX ADMIN — IPRATROPIUM BROMIDE 0.5 MG: 0.5 SOLUTION RESPIRATORY (INHALATION) at 20:20

## 2022-03-09 RX ADMIN — GUAIFENESIN 600 MG: 600 TABLET, EXTENDED RELEASE ORAL at 09:11

## 2022-03-09 RX ADMIN — METOPROLOL TARTRATE 25 MG: 25 TABLET, FILM COATED ORAL at 21:39

## 2022-03-09 RX ADMIN — METOPROLOL TARTRATE 25 MG: 25 TABLET, FILM COATED ORAL at 09:12

## 2022-03-09 RX ADMIN — INSULIN GLARGINE 10 UNITS: 100 INJECTION, SOLUTION SUBCUTANEOUS at 21:39

## 2022-03-09 RX ADMIN — HEPARIN SODIUM 5000 UNITS: 5000 INJECTION INTRAVENOUS; SUBCUTANEOUS at 21:39

## 2022-03-09 NOTE — PLAN OF CARE
Problem: Potential for Falls  Goal: Patient will remain free of falls  Description: INTERVENTIONS:  - Educate patient/family on patient safety including physical limitations  - Instruct patient to call for assistance with activity   - Consult OT/PT to assist with strengthening/mobility   - Keep Call bell within reach  - Keep bed low and locked with side rails adjusted as appropriate  - Keep care items and personal belongings within reach  - Initiate and maintain comfort rounds  - Make Fall Risk Sign visible to staff  - Apply yellow socks and bracelet for high fall risk patients  - Consider moving patient to room near nurses station  Outcome: Progressing     Problem: MOBILITY - ADULT  Goal: Maintain or return to baseline ADL function  Description: INTERVENTIONS:  -  Assess patient's ability to carry out ADLs; assess patient's baseline for ADL function and identify physical deficits which impact ability to perform ADLs (bathing, care of mouth/teeth, toileting, grooming, dressing, etc )  - Assess/evaluate cause of self-care deficits   - Assess range of motion  - Assess patient's mobility; develop plan if impaired  - Assess patient's need for assistive devices and provide as appropriate  - Encourage maximum independence but intervene and supervise when necessary  - Involve family in performance of ADLs  - Assess for home care needs following discharge   - Consider OT consult to assist with ADL evaluation and planning for discharge  - Provide patient education as appropriate  Outcome: Progressing  Goal: Maintains/Returns to pre admission functional level  Description: INTERVENTIONS:  - Perform BMAT or MOVE assessment daily    - Set and communicate daily mobility goal to care team and patient/family/caregiver  - Collaborate with rehabilitation services on mobility goals if consulted  - Perform Range of Motion 3 times a day  - Reposition patient every 2 hours    - Dangle patient 3 times a day  - Stand patient 3 times a day  - Ambulate patient 3 times a day  - Out of bed to chair  3 times a day   - Out of bed for meals 3 times a day  - Out of bed for toileting  - Record patient progress and toleration of activity level   Outcome: Progressing     Problem: Prexisting or High Potential for Compromised Skin Integrity  Goal: Skin integrity is maintained or improved  Description: INTERVENTIONS:  - Identify patients at risk for skin breakdown  - Assess and monitor skin integrity  - Assess and monitor nutrition and hydration status  - Monitor labs   - Assess for incontinence   - Turn and reposition patient  - Assist with mobility/ambulation  - Relieve pressure over bony prominences  - Avoid friction and shearing  - Provide appropriate hygiene as needed including keeping skin clean and dry  - Evaluate need for skin moisturizer/barrier cream  - Collaborate with interdisciplinary team   - Patient/family teaching  - Consider wound care consult   Outcome: Progressing     Problem: RESPIRATORY - ADULT  Goal: Achieves optimal ventilation and oxygenation  Description: INTERVENTIONS:  - Assess for changes in respiratory status  - Assess for changes in mentation and behavior  - Position to facilitate oxygenation and minimize respiratory effort  - Oxygen administered by appropriate delivery if ordered  - Initiate smoking cessation education as indicated  - Encourage broncho-pulmonary hygiene including cough, deep breathe, Incentive Spirometry  - Assess the need for suctioning and aspirate as needed  - Assess and instruct to report SOB or any respiratory difficulty  - Respiratory Therapy support as indicated  Outcome: Progressing     Problem: METABOLIC, FLUID AND ELECTROLYTES - ADULT  Goal: Glucose maintained within target range  Description: INTERVENTIONS:  - Monitor Blood Glucose as ordered  - Assess for signs and symptoms of hyperglycemia and hypoglycemia  - Administer ordered medications to maintain glucose within target range  - Assess nutritional intake and initiate nutrition service referral as needed  Outcome: Progressing     Problem: MUSCULOSKELETAL - ADULT  Goal: Maintain or return mobility to safest level of function  Description: INTERVENTIONS:  - Assess patient's ability to carry out ADLs; assess patient's baseline for ADL function and identify physical deficits which impact ability to perform ADLs (bathing, care of mouth/teeth, toileting, grooming, dressing, etc )  - Assess/evaluate cause of self-care deficits   - Assess range of motion  - Assess patient's mobility  - Assess patient's need for assistive devices and provide as appropriate  - Encourage maximum independence but intervene and supervise when necessary  - Involve family in performance of ADLs  - Assess for home care needs following discharge   - Consider OT consult to assist with ADL evaluation and planning for discharge  - Provide patient education as appropriate  Outcome: Progressing  Goal: Maintain proper alignment of affected body part  Description: INTERVENTIONS:  - Support, maintain and protect limb and body alignment  - Provide patient/ family with appropriate education  Outcome: Progressing     Problem: Nutrition/Hydration-ADULT  Goal: Nutrient/Hydration intake appropriate for improving, restoring or maintaining nutritional needs  Description: Monitor and assess patient's nutrition/hydration status for malnutrition  Collaborate with interdisciplinary team and initiate plan and interventions as ordered  Monitor patient's weight and dietary intake as ordered or per policy  Utilize nutrition screening tool and intervene as necessary  Determine patient's food preferences and provide high-protein, high-caloric foods as appropriate       INTERVENTIONS:  - Monitor oral intake, urinary output, labs, and treatment plans  - Assess nutrition and hydration status and recommend course of action  - Evaluate amount of meals eaten  - Assist patient with eating if necessary   - Allow adequate time for meals  - Recommend/ encourage appropriate diets, oral nutritional supplements, and vitamin/mineral supplements  - Order, calculate, and assess calorie counts as needed  - Recommend, monitor, and adjust tube feedings and TPN/PPN based on assessed needs  - Assess need for intravenous fluids  - Provide specific nutrition/hydration education as appropriate  - Include patient/family/caregiver in decisions related to nutrition  Outcome: Progressing     Problem: DISCHARGE PLANNING  Goal: Discharge to home or other facility with appropriate resources  Description: INTERVENTIONS:  - Identify barriers to discharge w/patient and caregiver  - Arrange for needed discharge resources and transportation as appropriate  - Identify discharge learning needs (meds, wound care, etc )  - Arrange for interpretive services to assist at discharge as needed  - Refer to Case Management Department for coordinating discharge planning if the patient needs post-hospital services based on physician/advanced practitioner order or complex needs related to functional status, cognitive ability, or social support system  Outcome: Progressing     Problem: Knowledge Deficit  Goal: Patient/family/caregiver demonstrates understanding of disease process, treatment plan, medications, and discharge instructions  Description: Complete learning assessment and assess knowledge base    Interventions:  - Provide teaching at level of understanding  - Provide teaching via preferred learning methods  Outcome: Progressing

## 2022-03-09 NOTE — PROGRESS NOTES
[facility]  Progress Note - Bailey Palmer 1970, 46 y o  female MRN: [id number]  Unit/Bed#: [facility] [address] 2 [address] [id number] Encounter: [id number]  Primary Care Provider: Puma Braswell MD   Date and time admitted to hospital: 2/16/2022  7:01 PM    * Acute respiratory failure with hypoxia [facility])  Assessment & Plan  42-year-old female prolonged stay and ICU for acute respiratory failure/hypoxia poly factorial secondary to COVID pneumonia/fibrosis pulmonary edema pneumonitis  Was on high-flow nasal cannula for 19 days  · Pulmonary following  Has been weaned to mid flow   · On prednisone taper    New onset a-fib [facility])  Assessment & Plan  · Transient atrial fibrillation secondary to respiratory failure  No further events  · Remains on metoprolol  Not on therapeutic anticoagulation    HTN (hypertension)  Assessment & Plan  · Continue metoprolol    BMI 50 0-59 9, adult (HCC)  Assessment & Plan  · Body mass index is 52 15 kg/m²  Hyperglycemia  Assessment & Plan  · Steroid induced hyperglycemia with no history of diabetes mellitus  · Continue glargine and sliding scale    Lab Results   Component Value Date    HGBA1C 6 6 (H) 02/16/2022     Results from last 7 days   Lab Units 03/09/22  1645 03/09/22  1203 03/09/22  0706 03/08/22  2047 03/08/22  1612 03/08/22  1055 03/08/22  0713 03/07/22  2053   POC GLUCOSE mg/dl 234* 100 112 134 181* 119 106 128       Sepsis due to COVID-19 [facility])  Assessment & Plan  · Present on admission  Has resolved      VTE Pharmacologic Prophylaxis: VTE Score: 7 High Risk (Score >/= 5) - Pharmacological DVT Prophylaxis Ordered: Heparin  Sequential Compression Devices Ordered  Patient Centered Rounds: I have performed bedside rounds with nursing staff today  Discussions with Specialists or Other Care Team Provider:  Case management    Education and Discussions with Family / Patient:  Daughter on telephone    Time Spent for Care: 25 mins    More than 50% of total time spent on counseling and coordination of care as described above  Current Length of Stay: 21 day(s)  Current Patient Status: Inpatient   Certification Statement: The patient will continue to require additional inpatient hospital stay due to hypoxia  Discharge Plan / Estimated Discharge Date: Anticipate discharge in > 72 hrs to home with home services  Code Status: Level 1 - Full Code      Subjective:   Patient seen and examined  Continues to get better, complaining about poor sleep overnight    Objective:   Vitals: Blood pressure 142/97, pulse 86, temperature 98 8 °F (37 1 °C), resp  rate (!) 28, height 4' 6" (1 372 m), weight 98 1 kg (216 lb 4 3 oz), SpO2 91 %  Physical Exam  Vitals reviewed  Constitutional:       General: She is not in acute distress  Appearance: Normal appearance  She is obese  Eyes:      General: No scleral icterus  Cardiovascular:      Rate and Rhythm: Regular rhythm  Heart sounds: Normal heart sounds  Pulmonary:      Breath sounds: Decreased breath sounds present  No wheezing  Abdominal:      Tenderness: There is no guarding or rebound  Musculoskeletal:         General: No swelling  Skin:     General: Skin is warm  Neurological:      Mental Status: She is alert and oriented to person, place, and time     Psychiatric:         Mood and Affect: Mood normal        Additional Data:   Labs:  Results from last 7 days   Lab Units 03/07/22  0444   WBC Thousand/uL 14 17*   HEMOGLOBIN g/dL 11 9   HEMATOCRIT % 36 9   MCV fL 91   PLATELETS Thousands/uL 216     Results from last 7 days   Lab Units 03/07/22  0444   SODIUM mmol/L 143   POTASSIUM mmol/L 3 6   CHLORIDE mmol/L 105   CO2 mmol/L 29   ANION GAP mmol/L 9   BUN mg/dL 19   CREATININE mg/dL 0 62   CALCIUM mg/dL 9 0   ALBUMIN g/dL 2 7*   TOTAL BILIRUBIN mg/dL 0 31   ALK PHOS U/L 59   ALT U/L 42   AST U/L 17   EGFR ml/min/1 73sq m 104   GLUCOSE RANDOM mg/dL 99                          Results from last 7 days   Lab Units 03/09/22  1645 03/09/22  1203 03/09/22  0706 03/08/22  2047 03/08/22  1612 03/08/22  1055 03/08/22  0713 03/07/22  2053 03/07/22  1613 03/07/22  1125 03/07/22  0716 03/06/22  2122   POC GLUCOSE mg/dl 234* 100 112 134 181* 119 106 128 128 94 91 150*             * I Have Reviewed All Lab Data Listed Above  Cultures:                   Lines/Drains:  Invasive Devices  Report    Peripheral Intravenous Line            Peripheral IV 03/08/22 Dorsal (posterior); Right Hand <1 day              Telemetry:      Imaging:  Imaging Reports Reviewed Today Include:   XR chest 1 view portable    Result Date: 2/17/2022  Impression: Multifocal groundglass infiltrates concerning for  Covid  19 infection  Workstation performed: [id number]     CTA ED chest PE Study    Result Date: 2/16/2022  Impression: No evidence of pulmonary artery embolus  Scattered airspace opacities within the lungs which may represent infection  Recommend short-term follow-up chest CT scan in 3 months to evaluate for resolution  The study was marked in [facility] for immediate notification   Workstation performed: [id number]       Scheduled Meds:  Current Facility-Administered Medications   Medication Dose Route Frequency Provider Last Rate    acetaminophen  650 mg Oral Q6H PRN Glorietta Pouch, CRNP      al mag oxide-diphenhydramine-lidocaine viscous  10 mL Swish & Swallow Q6H PRN Glorietta Pouch, CRNP      ALPRAZolam  0 5 mg Oral BID PRN Glorietta Pouch, CRNP      aluminum-magnesium hydroxide-simethicone  30 mL Oral Q4H PRN Glorietta Pouch, CRNP      bacitracin  1 small application Topical BID Gustavo MD [place]      benzonatate  200 mg Oral TID PRN Glorietta Pouch, CRNP      carbamide peroxide  5 drop Left Ear BID Glorietta Pouch, CRNP      docusate sodium  100 mg Oral BID PRN Glorietta Pouch, CRNP      fluticasone  1 spray Each Nare Daily Glorietta Pouch, CRNP      guaiFENesin  600 mg Oral Q12H [facility] Glorietta Pouch, CRNP      heparin (porcine) 5,000 Units Subcutaneous Q8H [address] Imanigrant Julioi, CRNP      insulin glargine  10 Units Subcutaneous Q12H [address] Imanigrant Julioi, CRNP      insulin lispro  2-12 Units Subcutaneous TID AC Imani Julioi, CRNP      insulin lispro  8 Units Subcutaneous TID With Meals Imani Frost, CRNP      ipratropium  0 5 mg Nebulization 4x Daily Imanigrant Julioi, CRNP      levalbuterol  1 25 mg Nebulization 4x Daily Imanigrant Julioi, CRNP      lidocaine  1 patch Topical Daily Imanigrant Julioi, CRNP      Lidocaine Viscous HCl  15 mL Swish & Spit 4x Daily PRN Imanigrant Frost, CRNP      metoprolol tartrate  25 mg Oral Q12H [address] Imanigrant Frost, CRNP      nystatin  1 application Topical TID Arnol Ardon DO      pantoprazole  40 mg Oral Daily Before Breakfast Imani Frost, CRNP      polyethylene glycol  17 g Oral Daily PRN Imani Frost, CRNP      predniSONE  10 mg Oral Daily Augustinealyaspen Lundborg, [address]      predniSONE  20 mg Oral Daily Sheralyn Lundborg, CRNP      saliva substitute  5 spray Mouth/Throat 4x Daily PRN Imani Frost, CRNP      sodium chloride  1 application Nasal T7R PRN Imani Frost, EMMA         Today, Patient Was Seen By: Arnol Ardon DO    ** Please Note: Dictation voice to text software may have been used in the creation of this document   **

## 2022-03-09 NOTE — ASSESSMENT & PLAN NOTE
63-year-old female prolonged stay and ICU for acute respiratory failure/hypoxia poly factorial secondary to COVID pneumonia/fibrosis pulmonary edema pneumonitis  Was on high-flow nasal cannula for 19 days  · Pulmonary following    Has been weaned to mid flow   · On prednisone taper

## 2022-03-09 NOTE — PLAN OF CARE
Problem: RESPIRATORY - ADULT  Goal: Achieves optimal ventilation and oxygenation  Description: INTERVENTIONS:  - Assess for changes in respiratory status  - Assess for changes in mentation and behavior  - Position to facilitate oxygenation and minimize respiratory effort  - Oxygen administered by appropriate delivery if ordered  - Initiate smoking cessation education as indicated  - Encourage broncho-pulmonary hygiene including cough, deep breathe, Incentive Spirometry  - Assess the need for suctioning and aspirate as needed  - Assess and instruct to report SOB or any respiratory difficulty  - Respiratory Therapy support as indicated  Outcome: Progressing     Problem: MUSCULOSKELETAL - ADULT  Goal: Maintain or return mobility to safest level of function  Description: INTERVENTIONS:  - Assess patient's ability to carry out ADLs; assess patient's baseline for ADL function and identify physical deficits which impact ability to perform ADLs (bathing, care of mouth/teeth, toileting, grooming, dressing, etc )  - Assess/evaluate cause of self-care deficits   - Assess range of motion  - Assess patient's mobility  - Assess patient's need for assistive devices and provide as appropriate  - Encourage maximum independence but intervene and supervise when necessary  - Involve family in performance of ADLs  - Assess for home care needs following discharge   - Consider OT consult to assist with ADL evaluation and planning for discharge  - Provide patient education as appropriate  Outcome: Progressing  Goal: Maintain proper alignment of affected body part  Description: INTERVENTIONS:  - Support, maintain and protect limb and body alignment  - Provide patient/ family with appropriate education  Outcome: Progressing     Problem: Knowledge Deficit  Goal: Patient/family/caregiver demonstrates understanding of disease process, treatment plan, medications, and discharge instructions  Description: Complete learning assessment and assess knowledge base    Interventions:  - Provide teaching at level of understanding  - Provide teaching via preferred learning methods  Outcome: Progressing

## 2022-03-09 NOTE — APP STUDENT NOTE
DOUGLAS STUDENT  Inpatient Progress Note for TRAINING ONLY  Not Part of Legal Medical Record     Progress Note - Lacey Lucero 46 y o  female MRN: [id number]  [facility] Encounter: [id number]    Assessment and Plan  1  Acute Respiratory Failure with Hypoxia, multifactorial (DDS: pneumonia, COVID-19 sequale, volume overload, pneumonitis)  · Patient presented with respiratory distress and hypoxia  · Patient weaned to mid flow oxygen sating at 93-95%  · Patient continues to have MCDOWELL and desaturation with exertion  · Continue to help patient walk halls for increased stamina  · Pulmonary following  · Patient now on 10 L midflow oxygen, will continue to wean oxygen keeping patients SPO2 >88%  · Continue prednisone taper  · Patient had 5 days ceftriaxone completed on 2/20/22  · Continue nebulizers   · Continue mucinex and tessalon perls   · Encourage incentive spirometry    2  Acute Diastolic Congestive Heart Failure  · Patient had 15 lb increase from January to February  · On admission patient had elevated pro BNP of 1472  · CXR on 2/16/22 revealed multifocal groundglass infiltrates concerning for COVID-19 infection  · ECHO on 2/17/22 revealed EF of 65%  · Continue to monitor daily weights and I/Os  · Continue to monitor electrolytes and replete as needed  · Continue to monitor renal functions    3  Paroxysmal Atrial Fibrillation  · Patient had a brief episode of a-fib with reversal back to sinus rhythm without intervention  · Patient currently remains in normal sinus rhythm  · Continue Heparin while inpatient  Since patient only had one episode a-fib no need for permanent anticoagulation therapy    4  Severe Obesity  · BMI of 53 63 kg/m^2  · Patient states she has good outpatient routine for exercise and diet    · Encouraged patient to continue this plan once out of the hospital     5  Hypertension  · SBP have been between 115-135  · Continue to hold amlodipine  · Continue metoprolol tartrate 25 mg po BID  · Continue to monitor vitals    6  Hyperglycemia (new onset DM vs steroid use)  · Hemoglobin A1C on 2/16/22 was 6 6  · BG fingersticks have been fluctuating while inpatient  · Currently being treated with insulin therapy, continue to adjust based on steroids  · Continue Lantus 10 Units BID  · Continue Humalog 8 Units TID and sliding scale with meals  · Continue fingerstick glucose before meals     7  Anxiety  · Patient's anxiety is improved today about her housing and need for things to get done while she is in the hospital    · Continue Xanax 1 mg prn anxiety    8  IV site infection  · Patient noted to have redness and drainage from right antecubital fossa after IV pulled on 3/5/22  · Patient states it is not painful today and looks significantly better  · Patient notes pain and tenderness on the left forearm where her IV was pulled this morning  · Continue to monitor   · Continue warm compresses    9  Chafing under the breasts, bilaterally  · Patient notes tenderness under her breasts  She states that when she washes the area it bleeds  · Nursing has been helping her apply powder after cleaning, continue  · Can try an antifungal ointment  VTE Pharmacologic Prophylaxis:   Pharmacologic: Heparin  Mechanical VTE Prophylaxis in Place: No    Patient Centered Rounds: I have performed bedside rounds with nursing staff today  Time Spent for Care: 45 minutes  More than 50% of total time spent on counseling and coordination of care as described above  Current Length of Stay: 21 day(s)    Current Patient Status: Inpatient   Certification Statement: The patient will continue to require additional inpatient hospital stay due to PT strengthening and continued monitoring of oxygenation  Discharge Plan: To be determined  Code Status: Level 1 - Full Code    Subjective:    This is a 46year old female with PMH of COVID-19 infection in January 2022 and HTN who is seen hospital day 21 due to admission diagnosis of respiratory distress and hypoxia  Patient was transitioned to 10 L mid flow oxygen yesterday  She states she was very nervous about this and scared how she would feel  She denies any SOB or CP  She notes her legs are extremely weak when she walks due to deconditioned state  She continues to have a good appetite  She notes that the irritation from the IV site in her right antecubital fossa is improved, however, she notes tenderness and hardness on her left forearm where her IV was removed this morning  She states that under her breasts are bleeding when she washes them  She notes the nurses have been putting powder on for her, but she states they still feel raw and are painful  She denies any N/V/D or abdominal pain  Objective:   Vitals:   Temp (24hrs), Av 9 °F (37 2 °C), Min:98 5 °F (36 9 °C), Max:99 4 °F (37 4 °C)    Temp:  [98 5 °F (36 9 °C)-99 4 °F (37 4 °C)] 98 5 °F (36 9 °C)  HR:  [80-92] 87  Resp:  [20-22] 22  BP: (109-150)/(64-99) 150/99  SpO2:  [91 %-96 %] 96 %  Body mass index is 52 15 kg/m²  Input and Output Summary (last 24 hours):   No intake or output data in the 24 hours ending 22 0858    Physical Exam:   Physical Exam  Vitals and nursing note reviewed  Exam conducted with a chaperone present  Constitutional:       General: She is not in acute distress  Appearance: She is obese  She is not ill-appearing  Interventions: Nasal cannula in place  Comments: 10L mid flow   HENT:      Head: Normocephalic and atraumatic  Eyes:      Conjunctiva/sclera: Conjunctivae normal    Cardiovascular:      Rate and Rhythm: Normal rate and regular rhythm  Heart sounds: No murmur heard  No friction rub  No gallop  Pulmonary:      Breath sounds: Examination of the right-lower field reveals rhonchi  Rhonchi (improved from yesterday) present  No wheezing  Chest:      Comments: Redness under breasts bilaterally  Warm and tender to touch    Abdominal:      General: Bowel sounds are normal       Palpations: Abdomen is soft  Tenderness: There is no abdominal tenderness  Musculoskeletal:      Cervical back: Neck supple  Skin:     General: Skin is warm and dry  Findings: Wound (right antecubital fossa) present  Neurological:      General: No focal deficit present  Mental Status: She is alert  Psychiatric:         Mood and Affect: Mood normal          Behavior: Behavior is cooperative  Historical Information   Past Medical History:   Diagnosis Date    Herniated cervical disc     Hypertension      Past Surgical History:   Procedure Laterality Date     SECTION      EYE SURGERY      SHOULDER SURGERY       Social History   Social History     Substance and Sexual Activity   Alcohol Use Never     Social History     Substance and Sexual Activity   Drug Use Not on file     Social History     Tobacco Use   Smoking Status Current Every Day Smoker    Packs/day: 0 50   Smokeless Tobacco Never Used     Family History: History reviewed  No pertinent family history      Meds/Allergies   all medications and allergies reviewed  No Known Allergies    Additional Data:   Labs:    Results from last 7 days   Lab Units 22  0444   WBC Thousand/uL 14 17*   HEMOGLOBIN g/dL 11 9   HEMATOCRIT % 36 9   PLATELETS Thousands/uL 216   NEUTROS PCT % 61   LYMPHS PCT % 30   MONOS PCT % 7   EOS PCT % 1     Results from last 7 days   Lab Units 22  0444   SODIUM mmol/L 143   POTASSIUM mmol/L 3 6   CHLORIDE mmol/L 105   CO2 mmol/L 29   BUN mg/dL 19   CREATININE mg/dL 0 62   ANION GAP mmol/L 9   CALCIUM mg/dL 9 0   ALBUMIN g/dL 2 7*   TOTAL BILIRUBIN mg/dL 0 31   ALK PHOS U/L 59   ALT U/L 42   AST U/L 17   GLUCOSE RANDOM mg/dL 99         Results from last 7 days   Lab Units 22  0706 22  2047 22  1612 22  1055 22  0713 22  2053 22  1613 22  1125 22  0716 22  2122 22  1628 22  1123   POC GLUCOSE mg/dl 112 134 181* 119 106 128 128 94 91 150* 140 125             * I Have Reviewed All Lab Data Listed Above  * Additional Pertinent Lab Tests Reviewed: All Labs Within Last 24 Hours Reviewed    Imaging:  No new imaging to review       Recent Cultures (last 7 days):           Last 24 Hours Medication List:   Current Facility-Administered Medications   Medication Dose Route Frequency Provider Last Rate    acetaminophen  650 mg Oral Q6H PRN Maralee Rideau, CRNP      al mag oxide-diphenhydramine-lidocaine viscous  10 mL Swish & Swallow Q6H PRN Maralee Rideau, CRNP      ALPRAZolam  0 5 mg Oral BID PRN Maralee Rideau, CRNP      aluminum-magnesium hydroxide-simethicone  30 mL Oral Q4H PRN Maralee Rideau, CRNP      bacitracin  1 small application Topical BID Mariana Duncan MD      benzonatate  200 mg Oral TID PRN Maralee Rideau, CRNP      carbamide peroxide  5 drop Left Ear BID Maralee Rideau, CRNP      docusate sodium  100 mg Oral BID PRN Maralee Rideau, CRNP      fluticasone  1 spray Each Nare Daily Maralee Rideau, CRNP      guaiFENesin  600 mg Oral Q12H [facility], CRNP      heparin (porcine)  5,000 Units Subcutaneous [facility] Maralee Rideau, CRNP      insulin glargine  10 Units Subcutaneous Q12H [facility] Maralee Rideau, CRNP      insulin lispro  2-12 Units Subcutaneous TID AC Maralee Rideau, CRNP      insulin lispro  8 Units Subcutaneous TID With Meals Maralee Rideau, CRNP      ipratropium  0 5 mg Nebulization 4x Daily Maralee Rideau, CRNP      levalbuterol  1 25 mg Nebulization 4x Daily Maralee Rideau, CRNP      lidocaine  1 patch Topical Daily Maralee Rideau, CRNP      Lidocaine Viscous HCl  15 mL Swish & Spit 4x Daily PRN Maralee Rideau, CRNP      metoprolol tartrate  25 mg Oral Q12H [facility] Ride[facility], CRNP      nystatin  1 application Topical TID Danny Bernheim, DO      pantoprazole  40 mg Oral Daily Before Breakfast Maralee Rideau, CRNP      polyethylene glycol 17 g Oral Daily PRN Bobby Doom, CRNP      predniSONE  10 mg Oral Daily Myrtice Friend, [address]      predniSONE  20 mg Oral Daily Myrtice Friend, [address]      saliva substitute  5 spray Mouth/Throat 4x Daily PRN Bobby Doom, CRNP      sodium chloride  1 application Nasal Y6F PRN Bobby Doom, CRNP          Today, Patient Was Seen By: Marilia England    ** Please Note: Dictation voice to text software may have been used in the creation of this document   **

## 2022-03-09 NOTE — ASSESSMENT & PLAN NOTE
· Steroid induced hyperglycemia with no history of diabetes mellitus  · Continue glargine and sliding scale    Lab Results   Component Value Date    HGBA1C 6 6 (H) 02/16/2022     Results from last 7 days   Lab Units 03/09/22  1645 03/09/22  1203 03/09/22  0706 03/08/22  2047 03/08/22  1612 03/08/22  1055 03/08/22  0713 03/07/22  2053   POC GLUCOSE mg/dl 234* 100 112 134 181* 119 106 128

## 2022-03-10 PROBLEM — N32.81 OAB (OVERACTIVE BLADDER): Status: ACTIVE | Noted: 2022-03-10

## 2022-03-10 LAB
GLUCOSE SERPL-MCNC: 100 MG/DL (ref 65–140)
GLUCOSE SERPL-MCNC: 137 MG/DL (ref 65–140)
GLUCOSE SERPL-MCNC: 140 MG/DL (ref 65–140)
GLUCOSE SERPL-MCNC: 177 MG/DL (ref 65–140)

## 2022-03-10 PROCEDURE — 82948 REAGENT STRIP/BLOOD GLUCOSE: CPT

## 2022-03-10 PROCEDURE — 97116 GAIT TRAINING THERAPY: CPT

## 2022-03-10 PROCEDURE — 97530 THERAPEUTIC ACTIVITIES: CPT

## 2022-03-10 PROCEDURE — 99232 SBSQ HOSP IP/OBS MODERATE 35: CPT | Performed by: INTERNAL MEDICINE

## 2022-03-10 PROCEDURE — 94640 AIRWAY INHALATION TREATMENT: CPT

## 2022-03-10 PROCEDURE — 97110 THERAPEUTIC EXERCISES: CPT

## 2022-03-10 RX ORDER — GLIPIZIDE 2.5 MG/1
2.5 TABLET, EXTENDED RELEASE ORAL
Status: DISCONTINUED | OUTPATIENT
Start: 2022-03-11 | End: 2022-03-16 | Stop reason: HOSPADM

## 2022-03-10 RX ORDER — OXYBUTYNIN CHLORIDE 5 MG/1
5 TABLET, EXTENDED RELEASE ORAL
Status: DISCONTINUED | OUTPATIENT
Start: 2022-03-10 | End: 2022-03-16 | Stop reason: HOSPADM

## 2022-03-10 RX ADMIN — PANTOPRAZOLE SODIUM 40 MG: 40 TABLET, DELAYED RELEASE ORAL at 06:50

## 2022-03-10 RX ADMIN — METOPROLOL TARTRATE 25 MG: 25 TABLET, FILM COATED ORAL at 21:22

## 2022-03-10 RX ADMIN — HEPARIN SODIUM 5000 UNITS: 5000 INJECTION INTRAVENOUS; SUBCUTANEOUS at 06:50

## 2022-03-10 RX ADMIN — GUAIFENESIN 600 MG: 600 TABLET, EXTENDED RELEASE ORAL at 21:20

## 2022-03-10 RX ADMIN — LEVALBUTEROL HYDROCHLORIDE 1.25 MG: 1.25 SOLUTION, CONCENTRATE RESPIRATORY (INHALATION) at 20:13

## 2022-03-10 RX ADMIN — NYSTATIN 1 APPLICATION: 100000 POWDER TOPICAL at 00:00

## 2022-03-10 RX ADMIN — METOPROLOL TARTRATE 25 MG: 25 TABLET, FILM COATED ORAL at 08:56

## 2022-03-10 RX ADMIN — LIDOCAINE 1 PATCH: 50 PATCH CUTANEOUS at 08:55

## 2022-03-10 RX ADMIN — IPRATROPIUM BROMIDE 0.5 MG: 0.5 SOLUTION RESPIRATORY (INHALATION) at 08:26

## 2022-03-10 RX ADMIN — LEVALBUTEROL HYDROCHLORIDE 1.25 MG: 1.25 SOLUTION, CONCENTRATE RESPIRATORY (INHALATION) at 08:26

## 2022-03-10 RX ADMIN — FLUTICASONE PROPIONATE 1 SPRAY: 50 SPRAY, METERED NASAL at 08:55

## 2022-03-10 RX ADMIN — LEVALBUTEROL HYDROCHLORIDE 1.25 MG: 1.25 SOLUTION, CONCENTRATE RESPIRATORY (INHALATION) at 11:53

## 2022-03-10 RX ADMIN — PREDNISONE 10 MG: 10 TABLET ORAL at 08:56

## 2022-03-10 RX ADMIN — IPRATROPIUM BROMIDE 0.5 MG: 0.5 SOLUTION RESPIRATORY (INHALATION) at 11:53

## 2022-03-10 RX ADMIN — NYSTATIN 1 APPLICATION: 100000 POWDER TOPICAL at 08:57

## 2022-03-10 RX ADMIN — NYSTATIN 1 APPLICATION: 100000 POWDER TOPICAL at 21:24

## 2022-03-10 RX ADMIN — IPRATROPIUM BROMIDE 0.5 MG: 0.5 SOLUTION RESPIRATORY (INHALATION) at 20:13

## 2022-03-10 RX ADMIN — NYSTATIN 1 APPLICATION: 100000 POWDER TOPICAL at 16:31

## 2022-03-10 RX ADMIN — INSULIN GLARGINE 10 UNITS: 100 INJECTION, SOLUTION SUBCUTANEOUS at 08:59

## 2022-03-10 RX ADMIN — HEPARIN SODIUM 5000 UNITS: 5000 INJECTION INTRAVENOUS; SUBCUTANEOUS at 14:18

## 2022-03-10 RX ADMIN — INSULIN LISPRO 2 UNITS: 100 INJECTION, SOLUTION INTRAVENOUS; SUBCUTANEOUS at 16:38

## 2022-03-10 RX ADMIN — HEPARIN SODIUM 5000 UNITS: 5000 INJECTION INTRAVENOUS; SUBCUTANEOUS at 21:20

## 2022-03-10 RX ADMIN — ALPRAZOLAM 0.5 MG: 0.5 TABLET ORAL at 21:24

## 2022-03-10 RX ADMIN — PREDNISONE 20 MG: 20 TABLET ORAL at 08:55

## 2022-03-10 RX ADMIN — GUAIFENESIN 600 MG: 600 TABLET, EXTENDED RELEASE ORAL at 08:55

## 2022-03-10 RX ADMIN — BACITRACIN 1 SMALL APPLICATION: 500 OINTMENT TOPICAL at 08:59

## 2022-03-10 RX ADMIN — BENZONATATE 200 MG: 100 CAPSULE ORAL at 21:21

## 2022-03-10 RX ADMIN — BACITRACIN 1 SMALL APPLICATION: 500 OINTMENT TOPICAL at 17:28

## 2022-03-10 RX ADMIN — OXYBUTYNIN CHLORIDE 5 MG: 5 TABLET, EXTENDED RELEASE ORAL at 21:21

## 2022-03-10 NOTE — PROGRESS NOTES
[address]  Progress Note - Azalea Mckinney 1970, 46 y o  female MRN: [id number]  Unit/Bed#: [place] 68 2 [address] [place] 87 222-01 Encounter: [id number]  Primary Care Provider: Blanca Lugo MD   Date and time admitted to hospital: 2/16/2022  7:01 PM    * Acute respiratory failure with hypoxia [facility])  Assessment & Plan  54-year-old female prolonged stay and ICU for acute respiratory failure/hypoxia poly factorial secondary to COVID pneumonia/fibrosis pulmonary edema pneumonitis  Was on high-flow nasal cannula for 19 days  · Pulmonary following  Has been weaned to mid flow   · On prednisone taper    OAB (overactive bladder)  Assessment & Plan  · Will start oxybutynin    New onset a-fib [facility])  Assessment & Plan  · Transient atrial fibrillation secondary to respiratory failure  No further events  · Remains on metoprolol  Not on therapeutic anticoagulation    HTN (hypertension)  Assessment & Plan  · Continue metoprolol    BMI 50 0-59 9, adult (HCC)  Assessment & Plan  · Body mass index is 52 15 kg/m²  Hyperglycemia  Assessment & Plan  · Steroid induced hyperglycemia with no history of diabetes mellitus  · Currently on glargine and sliding scale: Will discontinue glargine and start metformin and low-dose glipizide    Lab Results   Component Value Date    HGBA1C 6 6 (H) 02/16/2022     Results from last 7 days   Lab Units 03/10/22  1624 03/10/22  1137 03/10/22  0744 03/09/22  2119 03/09/22  1645 03/09/22  1203 03/09/22  0706 03/08/22  2047   POC GLUCOSE mg/dl 177* 140 100 117 234* 100 112 134       Sepsis due to COVID-19 [facility])  Assessment & Plan  · Present on admission  Has resolved      VTE Pharmacologic Prophylaxis: VTE Score: 7 High Risk (Score >/= 5) - Pharmacological DVT Prophylaxis Ordered: Heparin  Sequential Compression Devices Ordered  Patient Centered Rounds: I have performed bedside rounds with nursing staff today    Discussions with Specialists or Other Care Team Provider: Case management    Education and Discussions with Family / Patient:     Time Spent for Care: 20 mins  More than 50% of total time spent on counseling and coordination of care as described above  Current Length of Stay: 22 day(s)  Current Patient Status: Inpatient   Certification Statement: The patient will continue to require additional inpatient hospital stay due to hypoxia/COVID  Discharge Plan / Estimated Discharge Date: Anticipate discharge in > 72 hrs to home with home services  Code Status: Level 1 - Full Code      Subjective:   Patient seen and examined  Complaining of frequent urination at bedtime  Shortness of breath better    Objective:   Vitals: Blood pressure (!) 141/101, pulse 88, temperature 98 8 °F (37 1 °C), resp  rate 20, height 4' 6" (1 372 m), weight 98 1 kg (216 lb 4 3 oz), SpO2 95 %  Physical Exam  Vitals reviewed  Constitutional:       General: She is not in acute distress  Appearance: Normal appearance  She is obese  Eyes:      General: No scleral icterus  Cardiovascular:      Rate and Rhythm: Regular rhythm  Pulmonary:      Breath sounds: Decreased breath sounds present  No wheezing  Abdominal:      General: Bowel sounds are normal       Palpations: Abdomen is soft  Tenderness: There is no guarding or rebound  Musculoskeletal:         General: No swelling  Skin:     General: Skin is warm  Neurological:      Mental Status: She is alert     Psychiatric:         Mood and Affect: Mood normal        Additional Data:   Labs:  Results from last 7 days   Lab Units 03/07/22  0444   WBC Thousand/uL 14 17*   HEMOGLOBIN g/dL 11 9   HEMATOCRIT % 36 9   MCV fL 91   PLATELETS Thousands/uL 216     Results from last 7 days   Lab Units 03/07/22  0444   SODIUM mmol/L 143   POTASSIUM mmol/L 3 6   CHLORIDE mmol/L 105   CO2 mmol/L 29   ANION GAP mmol/L 9   BUN mg/dL 19   CREATININE mg/dL 0 62   CALCIUM mg/dL 9 0   ALBUMIN g/dL 2 7*   TOTAL BILIRUBIN mg/dL 0 31   ALK PHOS U/L 59 ALT U/L 42   AST U/L 17   EGFR ml/min/1 73sq m 104   GLUCOSE RANDOM mg/dL 99                          Results from last 7 days   Lab Units 03/10/22  1624 03/10/22  1137 03/10/22  0744 03/09/22  2119 03/09/22  1645 03/09/22  1203 03/09/22  0706 03/08/22  2047 03/08/22  1612 03/08/22  1055 03/08/22  0713 03/07/22  2053   POC GLUCOSE mg/dl 177* 140 100 117 234* 100 112 134 181* 119 106 128             * I Have Reviewed All Lab Data Listed Above  Cultures:                   Lines/Drains:  Invasive Devices  Report    Peripheral Intravenous Line            Peripheral IV 03/08/22 Dorsal (posterior); Right Hand 1 day              Telemetry:      Imaging:  Imaging Reports Reviewed Today Include:   XR chest 1 view portable    Result Date: 2/17/2022  Impression: Multifocal groundglass infiltrates concerning for  Covid  19 infection  Workstation performed: [id number]     CTA ED chest PE Study    Result Date: 2/16/2022  Impression: No evidence of pulmonary artery embolus  Scattered airspace opacities within the lungs which may represent infection  Recommend short-term follow-up chest CT scan in 3 months to evaluate for resolution  The study was marked in [facility] for immediate notification   Workstation performed: [id number]       Scheduled Meds:  Current Facility-Administered Medications   Medication Dose Route Frequency Provider Last Rate    acetaminophen  650 mg Oral Q6H PRN EMMA Cruz      al mag oxide-diphenhydramine-lidocaine viscous  10 mL Swish & Swallow Q6H PRN EMMA Cruz      ALPRAZolam  0 5 mg Oral BID PRN EMMA Cruz      aluminum-magnesium hydroxide-simethicone  30 mL Oral Q4H PRN EMMA Cruz      bacitracin  1 small application Topical BID Daysi Arteaga MD      benzonatate  200 mg Oral TID PREMMA Cruz      carbamide peroxide  5 drop Left Ear BID EMMA Cruz      docusate sodium  100 mg Oral BID PREMMA Cruz      fluticasone  1 spray Each Nare Daily Camilo Cassette, CRNP      guaiFENesin  600 mg Oral Q12H [address] Camilo Cassette, CRNP      heparin (porcine)  5,000 Units Subcutaneous [facility] Camilo Cassette, CRNP      insulin glargine  10 Units Subcutaneous Q12H [address] Camilo Cassette, CRNP      insulin lispro  2-12 Units Subcutaneous TID AC Camilo Cassette, CRNP      insulin lispro  8 Units Subcutaneous TID With Meals Camilo Cassette, CRNP      ipratropium  0 5 mg Nebulization 4x Daily Camilo Cassette, CRNP      levalbuterol  1 25 mg Nebulization 4x Daily Camilo Cassette, CRNP      lidocaine  1 patch Topical Daily Camilo Cassette, CRNP      Lidocaine Viscous HCl  15 mL Swish & Spit 4x Daily PRN Camilo Cassette, CRNP      metoprolol tartrate  25 mg Oral Q12H [address] Camilo Cassette, CRNP      nystatin  1 application Topical TID Amber Betancur DO      pantoprazole  40 mg Oral Daily Before Breakfast Camilo Cassette, CRNP      polyethylene glycol  17 g Oral Daily PRN Camilo Cassette, CRNP      predniSONE  10 mg Oral Daily [place], [place]      predniSONE  20 mg Oral Daily Oleg[place], CRNP      saliva substitute  5 spray Mouth/Throat 4x Daily PRN Camilo Cassette, CRNP      sodium chloride  1 application Nasal C3B PRN Camilo Cassette, CRNP         Today, Patient Was Seen By: Amber Betancur DO    ** Please Note: Dictation voice to text software may have been used in the creation of this document   **

## 2022-03-10 NOTE — ASSESSMENT & PLAN NOTE
71-year-old female prolonged stay and ICU for acute respiratory failure/hypoxia poly factorial secondary to COVID pneumonia/fibrosis pulmonary edema pneumonitis  Was on high-flow nasal cannula for 19 days  · Pulmonary following    Has been weaned to mid flow   · On prednisone taper

## 2022-03-10 NOTE — ASSESSMENT & PLAN NOTE
· Steroid induced hyperglycemia with no history of diabetes mellitus  · Currently on glargine and sliding scale:   Will discontinue glargine and start metformin and low-dose glipizide    Lab Results   Component Value Date    HGBA1C 6 6 (H) 02/16/2022     Results from last 7 days   Lab Units 03/10/22  1624 03/10/22  1137 03/10/22  0744 03/09/22  2119 03/09/22  1645 03/09/22  1203 03/09/22  0706 03/08/22  2047   POC GLUCOSE mg/dl 177* 140 100 117 234* 100 112 134

## 2022-03-10 NOTE — UTILIZATION REVIEW
Continued Stay Review    Date: 3/10/22 and 3/11/22                  Current Patient Class: IP  Current Level of Care: MS    HPI:51 y o  female initially admitted on 2/16 with resp failure  Assessment/Plan:   3/11 - continues use of Benzonate  On 10L [facility] oxygen, up from 9L  Has diminished breath sounds  Remains on Prednisone taper  Good glucose control  3/10 - Pt on Prednisone taper,  She is on oxygen 9L [address]  On exam she is c/o frequent urination at bedtime, starting on Oxybutynin  Feels SOB is improving  Decreased breath sounds, no wheezing  Using PRN Benzonate       Vital Signs:   03/11/22 0752 -- -- -- -- -- 92 % 10 L/min Mid flow nasal cannula --   03/11/22 07:40:32 99 °F (37 2 °C) 77 18 139/82 101 94 % -- -- --   03/10/22 21:02:25 98 5 °F (36 9 °C) 93 20 144/94 111 92 % 9 L/min Mid flow nasal cannula Sitting   03/10/22 2013 -- -- -- -- -- 91 % 9 L/min Mid flow nasal cannula --   03/10/22 1800 -- 88 -- -- -- 95 % -- -- --   03/10/22 1700 -- 74 -- -- -- 96 % -- -- --   03/10/22 16:23:03 -- 81 -- -- -- 98 % -- -- --   03/10/22 16:23:02 98 8 °F (37 1 °C) -- 20 141/101 Abnormal  114 -- 9 L/min Mid flow nasal cannula --   03/10/22 1153 -- -- -- -- -- 98 % -- -- --   03/10/22 1000 -- 98 -- -- -- 93 % -- -- --   03/10/22 0900 -- 104 -- -- -- 90 % -- -- --   03/10/22 0800 -- 100 -- -- -- 89 % Abnormal   -- -- --   03/10/22 07:38:29 98 5 °F (36 9 °C) 75 16 125/76 92 93 % 9 L/min Mid flow nasal cannula --   03/09/22 2139 -- 83 -- 124/77 -- -- -- -- --   03/09/22 21:37:19 -- 93 -- 124/77 93 89 % Abnormal  -- -- --   03/09/22 21:17:02 98 7 °F (37 1 °C) 98 24 Abnormal  146/101 Abnormal  116 90 % -- Mid flow nasal cannula Lying   03/09/22 2024 -- -- -- -- -- 94 % 9 L/min Mid flow nasal cannula --   03/09/22 1840 -- -- -- -- -- 92 % 9 L/min Mid flow nasal cannula --   03/09/22 1834 -- -- -- -- -- 88 % Abnormal  8 L/min Mid flow nasal cannula --   03/09/22 15:14:25 98 8 °F (37 1 °C) 86 28 Abnormal  142/97 112 91 % -- -- --   03/09/22 1315 -- -- -- -- -- 97 % 9 L/min Mid flow nasal cannula --   03/09/22 1100 -- -- -- -- -- 96 % 9 L/min Mid flow nasal cannula --   03/09/22 0900 -- -- -- -- -- 92 % 10 L/min Mid flow nasal cannula --   03/09/22 0740 -- -- -- -- -- 96 % 10 L/min Mid flow nasal cannula --   03/09/22 07:04:42 98 5 °F (36 9 °C) 87 22 150/99 116 93 % -- -- --   03/09/22 0332 -- -- -- -- -- -- 10 L/min Mid flow nasal cannula --   03/09/22 02:57:10 98 8 °F (37 1 °C) 82 20 109/64 79 95 % 10 L/min Mid flow nasal cannula Lying     Pertinent Labs/Diagnostic Results:       Results from last 7 days   Lab Units 03/07/22  0444   WBC Thousand/uL 14 17*   HEMOGLOBIN g/dL 11 9   HEMATOCRIT % 36 9   PLATELETS Thousands/uL 216   NEUTROS ABS Thousands/µL 8 73*         Results from last 7 days   Lab Units 03/07/22  0444   SODIUM mmol/L 143   POTASSIUM mmol/L 3 6   CHLORIDE mmol/L 105   CO2 mmol/L 29   ANION GAP mmol/L 9   BUN mg/dL 19   CREATININE mg/dL 0 62   EGFR ml/min/1 73sq m 104   CALCIUM mg/dL 9 0     Results from last 7 days   Lab Units 03/07/22  0444   AST U/L 17   ALT U/L 42   ALK PHOS U/L 59   TOTAL PROTEIN g/dL 6 1*   ALBUMIN g/dL 2 7*   TOTAL BILIRUBIN mg/dL 0 31     Results from last 7 days   Lab Units 03/10/22  1137 03/10/22  0744 03/09/22  2119 03/09/22  1645 03/09/22  1203 03/09/22  0706 03/08/22  2047 03/08/22  1612 03/08/22  1055 03/08/22  0713 03/07/22  2053 03/07/22  1613   POC GLUCOSE mg/dl 140 100 117 234* 100 112 134 181* 119 106 128 128     Results from last 7 days   Lab Units 03/07/22  0444   GLUCOSE RANDOM mg/dL 99     Results from last 7 days   Lab Units 03/07/22  0444   HEP C AB  Non-reactive     Medications:   Scheduled Medications:  bacitracin, 1 small application, Topical, BID  carbamide peroxide, 5 drop, Left Ear, BID  fluticasone, 1 spray, Each Nare, Daily  guaiFENesin, 600 mg, Oral, Q12H [address]  heparin (porcine), 5,000 Units, Subcutaneous, Q8H NED  insulin glargine, 10 Units, Subcutaneous, Q12H [address]  insulin lispro, 2-12 Units, Subcutaneous, TID AC  insulin lispro, 8 Units, Subcutaneous, TID With Meals  ipratropium, 0 5 mg, Nebulization, 4x Daily  levalbuterol, 1 25 mg, Nebulization, 4x Daily  lidocaine, 1 patch, Topical, Daily  metoprolol tartrate, 25 mg, Oral, Q12H NED  nystatin, 1 application, Topical, TID  pantoprazole, 40 mg, Oral, Daily Before Breakfast  predniSONE, 10 mg, Oral, Daily  predniSONE, 20 mg, Oral, Daily      Continuous IV Infusions:     PRN Meds:  acetaminophen, 650 mg, Oral, Q6H PRN  al mag oxide-diphenhydramine-lidocaine viscous, 10 mL, Swish & Swallow, Q6H PRN  ALPRAZolam, 0 5 mg, Oral, BID PRN  aluminum-magnesium hydroxide-simethicone, 30 mL, Oral, Q4H PRN  benzonatate, 200 mg, Oral, TID PRN - x 1 3/11,  3/10, 3/9  docusate sodium, 100 mg, Oral, BID PRN  Lidocaine Viscous HCl, 15 mL, Swish & Spit, 4x Daily PRN  polyethylene glycol, 17 g, Oral, Daily PRN  saliva substitute, 5 spray, Mouth/Throat, 4x Daily PRN  sodium chloride, 1 application, Nasal, A6P PRN    Discharge Plan: [facility]    Network Utilization Review Department  ATTENTION: Please call with any questions or concerns to [phone] and carefully listen to the prompts so that you are directed to the right person  All voicemails are confidential   Jair Villar all requests for admission clinical reviews, approved or denied determinations and any other requests to dedicated fax number below belonging to the campus where the patient is receiving treatment   List of dedicated fax numbers for the Facilities:  [address] DENIALS (Administrative/Medical Necessity) [phone]   [address] (Maternity/NICU/Pediatrics) 261 [facility],7Th Floor [address]  [address] [address] [address] [place] [address] [place] 1481 [address] [facility] [address] [facility] [phone]

## 2022-03-10 NOTE — PLAN OF CARE
Problem: Potential for Falls  Goal: Patient will remain free of falls  Description: INTERVENTIONS:  - Educate patient/family on patient safety including physical limitations  - Instruct patient to call for assistance with activity   - Consult OT/PT to assist with strengthening/mobility   - Keep Call bell within reach  - Keep bed low and locked with side rails adjusted as appropriate  - Keep care items and personal belongings within reach  - Initiate and maintain comfort rounds  - Make Fall Risk Sign visible to staff  - Offer Toileting every 2 Hours, in advance of need  - Initiate/Maintain chair, bed alarm  - Obtain necessary fall risk management equipment: call bell, bed alarm  - Apply yellow socks and bracelet for high fall risk patients  - Consider moving patient to room near nurses station  Outcome: Progressing     Problem: Prexisting or High Potential for Compromised Skin Integrity  Goal: Skin integrity is maintained or improved  Description: INTERVENTIONS:  - Identify patients at risk for skin breakdown  - Assess and monitor skin integrity  - Assess and monitor nutrition and hydration status  - Monitor labs   - Assess for incontinence   - Turn and reposition patient  - Assist with mobility/ambulation  - Relieve pressure over bony prominences  - Avoid friction and shearing  - Provide appropriate hygiene as needed including keeping skin clean and dry  - Evaluate need for skin moisturizer/barrier cream  - Collaborate with interdisciplinary team   - Patient/family teaching  - Consider wound care consult   Outcome: Progressing     Problem: RESPIRATORY - ADULT  Goal: Achieves optimal ventilation and oxygenation  Description: INTERVENTIONS:  - Assess for changes in respiratory status  - Assess for changes in mentation and behavior  - Position to facilitate oxygenation and minimize respiratory effort  - Oxygen administered by appropriate delivery if ordered  - Initiate smoking cessation education as indicated  - Encourage broncho-pulmonary hygiene including cough, deep breathe, Incentive Spirometry  - Assess the need for suctioning and aspirate as needed  - Assess and instruct to report SOB or any respiratory difficulty  - Respiratory Therapy support as indicated  Outcome: Progressing     Problem: METABOLIC, FLUID AND ELECTROLYTES - ADULT  Goal: Glucose maintained within target range  Description: INTERVENTIONS:  - Monitor Blood Glucose as ordered  - Assess for signs and symptoms of hyperglycemia and hypoglycemia  - Administer ordered medications to maintain glucose within target range  - Assess nutritional intake and initiate nutrition service referral as needed  Outcome: Progressing     Problem: MUSCULOSKELETAL - ADULT  Goal: Maintain or return mobility to safest level of function  Description: INTERVENTIONS:  - Assess patient's ability to carry out ADLs; assess patient's baseline for ADL function and identify physical deficits which impact ability to perform ADLs (bathing, care of mouth/teeth, toileting, grooming, dressing, etc )  - Assess/evaluate cause of self-care deficits   - Assess range of motion  - Assess patient's mobility  - Assess patient's need for assistive devices and provide as appropriate  - Encourage maximum independence but intervene and supervise when necessary  - Involve family in performance of ADLs  - Assess for home care needs following discharge   - Consider OT consult to assist with ADL evaluation and planning for discharge  - Provide patient education as appropriate  Outcome: Progressing     Problem: Knowledge Deficit  Goal: Patient/family/caregiver demonstrates understanding of disease process, treatment plan, medications, and discharge instructions  Description: Complete learning assessment and assess knowledge base    Interventions:  - Provide teaching at level of understanding  - Provide teaching via preferred learning methods  Outcome: Progressing

## 2022-03-10 NOTE — PLAN OF CARE
Problem: PHYSICAL THERAPY ADULT  Goal: Performs mobility at highest level of function for planned discharge setting  See evaluation for individualized goals  Description: Treatment/Interventions: Functional transfer training,LE strengthening/ROM,Elevations,Therapeutic exercise,Endurance training,Patient/family training,Equipment eval/education,Bed mobility,Gait training,Compensatory technique education,Spoke to MD,Spoke to nursing,Continued evaluation,OT  Equipment Recommended:  (monitor)       See flowsheet documentation for full assessment, interventions and recommendations  Outcome: Progressing  Note: Prognosis: Good  Problem List: Decreased endurance,Impaired balance,Decreased mobility  Assessment: Pt  progressing well with mobility and needed no hands on assist for any mobility  pt reported she has been walking around the unit with and without nursing staff and patient manages the O2 tank herself  Performed supine to sit and back and STS transfers independently  pt  on 8L o2 upon entry and SpO2 stats noted to be 87-89%  Increased it to 10L for activities and SpO2 improved to 93-94%  As session progressed O2 was dropped to 8L and patient remained at 87-93%  Noted drop in SpO2 stats with activity or laughter  Noted occ  unproductive cough but was less than last session  Pt  able to perform supine, seated and standing LE Marni  HR increased to 113 bpm with ambulation  pt  educated on pacing her activities  p  is making mobility gains and if continue to improve there is possible change in DCP  Tracy Oconnor ontinue to follow during the stay to maximize functional mobility  Barriers to Discharge: None  Barriers to Discharge Comments: lives alone  [address]     PT Discharge Recommendation:  (continue to monitor)          See flowsheet documentation for full assessment

## 2022-03-10 NOTE — PHYSICAL THERAPY NOTE
Physical Therapy Treatment Note       03/10/22 1031   PT Last Visit   PT Visit Date 03/10/22   Note Type   Note Type Treatment   Pain Assessment   Pain Assessment Tool 0-10   Pain Score No Pain   Restrictions/Precautions   Weight Bearing Precautions Per Order No   Other Precautions O2;Telemetry   General   Chart Reviewed Yes   Cognition   Overall Cognitive Status WFL   Subjective   Subjective Pt  standing in the bathroom upon entry  Pt  agreeable to PT   Bed Mobility   Supine to Sit 7  Independent   Sit to Supine 7  Independent   Transfers   Sit to Stand 7  Independent   Stand to Sit 7  Independent   Stand pivot 7  Independent   Ambulation/Elevation   Gait pattern Decreased heel strike; Excessively slow   Gait Assistance 5  Supervision   Additional items Assist x 1   Assistive Device None   Distance 400ft   Stair Management Assistance 5  Supervision   Additional items Assist x 1   Stair Management Technique One rail R;Alternating pattern; Foreward;Nonreciprocal;Reciprocal   Number of Stairs 10   Ambulation/Elevation Additional Comments O2 on 8L and SpO2 stats 90-93%   Balance   Static Sitting Normal   Dynamic Sitting Good   Static Standing Good   Dynamic Standing Fair +   Ambulatory Fair   Endurance Deficit   Endurance Deficit Yes   Endurance Deficit Description Spo2 stats occ to high 80s   Activity Tolerance   Activity Tolerance Patient tolerated treatment well   Nurse Made Aware Yes   Exercises   THR Sitting;Supine;20 reps;AROM; Bilateral   Balance training  alt  marches with EC    Assessment   Prognosis Good   Problem List Decreased endurance; Impaired balance;Decreased mobility   Assessment Pt  progressing well with mobility and needed no hands on assist for any mobility  pt reported she has been walking around the unit with and without nursing staff and patient manages the O2 tank herself  Performed supine to sit and back and STS transfers independently  pt  on 8L o2 upon entry and SpO2 stats noted to be 87-89%  Increased it to 10L for activities and SpO2 improved to 93-94%  As session progressed O2 was dropped to 8L and patient remained at 87-93%  Noted drop in SpO2 stats with activity or laughter  Noted occ  unproductive cough but was less than last session  Pt  able to perform supine, seated and standing LE Marni  HR increased to 113 bpm with ambulation  pt  educated on pacing her activities  p  is making mobility gains and if continue to improve there is possible change in DCP  Salina veraue to follow during the stay to maximize functional mobility  Barriers to Discharge None   Goals   Patient Goals I will keep doing things so I can get better   STG Expiration Date 03/17/22   PT Treatment Day 6   Plan   Treatment/Interventions Functional transfer training;LE strengthening/ROM; Elevations; Therapeutic exercise; Endurance training;Equipment eval/education; Bed mobility;Gait training;Patient/family training;Spoke to nursing   Progress Progressing toward goals   PT Frequency 4-6x/wk   Recommendation   PT Discharge Recommendation   (continue to monitor)   Equipment Recommended Other (Comment)  (None)   AM-PAC Basic Mobility Inpatient   Turning in Bed Without Bedrails 4   Lying on Back to Sitting on Edge of Flat Bed 4   Moving Bed to Chair 4   Standing Up From Chair 4   Walk in Room 4   Climb 3-5 Stairs 4   Basic Mobility Inpatient Raw Score 24   Basic Mobility Standardized Score 57 68   Turning Head Towards Sound 4   Follow Simple Instructions 4   Low Function Basic Mobility Raw Score 32   Low Function Basic Mobility Standardized Score 57 76   Highest Level Of Mobility   JH-HLM Goal 8: Walk 250 feet or more   JH-HLM Highest Level of Mobility 8: Walk 250 feet ot more   JH-HLM Goal Achieved Yes   End of Consult   Patient Position at End of Consult Seated edge of bed; All needs within reach     Azul Givens PTA    An AM-PAC basic mobility standardized score less than 42 9 suggest the patient may benefit from discharge to post-acute rehab services

## 2022-03-10 NOTE — PLAN OF CARE
Problem: Potential for Falls  Goal: Patient will remain free of falls  Description: INTERVENTIONS:  - Educate patient/family on patient safety including physical limitations  - Instruct patient to call for assistance with activity   - Consult OT/PT to assist with strengthening/mobility   - Keep Call bell within reach  - Keep bed low and locked with side rails adjusted as appropriate  - Keep care items and personal belongings within reach  - Initiate and maintain comfort rounds  - Make Fall Risk Sign visible to staff  - Offer Toileting every  Hours, in advance of need  - Initiate/Maintain alarm  - Obtain necessary fall risk management equipment:  - Apply yellow socks and bracelet for high fall risk patients  - Consider moving patient to room near nurses station  Outcome: Progressing     Problem: MOBILITY - ADULT  Goal: Maintain or return to baseline ADL function  Description: INTERVENTIONS:  -  Assess patient's ability to carry out ADLs; assess patient's baseline for ADL function and identify physical deficits which impact ability to perform ADLs (bathing, care of mouth/teeth, toileting, grooming, dressing, etc )  - Assess/evaluate cause of self-care deficits   - Assess range of motion  - Assess patient's mobility; develop plan if impaired  - Assess patient's need for assistive devices and provide as appropriate  - Encourage maximum independence but intervene and supervise when necessary  - Involve family in performance of ADLs  - Assess for home care needs following discharge   - Consider OT consult to assist with ADL evaluation and planning for discharge  - Provide patient education as appropriate  Outcome: Progressing  Goal: Maintains/Returns to pre admission functional level  Description: INTERVENTIONS:  - Perform BMAT or MOVE assessment daily    - Set and communicate daily mobility goal to care team and patient/family/caregiver     - Collaborate with rehabilitation services on mobility goals if consulted  - Perform Range of Motion  times a day  - Reposition patient every  hours    - Dangle patient  times a day  - Stand patient  times a day  - Ambulate patient  times a day  - Out of bed to chair  times a day   - Out of bed for meals times a day  - Out of bed for toileting  - Record patient progress and toleration of activity level   Outcome: Progressing     Problem: Prexisting or High Potential for Compromised Skin Integrity  Goal: Skin integrity is maintained or improved  Description: INTERVENTIONS:  - Identify patients at risk for skin breakdown  - Assess and monitor skin integrity  - Assess and monitor nutrition and hydration status  - Monitor labs   - Assess for incontinence   - Turn and reposition patient  - Assist with mobility/ambulation  - Relieve pressure over bony prominences  - Avoid friction and shearing  - Provide appropriate hygiene as needed including keeping skin clean and dry  - Evaluate need for skin moisturizer/barrier cream  - Collaborate with interdisciplinary team   - Patient/family teaching  - Consider wound care consult   Outcome: Progressing     Problem: RESPIRATORY - ADULT  Goal: Achieves optimal ventilation and oxygenation  Description: INTERVENTIONS:  - Assess for changes in respiratory status  - Assess for changes in mentation and behavior  - Position to facilitate oxygenation and minimize respiratory effort  - Oxygen administered by appropriate delivery if ordered  - Initiate smoking cessation education as indicated  - Encourage broncho-pulmonary hygiene including cough, deep breathe, Incentive Spirometry  - Assess the need for suctioning and aspirate as needed  - Assess and instruct to report SOB or any respiratory difficulty  - Respiratory Therapy support as indicated  Outcome: Progressing     Problem: METABOLIC, FLUID AND ELECTROLYTES - ADULT  Goal: Glucose maintained within target range  Description: INTERVENTIONS:  - Monitor Blood Glucose as ordered  - Assess for signs and symptoms of hyperglycemia and hypoglycemia  - Administer ordered medications to maintain glucose within target range  - Assess nutritional intake and initiate nutrition service referral as needed  Outcome: Progressing     Problem: MUSCULOSKELETAL - ADULT  Goal: Maintain or return mobility to safest level of function  Description: INTERVENTIONS:  - Assess patient's ability to carry out ADLs; assess patient's baseline for ADL function and identify physical deficits which impact ability to perform ADLs (bathing, care of mouth/teeth, toileting, grooming, dressing, etc )  - Assess/evaluate cause of self-care deficits   - Assess range of motion  - Assess patient's mobility  - Assess patient's need for assistive devices and provide as appropriate  - Encourage maximum independence but intervene and supervise when necessary  - Involve family in performance of ADLs  - Assess for home care needs following discharge   - Consider OT consult to assist with ADL evaluation and planning for discharge  - Provide patient education as appropriate  Outcome: Progressing  Goal: Maintain proper alignment of affected body part  Description: INTERVENTIONS:  - Support, maintain and protect limb and body alignment  - Provide patient/ family with appropriate education  Outcome: Progressing     Problem: Nutrition/Hydration-ADULT  Goal: Nutrient/Hydration intake appropriate for improving, restoring or maintaining nutritional needs  Description: Monitor and assess patient's nutrition/hydration status for malnutrition  Collaborate with interdisciplinary team and initiate plan and interventions as ordered  Monitor patient's weight and dietary intake as ordered or per policy  Utilize nutrition screening tool and intervene as necessary  Determine patient's food preferences and provide high-protein, high-caloric foods as appropriate       INTERVENTIONS:  - Monitor oral intake, urinary output, labs, and treatment plans  - Assess nutrition and hydration status and recommend course of action  - Evaluate amount of meals eaten  - Assist patient with eating if necessary   - Allow adequate time for meals  - Recommend/ encourage appropriate diets, oral nutritional supplements, and vitamin/mineral supplements  - Order, calculate, and assess calorie counts as needed  - Recommend, monitor, and adjust tube feedings and TPN/PPN based on assessed needs  - Assess need for intravenous fluids  - Provide specific nutrition/hydration education as appropriate  - Include patient/family/caregiver in decisions related to nutrition  Outcome: Progressing     Problem: DISCHARGE PLANNING  Goal: Discharge to home or other facility with appropriate resources  Description: INTERVENTIONS:  - Identify barriers to discharge w/patient and caregiver  - Arrange for needed discharge resources and transportation as appropriate  - Identify discharge learning needs (meds, wound care, etc )  - Arrange for interpretive services to assist at discharge as needed  - Refer to Case Management Department for coordinating discharge planning if the patient needs post-hospital services based on physician/advanced practitioner order or complex needs related to functional status, cognitive ability, or social support system  Outcome: Progressing     Problem: Knowledge Deficit  Goal: Patient/family/caregiver demonstrates understanding of disease process, treatment plan, medications, and discharge instructions  Description: Complete learning assessment and assess knowledge base    Interventions:  - Provide teaching at level of understanding  - Provide teaching via preferred learning methods  Outcome: Progressing

## 2022-03-11 LAB
GLUCOSE SERPL-MCNC: 104 MG/DL (ref 65–140)
GLUCOSE SERPL-MCNC: 110 MG/DL (ref 65–140)
GLUCOSE SERPL-MCNC: 189 MG/DL (ref 65–140)
GLUCOSE SERPL-MCNC: 85 MG/DL (ref 65–140)

## 2022-03-11 PROCEDURE — 82948 REAGENT STRIP/BLOOD GLUCOSE: CPT

## 2022-03-11 PROCEDURE — 94640 AIRWAY INHALATION TREATMENT: CPT

## 2022-03-11 PROCEDURE — 99232 SBSQ HOSP IP/OBS MODERATE 35: CPT | Performed by: INTERNAL MEDICINE

## 2022-03-11 RX ORDER — ECHINACEA PURPUREA EXTRACT 125 MG
1 TABLET ORAL EVERY 2 HOUR PRN
Status: DISCONTINUED | OUTPATIENT
Start: 2022-03-11 | End: 2022-03-16 | Stop reason: HOSPADM

## 2022-03-11 RX ORDER — LEVALBUTEROL 1.25 MG/.5ML
1.25 SOLUTION, CONCENTRATE RESPIRATORY (INHALATION)
Status: DISCONTINUED | OUTPATIENT
Start: 2022-03-11 | End: 2022-03-12

## 2022-03-11 RX ORDER — FLUTICASONE PROPIONATE 50 MCG
1 SPRAY, SUSPENSION (ML) NASAL 2 TIMES DAILY
Status: DISCONTINUED | OUTPATIENT
Start: 2022-03-12 | End: 2022-03-11

## 2022-03-11 RX ORDER — FLUTICASONE PROPIONATE 50 MCG
1 SPRAY, SUSPENSION (ML) NASAL 2 TIMES DAILY
Status: DISCONTINUED | OUTPATIENT
Start: 2022-03-11 | End: 2022-03-16 | Stop reason: HOSPADM

## 2022-03-11 RX ADMIN — BENZONATATE 200 MG: 100 CAPSULE ORAL at 08:39

## 2022-03-11 RX ADMIN — LEVALBUTEROL 1.25 MG: 1.25 SOLUTION, CONCENTRATE RESPIRATORY (INHALATION) at 20:23

## 2022-03-11 RX ADMIN — BACITRACIN 1 SMALL APPLICATION: 500 OINTMENT TOPICAL at 21:17

## 2022-03-11 RX ADMIN — LEVALBUTEROL HYDROCHLORIDE 1.25 MG: 1.25 SOLUTION, CONCENTRATE RESPIRATORY (INHALATION) at 07:50

## 2022-03-11 RX ADMIN — GUAIFENESIN 600 MG: 600 TABLET, EXTENDED RELEASE ORAL at 08:39

## 2022-03-11 RX ADMIN — NYSTATIN 1 APPLICATION: 100000 POWDER TOPICAL at 08:44

## 2022-03-11 RX ADMIN — ACETAMINOPHEN 650 MG: 325 TABLET, FILM COATED ORAL at 20:44

## 2022-03-11 RX ADMIN — HEPARIN SODIUM 5000 UNITS: 5000 INJECTION INTRAVENOUS; SUBCUTANEOUS at 15:00

## 2022-03-11 RX ADMIN — LIDOCAINE 1 PATCH: 50 PATCH CUTANEOUS at 08:43

## 2022-03-11 RX ADMIN — LEVALBUTEROL HYDROCHLORIDE 1.25 MG: 1.25 SOLUTION, CONCENTRATE RESPIRATORY (INHALATION) at 13:25

## 2022-03-11 RX ADMIN — BACITRACIN 1 SMALL APPLICATION: 500 OINTMENT TOPICAL at 08:39

## 2022-03-11 RX ADMIN — METOPROLOL TARTRATE 25 MG: 25 TABLET, FILM COATED ORAL at 21:17

## 2022-03-11 RX ADMIN — PREDNISONE 20 MG: 20 TABLET ORAL at 08:39

## 2022-03-11 RX ADMIN — IPRATROPIUM BROMIDE 0.5 MG: 0.5 SOLUTION RESPIRATORY (INHALATION) at 07:50

## 2022-03-11 RX ADMIN — OXYBUTYNIN CHLORIDE 5 MG: 5 TABLET, EXTENDED RELEASE ORAL at 21:17

## 2022-03-11 RX ADMIN — GLIPIZIDE 2.5 MG: 2.5 TABLET, EXTENDED RELEASE ORAL at 08:46

## 2022-03-11 RX ADMIN — IPRATROPIUM BROMIDE 0.5 MG: 0.5 SOLUTION RESPIRATORY (INHALATION) at 13:25

## 2022-03-11 RX ADMIN — HEPARIN SODIUM 5000 UNITS: 5000 INJECTION INTRAVENOUS; SUBCUTANEOUS at 06:05

## 2022-03-11 RX ADMIN — NYSTATIN 1 APPLICATION: 100000 POWDER TOPICAL at 21:17

## 2022-03-11 RX ADMIN — METFORMIN HYDROCHLORIDE 500 MG: 500 TABLET ORAL at 15:36

## 2022-03-11 RX ADMIN — FLUTICASONE PROPIONATE 1 SPRAY: 50 SPRAY, METERED NASAL at 21:17

## 2022-03-11 RX ADMIN — PANTOPRAZOLE SODIUM 40 MG: 40 TABLET, DELAYED RELEASE ORAL at 06:05

## 2022-03-11 RX ADMIN — ALPRAZOLAM 0.5 MG: 0.5 TABLET ORAL at 21:17

## 2022-03-11 RX ADMIN — IPRATROPIUM BROMIDE 0.5 MG: 0.5 SOLUTION RESPIRATORY (INHALATION) at 20:23

## 2022-03-11 RX ADMIN — METOPROLOL TARTRATE 25 MG: 25 TABLET, FILM COATED ORAL at 08:39

## 2022-03-11 RX ADMIN — HEPARIN SODIUM 5000 UNITS: 5000 INJECTION INTRAVENOUS; SUBCUTANEOUS at 21:18

## 2022-03-11 RX ADMIN — BENZONATATE 200 MG: 100 CAPSULE ORAL at 21:17

## 2022-03-11 RX ADMIN — PREDNISONE 10 MG: 10 TABLET ORAL at 08:41

## 2022-03-11 RX ADMIN — FLUTICASONE PROPIONATE 1 SPRAY: 50 SPRAY, METERED NASAL at 08:46

## 2022-03-11 RX ADMIN — GUAIFENESIN 600 MG: 600 TABLET, EXTENDED RELEASE ORAL at 21:17

## 2022-03-11 RX ADMIN — METFORMIN HYDROCHLORIDE 500 MG: 500 TABLET ORAL at 08:50

## 2022-03-11 NOTE — PLAN OF CARE
Problem: Potential for Falls  Goal: Patient will remain free of falls  Description: INTERVENTIONS:  - Educate patient/family on patient safety including physical limitations  - Instruct patient to call for assistance with activity   - Consult OT/PT to assist with strengthening/mobility   - Keep Call bell within reach  - Keep bed low and locked with side rails adjusted as appropriate  - Keep care items and personal belongings within reach  - Initiate and maintain comfort rounds  - Make Fall Risk Sign visible to staff  - Apply yellow socks and bracelet for high fall risk patients  - Consider moving patient to room near nurses station  Outcome: Progressing     Problem: MOBILITY - ADULT  Goal: Maintain or return to baseline ADL function  Description: INTERVENTIONS:  -  Assess patient's ability to carry out ADLs; assess patient's baseline for ADL function and identify physical deficits which impact ability to perform ADLs (bathing, care of mouth/teeth, toileting, grooming, dressing, etc )  - Assess/evaluate cause of self-care deficits   - Assess range of motion  - Assess patient's mobility; develop plan if impaired  - Assess patient's need for assistive devices and provide as appropriate  - Encourage maximum independence but intervene and supervise when necessary  - Involve family in performance of ADLs  - Assess for home care needs following discharge   - Consider OT consult to assist with ADL evaluation and planning for discharge  - Provide patient education as appropriate  Outcome: Progressing  Goal: Maintains/Returns to pre admission functional level  Description: INTERVENTIONS:  - Perform BMAT or MOVE assessment daily    - Set and communicate daily mobility goal to care team and patient/family/caregiver     - Collaborate with rehabilitation services on mobility goals if consulted  - Out of bed for toileting  - Record patient progress and toleration of activity level   Outcome: Progressing     Problem: Prexisting or High Potential for Compromised Skin Integrity  Goal: Skin integrity is maintained or improved  Description: INTERVENTIONS:  - Identify patients at risk for skin breakdown  - Assess and monitor skin integrity  - Assess and monitor nutrition and hydration status  - Monitor labs   - Assess for incontinence   - Turn and reposition patient  - Assist with mobility/ambulation  - Relieve pressure over bony prominences  - Avoid friction and shearing  - Provide appropriate hygiene as needed including keeping skin clean and dry  - Evaluate need for skin moisturizer/barrier cream  - Collaborate with interdisciplinary team   - Patient/family teaching  - Consider wound care consult   Outcome: Progressing     Problem: RESPIRATORY - ADULT  Goal: Achieves optimal ventilation and oxygenation  Description: INTERVENTIONS:  - Assess for changes in respiratory status  - Assess for changes in mentation and behavior  - Position to facilitate oxygenation and minimize respiratory effort  - Oxygen administered by appropriate delivery if ordered  - Initiate smoking cessation education as indicated  - Encourage broncho-pulmonary hygiene including cough, deep breathe, Incentive Spirometry  - Assess the need for suctioning and aspirate as needed  - Assess and instruct to report SOB or any respiratory difficulty  - Respiratory Therapy support as indicated  Outcome: Progressing     Problem: METABOLIC, FLUID AND ELECTROLYTES - ADULT  Goal: Glucose maintained within target range  Description: INTERVENTIONS:  - Monitor Blood Glucose as ordered  - Assess for signs and symptoms of hyperglycemia and hypoglycemia  - Administer ordered medications to maintain glucose within target range  - Assess nutritional intake and initiate nutrition service referral as needed  Outcome: Progressing     Problem: MUSCULOSKELETAL - ADULT  Goal: Maintain or return mobility to safest level of function  Description: INTERVENTIONS:  - Assess patient's ability to carry out ADLs; assess patient's baseline for ADL function and identify physical deficits which impact ability to perform ADLs (bathing, care of mouth/teeth, toileting, grooming, dressing, etc )  - Assess/evaluate cause of self-care deficits   - Assess range of motion  - Assess patient's mobility  - Assess patient's need for assistive devices and provide as appropriate  - Encourage maximum independence but intervene and supervise when necessary  - Involve family in performance of ADLs  - Assess for home care needs following discharge   - Consider OT consult to assist with ADL evaluation and planning for discharge  - Provide patient education as appropriate  Outcome: Progressing  Goal: Maintain proper alignment of affected body part  Description: INTERVENTIONS:  - Support, maintain and protect limb and body alignment  - Provide patient/ family with appropriate education  Outcome: Progressing     Problem: Nutrition/Hydration-ADULT  Goal: Nutrient/Hydration intake appropriate for improving, restoring or maintaining nutritional needs  Description: Monitor and assess patient's nutrition/hydration status for malnutrition  Collaborate with interdisciplinary team and initiate plan and interventions as ordered  Monitor patient's weight and dietary intake as ordered or per policy  Utilize nutrition screening tool and intervene as necessary  Determine patient's food preferences and provide high-protein, high-caloric foods as appropriate       INTERVENTIONS:  - Monitor oral intake, urinary output, labs, and treatment plans  - Assess nutrition and hydration status and recommend course of action  - Evaluate amount of meals eaten  - Assist patient with eating if necessary   - Allow adequate time for meals  - Recommend/ encourage appropriate diets, oral nutritional supplements, and vitamin/mineral supplements  - Order, calculate, and assess calorie counts as needed  - Recommend, monitor, and adjust tube feedings and TPN/PPN based on assessed needs  - Assess need for intravenous fluids  - Provide specific nutrition/hydration education as appropriate  - Include patient/family/caregiver in decisions related to nutrition  Outcome: Progressing     Problem: DISCHARGE PLANNING  Goal: Discharge to home or other facility with appropriate resources  Description: INTERVENTIONS:  - Identify barriers to discharge w/patient and caregiver  - Arrange for needed discharge resources and transportation as appropriate  - Identify discharge learning needs (meds, wound care, etc )  - Arrange for interpretive services to assist at discharge as needed  - Refer to Case Management Department for coordinating discharge planning if the patient needs post-hospital services based on physician/advanced practitioner order or complex needs related to functional status, cognitive ability, or social support system  Outcome: Progressing     Problem: Knowledge Deficit  Goal: Patient/family/caregiver demonstrates understanding of disease process, treatment plan, medications, and discharge instructions  Description: Complete learning assessment and assess knowledge base    Interventions:  - Provide teaching at level of understanding  - Provide teaching via preferred learning methods  Outcome: Progressing

## 2022-03-11 NOTE — ASSESSMENT & PLAN NOTE
----- Message from Tonya Briggs PA-C sent at 2/15/2021  1:03 PM EST -----  Please let Steve lFynn know that her blood count shows no sign of anemia   thx · Alprazolam p r n

## 2022-03-11 NOTE — CASE MANAGEMENT
Case Management Progress Note    Patient name Josh Fang  Location [facility] 68 2 /[address] 2 Jamilah Anton* MRN [id number]  : 1970 Date 3/11/2022       LOS (days): 23  Geometric Mean LOS (GMLOS) (days): 4 80  Days to GMLOS:-18        OBJECTIVE:        Current admission status: Inpatient  Preferred Pharmacy:   PATIENT/FAMILY REPORTS NO PREFERRED PHARMACY  No address on file      [address] [facility]  Phone: [phone] Fax: [phone]    Primary Care Provider: Claudia Gore MD    Primary Insurance: [facility]  Secondary Insurance:     PROGRESS NOTE: CM in to speak w/ pt regarding d/c planning  Pt requests that CM calls to Serina mckinney, [phone], to confirm that paperwork has been received by firm, as faxed on 3/9/2022 from [facility]  No answer at phone number, no voicemail left  Of note, pt has retained  r/t to current living conditions in apartment

## 2022-03-11 NOTE — ASSESSMENT & PLAN NOTE
59-year-old female prolonged stay and ICU for acute respiratory failure/hypoxia poly factorial secondary to COVID pneumonia/fibrosis pulmonary edema and pneumonitis  Was on high-flow nasal cannula for 19 days    · Was followed by pulmonology and has been weaned to mid flow   · Finishing up prednisone taper

## 2022-03-11 NOTE — ASSESSMENT & PLAN NOTE
· Steroid induced hyperglycemia with no history of diabetes mellitus  · Was on glargine which has been discontinued and currently trialing start metformin with low-dose glipizide    Lab Results   Component Value Date    HGBA1C 6 6 (H) 02/16/2022     Results from last 7 days   Lab Units 03/11/22  1106 03/11/22  0744 03/10/22  2104 03/10/22  1624 03/10/22  1137 03/10/22  0744 03/09/22  2119 03/09/22  1645   POC GLUCOSE mg/dl 104 85 137 177* 140 100 117 234*

## 2022-03-11 NOTE — PROGRESS NOTES
[address] [address]  Progress Note - Bharat Hua 1970, 46 y o  female MRN: [id number]  Unit/Bed#: [facility] 68 2 [address] 87 [id number] Encounter: [id number]  Primary Care Provider: Surendra Hallman MD   Date and time admitted to hospital: 2/16/2022  7:01 PM    * Acute respiratory failure with hypoxia [facility])  Assessment & Plan  68-year-old female prolonged stay and ICU for acute respiratory failure/hypoxia poly factorial secondary to COVID pneumonia/fibrosis pulmonary edema and pneumonitis  Was on high-flow nasal cannula for 19 days  · Was followed by pulmonology and has been weaned to mid flow   · Finishing up prednisone taper    OAB (overactive bladder)  Assessment & Plan  · Started oxybutynin    New onset a-fib [facility])  Assessment & Plan  · Transient atrial fibrillation secondary to respiratory failure  No further events  · Remains on metoprolol  Not on therapeutic anticoagulation    HTN (hypertension)  Assessment & Plan  · Continue metoprolol    Anxiety  Assessment & Plan  · Alprazolam p r n  BMI 50 0-59 9, adult (HCC)  Assessment & Plan  · Body mass index is 54 38 kg/m²  Hyperglycemia  Assessment & Plan  · Steroid induced hyperglycemia with no history of diabetes mellitus  · Was on glargine which has been discontinued and currently trialing start metformin with low-dose glipizide    Lab Results   Component Value Date    HGBA1C 6 6 (H) 02/16/2022     Results from last 7 days   Lab Units 03/11/22  1106 03/11/22  0744 03/10/22  2104 03/10/22  1624 03/10/22  1137 03/10/22  0744 03/09/22  2119 03/09/22  1645   POC GLUCOSE mg/dl 104 85 137 177* 140 100 117 234*       Sepsis due to COVID-19 [facility])  Assessment & Plan  · Present on admission  Has resolved      VTE Pharmacologic Prophylaxis: VTE Score: 7 High Risk (Score >/= 5) - Pharmacological DVT Prophylaxis Ordered: Heparin  Sequential Compression Devices Ordered      Patient Centered Rounds: I have performed bedside rounds with nursing staff today   Discussions with Specialists or Other Care Team Provider:  Case management    Education and Discussions with Family / Patient:     Time Spent for Care: 20 mins  More than 50% of total time spent on counseling and coordination of care as described above  Current Length of Stay: 23 day(s)  Current Patient Status: Inpatient   Certification Statement: The patient will continue to require additional inpatient hospital stay due to [place] hypoxia  Discharge Plan / Estimated Discharge Date: Anticipate discharge in > 72 hrs to home with home services  Code Status: Level 1 - Full Code      Subjective:   Patient seen and examined  Less urinary frequency overnight  Shortness of breath continues to improve    Objective:   Vitals: Blood pressure 139/82, pulse 86, temperature 99 °F (37 2 °C), resp  rate 18, height 4' 6" (1 372 m), weight 102 kg (225 lb 8 5 oz), SpO2 90 %  Physical Exam  Vitals reviewed  Constitutional:       General: She is not in acute distress  Appearance: She is obese  HENT:      Head: Atraumatic  Eyes:      Extraocular Movements: Extraocular movements intact  Cardiovascular:      Rate and Rhythm: Regular rhythm  Heart sounds: Normal heart sounds  Pulmonary:      Breath sounds: Decreased breath sounds present  No rhonchi  Abdominal:      General: Bowel sounds are normal       Palpations: Abdomen is soft  Tenderness: There is no guarding or rebound  Musculoskeletal:         General: No swelling  Skin:     General: Skin is warm  Neurological:      Mental Status: She is alert and oriented to person, place, and time     Psychiatric:         Mood and Affect: Mood normal        Additional Data:   Labs:  Results from last 7 days   Lab Units 03/07/22  0444   WBC Thousand/uL 14 17*   HEMOGLOBIN g/dL 11 9   HEMATOCRIT % 36 9   MCV fL 91   PLATELETS Thousands/uL 216     Results from last 7 days   Lab Units 03/07/22  0444   SODIUM mmol/L 143   POTASSIUM mmol/L 3 6 CHLORIDE mmol/L 105   CO2 mmol/L 29   ANION GAP mmol/L 9   BUN mg/dL 19   CREATININE mg/dL 0 62   CALCIUM mg/dL 9 0   ALBUMIN g/dL 2 7*   TOTAL BILIRUBIN mg/dL 0 31   ALK PHOS U/L 59   ALT U/L 42   AST U/L 17   EGFR ml/min/1 73sq m 104   GLUCOSE RANDOM mg/dL 99                          Results from last 7 days   Lab Units 03/11/22  1106 03/11/22  0744 03/10/22  2104 03/10/22  1624 03/10/22  1137 03/10/22  0744 03/09/22  2119 03/09/22  1645 03/09/22  1203 03/09/22  0706 03/08/22  2047 03/08/22  1612   POC GLUCOSE mg/dl 104 85 137 177* 140 100 117 234* 100 112 134 181*             * I Have Reviewed All Lab Data Listed Above  Cultures:                   Lines/Drains:  Invasive Devices  Report    Peripheral Intravenous Line            Peripheral IV 03/08/22 Dorsal (posterior); Right Hand 2 days              Telemetry:      Imaging:  Imaging Reports Reviewed Today Include:   XR chest 1 view portable    Result Date: 2/17/2022  Impression: Multifocal groundglass infiltrates concerning for  Covid  19 infection  Workstation performed: [id number]     CTA ED chest PE Study    Result Date: 2/16/2022  Impression: No evidence of pulmonary artery embolus  Scattered airspace opacities within the lungs which may represent infection  Recommend short-term follow-up chest CT scan in 3 months to evaluate for resolution  The study was marked in [facility] for immediate notification   Workstation performed: [id number]       Scheduled Meds:  Current Facility-Administered Medications   Medication Dose Route Frequency Provider Last Rate    acetaminophen  650 mg Oral Q6H PRN Phyllistine Aloe, CRNP      al mag oxide-diphenhydramine-lidocaine viscous  10 mL Swish & Swallow Q6H PRN Phyllistine Aloe, CRNP      ALPRAZolam  0 5 mg Oral BID PRN Phyllistine Aloe, CRNP      aluminum-magnesium hydroxide-simethicone  30 mL Oral Q4H PRN Phyllistine Aloe, CRNP      bacitracin  1 small application Topical BID Gina Hoffman MD      benzonatate  200 mg Oral TID PRN Erasmo Campuzano, CRNP      carbamide peroxide  5 drop Left Ear BID Erasmo Campuzano, CRNP      docusate sodium  100 mg Oral BID PRN Erasmo Campuzano, CRNP      fluticasone  1 spray Each Nare Daily Erasmo Campuzano, CRNP      glipiZIDE  2 5 mg Oral Daily With Breakfast Ross Saldana,       guaiFENesin  600 mg Oral Q12H [facility] Erasmo Campuzano, CRNP      heparin (porcine)  5,000 Units Subcutaneous [facility] Erasmo Campuzano, CRNP      insulin lispro  2-12 Units Subcutaneous TID AC Erasmo Campuzano, CRNP      ipratropium  0 5 mg Nebulization TID Jonathon Osborne, DO      levalbuterol  1 25 mg Nebulization TID Ross Saldana,       lidocaine  1 patch Topical Daily Erasmo Campuzano, LENINP      Lidocaine Viscous HCl  15 mL Swish & Spit 4x Daily PRN Erasmo Campuzano, CRNP      metFORMIN  500 mg Oral BID With Meals Ross Saldana,       metoprolol tartrate  25 mg Oral Q12H [facility] Erasmo Campuzano, LENINP      nystatin  1 application Topical TID Rossjeff Saldana,       oxybutynin  5 mg Oral HS Jonathon Osborne, DO      pantoprazole  40 mg Oral Daily Before Breakfast Erasmo Campuzano, LENINP      polyethylene glycol  17 g Oral Daily PRN Erasmo Campuzano, LENINP      predniSONE  10 mg Oral Daily Cary Smith, CRNP      saliva substitute  5 spray Mouth/Throat 4x Daily PRN Erasmo Campuzano, CRNP      sodium chloride  1 application Nasal H7P PRN EMMA Bingham         Today, Patient Was Seen By: Ross Saldana DO    ** Please Note: Dictation voice to text software may have been used in the creation of this document   **

## 2022-03-12 LAB
GLUCOSE SERPL-MCNC: 107 MG/DL (ref 65–140)
GLUCOSE SERPL-MCNC: 122 MG/DL (ref 65–140)
GLUCOSE SERPL-MCNC: 172 MG/DL (ref 65–140)
GLUCOSE SERPL-MCNC: 173 MG/DL (ref 65–140)

## 2022-03-12 PROCEDURE — 94640 AIRWAY INHALATION TREATMENT: CPT

## 2022-03-12 PROCEDURE — 82948 REAGENT STRIP/BLOOD GLUCOSE: CPT

## 2022-03-12 PROCEDURE — 99232 SBSQ HOSP IP/OBS MODERATE 35: CPT | Performed by: INTERNAL MEDICINE

## 2022-03-12 RX ORDER — LEVALBUTEROL 1.25 MG/.5ML
1.25 SOLUTION, CONCENTRATE RESPIRATORY (INHALATION)
Status: DISCONTINUED | OUTPATIENT
Start: 2022-03-13 | End: 2022-03-16 | Stop reason: HOSPADM

## 2022-03-12 RX ADMIN — HEPARIN SODIUM 5000 UNITS: 5000 INJECTION INTRAVENOUS; SUBCUTANEOUS at 06:03

## 2022-03-12 RX ADMIN — FLUTICASONE PROPIONATE 1 SPRAY: 50 SPRAY, METERED NASAL at 08:27

## 2022-03-12 RX ADMIN — IPRATROPIUM BROMIDE 0.5 MG: 0.5 SOLUTION RESPIRATORY (INHALATION) at 07:50

## 2022-03-12 RX ADMIN — METOPROLOL TARTRATE 25 MG: 25 TABLET, FILM COATED ORAL at 22:41

## 2022-03-12 RX ADMIN — PANTOPRAZOLE SODIUM 40 MG: 40 TABLET, DELAYED RELEASE ORAL at 06:03

## 2022-03-12 RX ADMIN — HEPARIN SODIUM 5000 UNITS: 5000 INJECTION INTRAVENOUS; SUBCUTANEOUS at 22:43

## 2022-03-12 RX ADMIN — IPRATROPIUM BROMIDE 0.5 MG: 0.5 SOLUTION RESPIRATORY (INHALATION) at 14:02

## 2022-03-12 RX ADMIN — BENZONATATE 200 MG: 100 CAPSULE ORAL at 08:28

## 2022-03-12 RX ADMIN — NYSTATIN 1 APPLICATION: 100000 POWDER TOPICAL at 08:27

## 2022-03-12 RX ADMIN — OXYBUTYNIN CHLORIDE 5 MG: 5 TABLET, EXTENDED RELEASE ORAL at 22:42

## 2022-03-12 RX ADMIN — BACITRACIN 1 SMALL APPLICATION: 500 OINTMENT TOPICAL at 08:28

## 2022-03-12 RX ADMIN — PREDNISONE 10 MG: 10 TABLET ORAL at 08:28

## 2022-03-12 RX ADMIN — LEVALBUTEROL 1.25 MG: 1.25 SOLUTION, CONCENTRATE RESPIRATORY (INHALATION) at 07:50

## 2022-03-12 RX ADMIN — GLIPIZIDE 2.5 MG: 2.5 TABLET, EXTENDED RELEASE ORAL at 08:28

## 2022-03-12 RX ADMIN — NYSTATIN 1 APPLICATION: 100000 POWDER TOPICAL at 22:46

## 2022-03-12 RX ADMIN — LIDOCAINE 1 PATCH: 50 PATCH CUTANEOUS at 08:28

## 2022-03-12 RX ADMIN — GUAIFENESIN 600 MG: 600 TABLET, EXTENDED RELEASE ORAL at 22:41

## 2022-03-12 RX ADMIN — BENZONATATE 200 MG: 100 CAPSULE ORAL at 22:42

## 2022-03-12 RX ADMIN — NYSTATIN 1 APPLICATION: 100000 POWDER TOPICAL at 18:29

## 2022-03-12 RX ADMIN — HEPARIN SODIUM 5000 UNITS: 5000 INJECTION INTRAVENOUS; SUBCUTANEOUS at 13:16

## 2022-03-12 RX ADMIN — GUAIFENESIN 600 MG: 600 TABLET, EXTENDED RELEASE ORAL at 08:28

## 2022-03-12 RX ADMIN — METFORMIN HYDROCHLORIDE 500 MG: 500 TABLET ORAL at 08:28

## 2022-03-12 RX ADMIN — ALPRAZOLAM 0.5 MG: 0.5 TABLET ORAL at 22:42

## 2022-03-12 RX ADMIN — INSULIN LISPRO 2 UNITS: 100 INJECTION, SOLUTION INTRAVENOUS; SUBCUTANEOUS at 18:29

## 2022-03-12 RX ADMIN — METFORMIN HYDROCHLORIDE 500 MG: 500 TABLET ORAL at 18:28

## 2022-03-12 RX ADMIN — BACITRACIN 1 SMALL APPLICATION: 500 OINTMENT TOPICAL at 22:43

## 2022-03-12 RX ADMIN — LEVALBUTEROL 1.25 MG: 1.25 SOLUTION, CONCENTRATE RESPIRATORY (INHALATION) at 14:02

## 2022-03-12 RX ADMIN — FLUTICASONE PROPIONATE 1 SPRAY: 50 SPRAY, METERED NASAL at 18:28

## 2022-03-12 RX ADMIN — METOPROLOL TARTRATE 25 MG: 25 TABLET, FILM COATED ORAL at 08:28

## 2022-03-12 NOTE — PROGRESS NOTES
Patient due for IV change this evening 3/12/22 at 2256  Currently no IV meds ordered  [facility]

## 2022-03-12 NOTE — ASSESSMENT & PLAN NOTE
· Steroid induced hyperglycemia with no history of diabetes mellitus  · Was on glargine which has been discontinued and currently stable on metformin with glipizide    Lab Results   Component Value Date    HGBA1C 6 6 (H) 02/16/2022     Results from last 7 days   Lab Units 03/12/22  1553 03/12/22  1112 03/12/22  0718 03/11/22  2114 03/11/22  1607 03/11/22  1106 03/11/22  0744 03/10/22  2104   POC GLUCOSE mg/dl 172* 122 107 110 189* 104 85 137

## 2022-03-12 NOTE — ASSESSMENT & PLAN NOTE
· Steroid induced hyperglycemia with no history of diabetes mellitus  · Was on glargine which has been discontinued and currently stable on metformin with glipizide    Lab Results   Component Value Date    HGBA1C 6 6 (H) 02/16/2022     Results from last 7 days   Lab Units 03/13/22  1123 03/13/22  0614 03/12/22  2144 03/12/22  1553 03/12/22  1112 03/12/22  0718 03/11/22  2114 03/11/22  1607   POC GLUCOSE mg/dl 66 96 173* 172* 122 107 110 189*

## 2022-03-12 NOTE — CASE MANAGEMENT
Case Management Discharge Planning Note    Patient name Josh Fang  Location [place] 2 /[address] 2 Jamilah Anton* MRN [id number]  : 1970 Date 3/12/2022       Current Admission Date: 2022  Current Admission Diagnosis:Acute respiratory failure with hypoxia [facility])   Patient Active Problem List    Diagnosis Date Noted    OAB (overactive bladder) 03/10/2022    Acute diastolic heart failure ([facility] [address] ) 2022    Anxiety 2022    HTN (hypertension) 2022    Transaminitis 2022    New onset a-fib ([facility] [address] ) 2022    Acute respiratory failure with hypoxia ([facility] [address] ) 2022    Elevated brain natriuretic peptide (BNP) level 2022    [facility] to [facility]    Thrombocytosis 2022    Hyperglycemia 2022    BMI 50 0-59 9, adult ([facility] [address] ) 2022    Dysfunction of right eustachian tube 2022    SOB (shortness of breath) on exertion 2022    Assistance needed with transportation 2022    Insufficient supply of food 2022    Pneumonia due to COVID-19 virus 2022    COVID-19 virus infection 2022      LOS (days): 24  Geometric Mean LOS (GMLOS) (days): 4 80  Days to GMLOS:-19     OBJECTIVE:  Risk of Unplanned Readmission Score: 16         Current admission status: Inpatient   Preferred Pharmacy:   PATIENT/FAMILY REPORTS NO PREFERRED PHARMACY  No address on file      [address] [facility]  Phone: [phone] Fax: [phone]    Primary Care Provider: Claudia Gore MD    Primary Insurance: [facility]  Secondary Insurance:     DISCHARGE DETAILS:    Discharge planning discussed with[de-identified] Pt  Freedom of Choice: Yes  Comments - Freedom of Choice: Pt considering STR and HHC  STR referrals updated as sent previously  Candelaria Moss referrals sent  Pt had no preferences towar a specific provider    CM contacted family/caregiver?: No- see comments (Pt A&O, able to make own decisions )

## 2022-03-12 NOTE — PROGRESS NOTES
[facility]  Progress Note - Bailey Palmer 1970, 46 y o  female MRN: [id number]  Unit/Bed#: [address] [place] 222-01 Encounter: [id number]  Primary Care Provider: Puma Braswell MD   Date and time admitted to hospital: 2/16/2022  7:01 PM    * Acute respiratory failure with hypoxia [facility])  Assessment & Plan  55-year-old female prolonged stay and ICU for acute respiratory failure/hypoxia poly factorial secondary to COVID pneumonia/fibrosis pulmonary edema and pneumonitis  Was on high-flow nasal cannula for 19 days  · Was followed by pulmonology and finished prednisone taper  · Has been further transition from mid flow to nasal cannula 4-6 L  · Decrease bronchodilators to b i d  as patient reports increased anxiety at bedtime    OAB (overactive bladder)  Assessment & Plan  · Started oxybutynin    New onset a-fib [facility])  Assessment & Plan  · Transient atrial fibrillation secondary to respiratory failure  No further events  · Remains on metoprolol  Not on therapeutic anticoagulation    HTN (hypertension)  Assessment & Plan  · Continue metoprolol    Anxiety  Assessment & Plan  · Alprazolam p r n  BMI 50 0-59 9, adult (HCC)  Assessment & Plan  · Body mass index is 51 19 kg/m²  Hyperglycemia  Assessment & Plan  · Steroid induced hyperglycemia with no history of diabetes mellitus  · Was on glargine which has been discontinued and currently stable on metformin with glipizide    Lab Results   Component Value Date    HGBA1C 6 6 (H) 02/16/2022     Results from last 7 days   Lab Units 03/12/22  1553 03/12/22  1112 03/12/22  0718 03/11/22  2114 03/11/22  1607 03/11/22  1106 03/11/22  0744 03/10/22  2104   POC GLUCOSE mg/dl 172* 122 107 110 189* 104 85 137       Sepsis due to COVID-19 [facility])  Assessment & Plan  · Present on admission  Has resolved      VTE Pharmacologic Prophylaxis: VTE Score: 7 High Risk (Score >/= 5) - Pharmacological DVT Prophylaxis Ordered: Heparin   Sequential Compression Devices Ordered  Patient Centered Rounds: I have performed bedside rounds with nursing staff today  Discussions with Specialists or Other Care Team Provider:     Education and Discussions with Family / Patient:     Time Spent for Care: 25 mins  More than 50% of total time spent on counseling and coordination of care as described above  Current Length of Stay: 24 day(s)  Current Patient Status: Inpatient   Certification Statement: The patient will continue to require additional inpatient hospital stay due to [place] hypoxia  Discharge Plan / Estimated Discharge Date: Anticipate discharge in 48-72 hrs to home  Code Status: Level 1 - Full Code      Subjective:   Patient seen and examined  Complains of increased anxiety at bedtime from multiple neb treatments  Objective:   Vitals: Blood pressure 142/90, pulse 87, temperature 98 7 °F (37 1 °C), temperature source Oral, resp  rate 19, height 4' 6" (1 372 m), weight 96 3 kg (212 lb 4 9 oz), SpO2 96 %  Physical Exam  Vitals reviewed  Constitutional:       General: She is not in acute distress  Appearance: Normal appearance  She is obese  HENT:      Head: Atraumatic  Eyes:      Extraocular Movements: Extraocular movements intact  Cardiovascular:      Rate and Rhythm: Regular rhythm  Heart sounds: Normal heart sounds  Pulmonary:      Breath sounds: Normal breath sounds  No wheezing  Abdominal:      General: Bowel sounds are normal       Palpations: Abdomen is soft  Tenderness: There is no guarding or rebound  Musculoskeletal:         General: No swelling  Skin:     General: Skin is warm  Neurological:      Mental Status: She is alert and oriented to person, place, and time  Motor: No weakness     Psychiatric:         Mood and Affect: Mood normal        Additional Data:   Labs:  Results from last 7 days   Lab Units 03/07/22  0444   WBC Thousand/uL 14 17*   HEMOGLOBIN g/dL 11 9   HEMATOCRIT % 36 9   MCV fL 91 PLATELETS Thousands/uL 216     Results from last 7 days   Lab Units 03/07/22  0444   SODIUM mmol/L 143   POTASSIUM mmol/L 3 6   CHLORIDE mmol/L 105   CO2 mmol/L 29   ANION GAP mmol/L 9   BUN mg/dL 19   CREATININE mg/dL 0 62   CALCIUM mg/dL 9 0   ALBUMIN g/dL 2 7*   TOTAL BILIRUBIN mg/dL 0 31   ALK PHOS U/L 59   ALT U/L 42   AST U/L 17   EGFR ml/min/1 73sq m 104   GLUCOSE RANDOM mg/dL 99                          Results from last 7 days   Lab Units 03/12/22  1553 03/12/22  1112 03/12/22  0718 03/11/22  2114 03/11/22  1607 03/11/22  1106 03/11/22  0744 03/10/22  2104 03/10/22  1624 03/10/22  1137 03/10/22  0744 03/09/22  2119   POC GLUCOSE mg/dl 172* 122 107 110 189* 104 85 137 177* 140 100 117             * I Have Reviewed All Lab Data Listed Above  Cultures:                   Lines/Drains:  Invasive Devices  Report    Peripheral Intravenous Line            Peripheral IV 03/08/22 Dorsal (posterior); Right Hand 3 days              Telemetry:      Imaging:  Imaging Reports Reviewed Today Include:   XR chest 1 view portable    Result Date: 2/17/2022  Impression: Multifocal groundglass infiltrates concerning for  Covid  19 infection  Workstation performed: [id number]     CTA ED chest PE Study    Result Date: 2/16/2022  Impression: No evidence of pulmonary artery embolus  Scattered airspace opacities within the lungs which may represent infection  Recommend short-term follow-up chest CT scan in 3 months to evaluate for resolution  The study was marked in [facility] for immediate notification   Workstation performed: [id number]       Scheduled Meds:  Current Facility-Administered Medications   Medication Dose Route Frequency Provider Last Rate    acetaminophen  650 mg Oral Q6H PRN EMMA Pleitez      al mag oxide-diphenhydramine-lidocaine viscous  10 mL Swish & Swallow Q6H PRN EMMA Pleitez      ALPRAZolam  0 5 mg Oral BID PRN EMMA Pleitez      aluminum-magnesium hydroxide-simethicone  30 mL Oral Q4H PRN EMMA Vaca      bacitracin  1 small application Topical BID Ghassan Hays MD      benzonatate  200 mg Oral TID PRN EMMA Vaca      carbamide peroxide  5 drop Left Ear BID EMMA Vaca      docusate sodium  100 mg Oral BID PRN EMMA Vaca      fluticasone  1 spray Each Nare BID EMMA Vizcaino      glipiZIDE  2 5 mg Oral Daily With Breakfast Dieter Díaz DO      guaiFENesin  600 mg Oral Q12H [address] EMMA Vaca      heparin (porcine)  5,000 Units Subcutaneous [facility] EMMA Vaca      insulin lispro  2-12 Units Subcutaneous TID AC EMMA Vaca      [START ON 3/13/2022] ipratropium  0 5 mg Nebulization BID Dieter Díaz DO      [START ON 3/13/2022] levalbuterol  1 25 mg Nebulization BID Dieter Díaz DO      lidocaine  1 patch Topical Daily EMMA Vaca      Lidocaine Viscous HCl  15 mL Swish & Spit 4x Daily PRN EMMA Vaca      metFORMIN  500 mg Oral BID With Meals Dieter Díaz,       metoprolol tartrate  25 mg Oral Q12H [address] EMMA Vaca      nystatin  1 application Topical TID Dieter Díaz,       oxybutynin  5 mg Oral HS Jonathon Osborne,       pantoprazole  40 mg Oral Daily Before Breakfast EMMA Vaca      polyethylene glycol  17 g Oral Daily PRN EMMA Vaca      saliva substitute  5 spray Mouth/Throat 4x Daily PRN EMMA Vaca      sodium chloride  1 spray Each Nare Q2H PRN EMMA Vizcaino         Today, Patient Was Seen By: Dieter Díaz DO    ** Please Note: Dictation voice to text software may have been used in the creation of this document   **

## 2022-03-12 NOTE — ASSESSMENT & PLAN NOTE
54-year-old female prolonged stay and ICU for acute respiratory failure/hypoxia poly factorial secondary to COVID pneumonia/fibrosis pulmonary edema and pneumonitis  Was on high-flow nasal cannula for 19 days  · Was followed by pulmonology and finished prednisone taper    · Has been further transition from mid flow to nasal cannula 4-6 L  · Decrease bronchodilators to b i d  as patient reports increased anxiety at bedtime

## 2022-03-12 NOTE — PLAN OF CARE
Problem: Potential for Falls  Goal: Patient will remain free of falls  Description: INTERVENTIONS:  - Educate patient/family on patient safety including physical limitations  - Instruct patient to call for assistance with activity   - Consult OT/PT to assist with strengthening/mobility   - Keep Call bell within reach  - Keep bed low and locked with side rails adjusted as appropriate  - Keep care items and personal belongings within reach  - Initiate and maintain comfort rounds  - Make Fall Risk Sign visible to staff  - Apply yellow socks and bracelet for high fall risk patients  - Consider moving patient to room near nurses station  Outcome: Progressing     Problem: MOBILITY - ADULT  Goal: Maintain or return to baseline ADL function  Description: INTERVENTIONS:  -  Assess patient's ability to carry out ADLs; assess patient's baseline for ADL function and identify physical deficits which impact ability to perform ADLs (bathing, care of mouth/teeth, toileting, grooming, dressing, etc )  - Assess/evaluate cause of self-care deficits   - Assess range of motion  - Assess patient's mobility; develop plan if impaired  - Assess patient's need for assistive devices and provide as appropriate  - Encourage maximum independence but intervene and supervise when necessary  - Involve family in performance of ADLs  - Assess for home care needs following discharge   - Consider OT consult to assist with ADL evaluation and planning for discharge  - Provide patient education as appropriate  Outcome: Progressing  Goal: Maintains/Returns to pre admission functional level  Description: INTERVENTIONS:  - Perform BMAT or MOVE assessment daily    - Set and communicate daily mobility goal to care team and patient/family/caregiver  - Collaborate with rehabilitation services on mobility goals if consulted  - Perform Range of Motion 4 times a day  - Reposition patient every 2 hours    - Dangle patient 4 times a day  - Stand patient 4 times a day  - Ambulate patient 4 times a day  - Out of bed to chair 4 times a day   - Out of bed for meals 3 times a day  - Out of bed for toileting  - Record patient progress and toleration of activity level   Outcome: Progressing     Problem: Prexisting or High Potential for Compromised Skin Integrity  Goal: Skin integrity is maintained or improved  Description: INTERVENTIONS:  - Identify patients at risk for skin breakdown  - Assess and monitor skin integrity  - Assess and monitor nutrition and hydration status  - Monitor labs   - Assess for incontinence   - Turn and reposition patient  - Assist with mobility/ambulation  - Relieve pressure over bony prominences  - Avoid friction and shearing  - Provide appropriate hygiene as needed including keeping skin clean and dry  - Evaluate need for skin moisturizer/barrier cream  - Collaborate with interdisciplinary team   - Patient/family teaching  - Consider wound care consult   Outcome: Progressing     Problem: RESPIRATORY - ADULT  Goal: Achieves optimal ventilation and oxygenation  Description: INTERVENTIONS:  - Assess for changes in respiratory status  - Assess for changes in mentation and behavior  - Position to facilitate oxygenation and minimize respiratory effort  - Oxygen administered by appropriate delivery if ordered  - Initiate smoking cessation education as indicated  - Encourage broncho-pulmonary hygiene including cough, deep breathe, Incentive Spirometry  - Assess the need for suctioning and aspirate as needed  - Assess and instruct to report SOB or any respiratory difficulty  - Respiratory Therapy support as indicated  Outcome: Progressing     Problem: METABOLIC, FLUID AND ELECTROLYTES - ADULT  Goal: Glucose maintained within target range  Description: INTERVENTIONS:  - Monitor Blood Glucose as ordered  - Assess for signs and symptoms of hyperglycemia and hypoglycemia  - Administer ordered medications to maintain glucose within target range  - Assess nutritional intake and initiate nutrition service referral as needed  Outcome: Progressing     Problem: MUSCULOSKELETAL - ADULT  Goal: Maintain or return mobility to safest level of function  Description: INTERVENTIONS:  - Assess patient's ability to carry out ADLs; assess patient's baseline for ADL function and identify physical deficits which impact ability to perform ADLs (bathing, care of mouth/teeth, toileting, grooming, dressing, etc )  - Assess/evaluate cause of self-care deficits   - Assess range of motion  - Assess patient's mobility  - Assess patient's need for assistive devices and provide as appropriate  - Encourage maximum independence but intervene and supervise when necessary  - Involve family in performance of ADLs  - Assess for home care needs following discharge   - Consider OT consult to assist with ADL evaluation and planning for discharge  - Provide patient education as appropriate  Outcome: Progressing  Goal: Maintain proper alignment of affected body part  Description: INTERVENTIONS:  - Support, maintain and protect limb and body alignment  - Provide patient/ family with appropriate education  Outcome: Progressing     Problem: Nutrition/Hydration-ADULT  Goal: Nutrient/Hydration intake appropriate for improving, restoring or maintaining nutritional needs  Description: Monitor and assess patient's nutrition/hydration status for malnutrition  Collaborate with interdisciplinary team and initiate plan and interventions as ordered  Monitor patient's weight and dietary intake as ordered or per policy  Utilize nutrition screening tool and intervene as necessary  Determine patient's food preferences and provide high-protein, high-caloric foods as appropriate       INTERVENTIONS:  - Monitor oral intake, urinary output, labs, and treatment plans  - Assess nutrition and hydration status and recommend course of action  - Evaluate amount of meals eaten  - Assist patient with eating if necessary   - Allow adequate time for meals  - Recommend/ encourage appropriate diets, oral nutritional supplements, and vitamin/mineral supplements  - Order, calculate, and assess calorie counts as needed  - Recommend, monitor, and adjust tube feedings and TPN/PPN based on assessed needs  - Assess need for intravenous fluids  - Provide specific nutrition/hydration education as appropriate  - Include patient/family/caregiver in decisions related to nutrition  Outcome: Progressing     Problem: DISCHARGE PLANNING  Goal: Discharge to home or other facility with appropriate resources  Description: INTERVENTIONS:  - Identify barriers to discharge w/patient and caregiver  - Arrange for needed discharge resources and transportation as appropriate  - Identify discharge learning needs (meds, wound care, etc )  - Arrange for interpretive services to assist at discharge as needed  - Refer to Case Management Department for coordinating discharge planning if the patient needs post-hospital services based on physician/advanced practitioner order or complex needs related to functional status, cognitive ability, or social support system  Outcome: Progressing     Problem: Knowledge Deficit  Goal: Patient/family/caregiver demonstrates understanding of disease process, treatment plan, medications, and discharge instructions  Description: Complete learning assessment and assess knowledge base    Interventions:  - Provide teaching at level of understanding  - Provide teaching via preferred learning methods  Outcome: Progressing

## 2022-03-12 NOTE — ASSESSMENT & PLAN NOTE
31-year-old female prolonged stay and ICU for acute respiratory failure/hypoxia poly factorial secondary to COVID pneumonia/fibrosis pulmonary edema and pneumonitis  Was on high-flow nasal cannula for 19 days  · Was followed by pulmonology and finished prednisone taper    · Has been further transition from mid flow to nasal cannula 4 L  · Decreased bronchodilators to b i d  as patient reports increased anxiety at bedtime

## 2022-03-13 LAB
ALBUMIN SERPL BCP-MCNC: 3.4 G/DL (ref 3.5–5)
ALP SERPL-CCNC: 62 U/L (ref 46–116)
ALT SERPL W P-5'-P-CCNC: 35 U/L (ref 12–78)
ANION GAP SERPL CALCULATED.3IONS-SCNC: 10 MMOL/L (ref 4–13)
AST SERPL W P-5'-P-CCNC: 14 U/L (ref 5–45)
BILIRUB SERPL-MCNC: 0.3 MG/DL (ref 0.2–1)
BUN SERPL-MCNC: 15 MG/DL (ref 5–25)
CALCIUM ALBUM COR SERPL-MCNC: 10.3 MG/DL (ref 8.3–10.1)
CALCIUM SERPL-MCNC: 9.8 MG/DL (ref 8.3–10.1)
CHLORIDE SERPL-SCNC: 100 MMOL/L (ref 100–108)
CO2 SERPL-SCNC: 29 MMOL/L (ref 21–32)
CREAT SERPL-MCNC: 0.72 MG/DL (ref 0.6–1.3)
ERYTHROCYTE [DISTWIDTH] IN BLOOD BY AUTOMATED COUNT: 14.2 % (ref 11.6–15.1)
GFR SERPL CREATININE-BSD FRML MDRD: 97 ML/MIN/1.73SQ M
GLUCOSE SERPL-MCNC: 104 MG/DL (ref 65–140)
GLUCOSE SERPL-MCNC: 108 MG/DL (ref 65–140)
GLUCOSE SERPL-MCNC: 150 MG/DL (ref 65–140)
GLUCOSE SERPL-MCNC: 66 MG/DL (ref 65–140)
GLUCOSE SERPL-MCNC: 96 MG/DL (ref 65–140)
HCT VFR BLD AUTO: 42.5 % (ref 34.8–46.1)
HGB BLD-MCNC: 13.8 G/DL (ref 11.5–15.4)
MCH RBC QN AUTO: 29.2 PG (ref 26.8–34.3)
MCHC RBC AUTO-ENTMCNC: 32.5 G/DL (ref 31.4–37.4)
MCV RBC AUTO: 90 FL (ref 82–98)
PLATELET # BLD AUTO: 218 THOUSANDS/UL (ref 149–390)
PMV BLD AUTO: 10.6 FL (ref 8.9–12.7)
POTASSIUM SERPL-SCNC: 3.9 MMOL/L (ref 3.5–5.3)
PROT SERPL-MCNC: 7 G/DL (ref 6.4–8.2)
RBC # BLD AUTO: 4.73 MILLION/UL (ref 3.81–5.12)
SODIUM SERPL-SCNC: 139 MMOL/L (ref 136–145)
WBC # BLD AUTO: 11.58 THOUSAND/UL (ref 4.31–10.16)

## 2022-03-13 PROCEDURE — 99232 SBSQ HOSP IP/OBS MODERATE 35: CPT | Performed by: INTERNAL MEDICINE

## 2022-03-13 PROCEDURE — 94640 AIRWAY INHALATION TREATMENT: CPT

## 2022-03-13 PROCEDURE — 82948 REAGENT STRIP/BLOOD GLUCOSE: CPT

## 2022-03-13 PROCEDURE — 85027 COMPLETE CBC AUTOMATED: CPT | Performed by: INTERNAL MEDICINE

## 2022-03-13 PROCEDURE — 80053 COMPREHEN METABOLIC PANEL: CPT | Performed by: INTERNAL MEDICINE

## 2022-03-13 RX ADMIN — BENZONATATE 200 MG: 100 CAPSULE ORAL at 21:58

## 2022-03-13 RX ADMIN — IPRATROPIUM BROMIDE 0.5 MG: 0.5 SOLUTION RESPIRATORY (INHALATION) at 14:15

## 2022-03-13 RX ADMIN — METOPROLOL TARTRATE 25 MG: 25 TABLET, FILM COATED ORAL at 21:58

## 2022-03-13 RX ADMIN — PANTOPRAZOLE SODIUM 40 MG: 40 TABLET, DELAYED RELEASE ORAL at 06:56

## 2022-03-13 RX ADMIN — FLUTICASONE PROPIONATE 1 SPRAY: 50 SPRAY, METERED NASAL at 08:45

## 2022-03-13 RX ADMIN — LEVALBUTEROL HYDROCHLORIDE 1.25 MG: 1.25 SOLUTION, CONCENTRATE RESPIRATORY (INHALATION) at 14:15

## 2022-03-13 RX ADMIN — GLIPIZIDE 2.5 MG: 2.5 TABLET, EXTENDED RELEASE ORAL at 08:44

## 2022-03-13 RX ADMIN — IPRATROPIUM BROMIDE 0.5 MG: 0.5 SOLUTION RESPIRATORY (INHALATION) at 07:25

## 2022-03-13 RX ADMIN — FLUTICASONE PROPIONATE 1 SPRAY: 50 SPRAY, METERED NASAL at 17:27

## 2022-03-13 RX ADMIN — GUAIFENESIN 600 MG: 600 TABLET, EXTENDED RELEASE ORAL at 21:58

## 2022-03-13 RX ADMIN — GUAIFENESIN 600 MG: 600 TABLET, EXTENDED RELEASE ORAL at 08:44

## 2022-03-13 RX ADMIN — LIDOCAINE 1 PATCH: 50 PATCH CUTANEOUS at 08:45

## 2022-03-13 RX ADMIN — HEPARIN SODIUM 5000 UNITS: 5000 INJECTION INTRAVENOUS; SUBCUTANEOUS at 06:56

## 2022-03-13 RX ADMIN — METFORMIN HYDROCHLORIDE 500 MG: 500 TABLET ORAL at 08:48

## 2022-03-13 RX ADMIN — BACITRACIN 1 SMALL APPLICATION: 500 OINTMENT TOPICAL at 21:58

## 2022-03-13 RX ADMIN — OXYBUTYNIN CHLORIDE 5 MG: 5 TABLET, EXTENDED RELEASE ORAL at 21:58

## 2022-03-13 RX ADMIN — NYSTATIN 1 APPLICATION: 100000 POWDER TOPICAL at 22:01

## 2022-03-13 RX ADMIN — ALPRAZOLAM 0.5 MG: 0.5 TABLET ORAL at 21:58

## 2022-03-13 RX ADMIN — HEPARIN SODIUM 5000 UNITS: 5000 INJECTION INTRAVENOUS; SUBCUTANEOUS at 14:54

## 2022-03-13 RX ADMIN — LEVALBUTEROL HYDROCHLORIDE 1.25 MG: 1.25 SOLUTION, CONCENTRATE RESPIRATORY (INHALATION) at 07:25

## 2022-03-13 RX ADMIN — BACITRACIN 1 SMALL APPLICATION: 500 OINTMENT TOPICAL at 08:44

## 2022-03-13 RX ADMIN — BENZONATATE 200 MG: 100 CAPSULE ORAL at 08:44

## 2022-03-13 RX ADMIN — HEPARIN SODIUM 5000 UNITS: 5000 INJECTION INTRAVENOUS; SUBCUTANEOUS at 21:58

## 2022-03-13 RX ADMIN — NYSTATIN 1 APPLICATION: 100000 POWDER TOPICAL at 08:45

## 2022-03-13 RX ADMIN — INSULIN LISPRO 2 UNITS: 100 INJECTION, SOLUTION INTRAVENOUS; SUBCUTANEOUS at 17:28

## 2022-03-13 RX ADMIN — NYSTATIN 1 APPLICATION: 100000 POWDER TOPICAL at 17:28

## 2022-03-13 RX ADMIN — METFORMIN HYDROCHLORIDE 500 MG: 500 TABLET ORAL at 17:27

## 2022-03-13 NOTE — PROGRESS NOTES
[address] [place] [address]  Progress Note - Arabella Bray 1970, 46 y o  female MRN: [id number]  Unit/Bed#: [facility] [place] [address] 222-01 Encounter: [id number]  Primary Care Provider: Michal Barthel, MD   Date and time admitted to hospital: 2/16/2022  7:01 PM    * Acute respiratory failure with hypoxia [facility])  Assessment & Plan  22-year-old female prolonged stay and ICU for acute respiratory failure/hypoxia poly factorial secondary to COVID pneumonia/fibrosis pulmonary edema and pneumonitis  Was on high-flow nasal cannula for 19 days  · Was followed by pulmonology and finished prednisone taper  · Has been further transition from mid flow to nasal cannula 4 L  · Decreased bronchodilators to b i d  as patient reports increased anxiety at bedtime    OAB (overactive bladder)  Assessment & Plan  · Started oxybutynin    New onset a-fib [facility])  Assessment & Plan  · Transient atrial fibrillation secondary to respiratory failure  No further events  · Remains on metoprolol  Not on therapeutic anticoagulation    HTN (hypertension)  Assessment & Plan  · Continue metoprolol    Anxiety  Assessment & Plan  · Alprazolam p r n  BMI 50 0-59 9, adult (HCC)  Assessment & Plan  · Body mass index is 51 19 kg/m²  Hyperglycemia  Assessment & Plan  · Steroid induced hyperglycemia with no history of diabetes mellitus  · Was on glargine which has been discontinued and currently stable on metformin with glipizide    Lab Results   Component Value Date    HGBA1C 6 6 (H) 02/16/2022     Results from last 7 days   Lab Units 03/13/22  1123 03/13/22  0614 03/12/22  2144 03/12/22  1553 03/12/22  1112 03/12/22  0718 03/11/22  2114 03/11/22  1607   POC GLUCOSE mg/dl 66 96 173* 172* 122 107 110 189*       Sepsis due to COVID-19 [facility])  Assessment & Plan  · Present on admission  Has resolved      VTE Pharmacologic Prophylaxis: VTE Score: 7 High Risk (Score >/= 5) - Pharmacological DVT Prophylaxis Ordered: Heparin   Sequential Compression Devices Ordered  Patient Centered Rounds: I have performed bedside rounds with nursing staff today  Discussions with Specialists or Other Care Team Provider:     Education and Discussions with Family / Patient:  Daughter on telephone    Time Spent for Care: 25 mins  More than 50% of total time spent on counseling and coordination of care as described above  Current Length of Stay: 25 day(s)  Current Patient Status: Inpatient   Certification Statement: The patient will continue to require additional inpatient hospital stay due to hypoxia COVID  Discharge Plan / Estimated Discharge Date: 24-48 hours    Code Status: Level 1 - Full Code      Subjective:   Patient seen and examined  Walking the halls with 4 L nasal cannula  Feeling well  Craving a "chocolate chip cookie"    Objective:   Vitals: Blood pressure 129/68, pulse 104, temperature 98 5 °F (36 9 °C), resp  rate 19, height 4' 6" (1 372 m), weight 96 3 kg (212 lb 4 9 oz), SpO2 92 %  Physical Exam  Vitals reviewed  Constitutional:       General: She is not in acute distress  Appearance: Normal appearance  She is obese  Eyes:      General: No scleral icterus  Cardiovascular:      Rate and Rhythm: Normal rate and regular rhythm  Pulmonary:      Breath sounds: Normal breath sounds  No wheezing  Abdominal:      General: Bowel sounds are normal       Palpations: Abdomen is soft  Tenderness: There is no guarding or rebound  Musculoskeletal:         General: No swelling or tenderness  Skin:     General: Skin is warm  Neurological:      Mental Status: She is alert and oriented to person, place, and time     Psychiatric:         Mood and Affect: Mood normal        Additional Data:   Labs:  Results from last 7 days   Lab Units 03/13/22  0531 03/07/22  0444   WBC Thousand/uL 11 58* 14 17*   HEMOGLOBIN g/dL 13 8 11 9   HEMATOCRIT % 42 5 36 9   MCV fL 90 91   PLATELETS Thousands/uL 218 216     Results from last 7 days   Lab Units 03/13/22  0527 03/07/22  0444   SODIUM mmol/L 139 143   POTASSIUM mmol/L 3 9 3 6   CHLORIDE mmol/L 100 105   CO2 mmol/L 29 29   ANION GAP mmol/L 10 9   BUN mg/dL 15 19   CREATININE mg/dL 0 72 0 62   CALCIUM mg/dL 9 8 9 0   ALBUMIN g/dL 3 4* 2 7*   TOTAL BILIRUBIN mg/dL 0 30 0 31   ALK PHOS U/L 62 59   ALT U/L 35 42   AST U/L 14 17   EGFR ml/min/1 73sq m 97 104   GLUCOSE RANDOM mg/dL 108 99                          Results from last 7 days   Lab Units 03/13/22  1123 03/13/22  0614 03/12/22  2144 03/12/22  1553 03/12/22  1112 03/12/22  0718 03/11/22  2114 03/11/22  1607 03/11/22  1106 03/11/22  0744 03/10/22  2104 03/10/22  1624   POC GLUCOSE mg/dl 66 96 173* 172* 122 107 110 189* 104 85 137 177*             * I Have Reviewed All Lab Data Listed Above  Cultures:                   Lines/Drains:  Invasive Devices  Report    None               Telemetry:      Imaging:  Imaging Reports Reviewed Today Include:   XR chest 1 view portable    Result Date: 2/17/2022  Impression: Multifocal groundglass infiltrates concerning for  Covid  19 infection  Workstation performed: [id number]     CTA ED chest PE Study    Result Date: 2/16/2022  Impression: No evidence of pulmonary artery embolus  Scattered airspace opacities within the lungs which may represent infection  Recommend short-term follow-up chest CT scan in 3 months to evaluate for resolution  The study was marked in [facility] for immediate notification   Workstation performed: [id number]       Scheduled Meds:  Current Facility-Administered Medications   Medication Dose Route Frequency Provider Last Rate    acetaminophen  650 mg Oral Q6H PRN Charls [place], CRNP      al mag oxide-diphenhydramine-lidocaine viscous  10 mL Swish & Swallow Q6H PRN Charls [place], CRNP      ALPRAZolam  0 5 mg Oral BID PRN Charls [place], CRNP      aluminum-magnesium hydroxide-simethicone  30 mL Oral Q4H PRN Charls [place], CRNP      bacitracin  1 small application Topical BID Martin Abraham MD Yg      benzonatate  200 mg Oral TID PRN [place] Perlat, EMMA      docusate sodium  100 mg Oral BID PRN [place] Perlat, EMMA      fluticasone  1 spray Each Nare BID EMMA Salamanca      glipiZIDE  2 5 mg Oral Daily With Breakfast Ladarius Rice DO      guaiFENesin  600 mg Oral Q12H [address] [place] Perlat, EMMA      heparin (porcine)  5,000 Units Subcutaneous [facility] [place] Perlat, EMMA      insulin lispro  2-12 Units Subcutaneous TID AC EMMA Vasquez      ipratropium  0 5 mg Nebulization BID Jonathon Osborne, DO      levalbuterol  1 25 mg Nebulization BID Jonathon Osborne, DO      lidocaine  1 patch Topical Daily EMMA Vasquez      Lidocaine Viscous HCl  15 mL Swish & Spit 4x Daily PRN [place] , EMMA      metFORMIN  500 mg Oral BID With Meals Ladarius Rice DO      metoprolol tartrate  25 mg Oral Q12H [address] [place] EMMA Hua      nystatin  1 application Topical TID Ladarius Rice DO      oxybutynin  5 mg Oral HS Jonathon Osborne DO      pantoprazole  40 mg Oral Daily Before Breakfast EMMA Vasquez      polyethylene glycol  17 g Oral Daily PRN [place] EMMA Hua      saliva substitute  5 spray Mouth/Throat 4x Daily PRN Virginiatyrone [place], EMMA      sodium chloride  1 spray Each Nare Q2H PRN EMMA [place]         Today, Patient Was Seen By: Ladarius Rice DO    ** Please Note: Dictation voice to text software may have been used in the creation of this document   **

## 2022-03-13 NOTE — PLAN OF CARE
Problem: Potential for Falls  Goal: Patient will remain free of falls  Description: INTERVENTIONS:  - Educate patient/family on patient safety including physical limitations  - Instruct patient to call for assistance with activity   - Consult OT/PT to assist with strengthening/mobility   - Keep Call bell within reach  - Keep bed low and locked with side rails adjusted as appropriate  - Keep care items and personal belongings within reach  - Initiate and maintain comfort rounds  - Make Fall Risk Sign visible to staff  - Obtain necessary fall risk management equipment: bed alarm  - Apply yellow socks and bracelet for high fall risk patients  - Consider moving patient to room near nurses station  Outcome: Progressing     Problem: MOBILITY - ADULT  Goal: Maintain or return to baseline ADL function  Description: INTERVENTIONS:  -  Assess patient's ability to carry out ADLs; assess patient's baseline for ADL function and identify physical deficits which impact ability to perform ADLs (bathing, care of mouth/teeth, toileting, grooming, dressing, etc )  - Assess/evaluate cause of self-care deficits   - Assess range of motion  - Assess patient's mobility; develop plan if impaired  - Assess patient's need for assistive devices and provide as appropriate  - Encourage maximum independence but intervene and supervise when necessary  - Involve family in performance of ADLs  - Assess for home care needs following discharge   - Consider OT consult to assist with ADL evaluation and planning for discharge  - Provide patient education as appropriate  Outcome: Progressing  Goal: Maintains/Returns to pre admission functional level  Description: INTERVENTIONS:  - Perform BMAT or MOVE assessment daily    - Set and communicate daily mobility goal to care team and patient/family/caregiver     - Collaborate with rehabilitation services on mobility goals if consulted  - Ambulate patient 3 times a day  - Out of bed to chair 3 times a day   - Out of bed for meals 3 times a day  - Out of bed for toileting  - Record patient progress and toleration of activity level   Outcome: Progressing     Problem: Prexisting or High Potential for Compromised Skin Integrity  Goal: Skin integrity is maintained or improved  Description: INTERVENTIONS:  - Identify patients at risk for skin breakdown  - Assess and monitor skin integrity  - Assess and monitor nutrition and hydration status  - Monitor labs   - Assess for incontinence   - Turn and reposition patient  - Assist with mobility/ambulation  - Relieve pressure over bony prominences  - Avoid friction and shearing  - Provide appropriate hygiene as needed including keeping skin clean and dry  - Evaluate need for skin moisturizer/barrier cream  - Collaborate with interdisciplinary team   - Patient/family teaching  - Consider wound care consult   Outcome: Progressing     Problem: RESPIRATORY - ADULT  Goal: Achieves optimal ventilation and oxygenation  Description: INTERVENTIONS:  - Assess for changes in respiratory status  - Assess for changes in mentation and behavior  - Position to facilitate oxygenation and minimize respiratory effort  - Oxygen administered by appropriate delivery if ordered  - Initiate smoking cessation education as indicated  - Encourage broncho-pulmonary hygiene including cough, deep breathe, Incentive Spirometry  - Assess the need for suctioning and aspirate as needed  - Assess and instruct to report SOB or any respiratory difficulty  - Respiratory Therapy support as indicated  Outcome: Progressing     Problem: METABOLIC, FLUID AND ELECTROLYTES - ADULT  Goal: Glucose maintained within target range  Description: INTERVENTIONS:  - Monitor Blood Glucose as ordered  - Assess for signs and symptoms of hyperglycemia and hypoglycemia  - Administer ordered medications to maintain glucose within target range  - Assess nutritional intake and initiate nutrition service referral as needed  Outcome: Progressing     Problem: MUSCULOSKELETAL - ADULT  Goal: Maintain or return mobility to safest level of function  Description: INTERVENTIONS:  - Assess patient's ability to carry out ADLs; assess patient's baseline for ADL function and identify physical deficits which impact ability to perform ADLs (bathing, care of mouth/teeth, toileting, grooming, dressing, etc )  - Assess/evaluate cause of self-care deficits   - Assess range of motion  - Assess patient's mobility  - Assess patient's need for assistive devices and provide as appropriate  - Encourage maximum independence but intervene and supervise when necessary  - Involve family in performance of ADLs  - Assess for home care needs following discharge   - Consider OT consult to assist with ADL evaluation and planning for discharge  - Provide patient education as appropriate  Outcome: Progressing  Goal: Maintain proper alignment of affected body part  Description: INTERVENTIONS:  - Support, maintain and protect limb and body alignment  - Provide patient/ family with appropriate education  Outcome: Progressing     Problem: Nutrition/Hydration-ADULT  Goal: Nutrient/Hydration intake appropriate for improving, restoring or maintaining nutritional needs  Description: Monitor and assess patient's nutrition/hydration status for malnutrition  Collaborate with interdisciplinary team and initiate plan and interventions as ordered  Monitor patient's weight and dietary intake as ordered or per policy  Utilize nutrition screening tool and intervene as necessary  Determine patient's food preferences and provide high-protein, high-caloric foods as appropriate       INTERVENTIONS:  - Monitor oral intake, urinary output, labs, and treatment plans  - Assess nutrition and hydration status and recommend course of action  - Evaluate amount of meals eaten  - Assist patient with eating if necessary   - Allow adequate time for meals  - Recommend/ encourage appropriate diets, oral nutritional supplements, and vitamin/mineral supplements  - Order, calculate, and assess calorie counts as needed  - Recommend, monitor, and adjust tube feedings and TPN/PPN based on assessed needs  - Assess need for intravenous fluids  - Provide specific nutrition/hydration education as appropriate  - Include patient/family/caregiver in decisions related to nutrition  Outcome: Progressing     Problem: DISCHARGE PLANNING  Goal: Discharge to home or other facility with appropriate resources  Description: INTERVENTIONS:  - Identify barriers to discharge w/patient and caregiver  - Arrange for needed discharge resources and transportation as appropriate  - Identify discharge learning needs (meds, wound care, etc )  - Arrange for interpretive services to assist at discharge as needed  - Refer to Case Management Department for coordinating discharge planning if the patient needs post-hospital services based on physician/advanced practitioner order or complex needs related to functional status, cognitive ability, or social support system  Outcome: Progressing     Problem: Knowledge Deficit  Goal: Patient/family/caregiver demonstrates understanding of disease process, treatment plan, medications, and discharge instructions  Description: Complete learning assessment and assess knowledge base    Interventions:  - Provide teaching at level of understanding  - Provide teaching via preferred learning methods  Outcome: Progressing

## 2022-03-14 PROBLEM — I48.0 PAROXYSMAL ATRIAL FIBRILLATION (HCC): Status: ACTIVE | Noted: 2022-02-23

## 2022-03-14 PROBLEM — E11.9 TYPE 2 DIABETES MELLITUS WITHOUT COMPLICATION, WITHOUT LONG-TERM CURRENT USE OF INSULIN (HCC): Status: ACTIVE | Noted: 2022-02-16

## 2022-03-14 LAB
GLUCOSE SERPL-MCNC: 108 MG/DL (ref 65–140)
GLUCOSE SERPL-MCNC: 127 MG/DL (ref 65–140)
GLUCOSE SERPL-MCNC: 133 MG/DL (ref 65–140)
GLUCOSE SERPL-MCNC: 147 MG/DL (ref 65–140)

## 2022-03-14 PROCEDURE — 97116 GAIT TRAINING THERAPY: CPT

## 2022-03-14 PROCEDURE — 97530 THERAPEUTIC ACTIVITIES: CPT

## 2022-03-14 PROCEDURE — 82948 REAGENT STRIP/BLOOD GLUCOSE: CPT

## 2022-03-14 PROCEDURE — 94640 AIRWAY INHALATION TREATMENT: CPT

## 2022-03-14 PROCEDURE — 97535 SELF CARE MNGMENT TRAINING: CPT

## 2022-03-14 PROCEDURE — 99232 SBSQ HOSP IP/OBS MODERATE 35: CPT | Performed by: INTERNAL MEDICINE

## 2022-03-14 PROCEDURE — 94760 N-INVAS EAR/PLS OXIMETRY 1: CPT

## 2022-03-14 RX ADMIN — GUAIFENESIN 600 MG: 600 TABLET, EXTENDED RELEASE ORAL at 08:38

## 2022-03-14 RX ADMIN — IPRATROPIUM BROMIDE 0.5 MG: 0.5 SOLUTION RESPIRATORY (INHALATION) at 07:17

## 2022-03-14 RX ADMIN — NYSTATIN 1 APPLICATION: 100000 POWDER TOPICAL at 21:28

## 2022-03-14 RX ADMIN — METOPROLOL TARTRATE 25 MG: 25 TABLET, FILM COATED ORAL at 08:38

## 2022-03-14 RX ADMIN — BENZONATATE 200 MG: 100 CAPSULE ORAL at 08:38

## 2022-03-14 RX ADMIN — GUAIFENESIN 600 MG: 600 TABLET, EXTENDED RELEASE ORAL at 21:27

## 2022-03-14 RX ADMIN — HEPARIN SODIUM 5000 UNITS: 5000 INJECTION INTRAVENOUS; SUBCUTANEOUS at 21:27

## 2022-03-14 RX ADMIN — HEPARIN SODIUM 5000 UNITS: 5000 INJECTION INTRAVENOUS; SUBCUTANEOUS at 14:28

## 2022-03-14 RX ADMIN — LEVALBUTEROL HYDROCHLORIDE 1.25 MG: 1.25 SOLUTION, CONCENTRATE RESPIRATORY (INHALATION) at 07:17

## 2022-03-14 RX ADMIN — BENZONATATE 200 MG: 100 CAPSULE ORAL at 21:28

## 2022-03-14 RX ADMIN — LEVALBUTEROL HYDROCHLORIDE 1.25 MG: 1.25 SOLUTION, CONCENTRATE RESPIRATORY (INHALATION) at 14:52

## 2022-03-14 RX ADMIN — BACITRACIN 1 SMALL APPLICATION: 500 OINTMENT TOPICAL at 21:28

## 2022-03-14 RX ADMIN — NYSTATIN 1 APPLICATION: 100000 POWDER TOPICAL at 08:37

## 2022-03-14 RX ADMIN — HEPARIN SODIUM 5000 UNITS: 5000 INJECTION INTRAVENOUS; SUBCUTANEOUS at 05:00

## 2022-03-14 RX ADMIN — FLUTICASONE PROPIONATE 1 SPRAY: 50 SPRAY, METERED NASAL at 18:22

## 2022-03-14 RX ADMIN — METFORMIN HYDROCHLORIDE 500 MG: 500 TABLET ORAL at 08:38

## 2022-03-14 RX ADMIN — PANTOPRAZOLE SODIUM 40 MG: 40 TABLET, DELAYED RELEASE ORAL at 04:56

## 2022-03-14 RX ADMIN — FLUTICASONE PROPIONATE 1 SPRAY: 50 SPRAY, METERED NASAL at 08:37

## 2022-03-14 RX ADMIN — OXYBUTYNIN CHLORIDE 5 MG: 5 TABLET, EXTENDED RELEASE ORAL at 21:28

## 2022-03-14 RX ADMIN — METFORMIN HYDROCHLORIDE 500 MG: 500 TABLET ORAL at 18:22

## 2022-03-14 RX ADMIN — ALPRAZOLAM 0.5 MG: 0.5 TABLET ORAL at 21:28

## 2022-03-14 RX ADMIN — METOPROLOL TARTRATE 25 MG: 25 TABLET, FILM COATED ORAL at 21:28

## 2022-03-14 RX ADMIN — IPRATROPIUM BROMIDE 0.5 MG: 0.5 SOLUTION RESPIRATORY (INHALATION) at 14:52

## 2022-03-14 RX ADMIN — LIDOCAINE 1 PATCH: 50 PATCH CUTANEOUS at 08:38

## 2022-03-14 RX ADMIN — GLIPIZIDE 2.5 MG: 2.5 TABLET, EXTENDED RELEASE ORAL at 08:38

## 2022-03-14 NOTE — ASSESSMENT & PLAN NOTE
· Patient has no prior history of diabetes  · Was noted to have steroid induced hyperglycemia  · A1c however 6 6, concerning for newly diagnosed diabetes type 2   · Only ICU patient was initiated on insulin  · Blood sugars currently stable on metformin and glipizide  · Will need prescription for Accu-Chek and testing supplies at discharge    Lab Results   Component Value Date    HGBA1C 6 6 (H) 02/16/2022     Results from last 7 days   Lab Units 03/14/22  1132 03/14/22  0742 03/13/22  2134 03/13/22  1555 03/13/22  1123 03/13/22  0614 03/12/22  2144 03/12/22  1553   POC GLUCOSE mg/dl 127 108 104 150* 66 96 173* 172*

## 2022-03-14 NOTE — PROGRESS NOTES
[facility]  Progress Note - Jennifer Fernandez 1970, 46 y o  female MRN: [id number]  Unit/Bed#: [address] [place] [address] [id number] Encounter: [id number]  Primary Care Provider: Simon Eubanks MD   Date and time admitted to hospital: 2/16/2022  7:01 PM    * Acute respiratory failure with hypoxia [facility])  Assessment & Plan  Pt is a 66-year-old female who was hospitalized 2/16 for acute respiratory failure with hypoxia secondary to COVID pneumonia/fibrosis pulmonary edema and pneumonitis  Was on high-flow nasal cannula for 19 days  · Was initially hospitalized in the ICU 2/16-3/5  · Was on high-flow nasal cannula, as well as BiPAP  · followed by pulmonology service and finished prednisone taper  · Has been further transition from high-flow oxygen, down to mid flow oxygen  · Patient continued to improve and currently has adequate oxygenation is on 4 L  · Will need home O2 eval prior to discharge  · Will also refer to remote access home monitoring program    Paroxysmal atrial fibrillation ([facility] [place] [address] )  Assessment & Plan  · While in the ICU patient was diagnosed with new onset atrial fibrillation, secondary to hypoxia/respiratory distress  · She was initially started on amiodarone drip, as well as heparin infusion for anticoagulation  · Patient returned to normal sinus rhythm  · 02/17/2022 echocardiogram left ventricular ejection fraction 65%  Wall motion normal   Grade 1 diastolic dysfunction  · Continue metoprolol 25 mg q 12 hours     ·  Not on therapeutic anticoagulation    OAB (overactive bladder)  Assessment & Plan  · Started oxybutynin    Acute diastolic heart failure (HCC)  Assessment & Plan  Wt Readings from Last 3 Encounters:   03/14/22 96 3 kg (212 lb 4 9 oz)   01/06/22 100 kg (221 lb)       While in the ICU, patient was diagnosed with acute diastolic congestive heart failure and diuresed with IV Lasix  She clinically improved  2D echocardiogram with left ventricular ejection fraction 65% and grade 1 diastolic dysfunction  Patient does not require maintenance diuretics      Transaminitis  Assessment & Plan  Patient had mild transaminitis secondary to COVID  Since normalized    HTN (hypertension)  Assessment & Plan  · Patient has history essential hypertension  · Continue metoprolol 25 mg q 12 hours  · Blood pressure adequately controlled  Continue to monitor and titrate    Anxiety  Assessment & Plan  · Alprazolam p r n  BMI 50 0-59 9, adult (HCC)  Assessment & Plan  · Body mass index is 51 19 kg/m²     · Dietary counseling, lifestyle modification    Type 2 diabetes mellitus without complication, without long-term current use of insulin ([facility] [address] )  Assessment & Plan  · Patient has no prior history of diabetes  · Was noted to have steroid induced hyperglycemia  · A1c however 6 6, concerning for newly diagnosed diabetes type 2   · Only ICU patient was initiated on insulin  · Blood sugars currently stable on metformin and glipizide  · Will need prescription for Accu-Chek and testing supplies at discharge    Lab Results   Component Value Date    HGBA1C 6 6 (H) 02/16/2022     Results from last 7 days   Lab Units 03/14/22  1132 03/14/22  0742 03/13/22  2134 03/13/22  1555 03/13/22  1123 03/13/22  0614 03/12/22  2144 03/12/22  1553   POC GLUCOSE mg/dl 127 108 104 150* 66 96 173* 172*       Thrombocytosis  Assessment & Plan  Patient had thrombocytosis secondary to her acute infection which has since normalized    Sepsis due to COVID-19 [facility])  Assessment & Plan  · Patient initially presented with sepsis, secondary to COVID-19 pneumonia  · Patient was treated in the ICU and improved    Pneumonia due to COVID-19 virus  Assessment & Plan  -patient was initially diagnosed with COVID-19 on 01/06 in the ER, unvaccinated  -she was followed by primary care, with 3 telemedicine visits:  She declined monoclonal antibody  -patient notes she felt progressively worse, until she presented in the ER 2/16, and respiratory distress  -patient was hospitalized in the ICU  -CTA of the chest revealed no evidence of pulmonary artery embolus  Scattered airspace opacities in the lungs  -in the ICU patient was evaluated for late COVID pneumonitis with possible superimposed atypical bacterial pneumonia versus pulmonary edema  -she had intermittent fevers and was initially started on antibiotics with ceftriaxone/azithromycin, as well as IV steroids, and bronchodilators, and baricitinib  -patient's blood cultures are negative x2  -she was also noted to have a witnessed aspiration event  -patient has clinically improved            Family:  Updated daughter Lauren Barragan via phone    dw pt's nurse  dw     VTE Pharmacologic Prophylaxis: Heparin  VTE Mechanical Prophylaxis: sequential compression device        Certification Statement: The patient will continue to require additional inpatient hospital stay due to need for further acute intervention for hypoxia, discharge plan    Status: inpatient     ===========================================================    Subjective:  Patient she feels better  She has been ambulating in the hallways  She notes she does become dyspneic with exertion but is able to know when she needs to rest   Denies any current pain anywhere  Denies any abdominal pain, nausea, vomiting, diarrhea  He is tolerating p o  Denies any other complaints      Physical Exam:   Temp:  [98 6 °F (37 °C)-99 4 °F (37 4 °C)] 98 6 °F (37 °C)  HR:  [] 99  Resp:  [18-20] 18  BP: (121-134)/(70-87) 121/70    Gen:  Pleasant, non-tachypnic, non-dyspnic  Conversant  Is able to speak in complete sentences without having to stop for dyspnea  Heart: regular rate and rhythm, S1S2 present, no murmur, rub or gallop  Lungs:  Decreased breath sounds bilaterally with decreased air movement  No wheezing, crackles, or rhonchi  No accessory muscle use or respiratory distress  Abd: soft, non-tender, non-distended   NABS, no guarding, rebound or peritoneal signs  Extremities: no clubbing, cyanosis or edema  2+pedal pulses bilaterally  Full range of motion  Neuro: awake, alert  Fluent speech  Strength globally intact   Skin: warm and dry: no petechiae, purpura and rash  LABS:   Results from last 7 days   Lab Units 03/13/22  0531   WBC Thousand/uL 11 58*   HEMOGLOBIN g/dL 13 8   HEMATOCRIT % 42 5   PLATELETS Thousands/uL 218     Results from last 7 days   Lab Units 03/13/22  0527   POTASSIUM mmol/L 3 9   CHLORIDE mmol/L 100   CO2 mmol/L 29   BUN mg/dL 15   CREATININE mg/dL 0 72   CALCIUM mg/dL 9 8       Hospital Data:    3/4 CXR:  No pneumothorax  Bilateral mixed interstitial and alveolar opacities without significant change from prior    3/3 CXR:  No pneumothorax  Bilateral diffuse parenchymal interstitial changes unchanged    3/3 CXR:  No change in moderate bilateral groundglass opacity, the sequela of Covid-19    2/23 CXR:  Persistent bilateral interstitial opacities and superimposed infiltrates    2/16 CTa chest:  No evidence of pulmonary artery embolus  Scattered airspace opacities within the lungs which may represent infection  Recommend short-term follow-up chest CT scan in 3 months to evaluate for resolution    2/16 CXR:   Multifocal groundglass infiltrates concerning for  Covid  19 infection    2/23: blood cx: neg          ---------------------------------------------------------------------------------------------------------------  This note has been constructed using a voice recognition system

## 2022-03-14 NOTE — ASSESSMENT & PLAN NOTE
Pt is a 59-year-old female who was hospitalized 2/16 for acute respiratory failure with hypoxia secondary to COVID pneumonia/fibrosis pulmonary edema and pneumonitis  Was on high-flow nasal cannula for 19 days  · Was initially hospitalized in the ICU 2/16-3/5  · Was on high-flow nasal cannula, as well as BiPAP  · followed by pulmonology service and finished prednisone taper    · Has been further transition from high-flow oxygen, down to mid flow oxygen  · Patient continued to improve and currently has adequate oxygenation is on 4 L  · Will need home O2 eval prior to discharge  · Will also refer to remote access home monitoring program

## 2022-03-14 NOTE — PLAN OF CARE
Problem: OCCUPATIONAL THERAPY ADULT  Goal: Performs self-care activities at highest level of function for planned discharge setting  See evaluation for individualized goals  Description: Treatment Interventions: ADL retraining,Functional transfer training,Endurance training,Cognitive reorientation,Patient/family training,Energy conservation          See flowsheet documentation for full assessment, interventions and recommendations  Outcome: Adequate for Discharge  Note: Limitation: Decreased ADL status,Decreased UE strength,Decreased Safe judgement during ADL,Decreased endurance,Decreased high-level ADLs  Prognosis: Fair  Assessment: Pt seen for 30 min tx session with focus on functional balance, functional mobility, ADL status, transfer safety, and education  Pt able to tolerate OOB mobility; sitting balance=g, standing balance=f+/f  Pt able to demonstrate good ADL status, b/l UE AROM/strength, and transfer safety  Therapist continue to reviewed energy conservation techniques; SOP2=86-88% on 5 liters of O2 with ambulation  Pt able to demonstrate good cognition  Pt appears close to her functional baseline  Pt may benefit from outpt respiratory rehab to improve her overall cardiovascular endurance  OT tx d/c'ed at this time 2* pt's return to her functional baseline  OT Discharge Recommendation:  (outpt respirator therapy)  OT - OK to Discharge:  Yes

## 2022-03-14 NOTE — ASSESSMENT & PLAN NOTE
· While in the ICU patient was diagnosed with new onset atrial fibrillation, secondary to hypoxia/respiratory distress  · She was initially started on amiodarone drip, as well as heparin infusion for anticoagulation  · Patient returned to normal sinus rhythm  · 02/17/2022 echocardiogram left ventricular ejection fraction 65%  Wall motion normal   Grade 1 diastolic dysfunction  · Continue metoprolol 25 mg q 12 hours     ·  Not on therapeutic anticoagulation

## 2022-03-14 NOTE — PHYSICAL THERAPY NOTE
Physical Therapy Treatment Note     03/14/22 1422   PT Last Visit   PT Visit Date 03/14/22   Note Type   Note Type Treatment   Pain Assessment   Pain Assessment Tool 0-10   Pain Score No Pain   Restrictions/Precautions   Weight Bearing Precautions Per Order No   Other Precautions O2;Telemetry   General   Chart Reviewed Yes   Subjective   Subjective Pt  seated on chair upon entry  Agreeable to PT  pt  on 4L o2 and stats about 94% at rest   Transfers   Sit to Stand 7  Independent   Additional items Armrests   Stand to Sit 7  Independent   Stand pivot 7  Independent   Ambulation/Elevation   Gait pattern WNL   Gait Assistance 5  Supervision   Assistive Device None   Distance 500ft   Stair Management Assistance 5  Supervision   Additional items Assist x 1; Increased time required   Stair Management Technique One rail R;Alternating pattern; Foreward;Reciprocal   Number of Stairs   (9x2)   Balance   Static Sitting Normal   Dynamic Sitting Good   Static Standing Good   Dynamic Standing Fair +   Ambulatory Fair +   Endurance Deficit   Endurance Deficit Yes   Endurance Deficit Description MCDOWELL  Spo2 stats dropped to 88% post ambualtion and stair negotiation   Activity Tolerance   Activity Tolerance Patient tolerated treatment well   Nurse Made Aware Yes   Exercises   Balance training  Standing alt  marches, HR/TR without UE support   Assessment   Prognosis Good   Problem List Decreased endurance;Obesity; Decreased mobility   Assessment pt has progerssed well with her overall mobility and needed no hands on A for any mobility  pt  able to manage the O2 tank independently able to connect and disconnect  Negotiated 9 steps x 2 with R handrail with S and therapist managed the O2 tank  pt  needed standing rest after ascending for a brief period of time due to SOB  pt  able to ambulate and talk t/o session  Educated patient on energy conservation  Pt  being d/c from PT as she has met all her goals consistently that was  set for PT   Had discussed with Denisha Reyes and Ralph Espino about patient's progress and was agreed to DC PT with recommendation of patine with OP cardiopulmonary PT and ambulating on the unit until DC from hospital    Barriers to Discharge None   Goals   Patient Goals I want to get out of here   STG Expiration Date 03/17/22   PT Treatment Day 7   Plan   Treatment/Interventions Functional transfer training;Elevations;LE strengthening/ROM; Therapeutic exercise;Patient/family training;Gait training;Spoke to nursing  (PT)   Progress Discontinue PT   Recommendation   PT Discharge Recommendation   (OP Cardiopulmonary rehab)   Equipment Recommended   (None)   AM-PAC Basic Mobility Inpatient   Turning in Bed Without Bedrails 4   Lying on Back to Sitting on Edge of Flat Bed 4   Moving Bed to Chair 4   Standing Up From Chair 4   Walk in Room 4   Climb 3-5 Stairs 4   Basic Mobility Inpatient Raw Score 24   Basic Mobility Standardized Score 57 68   Turning Head Towards Sound 4   Follow Simple Instructions 4   Low Function Basic Mobility Raw Score 32   Low Function Basic Mobility Standardized Score 57 76   Highest Level Of Mobility   JH-HLM Goal 8: Walk 250 feet or more   JH-HLM Highest Level of Mobility 8: Walk 250 feet ot more   JH-HLM Goal Achieved Yes   End of Consult   Patient Position at End of Consult Bedside chair; All needs within reach         Nely Mcleod PTA    An AM-PAC basic mobility standardized score less than 42 9 suggest the patient may benefit from discharge to post-acute rehab services

## 2022-03-14 NOTE — CASE MANAGEMENT
Case Management Progress Note    Patient name Eusebio Licea  Location [place] [facility] [facility] Yusef Echavarria* MRN [id number]  : 1970 Date 3/14/2022       LOS (days): 26  Geometric Mean LOS (GMLOS) (days): 4 80  Days to GMLOS:-20 8        OBJECTIVE:        Current admission status: Inpatient  Preferred Pharmacy:   PATIENT/FAMILY REPORTS NO PREFERRED PHARMACY  No address on file      [address] [facility]  Phone: [phone] Fax: [phone]    Primary Care Provider: Max Li MD    Primary Insurance: Radha Maritza [facility]  Secondary Insurance:     PROGRESS NOTE: CM in to speak w/ pt about pending d/c  Pt states that her apt is being inspected tomorrow for the possibility of mold  Pt expects that if mold is found, remediation will be completed immediately  She is agreeable to return after remediation  Pt asked if she has any alternative living arrangements, if she is not able to return immediately to her current address  Pt states "no " Dr Caren Brown aware of same  It is anticipated that pt may be d/c'd with oxygen at home, a conversation was had regarding electricity use and contacting PP[employer]L for assistance  Pt agreeable to contact PP&L     [id number] - CM spoke with PP&L to fax electric medical certification to [facility], for MD to complete

## 2022-03-14 NOTE — PLAN OF CARE
Problem: PHYSICAL THERAPY ADULT  Goal: Performs mobility at highest level of function for planned discharge setting  See evaluation for individualized goals  Description: Treatment/Interventions: Functional transfer training,LE strengthening/ROM,Elevations,Therapeutic exercise,Endurance training,Patient/family training,Equipment eval/education,Bed mobility,Gait training,Compensatory technique education,Spoke to MD,Spoke to nursing,Continued evaluation,OT  Equipment Recommended:  (monitor)       See flowsheet documentation for full assessment, interventions and recommendations  Outcome: Progressing  Note: Prognosis: Good  Problem List: Decreased endurance,Obesity,Decreased mobility  Assessment: pt has progerssed well with her overall mobility and needed no hands on A for any mobility  pt  able to manage the O2 tank independently able to connect and disconnect  Negotiated 9 steps x 2 with R handrail with S and therapist managed the O2 tank  pt  needed standing rest after ascending for a brief period of time due to SOB  pt  able to ambulate and talk t/o session  Educated patient on energy conservation  Pt  being d/c from PT as she has met all her goals consistently that was  set for PT  Had discussed with Emigdio Brown and Neelima Emanuel about patient's progress and was agreed to DC PT with recommendation of keyanna with OP cardiopulmonary PT and ambulating on the unit until DC from hospital   Barriers to Discharge: None  Barriers to Discharge Comments: lives alone  [address]     PT Discharge Recommendation:  (OP Cardiopulmonary rehab)          See flowsheet documentation for full assessment

## 2022-03-14 NOTE — ASSESSMENT & PLAN NOTE
-patient was initially diagnosed with COVID-19 on 01/06 in the ER, unvaccinated  -she was followed by primary care, with 3 telemedicine visits:  She declined monoclonal antibody  -patient notes she felt progressively worse, until she presented in the ER 2/16, and respiratory distress  -patient was hospitalized in the ICU  -CTA of the chest revealed no evidence of pulmonary artery embolus    Scattered airspace opacities in the lungs  -in the ICU patient was evaluated for late COVID pneumonitis with possible superimposed atypical bacterial pneumonia versus pulmonary edema  -she had intermittent fevers and was initially started on antibiotics with ceftriaxone/azithromycin, as well as IV steroids, and bronchodilators, and baricitinib  -patient's blood cultures are negative x2  -she was also noted to have a witnessed aspiration event  -patient has clinically improved

## 2022-03-14 NOTE — ASSESSMENT & PLAN NOTE
· Patient has history essential hypertension  · Continue metoprolol 25 mg q 12 hours  · Blood pressure adequately controlled    Continue to monitor and titrate

## 2022-03-14 NOTE — OCCUPATIONAL THERAPY NOTE
Occupational Therapy Progress Note(time=1430-1500)     Patient Name: Josh Fang  RSYFE'F Date: 3/14/2022  Problem List  Principal Problem:    Acute respiratory failure with hypoxia ([facility] [address] )  Active Problems:    Sepsis due to COVID-19 ([facility] [address] )    Thrombocytosis    Hyperglycemia    BMI 50 0-59 9, adult (HCC)    Anxiety    HTN (hypertension)    Transaminitis    New onset a-fib (HCC)    Acute diastolic heart failure (HCC)    OAB (overactive bladder)       03/14/22 1430   Note Type   Note Type Treatment   Restrictions/Precautions   Weight Bearing Precautions Per Order No   Other Precautions O2   Pain Assessment   Pain Assessment Tool FLACC   Pain Rating: FLACC (Rest) - Face 0   Pain Rating: FLACC (Rest) - Legs 0   Pain Rating: FLACC (Rest) - Activity 0   Pain Rating: FLACC (Rest) - Cry 0   Pain Rating: FLACC (Rest) - Consolability 0   Score: FLACC (Rest) 0   ADL   Where Assessed Chair   UB Bathing Assistance 7  Independent   LB Bathing Assistance 7  Independent   UB Dressing Assistance 7  Independent   LB Dressing Assistance 7  Independent   Functional Standing Tolerance   Time 10+mins   Transfers   Sit to Stand 7  Independent   Stand to Sit 7  Independent   Functional Mobility   Functional Mobility 7  Independent   Additional items   (w/o device)   Cognition   Overall Cognitive Status WFL   Arousal/Participation Alert   Attention Within functional limits   Orientation Level Oriented X4   Memory Within functional limits   Following Commands Follows all commands and directions without difficulty   Activity Tolerance   Activity Tolerance Patient tolerated treatment well   Medical Staff Made Aware nsg   Assessment   Assessment Pt seen for 30 min tx session with focus on functional balance, functional mobility, ADL status, transfer safety, and education  Pt able to tolerate OOB mobility; sitting balance=g, standing balance=f+/f  Pt able to demonstrate good ADL status, b/l UE AROM/strength, and transfer safety   Therapist continue to reviewed energy conservation techniques; SOP2=86-88% on 5 liters of O2 with ambulation  Pt able to demonstrate good cognition  Pt appears close to her functional baseline  Pt may benefit from outpt respiratory rehab to improve her overall cardiovascular endurance  OT tx d/c'ed at this time 2* pt's return to her functional baseline  Plan   Treatment Interventions ADL retraining;Functional transfer training; Endurance training;Cognitive reorientation;Patient/family training;Energy conservation   OT Treatment Day 5   OT Frequency   (d/c OT services)   Recommendation   OT Discharge Recommendation   (outpt respirator therapy)   OT - OK to Discharge Yes   AM-PAC Daily Activity Inpatient   Lower Body Dressing 4   Bathing 4   Toileting 4   Upper Body Dressing 4   Grooming 4   Eating 4   Daily Activity Raw Score 24   Daily Activity Standardized Score (Calc for Raw Score >=11) 57 54   AM-PAC Applied Cognition Inpatient   Following a Speech/Presentation 4   Understanding Ordinary Conversation 4   Taking Medications 4   Remembering Where Things Are Placed or Put Away 4   Remembering List of 4-5 Errands 4   Taking Care of Complicated Tasks 4   Applied Cognition Raw Score 24   Applied Cognition Standardized Score 62 21   Karina Fuller, OT

## 2022-03-14 NOTE — ASSESSMENT & PLAN NOTE
· Patient initially presented with sepsis, secondary to COVID-19 pneumonia  · Patient was treated in the ICU and improved

## 2022-03-14 NOTE — ASSESSMENT & PLAN NOTE
Wt Readings from Last 3 Encounters:   03/14/22 96 3 kg (212 lb 4 9 oz)   01/06/22 100 kg (221 lb)       While in the ICU, patient was diagnosed with acute diastolic congestive heart failure and diuresed with IV Lasix  She clinically improved  2D echocardiogram with left ventricular ejection fraction 65% and grade 1 diastolic dysfunction  Patient does not require maintenance diuretics

## 2022-03-14 NOTE — UTILIZATION REVIEW
Continued Stay Review    Date: 3/12, 3/13, 3/14    Current Patient Class: IP Current Level of Care: MS    HPI:51 y o  female initially admitted on 2/16 with acute hypoxic resp failure, SIRs, sepsis, pneumonia, acute dCHF, emphysema  Assessment/Plan:   3/12 - has completed steroid taper  Is now on 4 L NC oxygen and off [address]  Decreasing bronchodilators to BID after reports of increased anxiety at HS  New onset A fib - not on therapeutic AC at this time  She is using Alprazolam and Benzonate PRN  On exam no wheezing  3/13 - pt was seen ambulating in halls on 4L NC oxygen and feeling well  Lungs sounds are clear  3/14 - pt is down to 3L NC oxygen with good sats  She is afebrile  Lungs are clear  Improving blood sugars        Vital Signs:   03/14/22 07:40:27 98 7 °F (37 1 °C) 103 18 134/87 103 91 % 32 -- 3 L/min Nasal cannula Sitting   03/14/22 0718 -- -- -- -- -- 95 % -- -- -- -- --   03/13/22 21:32:32 99 4 °F (37 4 °C) 106 Abnormal  20 126/87 100 95 % 36 -- 4 L/min Nasal cannula Sitting   03/13/22 21:31:59 -- 104 20 126/87 100 95 % -- -- -- -- --   03/13/22 2100 -- -- -- -- -- 91 % 36 -- 4 L/min Nasal cannula --   03/13/22 14:53:35 98 5 °F (36 9 °C) 104 19 129/68 88 92 % -- 4 L/min -- Nasal cannula Sitting   03/13/22 1415 -- -- -- -- -- 95 % -- 4 L/min -- Nasal cannula --   03/13/22 0900 -- -- -- -- -- -- -- 4 L/min -- Nasal cannula --   03/13/22 0725 -- -- -- -- -- 98 % -- 4 L/min -- Nasal cannula --   03/13/22 07:10:46 97 8 °F (36 6 °C) 71 18 109/69 82 94 % -- 4 L/min -- Nasal cannula Sitting   03/12/22 21:42:59 98 7 °F (37 1 °C) 88 18 138/95 109 95 % -- -- -- -- --   03/12/22 2000 -- -- -- -- -- 97 % -- 4 L/min -- Nasal cannula --   03/12/22 16:56:16 98 9 °F (37 2 °C) 81 16 115/75 88 94 % -- 4 L/min -- Nasal cannula Sitting   03/12/22 1402 -- -- -- -- -- 96 % -- 4 L/min -- Nasal cannula --   03/12/22 1320 -- 87 -- -- -- 89 % Abnormal  -- 4 L/min -- Nasal cannula --   03/12/22 0805 -- -- -- -- -- 91 % -- 6 L/min -- Mid flow nasal cannula --   03/12/22 0750 -- -- -- -- -- 98 % -- 5 L/min -- Mid flow nasal cannula --   03/12/22 07:12:47 98 7 °F (37 1 °C) 78 19 142/90 107 96 % -- 6 L/min -- Mid flow nasal cannula Lying     Pertinent Labs/Diagnostic Results:       Results from last 7 days   Lab Units 03/13/22  0531   WBC Thousand/uL 11 58*   HEMOGLOBIN g/dL 13 8   HEMATOCRIT % 42 5   PLATELETS Thousands/uL 218         Results from last 7 days   Lab Units 03/13/22  0527   SODIUM mmol/L 139   POTASSIUM mmol/L 3 9   CHLORIDE mmol/L 100   CO2 mmol/L 29   ANION GAP mmol/L 10   BUN mg/dL 15   CREATININE mg/dL 0 72   EGFR ml/min/1 73sq m 97   CALCIUM mg/dL 9 8     Results from last 7 days   Lab Units 03/13/22  0527   AST U/L 14   ALT U/L 35   ALK PHOS U/L 62   TOTAL PROTEIN g/dL 7 0   ALBUMIN g/dL 3 4*   TOTAL BILIRUBIN mg/dL 0 30     Results from last 7 days   Lab Units 03/14/22  0742 03/13/22  2134 03/13/22  1555 03/13/22  1123 03/13/22  0614 03/12/22  2144 03/12/22  1553 03/12/22  1112 03/12/22  0718 03/11/22  2114 03/11/22  1607 03/11/22  1106   POC GLUCOSE mg/dl 108 104 150* 66 96 173* 172* 122 107 110 189* 104     Results from last 7 days   Lab Units 03/13/22  0527   GLUCOSE RANDOM mg/dL 108     Medications:   Scheduled Medications:  bacitracin, 1 small application, Topical, BID  fluticasone, 1 spray, Each Nare, BID  glipiZIDE, 2 5 mg, Oral, Daily With Breakfast  guaiFENesin, 600 mg, Oral, Q12H NED  heparin (porcine), 5,000 Units, Subcutaneous, Q8H NED  insulin lispro, 2-12 Units, Subcutaneous, TID AC  ipratropium, 0 5 mg, Nebulization, BID  levalbuterol, 1 25 mg, Nebulization, BID  lidocaine, 1 patch, Topical, Daily  metFORMIN, 500 mg, Oral, BID With Meals  metoprolol tartrate, 25 mg, Oral, Q12H NED  nystatin, 1 application, Topical, TID  oxybutynin, 5 mg, Oral, HS  pantoprazole, 40 mg, Oral, Daily Before Breakfast      Continuous IV Infusions:     PRN Meds:  acetaminophen, 650 mg, Oral, Q6H PRN  ashley Milan oxide-diphenhydramine-lidocaine viscous, 10 mL, Swish & Swallow, Q6H PRN  ALPRAZolam, 0 5 mg, Oral, BID PRN -x 1 3/12, 3/13  aluminum-magnesium hydroxide-simethicone, 30 mL, Oral, Q4H PRN  benzonatate, 200 mg, Oral, TID PRN - x 2 3/12, 3/13, x 1 3/14  docusate sodium, 100 mg, Oral, BID PRN  Lidocaine Viscous HCl, 15 mL, Swish & Spit, 4x Daily PRN  polyethylene glycol, 17 g, Oral, Daily PRN  saliva substitute, 5 spray, Mouth/Throat, 4x Daily PRN  sodium chloride, 1 spray, Each Nare, Q2H PRN    Discharge Plan: home     Kirti Del Cid RN  Network Utilization Review Department  ATTENTION: Please call with any questions or concerns to [phone] and carefully listen to the prompts so that you are directed to the right person  All voicemails are confidential   Jair Villar all requests for admission clinical reviews, approved or denied determinations and any other requests to dedicated fax number below belonging to the campus where the patient is receiving treatment   List of dedicated fax numbers for the Facilities:  [address] DENIALS (Administrative/Medical Necessity) [phone]   [address] (Maternity/NICU/Pediatrics) [phone]   [address]  [address] [facility] [place] [address] [address] [place] [address] [address] [facility] [place] [address] [phone]

## 2022-03-14 NOTE — PLAN OF CARE
Problem: MOBILITY - ADULT  Goal: Maintain or return to baseline ADL function  Description: INTERVENTIONS:  -  Assess patient's ability to carry out ADLs; assess patient's baseline for ADL function and identify physical deficits which impact ability to perform ADLs (bathing, care of mouth/teeth, toileting, grooming, dressing, etc )  - Assess/evaluate cause of self-care deficits   - Assess range of motion  - Assess patient's mobility; develop plan if impaired  - Assess patient's need for assistive devices and provide as appropriate  - Encourage maximum independence but intervene and supervise when necessary  - Involve family in performance of ADLs  - Assess for home care needs following discharge   - Consider OT consult to assist with ADL evaluation and planning for discharge  - Provide patient education as appropriate  Outcome: Progressing  Goal: Maintains/Returns to pre admission functional level  Description: INTERVENTIONS:  - Perform BMAT or MOVE assessment daily    - Set and communicate daily mobility goal to care team and patient/family/caregiver  - Collaborate with rehabilitation services on mobility goals if consulted  - Perform Range of Motion 5 times a day  - Reposition patient every 5 hours    - Dangle patient 5 times a day  - Stand patient 5 times a day  - Ambulate patient 5 times a day  - Out of bed to chair 5 times a day   - Out of bed for meals 5 times a day  - Out of bed for toileting  - Record patient progress and toleration of activity level   Outcome: Progressing     Problem: RESPIRATORY - ADULT  Goal: Achieves optimal ventilation and oxygenation  Description: INTERVENTIONS:  - Assess for changes in respiratory status  - Assess for changes in mentation and behavior  - Position to facilitate oxygenation and minimize respiratory effort  - Oxygen administered by appropriate delivery if ordered  - Initiate smoking cessation education as indicated  - Encourage broncho-pulmonary hygiene including cough, deep breathe, Incentive Spirometry  - Assess the need for suctioning and aspirate as needed  - Assess and instruct to report SOB or any respiratory difficulty  - Respiratory Therapy support as indicated  Outcome: Progressing     Problem: MUSCULOSKELETAL - ADULT  Goal: Maintain or return mobility to safest level of function  Description: INTERVENTIONS:  - Assess patient's ability to carry out ADLs; assess patient's baseline for ADL function and identify physical deficits which impact ability to perform ADLs (bathing, care of mouth/teeth, toileting, grooming, dressing, etc )  - Assess/evaluate cause of self-care deficits   - Assess range of motion  - Assess patient's mobility  - Assess patient's need for assistive devices and provide as appropriate  - Encourage maximum independence but intervene and supervise when necessary  - Involve family in performance of ADLs  - Assess for home care needs following discharge   - Consider OT consult to assist with ADL evaluation and planning for discharge  - Provide patient education as appropriate  Outcome: Progressing  Goal: Maintain proper alignment of affected body part  Description: INTERVENTIONS:  - Support, maintain and protect limb and body alignment  - Provide patient/ family with appropriate education  Outcome: Progressing     Problem: DISCHARGE PLANNING  Goal: Discharge to home or other facility with appropriate resources  Description: INTERVENTIONS:  - Identify barriers to discharge w/patient and caregiver  - Arrange for needed discharge resources and transportation as appropriate  - Identify discharge learning needs (meds, wound care, etc )  - Arrange for interpretive services to assist at discharge as needed  - Refer to Case Management Department for coordinating discharge planning if the patient needs post-hospital services based on physician/advanced practitioner order or complex needs related to functional status, cognitive ability, or social support system  Outcome: Progressing

## 2022-03-15 PROBLEM — I48.0 PAROXYSMAL ATRIAL FIBRILLATION (HCC): Status: RESOLVED | Noted: 2022-02-23 | Resolved: 2022-03-15

## 2022-03-15 LAB
GLUCOSE SERPL-MCNC: 109 MG/DL (ref 65–140)
GLUCOSE SERPL-MCNC: 113 MG/DL (ref 65–140)
GLUCOSE SERPL-MCNC: 114 MG/DL (ref 65–140)
GLUCOSE SERPL-MCNC: 128 MG/DL (ref 65–140)

## 2022-03-15 PROCEDURE — 94640 AIRWAY INHALATION TREATMENT: CPT

## 2022-03-15 PROCEDURE — 94761 N-INVAS EAR/PLS OXIMETRY MLT: CPT

## 2022-03-15 PROCEDURE — 99232 SBSQ HOSP IP/OBS MODERATE 35: CPT | Performed by: INTERNAL MEDICINE

## 2022-03-15 PROCEDURE — 82948 REAGENT STRIP/BLOOD GLUCOSE: CPT

## 2022-03-15 RX ORDER — BENZONATATE 100 MG/1
100 CAPSULE ORAL 3 TIMES DAILY PRN
Qty: 20 CAPSULE | Refills: 0 | Status: SHIPPED | OUTPATIENT
Start: 2022-03-15 | End: 2022-05-11 | Stop reason: ALTCHOICE

## 2022-03-15 RX ORDER — ALBUTEROL SULFATE 2.5 MG/3ML
2.5 SOLUTION RESPIRATORY (INHALATION) EVERY 6 HOURS PRN
Qty: 240 ML | Refills: 0 | Status: SHIPPED | OUTPATIENT
Start: 2022-03-15

## 2022-03-15 RX ORDER — LIDOCAINE 50 MG/G
1 PATCH TOPICAL DAILY
Qty: 30 PATCH | Refills: 0 | Status: SHIPPED | OUTPATIENT
Start: 2022-03-16

## 2022-03-15 RX ORDER — OXYBUTYNIN CHLORIDE 5 MG/1
5 TABLET, EXTENDED RELEASE ORAL
Qty: 30 TABLET | Refills: 0 | Status: SHIPPED | OUTPATIENT
Start: 2022-03-15

## 2022-03-15 RX ORDER — ALBUTEROL SULFATE 90 UG/1
2 AEROSOL, METERED RESPIRATORY (INHALATION) EVERY 6 HOURS PRN
Qty: 8.5 G | Refills: 0 | Status: SHIPPED | OUTPATIENT
Start: 2022-03-15

## 2022-03-15 RX ORDER — GUAIFENESIN 600 MG
600 TABLET, EXTENDED RELEASE 12 HR ORAL 2 TIMES DAILY PRN
Qty: 30 TABLET | Refills: 0 | Status: SHIPPED | OUTPATIENT
Start: 2022-03-15 | End: 2022-05-11 | Stop reason: ALTCHOICE

## 2022-03-15 RX ORDER — PANTOPRAZOLE SODIUM 40 MG/1
40 TABLET, DELAYED RELEASE ORAL
Qty: 30 TABLET | Refills: 0 | Status: SHIPPED | OUTPATIENT
Start: 2022-03-16

## 2022-03-15 RX ADMIN — BACITRACIN 1 SMALL APPLICATION: 500 OINTMENT TOPICAL at 21:37

## 2022-03-15 RX ADMIN — HEPARIN SODIUM 5000 UNITS: 5000 INJECTION INTRAVENOUS; SUBCUTANEOUS at 05:47

## 2022-03-15 RX ADMIN — IPRATROPIUM BROMIDE 0.5 MG: 0.5 SOLUTION RESPIRATORY (INHALATION) at 07:40

## 2022-03-15 RX ADMIN — FLUTICASONE PROPIONATE 1 SPRAY: 50 SPRAY, METERED NASAL at 18:22

## 2022-03-15 RX ADMIN — METOPROLOL TARTRATE 25 MG: 25 TABLET, FILM COATED ORAL at 09:21

## 2022-03-15 RX ADMIN — NYSTATIN 1 APPLICATION: 100000 POWDER TOPICAL at 15:56

## 2022-03-15 RX ADMIN — ALPRAZOLAM 0.5 MG: 0.5 TABLET ORAL at 21:37

## 2022-03-15 RX ADMIN — LIDOCAINE 1 PATCH: 50 PATCH CUTANEOUS at 09:21

## 2022-03-15 RX ADMIN — METFORMIN HYDROCHLORIDE 500 MG: 500 TABLET ORAL at 15:48

## 2022-03-15 RX ADMIN — FLUTICASONE PROPIONATE 1 SPRAY: 50 SPRAY, METERED NASAL at 09:20

## 2022-03-15 RX ADMIN — METOPROLOL TARTRATE 25 MG: 25 TABLET, FILM COATED ORAL at 21:37

## 2022-03-15 RX ADMIN — IPRATROPIUM BROMIDE 0.5 MG: 0.5 SOLUTION RESPIRATORY (INHALATION) at 14:00

## 2022-03-15 RX ADMIN — GUAIFENESIN 600 MG: 600 TABLET, EXTENDED RELEASE ORAL at 09:21

## 2022-03-15 RX ADMIN — HEPARIN SODIUM 5000 UNITS: 5000 INJECTION INTRAVENOUS; SUBCUTANEOUS at 21:38

## 2022-03-15 RX ADMIN — ACETAMINOPHEN 650 MG: 325 TABLET, FILM COATED ORAL at 18:19

## 2022-03-15 RX ADMIN — ACETAMINOPHEN 650 MG: 325 TABLET, FILM COATED ORAL at 09:21

## 2022-03-15 RX ADMIN — LEVALBUTEROL HYDROCHLORIDE 1.25 MG: 1.25 SOLUTION, CONCENTRATE RESPIRATORY (INHALATION) at 14:00

## 2022-03-15 RX ADMIN — HEPARIN SODIUM 5000 UNITS: 5000 INJECTION INTRAVENOUS; SUBCUTANEOUS at 15:48

## 2022-03-15 RX ADMIN — NYSTATIN 1 APPLICATION: 100000 POWDER TOPICAL at 09:21

## 2022-03-15 RX ADMIN — GUAIFENESIN 600 MG: 600 TABLET, EXTENDED RELEASE ORAL at 21:37

## 2022-03-15 RX ADMIN — METFORMIN HYDROCHLORIDE 500 MG: 500 TABLET ORAL at 09:20

## 2022-03-15 RX ADMIN — BACITRACIN 1 SMALL APPLICATION: 500 OINTMENT TOPICAL at 09:21

## 2022-03-15 RX ADMIN — LEVALBUTEROL HYDROCHLORIDE 1.25 MG: 1.25 SOLUTION, CONCENTRATE RESPIRATORY (INHALATION) at 07:40

## 2022-03-15 RX ADMIN — PANTOPRAZOLE SODIUM 40 MG: 40 TABLET, DELAYED RELEASE ORAL at 05:49

## 2022-03-15 RX ADMIN — OXYBUTYNIN CHLORIDE 5 MG: 5 TABLET, EXTENDED RELEASE ORAL at 21:37

## 2022-03-15 RX ADMIN — GLIPIZIDE 2.5 MG: 2.5 TABLET, EXTENDED RELEASE ORAL at 09:21

## 2022-03-15 RX ADMIN — NYSTATIN 1 APPLICATION: 100000 POWDER TOPICAL at 21:37

## 2022-03-15 RX ADMIN — BENZONATATE 200 MG: 100 CAPSULE ORAL at 21:37

## 2022-03-15 NOTE — RESPIRATORY THERAPY NOTE
Home Oxygen Qualifying Test     Patient name: Harvinder Ling        : 1970   Date of Test:  March 15, 2022  Diagnosis:    Home Oxygen Test:    **Medicare Guidelines require item(s) 1-5 on all ambulatory patients or 1 and 2 on non-ambulatory patients  1  Baseline SPO2 on Room Air at rest 87 %   a  If <= 88% on Room Air add O2 via NC to obtain SpO2 >=88%  If LPM needed, document LPM 2 needed to reach =>88%    2  SPO2 during exertion on Room Air N/A    a  During exertion monitor SPO2  If SPO2 increases >=89%, do not add supplemental oxygen    3  SPO2 on Oxygen at Rest 92 % at 4 LPM    4  SPO2 during exertion on Oxygen 89 % at 4 LPM    5  Test performed during exertion activity  [x]  Supplemental Home Oxygen is indicated  []  Client does not qualify for home oxygen  Respiratory Additional Notes- Pt able to walk for 6 minute requiring 4L oxygen with exertion   Pt had some breaks during test due to dyspnea on exertion but was able to complete the test     Bruce Corral, RT

## 2022-03-15 NOTE — DISCHARGE INSTRUCTIONS
[address]    Your healthcare provider and/or public health staff have evaluated you and have determined that you do not need to remain in the hospital at this time  At this time you can be isolated at home where you will be monitored by staff from your local or state health department  You should carefully follow the prevention and isolation steps below until a healthcare provider or local or state health department says that you can return to your normal activities  Stay home except to get medical care    People who are mildly ill with COVID-19 are able to isolate at home during their illness  You should restrict activities outside your home, except for getting medical care  Do not go to work, school, or public areas  Avoid using public transportation, ride-sharing, or taxis  Separate yourself from other people and animals in your home    People: As much as possible, you should stay in a specific room and away from other people in your home  Also, you should use a separate bathroom, if available  Animals: You should restrict contact with pets and other animals while you are sick with COVID-19, just like you would around other people  Although there have not been reports of pets or other animals becoming sick with COVID-19, it is still recommended that people sick with COVID-19 limit contact with animals until more information is known about the virus  When possible, have another member of your household care for your animals while you are sick  If you are sick with COVID-19, avoid contact with your pet, including petting, snuggling, being kissed or licked, and sharing food  If you must care for your pet or be around animals while you are sick, wash your hands before and after you interact with pets and wear a facemask  See COVID-19 and Animals for more information      Call ahead before visiting your doctor    If you have a medical appointment, call the healthcare provider and tell them that you have or may have COVID-19  This will help the healthcare providers office take steps to keep other people from getting infected or exposed  Wear a facemask    You should wear a facemask when you are around other people (e g , sharing a room or vehicle) or pets and before you enter a healthcare providers office  If you are not able to wear a facemask (for example, because it causes trouble breathing), then people who live with you should not stay in the same room with you, or they should wear a facemask if they enter your room  Cover your coughs and sneezes    Cover your mouth and nose with a tissue when you cough or sneeze  Throw used tissues in a lined trash can  Immediately wash your hands with soap and water for at least 20 seconds or, if soap and water are not available, clean your hands with an alcohol-based hand  that contains at least 60% alcohol  Clean your hands often    Wash your hands often with soap and water for at least 20 seconds, especially after blowing your nose, coughing, or sneezing; going to the bathroom; and before eating or preparing food  If soap and water are not readily available, use an alcohol-based hand  with at least 60% alcohol, covering all surfaces of your hands and rubbing them together until they feel dry  Soap and water are the best option if hands are visibly dirty  Avoid touching your eyes, nose, and mouth with unwashed hands  Avoid sharing personal household items    You should not share dishes, drinking glasses, cups, eating utensils, towels, or bedding with other people or pets in your home  After using these items, they should be washed thoroughly with soap and water  Clean all high-touch surfaces everyday    High touch surfaces include counters, tabletops, doorknobs, bathroom fixtures, toilets, phones, keyboards, tablets, and bedside tables  Also, clean any surfaces that may have blood, stool, or body fluids on them   Use a household cleaning spray or wipe, according to the label instructions  Labels contain instructions for safe and effective use of the cleaning product including precautions you should take when applying the product, such as wearing gloves and making sure you have good ventilation during use of the product  Monitor your symptoms    Seek prompt medical attention if your illness is worsening (e g , difficulty breathing)  Before seeking care, call your healthcare provider and tell them that you have, or are being evaluated for, COVID-19  Put on a facemask before you enter the facility  These steps will help the healthcare providers office to keep other people in the office or waiting room from getting infected or exposed  Ask your healthcare provider to call the local or [facility] health department  Persons who are placed under active monitoring or facilitated self-monitoring should follow instructions provided by their local health department or occupational health professionals, as appropriate  If you have a medical emergency and need to call 911, notify the dispatch personnel that you have, or are being evaluated for COVID-19  If possible, put on a facemask before emergency medical services arrive      Discontinuing home isolation    Patients with confirmed COVID-19 should remain under home isolation precautions until the following conditions are met:   - They have had no fever for at least 24 hours (that is one full day of no fever without the use medicine that reduces fevers)  AND  - other symptoms have improved (for example, when their cough or shortness of breath have improved)  AND  - If had mild or moderate illness, at least 10 days have passed since their symptoms first appeared or if severe illness (needed oxygen) or immunosuppressed, at least 20 days have passed since symptoms first appeared  Patients with confirmed COVID-19 should also notify close contacts (including their workplace) and ask that they self-quarantine  Currently, close contact is defined as being within 6 feet for 15 minutes or more from the period 24 hours starting 48 hours before symptom onset to the time at which the patient went into isolation  Close contacts of patients diagnosed with COVID-19 should be instructed by the patient to self-quarantine for 14 days from the last time of their last contact with the patient  Source: [employer]    ==========================================================  COVID-19 Vaccine (2 Dose)   Please visit [facility] or call [phone] option [phone] to schedule your vaccine  AMBULATORY CARE:   What you need to know about COVID-19 2-dose vaccines: The COVID-19 vaccine is a shot given to help prevent severe illness  The vaccines do not contain the virus that causes COVID-19  This means you are not at risk for getting COVID-19 from a vaccine  Instead, the vaccines teach your immune system to recognize the virus and produce antibodies to fight it  Healthcare providers recommend a COVID-19 vaccine, even if you have already had COVID-19  What you need to know about COVID-19 vaccine approvals:   · Several 2-dose vaccines have emergency use authorization (EUA)  An EUA means the vaccine is not officially approved but is used because the benefits outweigh the risks  · One 2-dose vaccine has full approval for those 16 or older  This vaccine also has an EUA for children 11to 13years old  · No COVID-19 vaccine is currently available to children younger than 5 years  How the vaccine is given:  The vaccine is given as a shot in your shoulder area  Two doses are needed for full protection  Make an appointment to get the second dose at the time recommended by your healthcare provider  A third (booster) dose is recommended for all adults 18 or older  This dose is given at least 6 months after the second dose   The booster can be a different brand of the COVID-19 vaccine than you originally received  Wait to get the vaccine if:   · You are sick or have a fever  · You took acetaminophen or ibuprofen the day you are to get the shot  Before you get the vaccine, tell your healthcare provider if:   · You have thrombocytopenia, a blood clotting disorder, or are taking blood thinning medicines  · Your immune system is weakened from medicines such as chemotherapy or steroids  · You know or think you are pregnant  Talk to your obstetrician or doctor about the benefits and risks of the vaccine during pregnancy  · You are breastfeeding  · You have an allergy to any component (part) of the vaccine  · You have a history of allergies  After you get the vaccine: You will be considered fully vaccinated 2 weeks after you get the final dose   This is how long your immune system needs to build a response strong enough to protect you  The following are guidelines to follow after you are fully vaccinated :  · Continue being careful until experts are sure a fully vaccinated person cannot get infected or pass the virus to others  Wear a face covering and stay 6 feet away from others when you are in public  Remember to wash your hands often  · It is considered safe to gather indoors without face coverings or social distancing if everyone there is fully vaccinated  · If you are exposed to the virus, you do not need to isolate or get tested unless you develop symptoms  · Check to see if you need to be tested before you travel  You may also need to quarantine after you return  Some countries require proof of a negative test before you leave  You should also be tested 3 to 5 days after you return from another country  · You may need to wear a face covering (mask) while you travel by plane, bus, or train  Risks of the vaccine: This is a new vaccine  All side effects may not be known  · You may have an allergic reaction to the vaccine      · You may not be able to get a second dose if you had a severe allergic reaction to the first dose  It may be life-threatening  · The vaccine may cause mild symptoms, such as a fever, chills, headache, and muscle aches  · You may develop swelling in and around your heart  Seek help immediately if you have chest pain, shortness of breath, or a fast, fluttering, or pounding heartbeat  · The area where you got the shot may be swollen, sore, or tender  This is usually mild and goes away in a few hours  It may help to move your arm around  · The vaccine may not be as helpful if your immune system is weak  · You may still get infected with the coronavirus after you receive the vaccine  What else you need to know about the vaccine:   · It is possible to become infected even after you get the vaccine  The vaccine helps lower the risk, but no vaccine can completely prevent infection  The vaccine helps prevent severe symptoms of COVID-19 or the health problems it can cause  · A vaccinated person will not automatically test positive for COVID-19  It is possible to become infected 2 weeks after your last dose  You may also be able to pass the virus to others without knowing you are infected  Be sure to continue following social distancing guidelines in your area  · The vaccine does not cause reproductive problems  Your healthcare provider can give you more information about the safety of vaccines before or during pregnancy  The vaccine is not believed to cause fertility problems  · Your DNA will not be changed  The vaccine does not affect the part of cells that control DNA  · Your child may need to be fully vaccinated to attend school  Ask your child's school as soon as possible  Vaccine requirements may vary by area  Call your local emergency number (911 in the [address] [place] [address],[address]) if:   · You have signs of a severe allergic reaction, such as trouble breathing, hives, or wheezing      · Your mouth and throat are swollen  · You have chest pain or your heart is beating faster than normal for you  · Your face is red or swollen  · You have hives that spread over your body  Call your doctor or the place where you got the vaccine if:   · You have a fever  · You have any other signs or symptoms that concern you or do not go away  · You have increased pain, redness, or swelling around the area where the shot was given  · You have questions or concerns about the COVID-19 vaccine  Apply a warm compress  to the area where the shot was given to decrease pain and swelling  It may also help to move your arm around  Other ways to prevent coronavirus infection:  Droplets are the most common way all coronaviruses spread  The virus spreads from person to person through talking, singing, coughing, and sneezing  Continue to do the following to keep yourself and others safe, even if you have had the vaccine:  · Wash your hands often throughout the day  Use hand  that contains alcohol if soap and water are not available  Do not touch your eyes, nose, or mouth without washing your hands first  Teach children how to wash their hands and use hand   · A face covering  is recommended when you are in public, such as grocery stores, pharmacies, and on mass transit  You may live in an area that has a face covering mandate  This means you must wear a covering in public  Check the laws in your area before you leave your home  Bring extra coverings with you  Follow up with your doctor as directed:  Write down your questions so you remember to ask them during your visits  © Copyright [employer] 2022 Information is for End User's use only and may not be sold, redistributed or otherwise used for commercial purposes  All illustrations and images included in CareNotes® are the copyrighted property of [email or web address]  or Hermila Geronimo   The above information is an  only   It is not intended as medical advice for individual conditions or treatments  Talk to your doctor, nurse or pharmacist before following any medical regimen to see if it is safe and effective for you

## 2022-03-15 NOTE — ASSESSMENT & PLAN NOTE
· Patient has no prior history of diabetes  · Was noted to have steroid induced hyperglycemia  · A1c however 6 6, concerning for newly diagnosed diabetes type 2   · While in the ICU patient was initiated on insulin  · Blood sugars currently stable on metformin and glipizide: Will transition regimen to metformin 1000 mg b i d   For monotherapy and discontinue glipizide, to avoid polypharmacy  · Sent prescription for glucometer and testing supplies to pharmacy today, in anticipation of discharge tomorrow  · Nursing staff educated patient on glucometer, lancets, and home testing  · Patient will need outpatient diabetic education classes, coordinated with her PCP    Lab Results   Component Value Date    HGBA1C 6 6 (H) 02/16/2022     Results from last 7 days   Lab Units 03/15/22  1601 03/15/22  1115 03/15/22  0732 03/14/22  2052 03/14/22  1631 03/14/22  1132 03/14/22  0742 03/13/22  2134   POC GLUCOSE mg/dl 128 114 113 147* 133 127 108 104

## 2022-03-15 NOTE — PROGRESS NOTES
[address] [facility]  Progress Note - Ronal Half 1970, 46 y o  female MRN: [id number]  [facility] [place] [id number] Encounter: [id number]  Primary Care Provider: Judith Ash MD   Date and time admitted to hospital: 2/16/2022  7:01 PM    * Acute respiratory failure with hypoxia [facility])  Assessment & Plan  Pt is a 49-year-old female who was hospitalized 2/16 for acute respiratory failure with hypoxia secondary to COVID pneumonia/fibrosis pulmonary edema and pneumonitis  Was on high-flow nasal cannula for 19 days  · Was initially hospitalized in the ICU 2/16-3/5  · Was on high-flow nasal cannula, as well as BiPAP  · Patient was on Veletri infusion  · followed by pulmonology service and completed a prednisone taper  · Has been further transitioned from high-flow oxygen, down to mid flow oxygen, down to nasal cannula  · Patient continued to improve and currently has adequate oxygenations on 3 L  · Home O2 eval performed this afternoon:  87% saturation room air at rest   92% saturation 4 L nasal cannula at rest   89% saturation 4 L with exertion  · Patient will require home O2, with portability, and concentrator  · Discussed with :  Patient will be provided a pulse oximeter at discharge    Paroxysmal atrial fibrillation (HCC)-resolved as of 3/15/2022  Assessment & Plan  · While in the ICU patient was diagnosed with new onset atrial fibrillation, secondary to hypoxia/respiratory distress  · She was initially started on amiodarone drip, as well as heparin infusion for anticoagulation  · Patient returned to normal sinus rhythm, has not had any additional arrhythmias  · 02/17/2022 echocardiogram left ventricular ejection fraction 65%  Wall motion normal   Grade 1 diastolic dysfunction  · Continue metoprolol 25 mg q 12 hours     · Not on therapeutic anticoagulation    OAB (overactive bladder)  Assessment & Plan  · Started oxybutynin with improvement in her symptoms    Acute diastolic heart failure ([facility] )  Assessment & Plan  Wt Readings from Last 3 Encounters:   03/15/22 96 3 kg (212 lb 4 9 oz)   01/06/22 100 kg (221 lb)       While in the ICU, patient was diagnosed with acute diastolic congestive heart failure and diuresed with IV Lasix  She clinically improved  2D echocardiogram with left ventricular ejection fraction 65% and grade 1 diastolic dysfunction  Patient does not require maintenance diuretics      Transaminitis  Assessment & Plan  Patient had mild transaminitis secondary to COVID  Since normalized    HTN (hypertension)  Assessment & Plan  · Patient has history essential hypertension  · Continue metoprolol 25 mg q 12 hours  · Blood pressure adequately controlled  Continue to monitor and titrate    Anxiety  Assessment & Plan  · Patient was noted to have anxiety during her hospital stay, and was critically ill, in the ICU  · She was prescribed Alprazolam p r n  · Patient's medical condition has markedly improved  · Benzodiazepines are not recommended for long-term therapy, and will not be continued at discharge  · She will continue to follow-up with her PCP    BMI 50 0-59 9, adult ([place] [address] )  Assessment & Plan  · Body mass index is 51 19 kg/m²  · Dietary counseling, lifestyle modification    Type 2 diabetes mellitus without complication, without long-term current use of insulin ([place] [address] )  Assessment & Plan  · Patient has no prior history of diabetes  · Was noted to have steroid induced hyperglycemia  · A1c however 6 6, concerning for newly diagnosed diabetes type 2   · While in the ICU patient was initiated on insulin  · Blood sugars currently stable on metformin and glipizide: Will transition regimen to metformin 1000 mg b i d   For monotherapy and discontinue glipizide, to avoid polypharmacy  · Sent prescription for glucometer and testing supplies to pharmacy today, in anticipation of discharge tomorrow  · Nursing staff educated patient on glucometer, lancets, and home testing  · Patient will need outpatient diabetic education classes, coordinated with her PCP    Lab Results   Component Value Date    HGBA1C 6 6 (H) 02/16/2022     Results from last 7 days   Lab Units 03/15/22  1601 03/15/22  1115 03/15/22  0732 03/14/22  2052 03/14/22  1631 03/14/22  1132 03/14/22  0742 03/13/22  2134   POC GLUCOSE mg/dl 128 114 113 147* 133 127 108 104       Thrombocytosis  Assessment & Plan  Patient had thrombocytosis secondary to her acute infection which has since normalized    Sepsis due to COVID-19 [facility])  Assessment & Plan  · Patient initially presented with sepsis, secondary to COVID-19 pneumonia  · Patient was treated in the ICU and improved    Pneumonia due to COVID-19 virus  Assessment & Plan  -patient was initially diagnosed with COVID-19 on 01/06 in the ER, unvaccinated  -she was followed by primary care, with 3 telemedicine visits:  She declined monoclonal antibody  -patient notes she felt progressively worse, until she presented in the ER 2/16, and respiratory distress  -patient was hospitalized in the ICU  -CTA of the chest revealed no evidence of pulmonary artery embolus    Scattered airspace opacities in the lungs  -in the ICU patient was evaluated for late COVID pneumonitis with possible superimposed atypical bacterial pneumonia versus pulmonary edema  -she had intermittent fevers and was initially started on antibiotics with ceftriaxone/azithromycin, as well as IV steroids, and bronchodilators, and baricitinib  -patient's blood cultures were negative x2  -she was also noted to have a witnessed aspiration event  -patient has clinically improved        VTE Pharmacologic Prophylaxis: Heparin  VTE Mechanical Prophylaxis: sequential compression device    Discussed with patient's nurse  Discussed with   Anticipate discharge home tomorrow after mold inspection today    Certification Statement: The patient will continue to require additional inpatient hospital stay due to need for further acute intervention for discharge planning    Status: inpatient     ===================================================================    Subjective:  Patient notes she feels better  She denies any shortness of breath at rest   Notes dyspnea with exertion  Cough has improved  Denies any pain anywhere  Tolerating p o  With good appetite  Denies any nausea, vomiting, diarrhea  Was ambulating multiple laps without any dizziness or lightheadedness  Physical Exam:   Temp:  [98 5 °F (36 9 °C)-98 9 °F (37 2 °C)] 98 7 °F (37 1 °C)  HR:  [] 98  Resp:  [18-20] 18  BP: (136-143)/(84-91) 136/84    Gen:  Pleasant, non-tachypnic, non-dyspnic  Conversant  Able to speak in complete sentences without having to stop for dyspnea  Heart: regular rate and rhythm, S1S2 present, no murmur, rub or gallop  Lungs: clear to ausculatation bilaterally  No wheezing, crackles, or rhonchi  No accessory muscle use or respiratory distress  Abd: soft, non-tender, non-distended  NABS, no guarding, rebound or peritoneal signs  Neuro: awake, alert  Fluent speech  Strength globally intact  Skin: warm and dry: no petechiae, purpura and rash  LABS:   Results from last 7 days   Lab Units 03/13/22  0531   WBC Thousand/uL 11 58*   HEMOGLOBIN g/dL 13 8   HEMATOCRIT % 42 5   PLATELETS Thousands/uL 218     Results from last 7 days   Lab Units 03/13/22  0527   POTASSIUM mmol/L 3 9   CHLORIDE mmol/L 100   CO2 mmol/L 29   BUN mg/dL 15   CREATININE mg/dL 0 72   CALCIUM mg/dL 9 8       Hospital Data:    3/4 CXR:  No pneumothorax    Bilateral mixed interstitial and alveolar opacities without significant change from prior     3/3 CXR:  No pneumothorax   Bilateral diffuse parenchymal interstitial changes unchanged     3/3 CXR:  No change in moderate bilateral groundglass opacity, the sequela of Covid-19     2/23 CXR:  Persistent bilateral interstitial opacities and superimposed infiltrates     2/16 CTa chest:  No evidence of pulmonary artery embolus  Scattered airspace opacities within the lungs which may represent infection  Recommend short-term follow-up chest CT scan in 3 months to evaluate for resolution     2/16 CXR:   Multifocal groundglass infiltrates concerning for  Covid  19 infection     2/23: blood cx: neg    2/17:  2D echocardiogram  Left Ventricle: Left ventricular cavity size is normal  The left ventricular ejection fraction is 65% by visual estimation  Systolic function is normal  Wall motion is normal  There is mild asymmetric hypertrophy  Diastolic function is mildly abnormal, consistent with grade I (abnormal) relaxation    Right Ventricle: Right ventricular cavity size is normal  Systolic function is low normal          ---------------------------------------------------------------------------------------------------------------  This note has been constructed using a voice recognition system

## 2022-03-15 NOTE — ASSESSMENT & PLAN NOTE
· While in the ICU patient was diagnosed with new onset atrial fibrillation, secondary to hypoxia/respiratory distress  · She was initially started on amiodarone drip, as well as heparin infusion for anticoagulation  · Patient returned to normal sinus rhythm, has not had any additional arrhythmias  · 02/17/2022 echocardiogram left ventricular ejection fraction 65%  Wall motion normal   Grade 1 diastolic dysfunction  · Continue metoprolol 25 mg q 12 hours     · Not on therapeutic anticoagulation

## 2022-03-15 NOTE — ASSESSMENT & PLAN NOTE
Pt is a 14-year-old female who was hospitalized 2/16 for acute respiratory failure with hypoxia secondary to COVID pneumonia/fibrosis pulmonary edema and pneumonitis  Was on high-flow nasal cannula for 19 days  · Was initially hospitalized in the ICU 2/16-3/5  · Was on high-flow nasal cannula, as well as BiPAP  · Patient was on Veletri infusion  · followed by pulmonology service and completed a prednisone taper    · Has been further transitioned from high-flow oxygen, down to mid flow oxygen, down to nasal cannula  · Patient continued to improve and currently has adequate oxygenations on 3 L  · Home O2 eval performed this afternoon:  87% saturation room air at rest   92% saturation 4 L nasal cannula at rest   89% saturation 4 L with exertion  · Patient will require home O2, with portability, and concentrator  · Discussed with :  Patient will be provided a pulse oximeter at discharge

## 2022-03-15 NOTE — ASSESSMENT & PLAN NOTE
· Patient was noted to have anxiety during her hospital stay, and was critically ill, in the ICU  · She was prescribed Alprazolam p r n  · Patient's medical condition has markedly improved    · Benzodiazepines are not recommended for long-term therapy, and will not be continued at discharge  · She will continue to follow-up with her PCP

## 2022-03-15 NOTE — PLAN OF CARE
Problem: MOBILITY - ADULT  Goal: Maintain or return to baseline ADL function  Description: INTERVENTIONS:  -  Assess patient's ability to carry out ADLs; assess patient's baseline for ADL function and identify physical deficits which impact ability to perform ADLs (bathing, care of mouth/teeth, toileting, grooming, dressing, etc )  - Assess/evaluate cause of self-care deficits   - Assess range of motion  - Assess patient's mobility; develop plan if impaired  - Assess patient's need for assistive devices and provide as appropriate  - Encourage maximum independence but intervene and supervise when necessary  - Involve family in performance of ADLs  - Assess for home care needs following discharge   - Consider OT consult to assist with ADL evaluation and planning for discharge  - Provide patient education as appropriate  Outcome: Progressing  Goal: Maintains/Returns to pre admission functional level  Description: INTERVENTIONS:  - Perform BMAT or MOVE assessment daily    - Set and communicate daily mobility goal to care team and patient/family/caregiver  - Collaborate with rehabilitation services on mobility goals if consulted  - Perform Range of Motion 5 times a day  - Reposition patient every 5 hours    - Dangle patient 5 times a day  - Stand patient 5 times a day  - Ambulate patient 5 times a day  - Out of bed to chair 5 times a day   - Out of bed for meals 5 times a day  - Out of bed for toileting  - Record patient progress and toleration of activity level   Outcome: Progressing     Problem: RESPIRATORY - ADULT  Goal: Achieves optimal ventilation and oxygenation  Description: INTERVENTIONS:  - Assess for changes in respiratory status  - Assess for changes in mentation and behavior  - Position to facilitate oxygenation and minimize respiratory effort  - Oxygen administered by appropriate delivery if ordered  - Initiate smoking cessation education as indicated  - Encourage broncho-pulmonary hygiene including cough, deep breathe, Incentive Spirometry  - Assess the need for suctioning and aspirate as needed  - Assess and instruct to report SOB or any respiratory difficulty  - Respiratory Therapy support as indicated  Outcome: Progressing     Problem: METABOLIC, FLUID AND ELECTROLYTES - ADULT  Goal: Glucose maintained within target range  Description: INTERVENTIONS:  - Monitor Blood Glucose as ordered  - Assess for signs and symptoms of hyperglycemia and hypoglycemia  - Administer ordered medications to maintain glucose within target range  - Assess nutritional intake and initiate nutrition service referral as needed  Outcome: Progressing     Problem: DISCHARGE PLANNING  Goal: Discharge to home or other facility with appropriate resources  Description: INTERVENTIONS:  - Identify barriers to discharge w/patient and caregiver  - Arrange for needed discharge resources and transportation as appropriate  - Identify discharge learning needs (meds, wound care, etc )  - Arrange for interpretive services to assist at discharge as needed  - Refer to Case Management Department for coordinating discharge planning if the patient needs post-hospital services based on physician/advanced practitioner order or complex needs related to functional status, cognitive ability, or social support system  Outcome: Progressing

## 2022-03-15 NOTE — ASSESSMENT & PLAN NOTE
-patient was initially diagnosed with COVID-19 on 01/06 in the ER, unvaccinated  -she was followed by primary care, with 3 telemedicine visits:  She declined monoclonal antibody  -patient notes she felt progressively worse, until she presented in the ER 2/16, and respiratory distress  -patient was hospitalized in the ICU  -CTA of the chest revealed no evidence of pulmonary artery embolus    Scattered airspace opacities in the lungs  -in the ICU patient was evaluated for late COVID pneumonitis with possible superimposed atypical bacterial pneumonia versus pulmonary edema  -she had intermittent fevers and was initially started on antibiotics with ceftriaxone/azithromycin, as well as IV steroids, and bronchodilators, and baricitinib  -patient's blood cultures were negative x2  -she was also noted to have a witnessed aspiration event  -patient has clinically improved

## 2022-03-15 NOTE — CASE MANAGEMENT
Case Management Progress Note    Patient name [facility] Vielka Penaloza* MRN [id number]  : 1970 Date 3/15/2022       LOS (days): 27  Geometric Mean LOS (GMLOS) (days): 4 80  Days to GMLOS:-21 8        OBJECTIVE:        Current admission status: Inpatient  Preferred Pharmacy:   PATIENT/FAMILY REPORTS NO PREFERRED PHARMACY  No address on file      [address] [facility]  Phone: [phone] Fax: [phone]    Primary Care Provider: Shey Colon MD    Primary Insurance: [facility]  Secondary Insurance:     PROGRESS NOTE:    Cm reviewed pt care coordination rounds with attending  Pt is medically cleared for d/c tomorrow  Home O2 eval to be done today  Cm department will continue to follow up for final medical clearance and dcp needs  Cm was made aware that meds and DM supplies was sent to [facility] pharmacy  Cm was informed that everything was covered and pt has a $4 copay  Cm informed provider and patient regarding this  Pt verbalized that she needs education for BS monitoring and wants to see a nutritionist to educate about her DM diet  Cm informed attending and nursing team and a consult to nutritionist was made and nursing is providing teaching on glucometer  Pt verbalized that she lives alone and will not have a ride back home  Awaiting for O2 eval to determine if pt will need BLS vs Ly[employer]  Cm department will continue to follow up

## 2022-03-15 NOTE — ASSESSMENT & PLAN NOTE
Wt Readings from Last 3 Encounters:   03/15/22 96 3 kg (212 lb 4 9 oz)   01/06/22 100 kg (221 lb)       While in the ICU, patient was diagnosed with acute diastolic congestive heart failure and diuresed with IV Lasix  She clinically improved  2D echocardiogram with left ventricular ejection fraction 65% and grade 1 diastolic dysfunction  Patient does not require maintenance diuretics

## 2022-03-16 VITALS
SYSTOLIC BLOOD PRESSURE: 111 MMHG | RESPIRATION RATE: 18 BRPM | TEMPERATURE: 97.9 F | BODY MASS INDEX: 49.39 KG/M2 | HEIGHT: 55 IN | OXYGEN SATURATION: 96 % | WEIGHT: 213.41 LBS | HEART RATE: 101 BPM | DIASTOLIC BLOOD PRESSURE: 80 MMHG

## 2022-03-16 PROBLEM — U07.1 SEPSIS DUE TO COVID-19 (HCC): Status: RESOLVED | Noted: 2022-02-16 | Resolved: 2022-03-16

## 2022-03-16 PROBLEM — D75.839 THROMBOCYTOSIS: Status: RESOLVED | Noted: 2022-02-16 | Resolved: 2022-03-16

## 2022-03-16 PROBLEM — I50.31 ACUTE DIASTOLIC HEART FAILURE (HCC): Status: RESOLVED | Noted: 2022-02-26 | Resolved: 2022-03-16

## 2022-03-16 PROBLEM — R74.01 TRANSAMINITIS: Status: RESOLVED | Noted: 2022-02-23 | Resolved: 2022-03-16

## 2022-03-16 PROBLEM — A41.89 SEPSIS DUE TO COVID-19 (HCC): Status: RESOLVED | Noted: 2022-02-16 | Resolved: 2022-03-16

## 2022-03-16 LAB
DME PARACHUTE DELIVERY DATE ACTUAL: NORMAL
DME PARACHUTE DELIVERY DATE EXPECTED: NORMAL
DME PARACHUTE DELIVERY DATE REQUESTED: NORMAL
DME PARACHUTE DELIVERY NOTE: NORMAL
DME PARACHUTE ITEM DESCRIPTION: NORMAL
DME PARACHUTE ORDER STATUS: NORMAL
DME PARACHUTE SUPPLIER NAME: NORMAL
DME PARACHUTE SUPPLIER PHONE: NORMAL
GLUCOSE SERPL-MCNC: 111 MG/DL (ref 65–140)
GLUCOSE SERPL-MCNC: 150 MG/DL (ref 65–140)
GLUCOSE SERPL-MCNC: 97 MG/DL (ref 65–140)

## 2022-03-16 PROCEDURE — 82948 REAGENT STRIP/BLOOD GLUCOSE: CPT

## 2022-03-16 PROCEDURE — 99239 HOSP IP/OBS DSCHRG MGMT >30: CPT | Performed by: INTERNAL MEDICINE

## 2022-03-16 PROCEDURE — 94640 AIRWAY INHALATION TREATMENT: CPT

## 2022-03-16 RX ADMIN — METOPROLOL TARTRATE 25 MG: 25 TABLET, FILM COATED ORAL at 08:10

## 2022-03-16 RX ADMIN — IPRATROPIUM BROMIDE 0.5 MG: 0.5 SOLUTION RESPIRATORY (INHALATION) at 07:40

## 2022-03-16 RX ADMIN — ACETAMINOPHEN 650 MG: 325 TABLET, FILM COATED ORAL at 13:49

## 2022-03-16 RX ADMIN — GUAIFENESIN 600 MG: 600 TABLET, EXTENDED RELEASE ORAL at 08:10

## 2022-03-16 RX ADMIN — NYSTATIN 1 APPLICATION: 100000 POWDER TOPICAL at 08:12

## 2022-03-16 RX ADMIN — INSULIN LISPRO 2 UNITS: 100 INJECTION, SOLUTION INTRAVENOUS; SUBCUTANEOUS at 12:38

## 2022-03-16 RX ADMIN — HEPARIN SODIUM 5000 UNITS: 5000 INJECTION INTRAVENOUS; SUBCUTANEOUS at 05:20

## 2022-03-16 RX ADMIN — LEVALBUTEROL HYDROCHLORIDE 1.25 MG: 1.25 SOLUTION, CONCENTRATE RESPIRATORY (INHALATION) at 07:40

## 2022-03-16 RX ADMIN — FLUTICASONE PROPIONATE 1 SPRAY: 50 SPRAY, METERED NASAL at 08:11

## 2022-03-16 RX ADMIN — METFORMIN HYDROCHLORIDE 500 MG: 500 TABLET ORAL at 17:14

## 2022-03-16 RX ADMIN — FLUTICASONE PROPIONATE 1 SPRAY: 50 SPRAY, METERED NASAL at 17:14

## 2022-03-16 RX ADMIN — PANTOPRAZOLE SODIUM 40 MG: 40 TABLET, DELAYED RELEASE ORAL at 05:20

## 2022-03-16 RX ADMIN — GLIPIZIDE 2.5 MG: 2.5 TABLET, EXTENDED RELEASE ORAL at 08:12

## 2022-03-16 RX ADMIN — METFORMIN HYDROCHLORIDE 500 MG: 500 TABLET ORAL at 08:10

## 2022-03-16 RX ADMIN — LIDOCAINE 1 PATCH: 50 PATCH CUTANEOUS at 08:10

## 2022-03-16 RX ADMIN — BENZONATATE 200 MG: 100 CAPSULE ORAL at 09:22

## 2022-03-16 RX ADMIN — BACITRACIN 1 SMALL APPLICATION: 500 OINTMENT TOPICAL at 08:10

## 2022-03-16 NOTE — ASSESSMENT & PLAN NOTE
Pt is a 68-year-old female who was hospitalized 2/16 for acute respiratory failure with hypoxia secondary to COVID pneumonia/fibrosis pulmonary edema and pneumonitis  Was on high-flow nasal cannula for 19 days  · Was initially hospitalized in the ICU 2/16-3/5  · Was on high-flow nasal cannula, as well as BiPAP  · Patient was on Veletri infusion  · followed by pulmonology service and completed a prednisone taper    · Has been further transitioned from high-flow oxygen, down to mid flow oxygen, down to nasal cannula  · Patient continued to improve and currently has adequate oxygenations on 3 L  · Home O2 eval performed this afternoon:  87% saturation room air at rest   92% saturation 4 L nasal cannula at rest   89% saturation 4 L with exertion  · Patient will require home O2, with portability, and concentrator  · Patient was provided a pulse oximeter at discharge

## 2022-03-16 NOTE — UTILIZATION REVIEW
Continued Stay Review    Date: 3/15/22    Current Patient Class: IP  Current Level of Care: MS    HPI:51 y o  female initially admitted on 2/16 with acute hypoxic resp failure, Covid 19 with pneumonia  Assessment/Plan:   Continues on oxygen at 3L NC  Is approved for home oxygen at d/c  Pt reports feeling improvement  Remains on oxygen at 4L w/o SOB at rest but does have MCDOWELL  Cough improving  Good appetite and tolerating diet  Able to ambulate with oxygen  Plan for d/c 3/16 with oxygen at home  Will need OP DM education  3/15 Home oxygen requirement test -  Pt able to walk for 6 minute requiring 4L oxygen with exertion  Pt had some breaks during test due to dyspnea on exertion but was able to complete the test   SPO2 on Oxygen at Rest 92 % at 4 LPM, SPO2 during exertion on Oxygen 89 % at 4 LPM, Test performed during exertion activity         Vital Signs:   03/15/22 21:27:39 98 4 °F (36 9 °C) 109 Abnormal  18 127/91 103 92 % -- -- -- -- --   03/15/22 1930 -- -- -- -- -- -- 32 -- 3 L/min Nasal cannula --   03/15/22 14:48:57 98 7 °F (37 1 °C) 98 18 136/84 101 92 % -- -- -- Nasal cannula Sitting   03/15/22 1400 -- -- -- -- -- 95 % -- -- -- -- --   03/15/22 1350 -- -- -- -- -- 97 % -- -- -- -- --   03/15/22 0740 -- -- -- -- -- 91 % -- -- -- -- --   03/15/22 07:27:18 98 9 °F (37 2 °C) 101 18 143/91 108 92 % -- -- -- Nasal cannula Sitting   03/15/22 0500 -- -- -- -- -- -- 32 -- 3 L/min Nasal cannula --   03/14/22 2100 -- -- -- -- -- 93 % 32 4 L/min 3 L/min Nasal cannula --   03/14/22 20:50:56 98 5 °F (36 9 °C) 117 Abnormal  20 136/89 105 89 % Abnormal  -- -- -- -- --   03/14/22 15:30:37 98 6 °F (37 °C) 99 18 121/70 87 90 % 32 -- 3 L/min Nasal cannula Sitting   03/14/22 1452 -- -- -- -- -- 94 % -- -- -- -- --   03/14/22 0750 -- -- -- -- -- -- 32 -- 3 L/min Nasal cannula --   03/14/22 07:40:27 98 7 °F (37 1 °C) 103 18 134/87 103 91 % 32 -- 3 L/min Nasal cannula Sitting   03/14/22 0718 -- -- -- -- -- 95 % -- -- -- -- --     Pertinent Labs/Diagnostic Results:       Results from last 7 days   Lab Units 03/13/22  0531   WBC Thousand/uL 11 58*   HEMOGLOBIN g/dL 13 8   HEMATOCRIT % 42 5   PLATELETS Thousands/uL 218         Results from last 7 days   Lab Units 03/13/22  0527   SODIUM mmol/L 139   POTASSIUM mmol/L 3 9   CHLORIDE mmol/L 100   CO2 mmol/L 29   ANION GAP mmol/L 10   BUN mg/dL 15   CREATININE mg/dL 0 72   EGFR ml/min/1 73sq m 97   CALCIUM mg/dL 9 8     Results from last 7 days   Lab Units 03/13/22  0527   AST U/L 14   ALT U/L 35   ALK PHOS U/L 62   TOTAL PROTEIN g/dL 7 0   ALBUMIN g/dL 3 4*   TOTAL BILIRUBIN mg/dL 0 30     Results from last 7 days   Lab Units 03/16/22  1112 03/16/22  0741 03/15/22  2128 03/15/22  1601 03/15/22  1115 03/15/22  0732 03/14/22  2052 03/14/22  1631 03/14/22  1132 03/14/22  0742 03/13/22  2134 03/13/22  1555   POC GLUCOSE mg/dl 150* 111 109 128 114 113 147* 133 127 108 104 150*     Results from last 7 days   Lab Units 03/13/22  0527   GLUCOSE RANDOM mg/dL 108     Medications:   Scheduled Medications:  bacitracin, 1 small application, Topical, BID  fluticasone, 1 spray, Each Nare, BID  glipiZIDE, 2 5 mg, Oral, Daily With Breakfast  guaiFENesin, 600 mg, Oral, Q12H NED  heparin (porcine), 5,000 Units, Subcutaneous, Q8H END  insulin lispro, 2-12 Units, Subcutaneous, TID AC  ipratropium, 0 5 mg, Nebulization, BID  levalbuterol, 1 25 mg, Nebulization, BID  lidocaine, 1 patch, Topical, Daily  metFORMIN, 500 mg, Oral, BID With Meals  metoprolol tartrate, 25 mg, Oral, Q12H NED  nystatin, 1 application, Topical, TID  oxybutynin, 5 mg, Oral, HS  pantoprazole, 40 mg, Oral, Daily Before Breakfast      Continuous IV Infusions:     PRN Meds:  acetaminophen, 650 mg, Oral, Q6H PRN  al mag oxide-diphenhydramine-lidocaine viscous, 10 mL, Swish & Swallow, Q6H PRN  ALPRAZolam, 0 5 mg, Oral, BID PRN  aluminum-magnesium hydroxide-simethicone, 30 mL, Oral, Q4H PRN  benzonatate, 200 mg, Oral, TID PRN - x 2 3/14, x 1 3/15  docusate sodium, 100 mg, Oral, BID PRN  Lidocaine Viscous HCl, 15 mL, Swish & Spit, 4x Daily PRN  polyethylene glycol, 17 g, Oral, Daily PRN  saliva substitute, 5 spray, Mouth/Throat, 4x Daily PRN  sodium chloride, 1 spray, Each Nare, Q2H PRN    Discharge Plan: home on home oxygen  Network Utilization Review Department  ATTENTION: Please call with any questions or concerns to [phone] and carefully listen to the prompts so that you are directed to the right person  All voicemails are confidential   Jaspreet Delude all requests for admission clinical reviews, approved or denied determinations and any other requests to dedicated fax number below belonging to the campus where the patient is receiving treatment   List of dedicated fax numbers for the Facilities:  [address] DENIALS (Administrative/Medical Necessity) [phone]   [address] (Maternity/NICU/Pediatrics) [phone]   [address]  [address] [address] [place] [address] [address] [place] [address] [address] [facility] [place] [address] [phone]

## 2022-03-16 NOTE — NURSING NOTE
Patient to be discharged 3/16/2022 1830  AVS and discharge instructions reviewed with patient  All questions answered  Patient to be picked up by Mirta Garibay 3/16/2022 1830  Patient to pack all belongings before discharge      Nellie Sanchez RN 3/16/2022 4:00 PM

## 2022-03-16 NOTE — DISCHARGE SUMMARY
[facility]  Discharge- Elroy Carbon 1970, 46 y o  female MRN: [id number]  [facility] Encounter: [id number]  Primary Care Provider: Josh Norman MD   Date and time admitted to hospital: 2/16/2022  7:01 PM          Admission Date: 2/16/2022       Discharge Date:  03/16/2022        Primary Diagnoses  Principal Problem:    Acute respiratory failure with hypoxia ([facility] )  Active Problems:    Pneumonia due to COVID-19 virus    Sepsis due to COVID-19 [facility])    Type 2 diabetes mellitus without complication, without long-term current use of insulin ([facility] )    BMI 50 0-59 9, adult ([facility] )    Anxiety    HTN (hypertension)    Acute diastolic heart failure (HCC)    OAB (overactive bladder)  Resolved Problems:    Paroxysmal atrial fibrillation ([place] [address] )    Thrombocytosis    Transaminitis      Hospital Course by problem:    Patient had a prolonged, complicated, 27 day hospital stay, with a prolonged ICU hospitalization  Below is a brief summary  For complete details please refer to the daily progress notes    * Acute respiratory failure with hypoxia [facility])  Assessment & Plan  Pt is a 68-year-old female who was hospitalized 2/16 for acute respiratory failure with hypoxia secondary to COVID pneumonia/fibrosis pulmonary edema and pneumonitis  Was on high-flow nasal cannula for 19 days  · Was initially hospitalized in the ICU 2/16-3/5  · Was on high-flow nasal cannula, as well as BiPAP  · Patient was on Veletri infusion  · followed by pulmonology service and completed a prednisone taper    · Has been further transitioned from high-flow oxygen, down to mid flow oxygen, down to nasal cannula  · Patient continued to improve and currently has adequate oxygenations on 3 L  · Home O2 eval performed this afternoon:  87% saturation room air at rest   92% saturation 4 L nasal cannula at rest   89% saturation 4 L with exertion  · Patient will require home O2, with portability, and concentrator  · Patient was provided a pulse oximeter at discharge    Paroxysmal atrial fibrillation (HCC)-resolved as of 3/15/2022  Assessment & Plan  · While in the ICU patient was diagnosed with new onset atrial fibrillation, secondary to hypoxia/respiratory distress  · She was initially started on amiodarone drip, as well as heparin infusion for anticoagulation  · Patient returned to normal sinus rhythm, has not had any additional arrhythmias  · 02/17/2022 echocardiogram left ventricular ejection fraction 65%  Wall motion normal   Grade 1 diastolic dysfunction  · Continue metoprolol 25 mg q 12 hours  · anticoagulation not indicated at discharge    OAB (overactive bladder)  Assessment & Plan  · Started oxybutynin with improvement in her symptoms    HTN (hypertension)  Assessment & Plan  · Patient has history essential hypertension  · Continue metoprolol 25 mg q 12 hours  · Blood pressure adequately controlled  Continue to monitor and titrate    Anxiety  Assessment & Plan  · Patient was noted to have anxiety during her hospital stay, and was critically ill, in the ICU  · She was prescribed Alprazolam p r n  · Patient's medical condition has markedly improved  · Benzodiazepines are not recommended for long-term therapy, and will not be continued at discharge  · She will continue to follow-up with her PCP    BMI 50 0-59 9, adult ([facility] )  Assessment & Plan  · Body mass index is 51 45 kg/m²  · Dietary counseling, lifestyle modification    Type 2 diabetes mellitus without complication, without long-term current use of insulin ([facility]  Assessment & Plan  · Patient has no prior history of diabetes  · Was noted to have steroid induced hyperglycemia  · A1c however 6 6, concerning for newly diagnosed diabetes type 2   · While in the ICU patient was initiated on insulin  · Blood sugars currently stable on metformin and glipizide: Will transition regimen to metformin 1000 mg b i d   For monotherapy and discontinue glipizide, to avoid polypharmacy at discharge  · Patient was provided a glucometer, testing strips, lancets prescription at discharge  · Nursing staff educated patient on glucometer, lancets, and home testing  · Patient will need outpatient diabetic education classes, coordinated with her PCP    Lab Results   Component Value Date    HGBA1C 6 6 (H) 02/16/2022     Results from last 7 days   Lab Units 03/16/22  0741 03/15/22  2128 03/15/22  1601 03/15/22  1115 03/15/22  0732 03/14/22  2052 03/14/22  1631 03/14/22  1132   POC GLUCOSE mg/dl 111 109 128 114 113 147* 133 127       Sepsis due to COVID-19 [facility])  Assessment & Plan  · Patient initially presented with sepsis, secondary to COVID-19 pneumonia  · Patient was treated in the ICU and improved    Pneumonia due to COVID-19 virus  Assessment & Plan  -patient was initially diagnosed with COVID-19 on 01/06 in the ER, unvaccinated  -she was followed by primary care, with 3 telemedicine visits:  She declined monoclonal antibody  -patient notes she felt progressively worse, until she presented in the ER 2/16, and respiratory distress  -patient was hospitalized in the ICU  -CTA of the chest revealed no evidence of pulmonary artery embolus    Scattered airspace opacities in the lungs  -in the ICU patient was evaluated for late COVID pneumonitis with possible superimposed atypical bacterial pneumonia versus pulmonary edema  -she had intermittent fevers and was initially started on antibiotics with ceftriaxone/azithromycin, as well as IV steroids, and bronchodilators, and baricitinib  -patient's blood cultures were negative x2  -she was also noted to have a witnessed aspiration event  -patient has clinically improved    Transaminitis-resolved as of 3/16/2022  Assessment & Plan  Patient had mild transaminitis secondary to COVID  Since normalized    Thrombocytosis-resolved as of 3/16/2022  Assessment & Plan  Patient had thrombocytosis secondary to her acute infection which has since normalized    Service:  Admitted:  Critical care:  Dr Mir Urias  Discharge service:  [facility]  Consulting Providers   None    Procedures Performed   None    Hospital Studies:  3/4 CXR:  No pneumothorax  Bilateral mixed interstitial and alveolar opacities without significant change from prior     3/3 CXR:  No pneumothorax   Bilateral diffuse parenchymal interstitial changes unchanged     3/3 CXR:  No change in moderate bilateral groundglass opacity, the sequela of Covid-19     2/23 CXR:  Persistent bilateral interstitial opacities and superimposed infiltrates     2/16 CTa chest:  No evidence of pulmonary artery embolus  Scattered airspace opacities within the lungs which may represent infection  Recommend short-term follow-up chest CT scan in 3 months to evaluate for resolution     2/16 CXR:   Multifocal groundglass infiltrates concerning for  Covid  19 infection     2/23: blood cx: neg     2/17:  2D echocardiogram  Left Ventricle: Left ventricular cavity size is normal  The left ventricular ejection fraction is 65% by visual estimation  Systolic function is normal  Wall motion is normal  There is mild asymmetric hypertrophy  Diastolic function is mildly abnormal, consistent with grade I (abnormal) relaxation    Right Ventricle: Right ventricular cavity size is normal  Systolic function is low normal     Results from last 7 days   Lab Units 03/13/22  0531   WBC Thousand/uL 11 58*   HEMOGLOBIN g/dL 13 8   HEMATOCRIT % 42 5   PLATELETS Thousands/uL 218     Results from last 7 days   Lab Units 03/13/22  0527   POTASSIUM mmol/L 3 9   CHLORIDE mmol/L 100   CO2 mmol/L 29   BUN mg/dL 15   CREATININE mg/dL 0 72   CALCIUM mg/dL 9 8       History and Physical Exam:  Please refer to the Admission H&P note    Discharge Condition: Improved  Discharge Disposition: Home/Self Care    Discharge Note and Physical Exam:   Patient ambulating in the halls, multiple laps    Denies shortness of breath at rest   Notes dyspnea on exertion continues to improve  Denies any pain anywhere  No current cough  Denies any nausea, vomiting, diarrhea  Tolerating p o  with good appetite  Ambulating without any dizziness or lightheadedness  Vitals:    03/16/22 0740   BP: 111/80   Pulse: 101   Resp: 18   Temp: 97 9 °F (36 6 °C)   SpO2: 96%     General:  Pleasant, non-tachypnic, non-dyspnic  Conversant  Heart: Regular rate and rhythm, S1S2 present  No murmur, rub or gallop  Lungs: Clear to auscultation bilaterally, no wheezing, rhonchi, or crackles  Good air movement  No accessory muscle use or respiratory distress  Abdomen: soft, non-tender, non-distended, NABS  No rebound or guarding  No mass or peritoneal signs  Neurologic:  Awake alert  Ambulating with steady gait  Skin: warm and dry  No petechiae, purpura or rash  Discharge Medications   Please see Medical Reconciliation Discharge Form    Discharge Follow Up Appointments:   Silvia Edgar MD: 1 week    Discharge  Statement   Total Time Spent today including physical exam, discussion with patient and discharge arrangements/care = 36 minutes      Discussed with patient's nurse  Discussed with     This note has been constructed using a voice recognition system

## 2022-03-16 NOTE — ASSESSMENT & PLAN NOTE
· While in the ICU patient was diagnosed with new onset atrial fibrillation, secondary to hypoxia/respiratory distress  · She was initially started on amiodarone drip, as well as heparin infusion for anticoagulation  · Patient returned to normal sinus rhythm, has not had any additional arrhythmias  · 02/17/2022 echocardiogram left ventricular ejection fraction 65%  Wall motion normal   Grade 1 diastolic dysfunction  · Continue metoprolol 25 mg q 12 hours     · anticoagulation not indicated at discharge

## 2022-03-16 NOTE — CASE MANAGEMENT
Case Management Discharge Planning Note    Patient name Azalea Mckinney  Location [facility] 68 2 /South 2 Edin Mitchell* MRN [id number]  : 1970 Date 3/16/2022       Current Admission Date: 2022  Current Admission Diagnosis:Acute respiratory failure with hypoxia [facility])   Patient Active Problem List    Diagnosis Date Noted    OAB (overactive bladder) 03/10/2022    Anxiety 2022    HTN (hypertension) 2022    Acute respiratory failure with hypoxia ([facility] ) 2022    Elevated brain natriuretic peptide (BNP) level 2022    Type 2 diabetes mellitus without complication, without long-term current use of insulin ([facility] ) 2022    BMI 50 0-59 9, adult ([facility] ) 2022    Dysfunction of right eustachian tube 2022    SOB (shortness of breath) on exertion 2022    Assistance needed with transportation 2022    Insufficient supply of food 2022    Pneumonia due to COVID-19 virus 2022    COVID-19 virus infection 2022      LOS (days): 28  Geometric Mean LOS (GMLOS) (days): 4 80  Days to GMLOS:-22 8     OBJECTIVE:  Risk of Unplanned Readmission Score: 18         Current admission status: Inpatient   Preferred Pharmacy:   PATIENT/FAMILY REPORTS NO PREFERRED PHARMACY  No address on file      [place] [address]  Phone: [phone] Fax: [address] [place] [address] 60 ,  [facility] 60 ,  [address] [place] [address]  Phone: [phone] Fax: [phone]    Primary Care Provider: Blanca Lugo MD    Primary Insurance: [facility]  Secondary Insurance:     DISCHARGE DETAILS: Pulse ox provided to pt for d/c this day- medications obtained from [facility] and given to the RN to provide to pt at d/c  Arranging coordination of BLS transport home w/ O2 home delivery        DME Referral Provided  Referral made for DME?: Yes  DME referral completed for the following items[de-identified] Nebulizer,Home Oxygen concentrator  DME Supplier Name[de-identified] [employer]         Would you like to participate in our [address] service program?  : Yes    Treatment Team Recommendation: Home  Discharge Destination Plan[de-identified] Home  Transport at Discharge : [facility]  Dispatcher Contacted: Yes  Number/Name of Dispatcher: [employer] [facility]  Transported by [employer] and Unit #): [facility]  ETA of Transport (Date): 03/16/22  ETA of Transport (Time): 8816           Addendum 3191:  [facility] to deliver O2 to home between 7113-9321  RN provided contact numbers for both [facility] should ambulance be delayed to ascertain changes in time as well as [employer] Liaison so to update with new  time should it come to that so to finalize coordination of O2 delivery  Nebulizer delivered to pt's room

## 2022-03-16 NOTE — NURSING NOTE
Patient to be picked up by [facility] at 1830 per [facility]  Patient to be given O2 tank by CM and [facility] team to inform RN if they can take tank with them or not  If they cannot take tank, RN to place tank in CM office  If [facility] does not arrive by [phone], RN to call either [facility] at [phone] or Gabriele Duncan the [facility] liaison at [phone]  Oxygen delivery personal supposed to meet patient at patient's home, hence importance of transport team arriving on time      Yamil Hansen RN 3/16/2022 1:07 PM

## 2022-03-16 NOTE — PLAN OF CARE
Problem: Potential for Falls  Goal: Patient will remain free of falls  Description: INTERVENTIONS:  - Educate patient/family on patient safety including physical limitations  - Instruct patient to call for assistance with activity   - Consult OT/PT to assist with strengthening/mobility   - Keep Call bell within reach  - Keep bed low and locked with side rails adjusted as appropriate  - Keep care items and personal belongings within reach  - Initiate and maintain comfort rounds  - Make Fall Risk Sign visible to staff  - Apply yellow socks and bracelet for high fall risk patients  - Consider moving patient to room near nurses station  3/16/2022 1558 by Hedy Oliveira RN  Outcome: Adequate for Discharge  3/16/2022 0750 by Hedy Oliveira RN  Outcome: Progressing     Problem: MOBILITY - ADULT  Goal: Maintain or return to baseline ADL function  Description: INTERVENTIONS:  -  Assess patient's ability to carry out ADLs; assess patient's baseline for ADL function and identify physical deficits which impact ability to perform ADLs (bathing, care of mouth/teeth, toileting, grooming, dressing, etc )  - Assess/evaluate cause of self-care deficits   - Assess range of motion  - Assess patient's mobility; develop plan if impaired  - Assess patient's need for assistive devices and provide as appropriate  - Encourage maximum independence but intervene and supervise when necessary  - Involve family in performance of ADLs  - Assess for home care needs following discharge   - Consider OT consult to assist with ADL evaluation and planning for discharge  - Provide patient education as appropriate  3/16/2022 1558 by Hedy Oliveira RN  Outcome: Adequate for Discharge  3/16/2022 0750 by Hedy Oliveira RN  Outcome: Progressing  Goal: Maintains/Returns to pre admission functional level  Description: INTERVENTIONS:  - Perform BMAT or MOVE assessment daily    - Set and communicate daily mobility goal to care team and patient/family/caregiver     - Collaborate with rehabilitation services on mobility goals if consulted  - Out of bed for toileting  - Record patient progress and toleration of activity level   3/16/2022 1558 by Hedy Oliveira RN  Outcome: Adequate for Discharge  3/16/2022 0750 by Hedy Oliveira RN  Outcome: Progressing     Problem: Prexisting or High Potential for Compromised Skin Integrity  Goal: Skin integrity is maintained or improved  Description: INTERVENTIONS:  - Identify patients at risk for skin breakdown  - Assess and monitor skin integrity  - Assess and monitor nutrition and hydration status  - Monitor labs   - Assess for incontinence   - Turn and reposition patient  - Assist with mobility/ambulation  - Relieve pressure over bony prominences  - Avoid friction and shearing  - Provide appropriate hygiene as needed including keeping skin clean and dry  - Evaluate need for skin moisturizer/barrier cream  - Collaborate with interdisciplinary team   - Patient/family teaching  - Consider wound care consult   3/16/2022 1558 by Hedy Oliveira RN  Outcome: Adequate for Discharge  3/16/2022 0750 by Hedy Oliveira RN  Outcome: Progressing     Problem: RESPIRATORY - ADULT  Goal: Achieves optimal ventilation and oxygenation  Description: INTERVENTIONS:  - Assess for changes in respiratory status  - Assess for changes in mentation and behavior  - Position to facilitate oxygenation and minimize respiratory effort  - Oxygen administered by appropriate delivery if ordered  - Initiate smoking cessation education as indicated  - Encourage broncho-pulmonary hygiene including cough, deep breathe, Incentive Spirometry  - Assess the need for suctioning and aspirate as needed  - Assess and instruct to report SOB or any respiratory difficulty  - Respiratory Therapy support as indicated  3/16/2022 1558 by Hedy Oliveira RN  Outcome: Adequate for Discharge  3/16/2022 0750 by Hedy Oliveira RN  Outcome: Progressing     Problem: METABOLIC, FLUID AND ELECTROLYTES - ADULT  Goal: Glucose maintained within target range  Description: INTERVENTIONS:  - Monitor Blood Glucose as ordered  - Assess for signs and symptoms of hyperglycemia and hypoglycemia  - Administer ordered medications to maintain glucose within target range  - Assess nutritional intake and initiate nutrition service referral as needed  3/16/2022 1558 by Abby Johnson RN  Outcome: Adequate for Discharge  3/16/2022 0750 by Abby Johnson RN  Outcome: Progressing     Problem: MUSCULOSKELETAL - ADULT  Goal: Maintain or return mobility to safest level of function  Description: INTERVENTIONS:  - Assess patient's ability to carry out ADLs; assess patient's baseline for ADL function and identify physical deficits which impact ability to perform ADLs (bathing, care of mouth/teeth, toileting, grooming, dressing, etc )  - Assess/evaluate cause of self-care deficits   - Assess range of motion  - Assess patient's mobility  - Assess patient's need for assistive devices and provide as appropriate  - Encourage maximum independence but intervene and supervise when necessary  - Involve family in performance of ADLs  - Assess for home care needs following discharge   - Consider OT consult to assist with ADL evaluation and planning for discharge  - Provide patient education as appropriate  3/16/2022 1558 by Abby Johnson RN  Outcome: Adequate for Discharge  3/16/2022 0750 by Abby Johnson RN  Outcome: Progressing  Goal: Maintain proper alignment of affected body part  Description: INTERVENTIONS:  - Support, maintain and protect limb and body alignment  - Provide patient/ family with appropriate education  3/16/2022 1558 by Abby Johnson RN  Outcome: Adequate for Discharge  3/16/2022 0750 by Abby Johnson RN  Outcome: Progressing     Problem: Nutrition/Hydration-ADULT  Goal: Nutrient/Hydration intake appropriate for improving, restoring or maintaining nutritional needs  Description: Monitor and assess patient's nutrition/hydration status for malnutrition  Collaborate with interdisciplinary team and initiate plan and interventions as ordered  Monitor patient's weight and dietary intake as ordered or per policy  Utilize nutrition screening tool and intervene as necessary  Determine patient's food preferences and provide high-protein, high-caloric foods as appropriate       INTERVENTIONS:  - Monitor oral intake, urinary output, labs, and treatment plans  - Assess nutrition and hydration status and recommend course of action  - Evaluate amount of meals eaten  - Assist patient with eating if necessary   - Allow adequate time for meals  - Recommend/ encourage appropriate diets, oral nutritional supplements, and vitamin/mineral supplements  - Order, calculate, and assess calorie counts as needed  - Recommend, monitor, and adjust tube feedings and TPN/PPN based on assessed needs  - Assess need for intravenous fluids  - Provide specific nutrition/hydration education as appropriate  - Include patient/family/caregiver in decisions related to nutrition  3/16/2022 1558 by Taran Cote RN  Outcome: Adequate for Discharge  3/16/2022 0750 by Taran Cote RN  Outcome: Progressing     Problem: DISCHARGE PLANNING  Goal: Discharge to home or other facility with appropriate resources  Description: INTERVENTIONS:  - Identify barriers to discharge w/patient and caregiver  - Arrange for needed discharge resources and transportation as appropriate  - Identify discharge learning needs (meds, wound care, etc )  - Arrange for interpretive services to assist at discharge as needed  - Refer to Case Management Department for coordinating discharge planning if the patient needs post-hospital services based on physician/advanced practitioner order or complex needs related to functional status, cognitive ability, or social support system  3/16/2022 1558 by Taran Cote RN  Outcome: Adequate for Discharge  3/16/2022 0750 by Taran Cote RN  Outcome: Progressing     Problem: Knowledge Deficit  Goal: Patient/family/caregiver demonstrates understanding of disease process, treatment plan, medications, and discharge instructions  Description: Complete learning assessment and assess knowledge base    Interventions:  - Provide teaching at level of understanding  - Provide teaching via preferred learning methods  3/16/2022 1558 by Eric Echavarria RN  Outcome: Adequate for Discharge  3/16/2022 0750 by Eric Echavarria, RN  Outcome: Progressing

## 2022-03-16 NOTE — ASSESSMENT & PLAN NOTE
· Patient has no prior history of diabetes  · Was noted to have steroid induced hyperglycemia  · A1c however 6 6, concerning for newly diagnosed diabetes type 2   · While in the ICU patient was initiated on insulin  · Blood sugars currently stable on metformin and glipizide: Will transition regimen to metformin 1000 mg b i d   For monotherapy and discontinue glipizide, to avoid polypharmacy at discharge  · Patient was provided a glucometer, testing strips, lancets prescription at discharge  · Nursing staff educated patient on glucometer, lancets, and home testing  · Patient will need outpatient diabetic education classes, coordinated with her PCP    Lab Results   Component Value Date    HGBA1C 6 6 (H) 02/16/2022     Results from last 7 days   Lab Units 03/16/22  0741 03/15/22  2128 03/15/22  1601 03/15/22  1115 03/15/22  0732 03/14/22  2052 03/14/22  1631 03/14/22  1132   POC GLUCOSE mg/dl 111 109 128 114 113 147* 133 127

## 2022-03-16 NOTE — PLAN OF CARE
Problem: MOBILITY - ADULT  Goal: Maintain or return to baseline ADL function  Description: INTERVENTIONS:  -  Assess patient's ability to carry out ADLs; assess patient's baseline for ADL function and identify physical deficits which impact ability to perform ADLs (bathing, care of mouth/teeth, toileting, grooming, dressing, etc )  - Assess/evaluate cause of self-care deficits   - Assess range of motion  - Assess patient's mobility; develop plan if impaired  - Assess patient's need for assistive devices and provide as appropriate  - Encourage maximum independence but intervene and supervise when necessary  - Involve family in performance of ADLs  - Assess for home care needs following discharge   - Consider OT consult to assist with ADL evaluation and planning for discharge  - Provide patient education as appropriate  Outcome: Progressing  Goal: Maintains/Returns to pre admission functional level  Description: INTERVENTIONS:  - Perform BMAT or MOVE assessment daily    - Set and communicate daily mobility goal to care team and patient/family/caregiver  - Collaborate with rehabilitation services on mobility goals if consulted  - Perform Range of Motion 3 times a day  - Reposition patient every 2 hours    - Dangle patient 3 times a day  - Stand patient 3 times a day  - Ambulate patient 3 times a day  - Out of bed to chair 3 times a day   - Out of bed for meals 3 times a day  - Out of bed for toileting  - Record patient progress and toleration of activity level   Outcome: Progressing     Problem: RESPIRATORY - ADULT  Goal: Achieves optimal ventilation and oxygenation  Description: INTERVENTIONS:  - Assess for changes in respiratory status  - Assess for changes in mentation and behavior  - Position to facilitate oxygenation and minimize respiratory effort  - Oxygen administered by appropriate delivery if ordered  - Initiate smoking cessation education as indicated  - Encourage broncho-pulmonary hygiene including cough, deep breathe, Incentive Spirometry  - Assess the need for suctioning and aspirate as needed  - Assess and instruct to report SOB or any respiratory difficulty  - Respiratory Therapy support as indicated  Outcome: Progressing     Problem: Nutrition/Hydration-ADULT  Goal: Nutrient/Hydration intake appropriate for improving, restoring or maintaining nutritional needs  Description: Monitor and assess patient's nutrition/hydration status for malnutrition  Collaborate with interdisciplinary team and initiate plan and interventions as ordered  Monitor patient's weight and dietary intake as ordered or per policy  Utilize nutrition screening tool and intervene as necessary  Determine patient's food preferences and provide high-protein, high-caloric foods as appropriate       INTERVENTIONS:  - Monitor oral intake, urinary output, labs, and treatment plans  - Assess nutrition and hydration status and recommend course of action  - Evaluate amount of meals eaten  - Assist patient with eating if necessary   - Allow adequate time for meals  - Recommend/ encourage appropriate diets, oral nutritional supplements, and vitamin/mineral supplements  - Order, calculate, and assess calorie counts as needed  - Recommend, monitor, and adjust tube feedings and TPN/PPN based on assessed needs  - Assess need for intravenous fluids  - Provide specific nutrition/hydration education as appropriate  - Include patient/family/caregiver in decisions related to nutrition  Outcome: Progressing

## 2022-03-16 NOTE — NURSING NOTE
Patient discharged 3/16/2022 6:40 PM  Rolene Humberto picked up and oxygen provider to meet patient at apartment per CM  Patient taken by transport to be taken home      Aziza Ayoub RN 3/16/2022 6:40 PM

## 2022-03-17 ENCOUNTER — TELEPHONE (OUTPATIENT)
Dept: CASE MANAGEMENT | Facility: HOSPITAL | Age: 52
End: 2022-03-17

## 2022-03-17 NOTE — TELEPHONE ENCOUNTER
Call received earlier this day of pt not having Glucometer, test strips and lancets in her medications on dc  CM call to [address] who states scripts were not received in the pharmacy and therefore were not filled- unfortunately, pt was d/c'd Wednesday and [facility] does not deliver  CM call to pt's listed pharmacy [facility] who will deliver once scripts received however too late for this day- will be delivered Friday  Scripts faxed  Cm call to pt leaving a  re: the above information

## 2022-03-21 NOTE — UTILIZATION REVIEW
[address] [facility]  Discharge- Everrett Job 1970, 46 y o  female MRN: [id number]  Unit/Bed#: [address] [place] [address] [id number] Encounter: [id number]  Primary Care Provider: Mansi Melgoza MD   Date and time admitted to hospital: 2/16/2022  7:01 PM              Admission Date: 2/16/2022       Discharge Date:  03/16/2022           Primary Diagnoses  Principal Problem:    Acute respiratory failure with hypoxia ([facility] )  Active Problems:    Pneumonia due to COVID-19 virus    Sepsis due to COVID-19 [facility])    Type 2 diabetes mellitus without complication, without long-term current use of insulin ([facility] )    BMI 50 0-59 9, adult ([facility] )    Anxiety    HTN (hypertension)    Acute diastolic heart failure (HCC)    OAB (overactive bladder)  Resolved Problems:    Paroxysmal atrial fibrillation ([place] [address] )    Thrombocytosis    Transaminitis        Hospital Course by problem:     Patient had a prolonged, complicated, 27 day hospital stay, with a prolonged ICU hospitalization  Below is a brief summary  For complete details please refer to the daily progress notes     * Acute respiratory failure with hypoxia [facility])  Assessment & Plan  Pt is a 40-year-old female who was hospitalized 2/16 for acute respiratory failure with hypoxia secondary to COVID pneumonia/fibrosis pulmonary edema and pneumonitis  Was on high-flow nasal cannula for 19 days  · Was initially hospitalized in the ICU 2/16-3/5  · Was on high-flow nasal cannula, as well as BiPAP  · Patient was on Veletri infusion  · followed by pulmonology service and completed a prednisone taper    · Has been further transitioned from high-flow oxygen, down to mid flow oxygen, down to nasal cannula  · Patient continued to improve and currently has adequate oxygenations on 3 L  · Home O2 eval performed this afternoon:  87% saturation room air at rest   92% saturation 4 L nasal cannula at rest   89% saturation 4 L with exertion  · Patient will require home O2, with portability, and concentrator  · Patient was provided a pulse oximeter at discharge     Paroxysmal atrial fibrillation (HCC)-resolved as of 3/15/2022  Assessment & Plan  · While in the ICU patient was diagnosed with new onset atrial fibrillation, secondary to hypoxia/respiratory distress  · She was initially started on amiodarone drip, as well as heparin infusion for anticoagulation  · Patient returned to normal sinus rhythm, has not had any additional arrhythmias  · 02/17/2022 echocardiogram left ventricular ejection fraction 65%  Wall motion normal   Grade 1 diastolic dysfunction  · Continue metoprolol 25 mg q 12 hours  · anticoagulation not indicated at discharge     OAB (overactive bladder)  Assessment & Plan  · Started oxybutynin with improvement in her symptoms     HTN (hypertension)  Assessment & Plan  · Patient has history essential hypertension  · Continue metoprolol 25 mg q 12 hours  · Blood pressure adequately controlled  Continue to monitor and titrate     Anxiety  Assessment & Plan  · Patient was noted to have anxiety during her hospital stay, and was critically ill, in the ICU  · She was prescribed Alprazolam p r n  · Patient's medical condition has markedly improved  · Benzodiazepines are not recommended for long-term therapy, and will not be continued at discharge  · She will continue to follow-up with her PCP     BMI 50 0-59 9, adult ([facility] )  Assessment & Plan  · Body mass index is 51 45 kg/m²  · Dietary counseling, lifestyle modification     Type 2 diabetes mellitus without complication, without long-term current use of insulin ([facility] )  Assessment & Plan  · Patient has no prior history of diabetes  · Was noted to have steroid induced hyperglycemia  · A1c however 6 6, concerning for newly diagnosed diabetes type 2   · While in the ICU patient was initiated on insulin  · Blood sugars currently stable on metformin and glipizide: Will transition regimen to metformin 1000 mg b i d   For monotherapy and discontinue glipizide, to avoid polypharmacy at discharge  · Patient was provided a glucometer, testing strips, lancets prescription at discharge  · Nursing staff educated patient on glucometer, lancets, and home testing  · Patient will need outpatient diabetic education classes, coordinated with her PCP           Lab Results   Component Value Date     HGBA1C 6 6 (H) 02/16/2022                  Results from last 7 days   Lab Units 03/16/22  0741 03/15/22  2128 03/15/22  1601 03/15/22  1115 03/15/22  0732 03/14/22  2052 03/14/22  1631 03/14/22  1132   POC GLUCOSE mg/dl 111 109 128 114 113 147* 133 127         Sepsis due to COVID-19 [facility])  Assessment & Plan  · Patient initially presented with sepsis, secondary to COVID-19 pneumonia  · Patient was treated in the ICU and improved     Pneumonia due to COVID-19 virus  Assessment & Plan  -patient was initially diagnosed with COVID-19 on 01/06 in the ER, unvaccinated  -she was followed by primary care, with 3 telemedicine visits:  She declined monoclonal antibody  -patient notes she felt progressively worse, until she presented in the ER 2/16, and respiratory distress  -patient was hospitalized in the ICU  -CTA of the chest revealed no evidence of pulmonary artery embolus    Scattered airspace opacities in the lungs  -in the ICU patient was evaluated for late COVID pneumonitis with possible superimposed atypical bacterial pneumonia versus pulmonary edema  -she had intermittent fevers and was initially started on antibiotics with ceftriaxone/azithromycin, as well as IV steroids, and bronchodilators, and baricitinib  -patient's blood cultures were negative x2  -she was also noted to have a witnessed aspiration event  -patient has clinically improved     Transaminitis-resolved as of 3/16/2022  Assessment & Plan  Patient had mild transaminitis secondary to COVID  Since normalized     Thrombocytosis-resolved as of 3/16/2022  Assessment & Plan  Patient had thrombocytosis secondary to her acute infection which has since normalized     Service:  Admitted:  Critical care:  Dr Yana Benoit  Discharge service:  [facility], [facility]      Consulting Providers   None     Procedures Performed   None     Hospital Studies:  3/4 CXR:  No pneumothorax  Bilateral mixed interstitial and alveolar opacities without significant change from prior     3/3 CXR:  No pneumothorax   Bilateral diffuse parenchymal interstitial changes unchanged     3/3 CXR:  No change in moderate bilateral groundglass opacity, the sequela of Covid-19     2/23 CXR:  Persistent bilateral interstitial opacities and superimposed infiltrates     2/16 CTa chest:  No evidence of pulmonary artery embolus  Scattered airspace opacities within the lungs which may represent infection  Recommend short-term follow-up chest CT scan in 3 months to evaluate for resolution     2/16 CXR:   Multifocal groundglass infiltrates concerning for  Covid  19 infection     2/23: blood cx: neg     2/17:  2D echocardiogram  Left Ventricle: Left ventricular cavity size is normal  The left ventricular ejection fraction is 65% by visual estimation  Systolic function is normal  Wall motion is normal  There is mild asymmetric hypertrophy  Diastolic function is mildly abnormal, consistent with grade I (abnormal) relaxation      Right Ventricle: Right ventricular cavity size is normal  Systolic function is low normal          Results from last 7 days   Lab Units 03/13/22  0531   WBC Thousand/uL 11 58*   HEMOGLOBIN g/dL 13 8   HEMATOCRIT % 42 5   PLATELETS Thousands/uL 218           Results from last 7 days   Lab Units 03/13/22  0527   POTASSIUM mmol/L 3 9   CHLORIDE mmol/L 100   CO2 mmol/L 29   BUN mg/dL 15   CREATININE mg/dL 0 72   CALCIUM mg/dL 9 8         History and Physical Exam:  Please refer to the Admission H&P note     Discharge Condition: Improved  Discharge Disposition: Home/Self Care     Discharge Note and Physical Exam:   Patient ambulating in the halls, multiple laps  Denies shortness of breath at rest   Notes dyspnea on exertion continues to improve  Denies any pain anywhere  No current cough  Denies any nausea, vomiting, diarrhea  Tolerating p o  with good appetite  Ambulating without any dizziness or lightheadedness          Vitals:     03/16/22 0740   BP: 111/80   Pulse: 101   Resp: 18   Temp: 97 9 °F (36 6 °C)   SpO2: 96%      General:  Pleasant, non-tachypnic, non-dyspnic  Conversant  Heart: Regular rate and rhythm, S1S2 present  No murmur, rub or gallop  Lungs: Clear to auscultation bilaterally, no wheezing, rhonchi, or crackles  Good air movement  No accessory muscle use or respiratory distress  Abdomen: soft, non-tender, non-distended, NABS  No rebound or guarding  No mass or peritoneal signs  Neurologic:  Awake alert  Ambulating with steady gait  Skin: warm and dry   No petechiae, purpura or rash         Discharge Medications   Please see Medical Reconciliation Discharge Form     Discharge Follow Up Appointments:   Mansi Melgoza MD: 1 week     Discharge  Statement   Total Time Spent today including physical exam, discussion with patient and discharge arrangements/care = 36 minutes      Discussed with patient's nurse  Discussed with      This note has been constructed using a voice recognition system

## 2022-03-24 ENCOUNTER — OFFICE VISIT (OUTPATIENT)
Dept: PULMONOLOGY | Facility: CLINIC | Age: 52
End: 2022-03-24
Payer: MEDICARE

## 2022-03-24 VITALS
OXYGEN SATURATION: 99 % | RESPIRATION RATE: 18 BRPM | TEMPERATURE: 98.9 F | WEIGHT: 219 LBS | BODY MASS INDEX: 50.68 KG/M2 | HEIGHT: 55 IN | DIASTOLIC BLOOD PRESSURE: 70 MMHG | SYSTOLIC BLOOD PRESSURE: 110 MMHG | HEART RATE: 83 BPM

## 2022-03-24 DIAGNOSIS — U07.1 PNEUMONIA DUE TO COVID-19 VIRUS: Primary | ICD-10-CM

## 2022-03-24 DIAGNOSIS — E11.9 TYPE 2 DIABETES MELLITUS WITHOUT COMPLICATION, WITHOUT LONG-TERM CURRENT USE OF INSULIN (HCC): ICD-10-CM

## 2022-03-24 DIAGNOSIS — J96.01 ACUTE RESPIRATORY FAILURE WITH HYPOXIA (HCC): ICD-10-CM

## 2022-03-24 DIAGNOSIS — J12.82 PNEUMONIA DUE TO COVID-19 VIRUS: Primary | ICD-10-CM

## 2022-03-24 LAB — SL AMB POCT GLUCOSE BLD: 76

## 2022-03-24 PROCEDURE — 1036F TOBACCO NON-USER: CPT | Performed by: INTERNAL MEDICINE

## 2022-03-24 PROCEDURE — 3008F BODY MASS INDEX DOCD: CPT | Performed by: INTERNAL MEDICINE

## 2022-03-24 PROCEDURE — 99214 OFFICE O/P EST MOD 30 MIN: CPT | Performed by: INTERNAL MEDICINE

## 2022-03-24 RX ORDER — ALCOHOL ANTISEPTIC PADS
PADS, MEDICATED (EA) TOPICAL
COMMUNITY
Start: 2022-03-17

## 2022-03-24 RX ORDER — BLOOD-GLUCOSE METER
EACH MISCELLANEOUS
COMMUNITY
Start: 2022-03-17

## 2022-03-24 RX ORDER — BLOOD SUGAR DIAGNOSTIC
STRIP MISCELLANEOUS
COMMUNITY
Start: 2022-03-17

## 2022-03-24 RX ORDER — LANCING DEVICE
EACH MISCELLANEOUS
COMMUNITY
Start: 2022-03-17

## 2022-03-25 PROBLEM — R06.02 SOB (SHORTNESS OF BREATH) ON EXERTION: Status: RESOLVED | Noted: 2022-01-17 | Resolved: 2022-03-25

## 2022-03-25 PROBLEM — U07.1 COVID-19 VIRUS INFECTION: Status: RESOLVED | Noted: 2022-01-11 | Resolved: 2022-03-25

## 2022-03-25 PROBLEM — R79.89 ELEVATED BRAIN NATRIURETIC PEPTIDE (BNP) LEVEL: Status: RESOLVED | Noted: 2022-02-16 | Resolved: 2022-03-25

## 2022-03-25 NOTE — ASSESSMENT & PLAN NOTE
Identified during the hospital stay    Likely steroid induced predominantly  Fingerstick point of care today 76    Lab Results   Component Value Date    HGBA1C 6 6 (H) 02/16/2022

## 2022-03-25 NOTE — ASSESSMENT & PLAN NOTE
· Performed oximetry in the office today  · Resting saturation 91% but quickly desaturates with 1 minute to 88% on room air  Does desaturate with ambulation on flat ground to 88% on 2 L   · Knees 3 L to maintain saturation greater than 90%    More strenuous activity will likely require 4-5 L for stairs or sustained activity

## 2022-03-25 NOTE — ASSESSMENT & PLAN NOTE
· Continues to recover from COVID-19 infection   · Making progress with regard to her oxygen requirement and shortness of breath   · Very committed to remaining active and exerting herself regularly  · No additional therapy required at this time   · Continue Tessalon p r n  · Continue nebulizers p r n  · She will return for evaluation in May with a chest x-ray prior to the visit    We will re-evaluate her oxygen requirements at that time

## 2022-03-25 NOTE — PROGRESS NOTES
Progress note - Pulmonary Medicine   Katie Gayla Weaver 46 y o  female MRN: [id number]       Impression & Plan:     Pneumonia due to COVID-19 virus  · Continues to recover from COVID-19 infection   · Making progress with regard to her oxygen requirement and shortness of breath   · Very committed to remaining active and exerting herself regularly  · No additional therapy required at this time   · Continue Tessalon p r n  · Continue nebulizers p r n  · She will return for evaluation in May with a chest x-ray prior to the visit  We will re-evaluate her oxygen requirements at that time    Acute respiratory failure with hypoxia (HCC)  · Performed oximetry in the office today  · Resting saturation 91% but quickly desaturates with 1 minute to 88% on room air  Does desaturate with ambulation on flat ground to 88% on 2 L   · Knees 3 L to maintain saturation greater than 90%  More strenuous activity will likely require 4-5 L for stairs or sustained activity     BMI 50 0-59 9, adult (HCC)  · Diet and weight loss is encouraged  · She has been trying to adhere to a diabetic diet and cutting out desserts and concentrated sweets    Type 2 diabetes mellitus without complication, without long-term current use of insulin ([facility] )  Identified during the hospital stay  Likely steroid induced predominantly  Fingerstick point of care today 76    Lab Results   Component Value Date    HGBA1C 6 6 (H) 02/16/2022     She will follow-up in 4-6 weeks with chest x-ray and we will repeat her oximetry testing  ______________________________________________________________________    HPI:    Graciela Nicolas presents today for follow-up of hospitalization for COVID-19 infection  She had severe hypoxic respiratory failure requiring ICU stay  She did not require intubation but was on high-flow nasal cannula and had severe desaturation with minimal activity  She has started to improved substantially    She is now down to nasal cannula oxygen and has been discharged home  Her level of activity was maintained in she was able to go home without rehab services  She has maintained activity at home and is using the oxygen with daily activities  She typically is using 2-3 L at rest and 4-5 L with exertion  With stairs or more strenuous activities she does note that she needs 6-8 L  Minimal residual cough  No chest pain  No lightheadedness or dizziness  Denies abdominal pain  She has been watching her diet closely but unfortunately has not been doing home glucose testing  Her sugars were significantly elevated during the hospital stay while on steroids but not currently based on today's fingerstick    She is obese and has a history of obstructive sleep apnea  She will need further testing    We attempt to get records from her previous provider and from Dr Jannet Hdez but unfortunately we have not received any additional information regarding her sleep apnea history and testing    Current Medications:    Current Outpatient Medications:     acetaminophen (TYLENOL) 500 mg tablet, Take 1,000 mg by mouth every 6 (six) hours as needed, Disp: , Rfl:     albuterol (2 5 mg/3 mL) 0 083 % nebulizer solution, Take 3 mL (2 5 mg total) by nebulization every 6 (six) hours as needed for wheezing or shortness of breath, Disp: 240 mL, Rfl: 0    albuterol (ProAir HFA) 90 mcg/act inhaler, Inhale 2 puffs every 6 (six) hours as needed for wheezing, Disp: 8 5 g, Rfl: 0    benzonatate (TESSALON PERLES) 100 mg capsule, Take 1 capsule (100 mg total) by mouth 3 (three) times a day as needed for cough, Disp: 20 capsule, Rfl: 0    Blood Glucose Monitoring Suppl (Contour Next EZ) w/Device KIT, 3 TIMES  A DAY BLOODSUGAR TESTING, Disp: , Rfl:     Contour Next Test test strip, 3 TIMES  A DAY BLOODSUGAR TESTING, Disp: , Rfl:     fluticasone (FLONASE) 50 mcg/act nasal spray, 1 spray into each nostril daily, Disp: 9 9 mL, Rfl: 0    guaiFENesin (MUCINEX) 600 mg 12 hr tablet, Take 1 tablet (600 mg total) by mouth 2 (two) times a day as needed for cough, Disp: 30 tablet, Rfl: 0    ipratropium (ATROVENT) 0 02 % nebulizer solution, Take 2 5 mL (0 5 mg total) by nebulization 2 (two) times a day as needed for wheezing or shortness of breath, Disp: 50 mL, Rfl: 0    lidocaine (LIDODERM) 5 %, Apply 1 patch topically daily Remove & Discard patch within 12 hours or as directed by MD, Disp: 30 patch, Rfl: 0    metFORMIN (GLUCOPHAGE) 500 mg tablet, Take 2 tablets (1,000 mg total) by mouth 2 (two) times a day with meals, Disp: 60 tablet, Rfl: 0    metoprolol tartrate (LOPRESSOR) 25 mg tablet, Take 1 tablet (25 mg total) by mouth every 12 (twelve) hours, Disp: 60 tablet, Rfl: 0    oxybutynin (DITROPAN-XL) 5 mg 24 hr tablet, Take 1 tablet (5 mg total) by mouth daily at bedtime, Disp: 30 tablet, Rfl: 0    pantoprazole (PROTONIX) 40 mg tablet, Take 1 tablet (40 mg total) by mouth daily before breakfast, Disp: 30 tablet, Rfl: 0    Pharmacist Choice Lancets MISC, 3 TIMES  A DAY BLOODSUGAR TESTING, Disp: , Rfl:     UltiCare Alcohol Swabs 70 % PADS, 3 TIMES  A DAY BLOODSUGARTESTING BEFORE AFTER EACH USE, Disp: , Rfl:     Review of Systems:  Aside from what is mentioned in the HPI, the review of systems is otherwise negative    Past medical history, surgical history, and family history were reviewed and updated as appropriate    Social history updates:  Social History     Tobacco Use   Smoking Status Former Smoker    Packs/day: 0 75    Years: 36 00    Pack years: 27 00    Types: Cigarettes    Start date:     Quit date: 2022    Years since quittin 2   Smokeless Tobacco Never Used       PhysicalExamination:  Vitals:   /70   Pulse 83   Temp 98 9 °F (37 2 °C)   Resp 18   Ht 4' 6 5" (1 384 m)   Wt 99 3 kg (219 lb)   SpO2 99%   BMI 51 84 kg/m²   Gen:  Morbidly obese  Comfortable on room air  No conversational dyspnea  HEENT:  Conjugate gaze  sclerae anicteric    Oropharynx moist  Neck: Trachea is midline  No JVD  No adenopathy  Chest:  Symmetric chest wall excursion with slightly distant breath sounds due to body habitus  No rales or crackles  Wheezes  Cardiac:  Distant heart tones, regular  no murmur  Abdomen:  Obese, benign  Extremities: No edema   Neuro:  Normal speech and mentation      Diagnostic Data:  Labs: I personally reviewed the most recent laboratory data pertinent to today's visit    Lab Results   Component Value Date    WBC 11 58 (H) 03/13/2022    HGB 13 8 03/13/2022    HCT 42 5 03/13/2022    MCV 90 03/13/2022     03/13/2022     Lab Results   Component Value Date    SODIUM 139 03/13/2022    K 3 9 03/13/2022    CO2 29 03/13/2022     03/13/2022    BUN 15 03/13/2022    CREATININE 0 72 03/13/2022    CALCIUM 9 8 03/13/2022       Imaging:  I personally reviewed the images on the [facility] system pertinent to today's visit  Most recent chest x-ray is from the ICU in March    This study still showed significant persistent lung infiltrates    Oximetry testing performed today did show exertional desaturation and continued need for supplemental oxygen at 3 L with walking on flat ground    Marquise Green MD

## 2022-03-25 NOTE — ASSESSMENT & PLAN NOTE
· Diet and weight loss is encouraged  · She has been trying to adhere to a diabetic diet and cutting out desserts and concentrated sweets

## 2022-04-15 PROBLEM — G47.33 OSA (OBSTRUCTIVE SLEEP APNEA): Status: ACTIVE | Noted: 2022-04-15

## 2022-04-18 ENCOUNTER — HOSPITAL ENCOUNTER (OUTPATIENT)
Dept: RADIOLOGY | Facility: HOSPITAL | Age: 52
Discharge: HOME | End: 2022-04-18
Attending: INTERNAL MEDICINE

## 2022-04-18 DIAGNOSIS — U07.1 PNEUMONIA DUE TO COVID-19 VIRUS: ICD-10-CM

## 2022-04-18 DIAGNOSIS — J12.82 PNEUMONIA DUE TO COVID-19 VIRUS: ICD-10-CM

## 2022-05-11 ENCOUNTER — OFFICE VISIT (OUTPATIENT)
Dept: PULMONOLOGY | Facility: CLINIC | Age: 52
End: 2022-05-11
Payer: MEDICARE

## 2022-05-11 VITALS
BODY MASS INDEX: 52.05 KG/M2 | WEIGHT: 231.4 LBS | HEIGHT: 56 IN | SYSTOLIC BLOOD PRESSURE: 138 MMHG | HEART RATE: 87 BPM | OXYGEN SATURATION: 100 % | DIASTOLIC BLOOD PRESSURE: 64 MMHG

## 2022-05-11 DIAGNOSIS — G47.33 OSA (OBSTRUCTIVE SLEEP APNEA): ICD-10-CM

## 2022-05-11 DIAGNOSIS — F12.90 MARIJUANA USE: ICD-10-CM

## 2022-05-11 DIAGNOSIS — U07.1 PNEUMONIA DUE TO COVID-19 VIRUS: Primary | ICD-10-CM

## 2022-05-11 DIAGNOSIS — J96.01 ACUTE RESPIRATORY FAILURE WITH HYPOXIA (HCC): ICD-10-CM

## 2022-05-11 DIAGNOSIS — J96.11 CHRONIC RESPIRATORY FAILURE WITH HYPOXIA (HCC): ICD-10-CM

## 2022-05-11 DIAGNOSIS — J98.4 RESTRICTIVE LUNG DISEASE: ICD-10-CM

## 2022-05-11 DIAGNOSIS — J12.82 PNEUMONIA DUE TO COVID-19 VIRUS: Primary | ICD-10-CM

## 2022-05-11 PROCEDURE — 94618 PULMONARY STRESS TESTING: CPT | Performed by: INTERNAL MEDICINE

## 2022-05-11 PROCEDURE — 1036F TOBACCO NON-USER: CPT | Performed by: INTERNAL MEDICINE

## 2022-05-11 PROCEDURE — 3008F BODY MASS INDEX DOCD: CPT | Performed by: INTERNAL MEDICINE

## 2022-05-11 PROCEDURE — 99214 OFFICE O/P EST MOD 30 MIN: CPT | Performed by: INTERNAL MEDICINE

## 2022-05-11 NOTE — ASSESSMENT & PLAN NOTE
· Does have episodic use of marijuana   · I strongly encouraged her to avoid combustible marijuana due to her respiratory condition

## 2022-05-11 NOTE — ASSESSMENT & PLAN NOTE
· Sleep test reviewed, is on AutoSet CPAP device which is a [employer] device that is subject to the recall  · She has registered the device with the [employer]  We discussed the risks and benefits of continued utilization of her CPAP device  She is quite symptomatic from a sleep perspective and I do think the benefit of continued CPAP use would outweigh the risk    She was encouraged to return to CPAP use and await replacement of her device by [employer] [employer]

## 2022-05-11 NOTE — PROGRESS NOTES
Progress note - Pulmonary Medicine   Katie Tomiemma Justina 46 y o  female MRN: [id number]       Impression & Plan:     Pneumonia due to COVID-19 virus  · This is resolved  Chest x-ray is now clear  · Still dyspnea on exertion which is not COVID related    Restrictive lung disease  · Restrictive lung disease is due to obesity  · Patient is very interested in diet and exercise, still has an oxygen requirement and should use supplemental oxygen for exertion and sleep  · Encourage diet and weight loss    Chronic respiratory failure with hypoxia (HCC)  · Based on 6 minute walk testing today, patient still require supplemental oxygen  This is on the basis of restrictive lung disease and no longer on the basis of COVID-19 pneumonia   · Patient will have a new prescription ordered for portable oxygen  She would benefit from having a conserving device or portable concentrator if available    FILIPE (obstructive sleep apnea)  · Sleep test reviewed, is on AutoSet CPAP device which is a [employer] device that is subject to the recall  · She has registered the device with the [employer]  We discussed the risks and benefits of continued utilization of her CPAP device  She is quite symptomatic from a sleep perspective and I do think the benefit of continued CPAP use would outweigh the risk  She was encouraged to return to CPAP use and await replacement of her device by [employer]    Marijuana use  · Does have episodic use of marijuana   · I strongly encouraged her to avoid combustible marijuana due to her respiratory condition  Follow-up in 6 weeks to reassess oxygen requirements and pulmonary symptoms  ______________________________________________________________________    HPI:    Ronal Benitez presents today for follow-up of COVID-19 pneumonia and persistent oxygen dependence  She remains short of breath with exertion but her shortness of breath has improved substantially    She has taken some walks without oxygen but has gotten fairly short of breath  She has had to stop to rest and has noted low oxygen levels on her home pulse ox  She has had an x-ray which has showed interval clearance of pulmonary infiltrates  The COVID-19 component has resolved  She does not have chest pain  She does not have wheezing  She does smoke marijuana occasionally but has quit smoking cigarettes remotely  No chest pain or cardiac complaints  No leg swelling  She is morbidly obese  She is interested in resuming an exercise regimen so that she can lose some weight  She does have obstructive sleep apnea  Currently is not sleeping with CPAP due to the [employer] recall  Unfortunately she gets up very frequently to go to the bathroom and has an overactive bladder  When on CPAP therapy however this was much better  No headache  No lightheadedness or dizziness  No abdominal pain or GERD symptoms      Current Medications:    Current Outpatient Medications:     acetaminophen (TYLENOL) 500 mg tablet, Take 1,000 mg by mouth every 6 (six) hours as needed, Disp: , Rfl:     albuterol (2 5 mg/3 mL) 0 083 % nebulizer solution, Take 3 mL (2 5 mg total) by nebulization every 6 (six) hours as needed for wheezing or shortness of breath, Disp: 240 mL, Rfl: 0    albuterol (ProAir HFA) 90 mcg/act inhaler, Inhale 2 puffs every 6 (six) hours as needed for wheezing, Disp: 8 5 g, Rfl: 0    Blood Glucose Monitoring Suppl (Contour Next EZ) w/Device KIT, 3 TIMES  A DAY BLOODSUGAR TESTING, Disp: , Rfl:     Contour Next Test test strip, 3 TIMES  A DAY BLOODSUGAR TESTING, Disp: , Rfl:     fluticasone (FLONASE) 50 mcg/act nasal spray, 1 spray into each nostril daily, Disp: 9 9 mL, Rfl: 0    ipratropium (ATROVENT) 0 02 % nebulizer solution, Take 2 5 mL (0 5 mg total) by nebulization 2 (two) times a day as needed for wheezing or shortness of breath, Disp: 50 mL, Rfl: 0    lidocaine (LIDODERM) 5 %, Apply 1 patch topically daily Remove & Discard patch within 12 hours or as directed by MD, Disp: 30 patch, Rfl: 0    metFORMIN (GLUCOPHAGE) 500 mg tablet, Take 2 tablets (1,000 mg total) by mouth 2 (two) times a day with meals, Disp: 60 tablet, Rfl: 0    metoprolol tartrate (LOPRESSOR) 25 mg tablet, Take 1 tablet (25 mg total) by mouth every 12 (twelve) hours, Disp: 60 tablet, Rfl: 0    oxybutynin (DITROPAN-XL) 5 mg 24 hr tablet, Take 1 tablet (5 mg total) by mouth daily at bedtime, Disp: 30 tablet, Rfl: 0    pantoprazole (PROTONIX) 40 mg tablet, Take 1 tablet (40 mg total) by mouth daily before breakfast, Disp: 30 tablet, Rfl: 0    Pharmacist Choice Lancets MISC, 3 TIMES  A DAY BLOODSUGAR TESTING, Disp: , Rfl:     UltiCare Alcohol Swabs 70 % PADS, 3 TIMES  A DAY BLOODSUGARTESTING BEFORE AFTER EACH USE, Disp: , Rfl:     Review of Systems:  Aside from what is mentioned in the HPI, the review of systems is otherwise negative    Past medical history, surgical history, and family history were reviewed and updated as appropriate    Social history updates:  Social History     Tobacco Use   Smoking Status Former Smoker    Packs/day: 0 75    Years: 36 00    Pack years: 27 00    Types: Cigarettes    Start date:     Quit date: 2022    Years since quittin 3   Smokeless Tobacco Never Used       PhysicalExamination:  Vitals:   /64 (BP Location: Left arm, Patient Position: Sitting, Cuff Size: Standard) Comment (BP Location): lower arm  Pulse 87   Ht 4' 7 8" (1 417 m)   Wt 105 kg (231 lb 6 4 oz)   SpO2 100%   BMI 52 25 kg/m²   Gen:  Morbidly obese  Comfortable on room air  No conversational dyspnea  HEENT:  Conjugate gaze  sclerae anicteric  Oropharynx moist  Neck: Trachea is midline  No JVD  No adenopathy  Chest:  Symmetric but limited chest wall excursion  Breath sounds are distant but otherwise clear  No rales or crackles  Cardiac:  Regular   no murmur  Abdomen:  benign  Extremities: No edema  Neuro:  Normal speech and mentation    Diagnostic Data:  Labs: I personally reviewed the most recent laboratory data pertinent to today's visit    Lab Results   Component Value Date    WBC 11 58 (H) 03/13/2022    HGB 13 8 03/13/2022    HCT 42 5 03/13/2022    MCV 90 03/13/2022     03/13/2022     Lab Results   Component Value Date    SODIUM 139 03/13/2022    K 3 9 03/13/2022    CO2 29 03/13/2022     03/13/2022    BUN 15 03/13/2022    CREATININE 0 72 03/13/2022    CALCIUM 9 8 03/13/2022       Imaging:    Chest x-ray report from [facility] was reviewed  Date of study 05/04/2022  There are clear lung fields per the radiologist's report  Images not available for my personal review today    6 minute walk test was performed in the office today  She did desaturate to 86% after 2 minutes of ambulation  With placement on 2 L of supplemental oxygen oxygen saturations did remain above 2 L  based on the testing, she has had significant improvement in her oxygen requirement but still has a residual oxygen requirement      Luis Zee MD

## 2022-05-11 NOTE — ASSESSMENT & PLAN NOTE
· Based on 6 minute walk testing today, patient still require supplemental oxygen  This is on the basis of restrictive lung disease and no longer on the basis of COVID-19 pneumonia   · Patient will have a new prescription ordered for portable oxygen    She would benefit from having a conserving device or portable concentrator if available

## 2022-05-11 NOTE — ASSESSMENT & PLAN NOTE
· Restrictive lung disease is due to obesity  · Patient is very interested in diet and exercise, still has an oxygen requirement and should use supplemental oxygen for exertion and sleep  · Encourage diet and weight loss

## 2022-05-11 NOTE — ASSESSMENT & PLAN NOTE
· This is resolved    Chest x-ray is now clear  · Still dyspnea on exertion which is not COVID related

## 2022-06-21 ENCOUNTER — TELEPHONE (OUTPATIENT)
Dept: PULMONOLOGY | Facility: CLINIC | Age: 52
End: 2022-06-21

## 2022-06-21 NOTE — TELEPHONE ENCOUNTER
Spoke with pt in regards to her insurance  Explained we do not PAR & patient was seen the last time because it was a hospital follow up  At the time of her last appt she should've been told if she would like to continue care with us she will either have to switch insurances or find a provider who takes her insurance  Pt can still keep her appt with Dr Derrick Barrientos but will need a plan in motion for future appointments

## 2022-06-23 ENCOUNTER — OFFICE VISIT (OUTPATIENT)
Dept: PULMONOLOGY | Facility: CLINIC | Age: 52
End: 2022-06-23
Payer: MEDICARE

## 2022-06-23 ENCOUNTER — TELEPHONE (OUTPATIENT)
Dept: PULMONOLOGY | Facility: CLINIC | Age: 52
End: 2022-06-23

## 2022-06-23 VITALS
DIASTOLIC BLOOD PRESSURE: 80 MMHG | HEIGHT: 55 IN | SYSTOLIC BLOOD PRESSURE: 142 MMHG | RESPIRATION RATE: 16 BRPM | WEIGHT: 236 LBS | OXYGEN SATURATION: 94 % | HEART RATE: 88 BPM | BODY MASS INDEX: 54.62 KG/M2

## 2022-06-23 DIAGNOSIS — J98.4 RESTRICTIVE LUNG DISEASE: Primary | ICD-10-CM

## 2022-06-23 DIAGNOSIS — G47.33 OSA (OBSTRUCTIVE SLEEP APNEA): ICD-10-CM

## 2022-06-23 DIAGNOSIS — J96.11 CHRONIC RESPIRATORY FAILURE WITH HYPOXIA (HCC): ICD-10-CM

## 2022-06-23 PROBLEM — J12.82 PNEUMONIA DUE TO COVID-19 VIRUS: Status: RESOLVED | Noted: 2022-01-11 | Resolved: 2022-06-23

## 2022-06-23 PROBLEM — U07.1 PNEUMONIA DUE TO COVID-19 VIRUS: Status: RESOLVED | Noted: 2022-01-11 | Resolved: 2022-06-23

## 2022-06-23 LAB
DME PARACHUTE DELIVERY DATE REQUESTED: NORMAL
DME PARACHUTE ITEM DESCRIPTION: NORMAL
DME PARACHUTE ORDER STATUS: NORMAL
DME PARACHUTE SUPPLIER NAME: NORMAL
DME PARACHUTE SUPPLIER PHONE: NORMAL

## 2022-06-23 PROCEDURE — 94618 PULMONARY STRESS TESTING: CPT | Performed by: INTERNAL MEDICINE

## 2022-06-23 PROCEDURE — 1036F TOBACCO NON-USER: CPT | Performed by: INTERNAL MEDICINE

## 2022-06-23 PROCEDURE — 99214 OFFICE O/P EST MOD 30 MIN: CPT | Performed by: INTERNAL MEDICINE

## 2022-06-23 PROCEDURE — 3008F BODY MASS INDEX DOCD: CPT | Performed by: INTERNAL MEDICINE

## 2022-06-23 NOTE — ASSESSMENT & PLAN NOTE
· Still awaiting her replacement device from [employer]   · If new device is required in [place], will need to have a sleep study performed and new device set up ordered

## 2022-06-23 NOTE — ASSESSMENT & PLAN NOTE
· Has been using oxygen  · Has found it difficult to use with exertion  Has been attempting exercise without the oxygen and tolerating this but gets very fatigued  · Should use supplemental oxygen with the exercise    · Continue oxygen supplementation based on today's 6 minute walk test

## 2022-06-23 NOTE — PROGRESS NOTES
Progress note - Pulmonary Medicine   Katie Maicol Butt 46 y o  female MRN: [id number]       Impression & Plan:     Restrictive lung disease  · Patient has restrictive lung disease related to obesity  · Continued oxygen requirement is on the basis of obesity  · Would benefit from portable oxygen concentrator  This was ordered previously but incorrectly associated with COVID diagnosis despite my Epic order to the contrary  · Reviewed appropriate use of the portable concentrator with the patient  FILIPE (obstructive sleep apnea)  · Still awaiting her replacement device from [employer]   · If new device is required in [facility], will need to have a sleep study performed and new device set up ordered    Chronic respiratory failure with hypoxia ([facility] [address] )  · Has been using oxygen  · Has found it difficult to use with exertion  Has been attempting exercise without the oxygen and tolerating this but gets very fatigued  · Should use supplemental oxygen with the exercise  · Continue oxygen supplementation based on today's 6 minute walk test    BMI 50 0-59 9, adult ([facility] [address] )  · Encouraged her continued efforts at diet and weight loss   · She is currently exercising quite regularly and encouraged her to continue this    Patient will follow-up with me if she is able to change her insurance to 1 of our participating plans or her recent change in insurance is on par with our participation  Her current prescription plan is not on par with our practices    ______________________________________________________________________    HPI:    Mitch Nair presents today for follow-up of restrictive lung disease from obesity and hypoxemic respiratory failure  Initially noted to be profoundly hypoxemic with time of COVID diagnosis but had significant improvement  Her residual oxygen dependence at this time is related to her restrictive lung disease  She has continued to improve from a symptom perspective    She has long periods of time where she is not using the oxygen and no longer requires oxygen at rest   She does continue to demonstrate desaturation and continues to require it for exertion  She was supposed to have been given a portable supply but unfortunately the order was submitted under COVID despite my order to the contrary  She does not report any new symptoms  She was exercising at the gym  She uses a treadmill and a elliptical device  She does get quite winded with this but tries to pace herself and is gradually trying to increase her activity  No cough or phlegm  No chest pain  No wheezing  She has albuterol but has not required it lately  Otherwise she reports no new symptoms from a pulmonary perspective  She does report continued sleep symptoms  She has not received her replacement device for the recalled [employer] machine that she currently owns  She does not want to use her current device over fear for the recall concerns      Current Medications:    Current Outpatient Medications:     acetaminophen (TYLENOL) 500 mg tablet, Take 1,000 mg by mouth every 6 (six) hours as needed, Disp: , Rfl:     albuterol (2 5 mg/3 mL) 0 083 % nebulizer solution, Take 3 mL (2 5 mg total) by nebulization every 6 (six) hours as needed for wheezing or shortness of breath, Disp: 240 mL, Rfl: 0    albuterol (ProAir HFA) 90 mcg/act inhaler, Inhale 2 puffs every 6 (six) hours as needed for wheezing, Disp: 8 5 g, Rfl: 0    Blood Glucose Monitoring Suppl (Contour Next EZ) w/Device KIT, 3 TIMES  A DAY BLOODSUGAR TESTING, Disp: , Rfl:     Contour Next Test test strip, 3 TIMES  A DAY BLOODSUGAR TESTING, Disp: , Rfl:     fluticasone (FLONASE) 50 mcg/act nasal spray, 1 spray into each nostril daily, Disp: 9 9 mL, Rfl: 0    lidocaine (LIDODERM) 5 %, Apply 1 patch topically daily Remove & Discard patch within 12 hours or as directed by MD, Disp: 30 patch, Rfl: 0    metFORMIN (GLUCOPHAGE) 500 mg tablet, Take 2 tablets (1,000 mg total) by mouth 2 (two) times a day with meals, Disp: 60 tablet, Rfl: 0    metoprolol tartrate (LOPRESSOR) 25 mg tablet, Take 1 tablet (25 mg total) by mouth every 12 (twelve) hours, Disp: 60 tablet, Rfl: 0    oxybutynin (DITROPAN-XL) 5 mg 24 hr tablet, Take 1 tablet (5 mg total) by mouth daily at bedtime, Disp: 30 tablet, Rfl: 0    pantoprazole (PROTONIX) 40 mg tablet, Take 1 tablet (40 mg total) by mouth daily before breakfast, Disp: 30 tablet, Rfl: 0    Pharmacist Choice Lancets MISC, 3 TIMES  A DAY BLOODSUGAR TESTING, Disp: , Rfl:     UltiCare Alcohol Swabs 70 % PADS, 3 TIMES  A DAY BLOODSUGARTESTING BEFORE AFTER EACH USE, Disp: , Rfl:     ipratropium (ATROVENT) 0 02 % nebulizer solution, Take 2 5 mL (0 5 mg total) by nebulization 2 (two) times a day as needed for wheezing or shortness of breath, Disp: 50 mL, Rfl: 0    Review of Systems:  Aside from what is mentioned in the HPI, the review of systems is otherwise negative    Past medical history, surgical history, and family history were reviewed and updated as appropriate    Social history updates:  Social History     Tobacco Use   Smoking Status Former Smoker    Packs/day: 0 75    Years: 36 00    Pack years: 27 00    Types: Cigarettes    Start date:     Quit date: 2022    Years since quittin 4   Smokeless Tobacco Never Used       PhysicalExamination:  Vitals:   /80 (BP Location: Left arm, Patient Position: Sitting, Cuff Size: Standard)   Pulse 88   Resp 16   Ht 4' 6 5" (1 384 m)   Wt 107 kg (236 lb)   SpO2 94%   BMI 55 86 kg/m²   Gen:  Obese  Comfortable on room air at rest   No conversational dyspnea  HEENT:  Conjugate gaze  sclerae anicteric  Oropharynx moist  Neck: Trachea is midline  No JVD  No adenopathy  Chest:  Symmetric but shallow chest wall excursion  Lungs are clear  I do not appreciate wheezes or crackles  Cardiac:  Distant heart tones, regular  no murmur  Abdomen:  Obese    benign  Extremities: No edema  Neuro: Normal speech and mentation    Diagnostic Data:  Labs: I personally reviewed the most recent laboratory data pertinent to today's visit    Lab Results   Component Value Date    WBC 11 58 (H) 03/13/2022    HGB 13 8 03/13/2022    HCT 42 5 03/13/2022    MCV 90 03/13/2022     03/13/2022     Lab Results   Component Value Date    SODIUM 139 03/13/2022    K 3 9 03/13/2022    CO2 29 03/13/2022     03/13/2022    BUN 15 03/13/2022    CREATININE 0 72 03/13/2022    CALCIUM 9 8 03/13/2022         Other studies:  Patient underwent 6 minute walk study today  She did demonstrate desaturation with ambulation after 4 minutes to 86%    She was placed on supplemental oxygen at 2 L and was able to maintain her saturation above 90%    Sowmya Vega MD

## 2022-06-23 NOTE — TELEPHONE ENCOUNTER
The pt has appt today for 6MW, we can resend the order today with correct dx codes if pt still qualifies

## 2022-06-23 NOTE — TELEPHONE ENCOUNTER
Pt aware we par w/ [facility], [employer],  [facility]  Must switch if she wants to stay with [facility]

## 2022-06-23 NOTE — ASSESSMENT & PLAN NOTE
· Encouraged her continued efforts at diet and weight loss   · She is currently exercising quite regularly and encouraged her to continue this

## 2022-06-23 NOTE — ASSESSMENT & PLAN NOTE
· Patient has restrictive lung disease related to obesity  · Continued oxygen requirement is on the basis of obesity  · Would benefit from portable oxygen concentrator  This was ordered previously but incorrectly associated with COVID diagnosis despite my Epic order to the contrary  · Reviewed appropriate use of the portable concentrator with the patient

## 2022-06-23 NOTE — TELEPHONE ENCOUNTER
[facility] called re: they received the POC order for Katie but when she initially qualified for O2 it was under a COVID diagnosis and insurances wont cover the POC with that diagnosis because they don't think its a long term lung condition  In order for Katie to qualify for a POC she needs to do so under a long term lung condition    Please advise: [phone]  Fax#: [phone]

## 2022-08-05 NOTE — PLAN OF CARE
Problem: PHYSICAL THERAPY ADULT  Goal: Performs mobility at highest level of function for planned discharge setting  See evaluation for individualized goals  Description: Treatment/Interventions: Functional transfer training,LE strengthening/ROM,Therapeutic exercise,Endurance training,Patient/family training,Gait training,Equipment eval/education,Spoke to nursing,OT  Equipment Recommended: Sarah Grider       See flowsheet documentation for full assessment, interventions and recommendations  Outcome: Progressing  Note: Prognosis: Good  Problem List: Decreased endurance,Impaired balance,Obesity  Assessment:  Pt  Seen for PT treatment interventions as per PT POC  Pt asking to get out of the room and go to the cafeteria  Pt on 40% HFNC  Pt  Out of bed upon arrival   Offers no c/o pain  PT is showing improved ability to perform transfers and ambulation this session requiring less assistance supervision assist x1 for transfers and min assist x1 for ambulation without use of rw    Pt progressed with ambulation distances from previous session to 12' x3 and 15' x2( forward and backward ambulation training performed)pt demonstrates slow reciprocal gait with nbos and mild lateral sway at times requiring hha x1 for safety  Mild forrester noted at times  Pt's spo2 remained WNL at 94%-95% with mobility and 98%-100% at rest on 40% HFNC     cues for seated or standing rest breaks, pacing and energy conservation techniques required  Pt  Performs seated b/l le arom x 12 reps  and standing exercises with min assist for steadying assist of marching, mini squats and lateral side steps L and R x5-12 reps  Pt remains highly motivated, though continues to be limited by decreased activity tolerance, balance and endurance  Pt  Remained seated out of bed in recliner at conclusion of PT session  The patient's AM-PAC Basic Mobility Inpatient Short Form Raw Score is 19   A Raw score of greater than 16 suggests the patient may benefit from discharge to home  Please also refer to the recommendation of the Physical Therapist for safe discharge planning  Despite Am pac score and d/c recommendations,  due to functional limitations and impairments in balance, activity tolerance endurance and social and enviornmental barriers and ncreased risk for falls STR is recommended at this time in order to optimize outcomes and facilitate return to PLOF  Barriers to Discharge: Inaccessible home environment,Decreased caregiver support  Barriers to Discharge Comments: LOUIE, alone     PT Discharge Recommendation: Post acute rehabilitation services (vs str pending progress and achievement of PT goals  )          See flowsheet documentation for full assessment  Assistance with ambulation/Assistance OOB with selected safe patient handling equipment/Communicate Risk of Fall with Harm to all staff/Discuss with provider need for PT consult/Monitor gait and stability/Provide patient with walking aids - walker, cane, crutches/Reinforce activity limits and safety measures with patient and family/Tailored Fall Risk Interventions/Visual Cue: Yellow wristband and red socks/Bed in lowest position, wheels locked, appropriate side rails in place/Call bell, personal items and telephone in reach/Instruct patient to call for assistance before getting out of bed or chair/Non-slip footwear when patient is out of bed/[place] to call system/Physically safe environment - no spills, clutter or unnecessary equipment/Purposeful Proactive Rounding/Room/bathroom lighting operational, light cord in reach

## 2022-09-09 ENCOUNTER — OFFICE VISIT (OUTPATIENT)
Dept: FAMILY MEDICINE CLINIC | Facility: CLINIC | Age: 52
End: 2022-09-09

## 2022-09-09 ENCOUNTER — APPOINTMENT (OUTPATIENT)
Dept: LAB | Facility: CLINIC | Age: 52
End: 2022-09-09
Payer: MEDICARE

## 2022-09-09 VITALS
DIASTOLIC BLOOD PRESSURE: 94 MMHG | SYSTOLIC BLOOD PRESSURE: 152 MMHG | RESPIRATION RATE: 18 BRPM | WEIGHT: 246 LBS | HEART RATE: 97 BPM | BODY MASS INDEX: 56.93 KG/M2 | TEMPERATURE: 97.3 F | OXYGEN SATURATION: 96 % | HEIGHT: 55 IN

## 2022-09-09 DIAGNOSIS — I10 PRIMARY HYPERTENSION: Chronic | ICD-10-CM

## 2022-09-09 DIAGNOSIS — R05.9 COUGH: ICD-10-CM

## 2022-09-09 DIAGNOSIS — Z00.00 HEALTHCARE MAINTENANCE: ICD-10-CM

## 2022-09-09 DIAGNOSIS — E11.9 TYPE 2 DIABETES MELLITUS WITHOUT COMPLICATION, WITHOUT LONG-TERM CURRENT USE OF INSULIN (HCC): ICD-10-CM

## 2022-09-09 DIAGNOSIS — Z23 ENCOUNTER FOR IMMUNIZATION: Primary | ICD-10-CM

## 2022-09-09 LAB
ALBUMIN SERPL BCP-MCNC: 3.9 G/DL (ref 3.5–5)
ALP SERPL-CCNC: 106 U/L (ref 46–116)
ALT SERPL W P-5'-P-CCNC: 45 U/L (ref 12–78)
ANION GAP SERPL CALCULATED.3IONS-SCNC: 5 MMOL/L (ref 4–13)
AST SERPL W P-5'-P-CCNC: 29 U/L (ref 5–45)
BASOPHILS # BLD AUTO: 0.09 THOUSANDS/ΜL (ref 0–0.1)
BASOPHILS NFR BLD AUTO: 1 % (ref 0–1)
BILIRUB SERPL-MCNC: 0.34 MG/DL (ref 0.2–1)
BUN SERPL-MCNC: 7 MG/DL (ref 5–25)
CALCIUM SERPL-MCNC: 9.4 MG/DL (ref 8.3–10.1)
CHLORIDE SERPL-SCNC: 107 MMOL/L (ref 96–108)
CHOLEST SERPL-MCNC: 137 MG/DL
CO2 SERPL-SCNC: 29 MMOL/L (ref 21–32)
CREAT SERPL-MCNC: 0.83 MG/DL (ref 0.6–1.3)
CREAT UR-MCNC: 168 MG/DL
EOSINOPHIL # BLD AUTO: 0.11 THOUSAND/ΜL (ref 0–0.61)
EOSINOPHIL NFR BLD AUTO: 1 % (ref 0–6)
ERYTHROCYTE [DISTWIDTH] IN BLOOD BY AUTOMATED COUNT: 13.7 % (ref 11.6–15.1)
GFR SERPL CREATININE-BSD FRML MDRD: 81 ML/MIN/1.73SQ M
GLUCOSE P FAST SERPL-MCNC: 107 MG/DL (ref 65–99)
HCT VFR BLD AUTO: 47.8 % (ref 34.8–46.1)
HDLC SERPL-MCNC: 40 MG/DL
HGB BLD-MCNC: 14.8 G/DL (ref 11.5–15.4)
IMM GRANULOCYTES # BLD AUTO: 0.04 THOUSAND/UL (ref 0–0.2)
IMM GRANULOCYTES NFR BLD AUTO: 0 % (ref 0–2)
LDLC SERPL CALC-MCNC: 67 MG/DL (ref 0–100)
LYMPHOCYTES # BLD AUTO: 4.63 THOUSANDS/ΜL (ref 0.6–4.47)
LYMPHOCYTES NFR BLD AUTO: 37 % (ref 14–44)
MCH RBC QN AUTO: 29.5 PG (ref 26.8–34.3)
MCHC RBC AUTO-ENTMCNC: 31 G/DL (ref 31.4–37.4)
MCV RBC AUTO: 95 FL (ref 82–98)
MICROALBUMIN UR-MCNC: 26.1 MG/L (ref 0–20)
MICROALBUMIN/CREAT 24H UR: 16 MG/G CREATININE (ref 0–30)
MONOCYTES # BLD AUTO: 0.92 THOUSAND/ΜL (ref 0.17–1.22)
MONOCYTES NFR BLD AUTO: 7 % (ref 4–12)
NEUTROPHILS # BLD AUTO: 6.61 THOUSANDS/ΜL (ref 1.85–7.62)
NEUTS SEG NFR BLD AUTO: 54 % (ref 43–75)
NONHDLC SERPL-MCNC: 97 MG/DL
NRBC BLD AUTO-RTO: 0 /100 WBCS
PLATELET # BLD AUTO: 288 THOUSANDS/UL (ref 149–390)
PMV BLD AUTO: 11.2 FL (ref 8.9–12.7)
POTASSIUM SERPL-SCNC: 4.2 MMOL/L (ref 3.5–5.3)
PROT SERPL-MCNC: 8 G/DL (ref 6.4–8.4)
RBC # BLD AUTO: 5.01 MILLION/UL (ref 3.81–5.12)
SL AMB POCT HEMOGLOBIN AIC: 6.6 (ref ?–6.5)
SODIUM SERPL-SCNC: 141 MMOL/L (ref 135–147)
TRIGL SERPL-MCNC: 148 MG/DL
WBC # BLD AUTO: 12.4 THOUSAND/UL (ref 4.31–10.16)

## 2022-09-09 PROCEDURE — 36415 COLL VENOUS BLD VENIPUNCTURE: CPT

## 2022-09-09 PROCEDURE — 3077F SYST BP >= 140 MM HG: CPT | Performed by: FAMILY MEDICINE

## 2022-09-09 PROCEDURE — 82043 UR ALBUMIN QUANTITATIVE: CPT

## 2022-09-09 PROCEDURE — 99214 OFFICE O/P EST MOD 30 MIN: CPT | Performed by: FAMILY MEDICINE

## 2022-09-09 PROCEDURE — 3080F DIAST BP >= 90 MM HG: CPT | Performed by: FAMILY MEDICINE

## 2022-09-09 PROCEDURE — 83036 HEMOGLOBIN GLYCOSYLATED A1C: CPT | Performed by: FAMILY MEDICINE

## 2022-09-09 PROCEDURE — 80061 LIPID PANEL: CPT

## 2022-09-09 PROCEDURE — 80053 COMPREHEN METABOLIC PANEL: CPT

## 2022-09-09 PROCEDURE — 82570 ASSAY OF URINE CREATININE: CPT

## 2022-09-09 PROCEDURE — 85025 COMPLETE CBC W/AUTO DIFF WBC: CPT

## 2022-09-09 RX ORDER — CHLORTHALIDONE 25 MG/1
12.5 TABLET ORAL DAILY
Qty: 90 TABLET | Refills: 3 | Status: SHIPPED | OUTPATIENT
Start: 2022-09-09

## 2022-09-09 RX ORDER — ATORVASTATIN CALCIUM 40 MG/1
40 TABLET, FILM COATED ORAL DAILY
Qty: 90 TABLET | Refills: 3 | Status: SHIPPED | OUTPATIENT
Start: 2022-09-09

## 2022-09-09 NOTE — ASSESSMENT & PLAN NOTE
Lab Results   Component Value Date    HGBA1C 6 6 (H) 02/16/2022   Currently not on any medication  Offered option of metformin  Patient declined and states that she would like to try lifestyle modifications  Plan  Will bring back patient in 3 months to review and repeat her A1c    If continues to be elevated will add on diabetic medication

## 2022-09-09 NOTE — ASSESSMENT & PLAN NOTE
BP Readings from Last 3 Encounters:   09/09/22 152/94   06/23/22 142/80   05/11/22 138/64     Not controlled  History of hypertension previously on metoprolol  Not taking any medications at this time      Plan  -will obtain basic comprehensive metabolic panel  -will start patient on chlorthalidone 12 5 mg  -will have patient repeat CMP in 4 weeks  -will recheck blood pressure in subsequent visit

## 2022-09-09 NOTE — PROGRESS NOTES
Assessment/Plan:    1  Encounter for immunization  Assessment & Plan:  Patient did kind pneumonia vaccination though she is high risk  Will review again in subsequent visit  Will bring patient back in for gynecological Pap smear  Will have patient obtain mammogram at this time  2  Type 2 diabetes mellitus without complication, without long-term current use of insulin (HCC)  Assessment & Plan:    Lab Results   Component Value Date    HGBA1C 6 6 (H) 02/16/2022   Currently not on any medication  Offered option of metformin  Patient declined and states that she would like to try lifestyle modifications  Plan  Will bring back patient in 3 months to review and repeat her A1c  If continues to be elevated will add on diabetic medication    Orders:  -     POCT hemoglobin A1c  -     Microalbumin / creatinine urine ratio ([facility]); Future  -     Lipid panel; Future  -     Comprehensive metabolic panel; Future  -     Comprehensive metabolic panel; Future  -     atorvastatin (LIPITOR) 40 mg tablet; Take 1 tablet (40 mg total) by mouth daily    3  Primary hypertension  Assessment & Plan:    BP Readings from Last 3 Encounters:   09/09/22 152/94   06/23/22 142/80   05/11/22 138/64     Not controlled  History of hypertension previously on metoprolol  Not taking any medications at this time  Plan  -will obtain basic comprehensive metabolic panel  -will start patient on chlorthalidone 12 5 mg  -will have patient repeat CMP in 4 weeks  -will recheck blood pressure in subsequent visit    Orders:  -     chlorthalidone 25 mg tablet; Take 0 5 tablets (12 5 mg total) by mouth daily    4  BMI 50 0-59 9, adult [facility])  Assessment & Plan:  BMI Counseling: Body mass index is 58 23 kg/m²   The BMI is above normal  Nutrition recommendations include reducing portion sizes, decreasing overall calorie intake, 3-5 servings of fruits/vegetables daily, reducing fast food intake, consuming healthier snacks and decreasing soda and/or juice intake  Exercise recommendations include exercising 3-5 times per week and joining a gym  5  Healthcare maintenance  Assessment & Plan:  Patient did kind pneumonia vaccination though she is high risk  Will review again in subsequent visit  Will bring patient back in for gynecological Pap smear  Will have patient obtain mammogram at this time  Orders:  -     Mammo screening bilateral w 3d & cad; Future; Expected date: 09/09/2022  -     CBC and differential; Future    6  Cough  -     Complete PFT with post bronchodilator; Future       Subjective:      Patient ID: Lucienne Spatz is a 46 y o  female  Past medical history for COVID infection requiring to hospitalization and subsequently O2 requirement followed by pulmonology, history also significant for hypertension currently not on her medications  Patient history also significant for morbid obesity and diabetes not on any medication is coming in to establish care  Patient has much apprehension with medications however states that she is willing to take come if indicated  Patient also reports that she sometimes gets short of breath while ambulating and exercising hence why she has not been able to exercise as much as she could  This is in light of her recent COVID infection she never completely recovered  The following portions of the patient's history were reviewed and updated as appropriate: allergies, current medications, past family history, past medical history, past social history, past surgical history, and problem list     Review of Systems   Constitutional: Positive for fatigue and unexpected weight change (Weight gain)  Negative for chills and fever  HENT: Negative for ear pain and sore throat  Eyes: Negative for pain and visual disturbance  Respiratory: Positive for cough and shortness of breath (While ambulating)  Cardiovascular: Negative for chest pain and palpitations     Gastrointestinal: Negative for abdominal pain and vomiting  Genitourinary: Negative for dysuria and hematuria  Musculoskeletal: Negative for arthralgias and back pain  Skin: Negative for color change and rash  Neurological: Negative for seizures and syncope  All other systems reviewed and are negative  Objective:      /94 (BP Location: Left arm, Patient Position: Sitting, Cuff Size: Large)   Pulse 97   Temp (!) 97 3 °F (36 3 °C) (Temporal)   Resp 18   Ht 4' 6 5" (1 384 m)   Wt 112 kg (246 lb)   SpO2 96%   BMI 58 23 kg/m²          Physical Exam  Constitutional:       General: She is not in acute distress  Appearance: Normal appearance  She is obese  HENT:      Nose: No congestion or rhinorrhea  Eyes:      Extraocular Movements: Extraocular movements intact  Pupils: Pupils are equal, round, and reactive to light  Cardiovascular:      Rate and Rhythm: Normal rate and regular rhythm  Pulses: Normal pulses  Heart sounds: Normal heart sounds  No murmur heard  No gallop  Pulmonary:      Effort: Pulmonary effort is normal       Breath sounds: Normal breath sounds  No wheezing, rhonchi or rales  Abdominal:      General: Bowel sounds are normal  There is no distension  Palpations: Abdomen is soft  There is no mass  Tenderness: There is no abdominal tenderness  Hernia: No hernia is present  Skin:     General: Skin is warm  Coloration: Skin is not jaundiced  Findings: No bruising  Neurological:      General: No focal deficit present  Mental Status: She is alert and oriented to person, place, and time  Psychiatric:         Mood and Affect: Mood normal          Behavior: Behavior normal          Thought Content:  Thought content normal            Yeimy Hernandez DO   Family Medicine PGY-1   9/9/2022

## 2022-09-10 NOTE — ASSESSMENT & PLAN NOTE
Patient did kind pneumonia vaccination though she is high risk  Will review again in subsequent visit  Will bring patient back in for gynecological Pap smear  Will have patient obtain mammogram at this time

## 2022-09-10 NOTE — ASSESSMENT & PLAN NOTE
BMI Counseling: Body mass index is 58 23 kg/m²  The BMI is above normal  Nutrition recommendations include reducing portion sizes, decreasing overall calorie intake, 3-5 servings of fruits/vegetables daily, reducing fast food intake, consuming healthier snacks and decreasing soda and/or juice intake  Exercise recommendations include exercising 3-5 times per week and joining a gym

## 2022-09-15 ENCOUNTER — TELEPHONE (OUTPATIENT)
Dept: FAMILY MEDICINE CLINIC | Facility: CLINIC | Age: 52
End: 2022-09-15

## 2022-09-15 NOTE — TELEPHONE ENCOUNTER
Patient is asking for a referral for her to continue physical therapy for her back, neck and left shoulder  Please add in epic  Patient can provide proof of her conditions  Patient already discussed this with her pcp

## 2022-09-19 ENCOUNTER — TELEPHONE (OUTPATIENT)
Dept: FAMILY MEDICINE CLINIC | Facility: CLINIC | Age: 52
End: 2022-09-19

## 2022-09-19 DIAGNOSIS — M54.2 NECK PAIN: Primary | ICD-10-CM

## 2022-09-19 NOTE — TELEPHONE ENCOUNTER
Pt called nurse line requesting a letter stating that she needs accommodations due to her medical conditions also pt requesting a referral for physical therapy as we spoke in person       Please advise, Thanks

## 2022-09-22 NOTE — TELEPHONE ENCOUNTER
Received call from parent/patient: Patient requesting a call back to discuss paperwork that was faxed to her  Stated one of the forms was correct but not the other  Any questions please call [phone]

## 2022-09-23 ENCOUNTER — TELEPHONE (OUTPATIENT)
Dept: FAMILY MEDICINE CLINIC | Facility: CLINIC | Age: 52
End: 2022-09-23

## 2022-09-23 NOTE — TELEPHONE ENCOUNTER
[employer] form received on 09/23/2022  to be completed by PCP  Copy made and placed in PCP folder  Forms to be delivered to PCP mailbox at assigned time      (Received by mail)

## 2022-09-29 NOTE — TELEPHONE ENCOUNTER
No luck on the periodontist, she'll have to call her insurance    And I have not received any of her records as of yet to be able to update what she is requesting

## 2022-10-04 ENCOUNTER — OFFICE VISIT (OUTPATIENT)
Dept: PULMONOLOGY | Facility: CLINIC | Age: 52
End: 2022-10-04
Payer: MEDICARE

## 2022-10-04 ENCOUNTER — TELEPHONE (OUTPATIENT)
Dept: PULMONOLOGY | Facility: CLINIC | Age: 52
End: 2022-10-04

## 2022-10-04 VITALS
BODY MASS INDEX: 57.86 KG/M2 | RESPIRATION RATE: 18 BRPM | OXYGEN SATURATION: 94 % | WEIGHT: 250 LBS | DIASTOLIC BLOOD PRESSURE: 80 MMHG | SYSTOLIC BLOOD PRESSURE: 128 MMHG | HEART RATE: 82 BPM | HEIGHT: 55 IN

## 2022-10-04 DIAGNOSIS — J96.11 CHRONIC RESPIRATORY FAILURE WITH HYPOXIA (HCC): Primary | ICD-10-CM

## 2022-10-04 DIAGNOSIS — G47.33 OSA (OBSTRUCTIVE SLEEP APNEA): ICD-10-CM

## 2022-10-04 DIAGNOSIS — J98.4 RESTRICTIVE LUNG DISEASE: ICD-10-CM

## 2022-10-04 PROCEDURE — 94618 PULMONARY STRESS TESTING: CPT | Performed by: NURSE PRACTITIONER

## 2022-10-04 PROCEDURE — 99214 OFFICE O/P EST MOD 30 MIN: CPT | Performed by: NURSE PRACTITIONER

## 2022-10-04 NOTE — TELEPHONE ENCOUNTER
Patient LVM: She has the DME info you were requesting her to find  She would like someone who asked her to get this information to call her and go over it   Please advise

## 2022-10-04 NOTE — ASSESSMENT & PLAN NOTE
History of moderate FILIPE on previous sleep study    Received new machine from [employer], will work with nunez and DME to set up machine to obtain compliance data  Will also send information to sleep specialists [place]      Reports on AutoCPAP and denies any current issues with machine    Order for replacement supplies  Discussed inspire device, she will not pursue at this time  Work on weightloss

## 2022-10-04 NOTE — PROGRESS NOTES
Pulmonary Follow Up Note   Eusebio Licea 46 y o  female MRN: [id number]  10/4/2022      Assessment:    Restrictive lung disease  Secondary to body habitus    Discussed weight loss-diet changes and increased activity    FILIPE (obstructive sleep apnea)  History of moderate FILIPE on previous sleep study    Received new machine from [employer], will work with nunez and DME to set up machine to obtain compliance data  Will also send information to sleep specialists [place]  Reports on AutoCPAP and denies any current issues with machine    Order for replacement supplies  Discussed inspire device, she will not pursue at this time  Work on weightloss    Chronic respiratory failure with hypoxia ([address] )  Repeat 6 MW -today, continues to require supplemental O2 at 2 L     Will place order for POC      Plan:    Diagnoses and all orders for this visit:    Chronic respiratory failure with hypoxia (HCC)  -     POCT 6 minute walk  -     Home Oxygen with Portability    FILIPE (obstructive sleep apnea)  -     PAP DME Resupply/Reorder    Restrictive lung disease        Return in about 3 months (around 1/4/2023), or if symptoms worsen or fail to improve  History of Present Illness   HPI:  Eusebio Licea is a 46 y o  female who presents to the office for routine follow up  Follows with Dr Megan Sheppard for restrictive lung disease, FILIPE, chronic hypoxic respiratory failure and obesity  Just received replacement from [facility], but does not have the machine fully set up in order to review compliance  She is unsure of settings but is on an AutoCPAP and feels that her symptoms are well controlled-simply needs replacement mask and tubing     She has been very sedentary and is planning on increasing her daily activity  Has been limited due to chronic pain secondary to her back and knees  Declines pulmonary rehab and plans on starting PT after follow up with PCP     Has not been using her supplemental O2    Developed chronic hypoxic respiratory after COVID dx, but overall is improving  She will plan to start using supplemental O2 at 2 L with exertion-will increase use when she receives her POC  Does have a concentrator at home    Review of Systems   Constitutional: Negative for activity change and fatigue  HENT: Negative for congestion and postnasal drip  Eyes: Negative for discharge and itching  Respiratory: Positive for shortness of breath (with exertion)  Negative for apnea, cough, choking, chest tightness, wheezing and stridor  Cardiovascular: Negative for chest pain, palpitations and leg swelling  Gastrointestinal: Negative for abdominal distention and abdominal pain  Endocrine: Negative for cold intolerance and heat intolerance  Genitourinary: Negative for difficulty urinating  Musculoskeletal: Positive for arthralgias and back pain  Skin: Negative for color change, pallor, rash and wound  Allergic/Immunologic: Negative for environmental allergies and food allergies  Neurological: Negative for dizziness and headaches  Hematological: Negative for adenopathy  Psychiatric/Behavioral: Negative for agitation and behavioral problems  All other systems reviewed and are negative  Historical Information   Past Medical History:   Diagnosis Date    Herniated cervical disc     Hypertension      Past Surgical History:   Procedure Laterality Date     SECTION      EYE SURGERY      SHOULDER SURGERY       History reviewed  No pertinent family history      Social History     Tobacco Use   Smoking Status Former Smoker    Packs/day: 0 75    Years: 36 00    Pack years: 27 00    Types: Cigarettes    Start date: 12    Quit date: 2022    Years since quittin 7   Smokeless Tobacco Never Used         Meds/Allergies     Current Outpatient Medications:     acetaminophen (TYLENOL) 500 mg tablet, Take 1,000 mg by mouth every 6 (six) hours as needed, Disp: , Rfl:     albuterol (2 5 mg/3 mL) 0 083 % nebulizer solution, Take 3 mL (2 5 mg total) by nebulization every 6 (six) hours as needed for wheezing or shortness of breath, Disp: 240 mL, Rfl: 0    albuterol (ProAir HFA) 90 mcg/act inhaler, Inhale 2 puffs every 6 (six) hours as needed for wheezing, Disp: 8 5 g, Rfl: 0    atorvastatin (LIPITOR) 40 mg tablet, Take 1 tablet (40 mg total) by mouth daily, Disp: 90 tablet, Rfl: 3    Blood Glucose Monitoring Suppl (Contour Next EZ) w/Device KIT, 3 TIMES  A DAY BLOODSUGAR TESTING, Disp: , Rfl:     chlorthalidone 25 mg tablet, Take 0 5 tablets (12 5 mg total) by mouth daily, Disp: 90 tablet, Rfl: 3    Contour Next Test test strip, 3 TIMES  A DAY BLOODSUGAR TESTING, Disp: , Rfl:     fluticasone (FLONASE) 50 mcg/act nasal spray, 1 spray into each nostril daily, Disp: 9 9 mL, Rfl: 0    lidocaine (LIDODERM) 5 %, Apply 1 patch topically daily Remove & Discard patch within 12 hours or as directed by MD, Disp: 30 patch, Rfl: 0    metoprolol tartrate (LOPRESSOR) 25 mg tablet, Take 1 tablet (25 mg total) by mouth every 12 (twelve) hours, Disp: 60 tablet, Rfl: 0    oxybutynin (DITROPAN-XL) 5 mg 24 hr tablet, Take 1 tablet (5 mg total) by mouth daily at bedtime, Disp: 30 tablet, Rfl: 0    pantoprazole (PROTONIX) 40 mg tablet, Take 1 tablet (40 mg total) by mouth daily before breakfast, Disp: 30 tablet, Rfl: 0    Pharmacist Choice Lancets MISC, 3 TIMES  A DAY BLOODSUGAR TESTING, Disp: , Rfl:     UltiCare Alcohol Swabs 70 % PADS, 3 TIMES  A DAY BLOODSUGARTESTING BEFORE AFTER EACH USE, Disp: , Rfl:     ipratropium (ATROVENT) 0 02 % nebulizer solution, Take 2 5 mL (0 5 mg total) by nebulization 2 (two) times a day as needed for wheezing or shortness of breath, Disp: 50 mL, Rfl: 0  No Known Allergies    Vitals: Blood pressure 128/80, pulse 82, resp  rate 18, height 4' 6 5" (1 384 m), weight 113 kg (250 lb), SpO2 94 %  Body mass index is 59 18 kg/m²   Oxygen Therapy  SpO2: 94 %  Oxygen Therapy: None (Room air)    Physical Exam  Physical Exam  Vitals reviewed  Constitutional:       Appearance: She is obese  HENT:      Head: Normocephalic and atraumatic  Nose: Nose normal       Mouth/Throat:      Mouth: Mucous membranes are moist    Eyes:      Pupils: Pupils are equal, round, and reactive to light  Cardiovascular:      Rate and Rhythm: Normal rate  Pulses: Normal pulses  Pulmonary:      Effort: Pulmonary effort is normal       Breath sounds: Normal breath sounds  Abdominal:      General: Abdomen is flat  Palpations: Abdomen is soft  Skin:     General: Skin is warm  Neurological:      General: No focal deficit present  Mental Status: She is alert and oriented to person, place, and time  Mental status is at baseline  Psychiatric:         Mood and Affect: Mood normal          Behavior: Behavior normal          Thought Content: Thought content normal          Judgment: Judgment normal          Labs: I have personally reviewed pertinent lab results    Lab Results   Component Value Date    WBC 12 40 (H) 09/09/2022    HGB 14 8 09/09/2022    HCT 47 8 (H) 09/09/2022    MCV 95 09/09/2022     09/09/2022     Lab Results   Component Value Date    CALCIUM 9 4 09/09/2022    K 4 2 09/09/2022    CO2 29 09/09/2022     09/09/2022    BUN 7 09/09/2022    CREATININE 0 83 09/09/2022     No results found for: IGE  Lab Results   Component Value Date    ALT 45 09/09/2022    AST 29 09/09/2022    ALKPHOS 106 09/09/2022

## 2022-10-05 ENCOUNTER — RA CDI HCC (OUTPATIENT)
Dept: OTHER | Facility: HOSPITAL | Age: 52
End: 2022-10-05

## 2022-10-05 ENCOUNTER — DOCUMENTATION (OUTPATIENT)
Dept: PULMONOLOGY | Facility: CLINIC | Age: 52
End: 2022-10-05

## 2022-10-05 NOTE — TELEPHONE ENCOUNTER
Returned pt's call, she provided me w/  DME from [facility] ask for [facility] [employer]  Pt is aware we'll send new orders to [employer]

## 2022-10-05 NOTE — PROGRESS NOTES
[address]  coding opportunities       Chart reviewed, no opportunity found: CHART REVIEWED, NO OPPORTUNITY FOUND        Patients Insurance     Medicare Insurance: Medicare

## 2022-10-07 ENCOUNTER — TELEPHONE (OUTPATIENT)
Dept: PULMONOLOGY | Facility: CLINIC | Age: 52
End: 2022-10-07

## 2022-10-07 NOTE — TELEPHONE ENCOUNTER
Pt LM re: she wanted to know if she could get some "shorter tanks" that are easier for her to get around with so she isn't confined to her home   Her DME company is [employer]  Please advise: [phone]

## 2022-10-07 NOTE — TELEPHONE ENCOUNTER
Called and informed pt she should contact [employer] in regards to ordering specific tanks as we have already sent the order to them     [employer] # [phone]

## 2022-10-11 ENCOUNTER — TELEPHONE (OUTPATIENT)
Dept: FAMILY MEDICINE CLINIC | Facility: CLINIC | Age: 52
End: 2022-10-11

## 2022-10-12 ENCOUNTER — OFFICE VISIT (OUTPATIENT)
Dept: FAMILY MEDICINE CLINIC | Facility: CLINIC | Age: 52
End: 2022-10-12

## 2022-10-12 VITALS
SYSTOLIC BLOOD PRESSURE: 110 MMHG | WEIGHT: 249 LBS | DIASTOLIC BLOOD PRESSURE: 78 MMHG | HEART RATE: 92 BPM | RESPIRATION RATE: 18 BRPM | OXYGEN SATURATION: 98 % | TEMPERATURE: 97.1 F | HEIGHT: 55 IN | BODY MASS INDEX: 57.62 KG/M2

## 2022-10-12 DIAGNOSIS — R09.02 HYPOXIA: ICD-10-CM

## 2022-10-12 DIAGNOSIS — I10 PRIMARY HYPERTENSION: Primary | Chronic | ICD-10-CM

## 2022-10-12 DIAGNOSIS — Z13.9 ENCOUNTER FOR HEALTH-RELATED SCREENING: ICD-10-CM

## 2022-10-12 DIAGNOSIS — M25.562 LEFT KNEE PAIN, UNSPECIFIED CHRONICITY: ICD-10-CM

## 2022-10-12 DIAGNOSIS — M54.2 NECK PAIN: ICD-10-CM

## 2022-10-12 PROCEDURE — 99213 OFFICE O/P EST LOW 20 MIN: CPT | Performed by: FAMILY MEDICINE

## 2022-10-12 PROCEDURE — 3074F SYST BP LT 130 MM HG: CPT | Performed by: FAMILY MEDICINE

## 2022-10-12 PROCEDURE — 3078F DIAST BP <80 MM HG: CPT | Performed by: FAMILY MEDICINE

## 2022-10-12 NOTE — LETTER
October 16, 2022     Duane Martines, [address] [facility]    Patient: Rosanna Lai   YOB: 1970   Date of Visit: 10/12/2022       Dear Betsey Hernandez    Ms Earnestine Mabry was seen in your office  Placed on O2 oxygen  However no definitive plan as to how and when to wean her off her oxygen  She has no follow-up appointment visit in your office  I was wondering if it would be possible to schedule a follow-up appointment with your team and determine how long she needs to be on her O2 oxygen  Sincerely,        Pasquale Barthel,         CC: MD Sascha Savage DO  10/16/2022  9:36 AM  Cosign Needed  Assessment/Plan:    1  Primary hypertension  Assessment & Plan:    BP Readings from Last 3 Encounters:   10/12/22 110/78   10/04/22 128/80   09/09/22 152/94     Now well controlled  On current home medication regiment  Denies any side effect  Will plan on continuing current regimen  2  Neck pain  Assessment & Plan:  Chronic cervical and shoulder pain  Previously made better through physical therapy  Would like to reengage with a treatment modality  Orders:  -     Ambulatory Referral to Comprehensive Spine PT; Future    3  Hypoxia requiring oxygen  Assessment & Plan:  Per pulmonology  O2 saturation with oxygen was 98 in the office  Without 2 L of oxygen throughout the visit patient maintained O2 stats at 80  Questionable as to what the plan is with oxygen and if any intervention will be required  Plan  -will reach out to pulmonology to see if any additional studies would be required in addition, request patient to be seen with plan as to how long she is to be on the oxygen  -per my evaluation in the office patient does not need O2 oxygen and would likely benefit from pulmonary function testing    -will obtain chest x-ray    Orders:  -     XR chest pa & lateral; Future; Expected date: 10/12/2022    4   Left knee pain, unspecified chronicity  Assessment & Plan:  Left-sided  Not erythematous or hot  Specialty test negative  Will obtain knee x-rate  Can consider glucocorticoid injection in our next visit    Orders:  -     XR knee 3 vw left non injury; Future; Expected date: 10/12/2022    5  Encounter for health-related screening  Assessment & Plan:  Patient declined pneumococcal vaccine, patient declined influenza vaccine risk versus benefit discussed         Subjective:      Patient ID: Jaki Read is a 46 y o  female  Past medical history known for lung damage status post COVID, following pulmonology now requiring 2 L of nasal cannula, history also notable for morbid obesity, history also notable for hypertension recently started on chlorthalidone is not coming in for follow-up visit  Patient reports that since our last encounter she failed an O2 walk test and was started on 2 L nasal cannula for oxygen  Though patient reports she feels about the same  Patient denies any current chest pain, palpitation, fevers or chills  Patient has recently joined gym and will be attending physical therapy  The following portions of the patient's history were reviewed and updated as appropriate: allergies, current medications, past family history, past medical history, past social history, past surgical history, and problem list     Review of Systems   Constitutional: Positive for fatigue and unexpected weight change (Weight gain)  Negative for chills and fever  HENT: Negative for ear pain and sore throat  Eyes: Negative for pain and visual disturbance  Respiratory: Negative for cough and shortness of breath  Cardiovascular: Negative for chest pain and palpitations  Gastrointestinal: Negative for abdominal pain and vomiting  Genitourinary: Negative for dysuria and hematuria  Musculoskeletal: Negative for arthralgias and back pain  Skin: Negative for color change and rash     Neurological: Negative for seizures and syncope  All other systems reviewed and are negative  Objective:      /78 (BP Location: Left arm, Patient Position: Sitting, Cuff Size: Large)   Pulse 92   Temp (!) 97 1 °F (36 2 °C) (Temporal)   Resp 18   Ht 4' 6 5" (1 384 m)   Wt 113 kg (249 lb)   SpO2 98% Comment: 2L  Breastfeeding No   BMI 58 94 kg/m²          Physical Exam  Constitutional:       General: She is not in acute distress  Appearance: Normal appearance  She is obese  HENT:      Nose: No congestion or rhinorrhea  Eyes:      Extraocular Movements: Extraocular movements intact  Pupils: Pupils are equal, round, and reactive to light  Cardiovascular:      Rate and Rhythm: Normal rate and regular rhythm  Pulses: Normal pulses  Heart sounds: Normal heart sounds  No murmur heard  No gallop  Pulmonary:      Effort: Pulmonary effort is normal       Breath sounds: Normal breath sounds  No wheezing, rhonchi or rales  Abdominal:      General: Bowel sounds are normal  There is no distension  Palpations: Abdomen is soft  There is no mass  Tenderness: There is no abdominal tenderness  Hernia: No hernia is present  Skin:     General: Skin is warm  Coloration: Skin is not jaundiced  Findings: No bruising  Neurological:      General: No focal deficit present  Mental Status: She is alert and oriented to person, place, and time  Psychiatric:         Mood and Affect: Mood normal          Behavior: Behavior normal          Thought Content:  Thought content normal            [facility] PGY-1   10/16/2022

## 2022-10-12 NOTE — ASSESSMENT & PLAN NOTE
Wt Readings from Last 3 Encounters:   10/12/22 113 kg (249 lb)   10/04/22 113 kg (250 lb)   09/09/22 112 kg (246 lb)

## 2022-10-12 NOTE — PROGRESS NOTES
Assessment/Plan:    1  Primary hypertension  Assessment & Plan:    BP Readings from Last 3 Encounters:   10/12/22 110/78   10/04/22 128/80   09/09/22 152/94     Now well controlled  On current home medication regiment  Denies any side effect  Will plan on continuing current regimen  2  Neck pain  Assessment & Plan:  Chronic cervical and shoulder pain  Previously made better through physical therapy  Would like to reengage with a treatment modality  Orders:  -     Ambulatory Referral to [facility]; Future    3  Hypoxia requiring oxygen  Assessment & Plan:  Per pulmonology  O2 saturation with oxygen was 98 in the office  Without 2 L of oxygen throughout the visit patient maintained O2 stats at 80  Questionable as to what the plan is with oxygen and if any intervention will be required  Plan  -will reach out to pulmonology to see if any additional studies would be required in addition, request patient to be seen with plan as to how long she is to be on the oxygen  -per my evaluation in the office patient does not need O2 oxygen and would likely benefit from pulmonary function testing    -will obtain chest x-ray    Orders:  -     XR chest pa & lateral; Future; Expected date: 10/12/2022    4  Left knee pain, unspecified chronicity  Assessment & Plan:  Left-sided  Not erythematous or hot  Specialty test negative  Will obtain knee x-rate  Can consider glucocorticoid injection in our next visit    Orders:  -     XR knee 3 vw left non injury; Future; Expected date: 10/12/2022    5  Encounter for health-related screening  Assessment & Plan:  Patient declined pneumococcal vaccine, patient declined influenza vaccine risk versus benefit discussed         Subjective:      Patient ID: Tony Foy is a 46 y o  female      Past medical history known for lung damage status post COVID, following pulmonology now requiring 2 L of nasal cannula, history also notable for morbid obesity, history also notable for hypertension recently started on chlorthalidone is not coming in for follow-up visit  Patient reports that since our last encounter she failed an O2 walk test and was started on 2 L nasal cannula for oxygen  Though patient reports she feels about the same  Patient denies any current chest pain, palpitation, fevers or chills  Patient has recently joined gym and will be attending physical therapy  The following portions of the patient's history were reviewed and updated as appropriate: allergies, current medications, past family history, past medical history, past social history, past surgical history, and problem list     Review of Systems   Constitutional: Positive for fatigue and unexpected weight change (Weight gain)  Negative for chills and fever  HENT: Negative for ear pain and sore throat  Eyes: Negative for pain and visual disturbance  Respiratory: Negative for cough and shortness of breath  Cardiovascular: Negative for chest pain and palpitations  Gastrointestinal: Negative for abdominal pain and vomiting  Genitourinary: Negative for dysuria and hematuria  Musculoskeletal: Negative for arthralgias and back pain  Skin: Negative for color change and rash  Neurological: Negative for seizures and syncope  All other systems reviewed and are negative  Objective:      /78 (BP Location: Left arm, Patient Position: Sitting, Cuff Size: Large)   Pulse 92   Temp (!) 97 1 °F (36 2 °C) (Temporal)   Resp 18   Ht 4' 6 5" (1 384 m)   Wt 113 kg (249 lb)   SpO2 98% Comment: 2L  Breastfeeding No   BMI 58 94 kg/m²          Physical Exam  Constitutional:       General: She is not in acute distress  Appearance: Normal appearance  She is obese  HENT:      Nose: No congestion or rhinorrhea  Eyes:      Extraocular Movements: Extraocular movements intact  Pupils: Pupils are equal, round, and reactive to light     Cardiovascular:      Rate and Rhythm: Normal rate and regular rhythm  Pulses: Normal pulses  Heart sounds: Normal heart sounds  No murmur heard  No gallop  Pulmonary:      Effort: Pulmonary effort is normal       Breath sounds: Normal breath sounds  No wheezing, rhonchi or rales  Abdominal:      General: Bowel sounds are normal  There is no distension  Palpations: Abdomen is soft  There is no mass  Tenderness: There is no abdominal tenderness  Hernia: No hernia is present  Skin:     General: Skin is warm  Coloration: Skin is not jaundiced  Findings: No bruising  Neurological:      General: No focal deficit present  Mental Status: She is alert and oriented to person, place, and time  Psychiatric:         Mood and Affect: Mood normal          Behavior: Behavior normal          Thought Content:  Thought content normal            [place] [id number] Medicine PGY-1   10/16/2022

## 2022-10-12 NOTE — ASSESSMENT & PLAN NOTE
BP Readings from Last 3 Encounters:   10/12/22 110/78   10/04/22 128/80   09/09/22 152/94     Now well controlled  On current home medication regiment  Denies any side effect  Will plan on continuing current regimen

## 2022-10-12 NOTE — ASSESSMENT & PLAN NOTE
Lab Results   Component Value Date    HGBA1C 6 6 (A) 09/09/2022   Currently not on any medication  Offered option of metformin  Patient declined and states that she would like to try lifestyle modifications  Plan  Will bring back patient in 3 months to review and repeat her A1c    If continues to be elevated will add on diabetic medication

## 2022-10-13 ENCOUNTER — TELEPHONE (OUTPATIENT)
Dept: PULMONOLOGY | Facility: CLINIC | Age: 52
End: 2022-10-13

## 2022-10-13 NOTE — TELEPHONE ENCOUNTER
Laurita Linton MD  You 37 minutes ago (3:10 PM)         Please let her know the oxygen should be continued until she sees me next  She's had a recent 6 min walk test showing she still needs it  Get her docs contact info  I am away next weeek so probably wont reach out for a while     Thanks

## 2022-10-13 NOTE — TELEPHONE ENCOUNTER
I did advise her to use the O2 as directed by our office  I also told her she is due for a follow up with you in January and we will reach out to her once we have your schedule to set it up  She confirmed all of this information      Her PCP is Dr Guicho Harris  Office phone #: [phone]

## 2022-10-13 NOTE — TELEPHONE ENCOUNTER
Patient calling saying she saw her primary care doctor and he was totally opposed to her using oxygen  She said he feels like she does not need it  She states she told him she feels the difference being on 2L  She is using it when she is walking around and going to the gym  She also stated her primary wanted to know what her plan was for the O2  She said she did not want to be the middle person and wanted our office to reach out to him to discuss her O2 needs  Please advise

## 2022-10-16 PROBLEM — Z13.9 ENCOUNTER FOR HEALTH-RELATED SCREENING: Status: ACTIVE | Noted: 2022-09-09

## 2022-10-16 PROBLEM — R09.02 HYPOXIA: Status: ACTIVE | Noted: 2022-10-16

## 2022-10-16 PROBLEM — M54.2 NECK PAIN: Status: ACTIVE | Noted: 2022-10-16

## 2022-10-16 PROBLEM — M25.562 LEFT KNEE PAIN: Status: ACTIVE | Noted: 2022-10-16

## 2022-10-16 NOTE — ASSESSMENT & PLAN NOTE
Left-sided  Not erythematous or hot  Specialty test negative  Will obtain knee x-rate    Can consider glucocorticoid injection in our next visit

## 2022-10-16 NOTE — ASSESSMENT & PLAN NOTE
Per pulmonology  O2 saturation with oxygen was 98 in the office  Without 2 L of oxygen throughout the visit patient maintained O2 stats at 80  Questionable as to what the plan is with oxygen and if any intervention will be required  Plan  -will reach out to pulmonology to see if any additional studies would be required in addition, request patient to be seen with plan as to how long she is to be on the oxygen    -per my evaluation in the office patient does not need O2 oxygen and would likely benefit from pulmonary function testing    -will obtain chest x-ray

## 2022-10-16 NOTE — ASSESSMENT & PLAN NOTE
Patient declined pneumococcal vaccine, patient declined influenza vaccine risk versus benefit discussed

## 2022-10-16 NOTE — ASSESSMENT & PLAN NOTE
Chronic cervical and shoulder pain  Previously made better through physical therapy  Would like to reengage with a treatment modality

## 2022-10-20 ENCOUNTER — HOSPITAL ENCOUNTER (OUTPATIENT)
Dept: RADIOLOGY | Facility: HOSPITAL | Age: 52
End: 2022-10-20
Payer: COMMERCIAL

## 2022-10-20 DIAGNOSIS — M25.562 LEFT KNEE PAIN, UNSPECIFIED CHRONICITY: ICD-10-CM

## 2022-10-20 DIAGNOSIS — R09.02 HYPOXIA: ICD-10-CM

## 2022-10-20 PROCEDURE — 73562 X-RAY EXAM OF KNEE 3: CPT

## 2022-10-20 PROCEDURE — 71046 X-RAY EXAM CHEST 2 VIEWS: CPT

## 2022-11-02 ENCOUNTER — HOSPITAL ENCOUNTER (OUTPATIENT)
Dept: MAMMOGRAPHY | Facility: CLINIC | Age: 52
Discharge: HOME | End: 2022-11-02

## 2022-11-02 VITALS — WEIGHT: 249 LBS | HEIGHT: 55 IN | BODY MASS INDEX: 57.62 KG/M2

## 2022-11-02 DIAGNOSIS — Z12.31 ENCOUNTER FOR SCREENING MAMMOGRAM FOR MALIGNANT NEOPLASM OF BREAST: ICD-10-CM

## 2022-11-02 DIAGNOSIS — Z00.00 HEALTHCARE MAINTENANCE: ICD-10-CM

## 2022-11-08 LAB
DME PARACHUTE DELIVERY DATE EXPECTED: NORMAL
DME PARACHUTE DELIVERY DATE REQUESTED: NORMAL
DME PARACHUTE ITEM DESCRIPTION: NORMAL
DME PARACHUTE ORDER STATUS: NORMAL
DME PARACHUTE SUPPLIER NAME: NORMAL
DME PARACHUTE SUPPLIER PHONE: NORMAL

## 2022-11-15 LAB
DME PARACHUTE DELIVERY DATE ACTUAL: NORMAL
DME PARACHUTE DELIVERY DATE EXPECTED: NORMAL
DME PARACHUTE DELIVERY DATE REQUESTED: NORMAL
DME PARACHUTE ITEM DESCRIPTION: NORMAL
DME PARACHUTE ORDER STATUS: NORMAL
DME PARACHUTE SUPPLIER NAME: NORMAL
DME PARACHUTE SUPPLIER PHONE: NORMAL

## 2022-11-17 ENCOUNTER — APPOINTMENT (EMERGENCY)
Dept: RADIOLOGY | Facility: HOSPITAL | Age: 52
End: 2022-11-17

## 2022-11-17 ENCOUNTER — HOSPITAL ENCOUNTER (EMERGENCY)
Facility: HOSPITAL | Age: 52
Discharge: HOME | End: 2022-11-17
Attending: EMERGENCY MEDICINE

## 2022-11-17 VITALS
SYSTOLIC BLOOD PRESSURE: 162 MMHG | OXYGEN SATURATION: 96 % | WEIGHT: 244.9 LBS | RESPIRATION RATE: 16 BRPM | BODY MASS INDEX: 57.97 KG/M2 | TEMPERATURE: 98.5 F | HEART RATE: 79 BPM | DIASTOLIC BLOOD PRESSURE: 93 MMHG

## 2022-11-17 DIAGNOSIS — M77.50 TENDONITIS OF ANKLE: Primary | ICD-10-CM

## 2022-11-17 NOTE — ED PROVIDER NOTES
History  Chief Complaint   Patient presents with   • Ankle Injury     Left ankle pain after working out on elliptical since   No meds pTA  15-year-old female with a history of hypertension presenting for evaluation of left ankle pain  Patient states her pain has been hurting for the last week since she has increased her activity at the gym  Patient states she has been walking on the treadmill using the elliptical   She notes persistent pain in the medial and anterior aspects of her left ankle that is worse with ambulation  Patient did not take anything for pain at home  Denies paresthesias  Denies direct trauma or injury  Denies other acute complaints  Prior to Admission Medications   Prescriptions Last Dose Informant Patient Reported? Taking?    Blood Glucose Monitoring Suppl (Contour Next EZ) w/Device KIT   Yes No   Sig: 3 TIMES  A DAY BLOODSUGAR TESTING   Contour Next Test test strip   Yes No   Sig: 3 TIMES  A DAY BLOODSUGAR TESTING   Pharmacist Choice Lancets MISC   Yes No   Sig: 3 TIMES  A DAY BLOODSUGAR TESTING   UltiCare Alcohol Swabs 70 % PADS   Yes No   Sig: 3 TIMES  A DAY BLOODSUGARTESTING BEFORE AFTER EACH USE   acetaminophen (TYLENOL) 500 mg tablet   Yes No   Sig: Take 1,000 mg by mouth every 6 (six) hours as needed   albuterol (2 5 mg/3 mL) 0 083 % nebulizer solution   No No   Sig: Take 3 mL (2 5 mg total) by nebulization every 6 (six) hours as needed for wheezing or shortness of breath   albuterol (ProAir HFA) 90 mcg/act inhaler   No No   Sig: Inhale 2 puffs every 6 (six) hours as needed for wheezing   atorvastatin (LIPITOR) 40 mg tablet   No No   Sig: Take 1 tablet (40 mg total) by mouth daily   chlorthalidone 25 mg tablet   No No   Sig: Take 0 5 tablets (12 5 mg total) by mouth daily   fluticasone (FLONASE) 50 mcg/act nasal spray   No No   Si spray into each nostril daily   ipratropium (ATROVENT) 0 02 % nebulizer solution   No No   Sig: Take 2 5 mL (0 5 mg total) by nebulization 2 (two) times a day as needed for wheezing or shortness of breath   lidocaine (LIDODERM) 5 %   No No   Sig: Apply 1 patch topically daily Remove & Discard patch within 12 hours or as directed by MD   metoprolol tartrate (LOPRESSOR) 25 mg tablet   No No   Sig: Take 1 tablet (25 mg total) by mouth every 12 (twelve) hours   oxybutynin (DITROPAN-XL) 5 mg 24 hr tablet   No No   Sig: Take 1 tablet (5 mg total) by mouth daily at bedtime   pantoprazole (PROTONIX) 40 mg tablet   No No   Sig: Take 1 tablet (40 mg total) by mouth daily before breakfast      Facility-Administered Medications: None       Past Medical History:   Diagnosis Date   • Herniated cervical disc    • Hypertension        Past Surgical History:   Procedure Laterality Date   •  SECTION     • EYE SURGERY     • SHOULDER SURGERY         Family History   Problem Relation Age of Onset   • No Known Problems Mother    • No Known Problems Father    • No Known Problems Sister    • No Known Problems Daughter    • No Known Problems Maternal Grandmother    • No Known Problems Maternal Grandfather    • No Known Problems Paternal Grandmother    • No Known Problems Paternal Grandfather    • Colon cancer Maternal Aunt    • Brain cancer Maternal Aunt    • No Known Problems Maternal Aunt    • No Known Problems Maternal Aunt    • No Known Problems Maternal Aunt    • No Known Problems Paternal Aunt    • No Known Problems Paternal Aunt      I have reviewed and agree with the history as documented      E-Cigarette/Vaping   • E-Cigarette Use Never User      E-Cigarette/Vaping Substances   • Nicotine No    • THC No    • CBD No    • Flavoring No      Social History     Tobacco Use   • Smoking status: Former     Packs/day: 0 75     Years: 36 00     Pack years: 27 00     Types: Cigarettes     Start date: 12     Quit date: 2022     Years since quittin 8   • Smokeless tobacco: Never   Vaping Use   • Vaping Use: Never used   Substance Use Topics   • Alcohol use: Yes     Comment: rarely   • Drug use: Yes     Types: Marijuana     Comment: makes tea out of marijuana       Review of Systems   Constitutional: Negative for chills and fever  HENT: Negative for congestion, rhinorrhea and sore throat  Respiratory: Positive for shortness of breath (chronic since covid)  Negative for cough  Cardiovascular: Negative for chest pain  Gastrointestinal: Negative for abdominal pain, diarrhea, nausea and vomiting  Musculoskeletal: Positive for arthralgias  Negative for myalgias  Skin: Negative for color change and rash  Neurological: Negative for weakness and numbness  Hematological: Does not bruise/bleed easily  Physical Exam  Physical Exam  Vitals and nursing note reviewed  Constitutional:       General: She is not in acute distress  Appearance: Normal appearance  HENT:      Head: Normocephalic and atraumatic  Nose: Nose normal    Eyes:      General: No scleral icterus  Right eye: No discharge  Left eye: No discharge  Conjunctiva/sclera: Conjunctivae normal    Cardiovascular:      Rate and Rhythm: Normal rate and regular rhythm  Pulmonary:      Effort: Pulmonary effort is normal  No respiratory distress  Abdominal:      General: There is no distension  Palpations: Abdomen is soft  Musculoskeletal:      Cervical back: Neck supple  Legs:       Comments: No tenderness to fibular head or proximal anterior tibia  Good distal pulses and capillary refill  Intact flexion and extension with strength 5/5 at the ankle and knee  Sensation intact  Skin:     General: Skin is warm and dry  Findings: No rash  Neurological:      General: No focal deficit present  Mental Status: She is alert and oriented to person, place, and time  Mental status is at baseline     Psychiatric:         Mood and Affect: Mood normal          Behavior: Behavior normal          Vital Signs  ED Triage Vitals [11/17/22 1135]   Temperature Pulse Respirations Blood Pressure SpO2   98 5 °F (36 9 °C) 79 16 162/93 96 %      Temp Source Heart Rate Source Patient Position - Orthostatic VS BP Location FiO2 (%)   Oral Monitor Sitting Left arm --      Pain Score       --           Vitals:    11/17/22 1135   BP: 162/93   Pulse: 79   Patient Position - Orthostatic VS: Sitting         Visual Acuity      ED Medications  Medications - No data to display    Diagnostic Studies  Results Reviewed     None                 XR ankle 3+ views LEFT   Final Result by Lokesh Rivera MD (11/17 1254)      No acute osseous abnormality  Workstation performed: [id number]         XR foot 3+ views LEFT   Final Result by Lokesh Rivera MD (11/17 1254)      No acute osseous abnormality  Workstation performed: [id number]                    Procedures  Procedures         ED Course                                             MDM  Number of Diagnoses or Management Options  Tendonitis of ankle  Diagnosis management comments: 59-year-old female presenting for evaluation of atraumatic left ankle pain  Differential diagnosis includes tendinitis, fracture, dislocation, sprain, ligamentous injury, avulsion fracture  No evidence of DVT  No evidence of septic arthritis  Ankle is neurovascularly intact  X-ray obtained of the ankle and foot which are negative for acute osseous abnormality  I suspect ligamentous injury or tendinitis from overuse  Patient counseled on supportive measures at home including ice, rest, elevation, and compression  Provided with prescription for Voltaren gel  Referred to Podiatry for further evaluation and management  Also placed referral for physical therapy  Return precautions discussed  Patient is in agreement and understanding of these instructions         Disposition  Final diagnoses:   Tendonitis of ankle     Time reflects when diagnosis was documented in both MDM as applicable and the Disposition within this note Time User Action Codes Description Comment    11/17/2022  1:01 PM Lisa Salmeron Add [M77 50] Tendonitis of ankle       ED Disposition     ED Disposition   Discharge    Condition   Stable    Date/Time   Thu Nov 17, 2022  1:01 PM    Comment   Jyoti Sin discharge to home/self care                 Follow-up Information     Follow up With Specialties Details Why Contact Info    Kaye Lepe DPM Podiatry, Wound Care Schedule an appointment as soon as possible for a visit   [address]  [address] [facility] [address] [facility]            Discharge Medication List as of 11/17/2022  1:03 PM      CONTINUE these medications which have NOT CHANGED    Details   acetaminophen (TYLENOL) 500 mg tablet Take 1,000 mg by mouth every 6 (six) hours as needed, Historical Med      albuterol (2 5 mg/3 mL) 0 083 % nebulizer solution Take 3 mL (2 5 mg total) by nebulization every 6 (six) hours as needed for wheezing or shortness of breath, Starting Tue 3/15/2022, Normal      albuterol (ProAir HFA) 90 mcg/act inhaler Inhale 2 puffs every 6 (six) hours as needed for wheezing, Starting Tue 3/15/2022, Normal      atorvastatin (LIPITOR) 40 mg tablet Take 1 tablet (40 mg total) by mouth daily, Starting Fri 9/9/2022, Normal      Blood Glucose Monitoring Suppl (Contour Next EZ) w/Device KIT 3 TIMES  A DAY BLOODSUGAR TESTING, Historical Med      chlorthalidone 25 mg tablet Take 0 5 tablets (12 5 mg total) by mouth daily, Starting Fri 9/9/2022, Normal      Contour Next Test test strip 3 TIMES  A DAY BLOODSUGAR TESTING, Historical Med      fluticasone (FLONASE) 50 mcg/act nasal spray 1 spray into each nostril daily, Starting Mon 1/17/2022, Normal      ipratropium (ATROVENT) 0 02 % nebulizer solution Take 2 5 mL (0 5 mg total) by nebulization 2 (two) times a day as needed for wheezing or shortness of breath, Starting Tue 3/15/2022, Until Thu 4/14/2022 at 2359, Normal      lidocaine (LIDODERM) 5 % Apply 1 patch topically daily Remove & Discard patch within 12 hours or as directed by MD, Starting Wed 3/16/2022, Normal      metoprolol tartrate (LOPRESSOR) 25 mg tablet Take 1 tablet (25 mg total) by mouth every 12 (twelve) hours, Starting Tue 3/15/2022, Normal      oxybutynin (DITROPAN-XL) 5 mg 24 hr tablet Take 1 tablet (5 mg total) by mouth daily at bedtime, Starting Tue 3/15/2022, Normal      pantoprazole (PROTONIX) 40 mg tablet Take 1 tablet (40 mg total) by mouth daily before breakfast, Starting Wed 3/16/2022, Normal      Pharmacist Choice Lancets MISC 3 TIMES  A DAY BLOODSUGAR TESTING, Historical Med      UltiCare Alcohol Swabs 70 % PADS 3 TIMES  A DAY BLOODSUGARTESTING BEFORE AFTER EACH USE, Historical Med                 PDMP Review       Value Time User    PDMP Reviewed  Yes 3/16/2022 11:11 Tad Denney MD          ED Provider  Electronically Signed by           Jax Hu MD  11/17/22 7876

## 2022-11-21 ENCOUNTER — TELEPHONE (OUTPATIENT)
Dept: FAMILY MEDICINE CLINIC | Facility: CLINIC | Age: 52
End: 2022-11-21

## 2022-12-02 DIAGNOSIS — U07.1 COVID-19 VIRUS INFECTION: ICD-10-CM

## 2022-12-02 RX ORDER — ALBUTEROL SULFATE 90 UG/1
2 AEROSOL, METERED RESPIRATORY (INHALATION) EVERY 6 HOURS PRN
Qty: 8.5 G | Refills: 0 | Status: SHIPPED | OUTPATIENT
Start: 2022-12-02

## 2022-12-14 ENCOUNTER — RA CDI HCC (OUTPATIENT)
Dept: OTHER | Facility: HOSPITAL | Age: 52
End: 2022-12-14

## 2022-12-20 ENCOUNTER — TELEPHONE (OUTPATIENT)
Dept: FAMILY MEDICINE CLINIC | Facility: CLINIC | Age: 52
End: 2022-12-20

## 2022-12-20 NOTE — TELEPHONE ENCOUNTER
12/20/22    Pt called office today returning call / message and stated that she is no longer living in [facility] [place]  Pt also requested no more call from our clinic to schedule appts  PCP has been removed

## 2023-02-06 ENCOUNTER — TELEPHONE (OUTPATIENT)
Dept: PULMONOLOGY | Facility: CLINIC | Age: 53
End: 2023-02-06

## 2023-02-06 NOTE — TELEPHONE ENCOUNTER
Pt lm on refill line needing to speak to someone in [place] office  She states someone was supposed to send her copy of 6mw   Would like to speak with someone in office

## 2023-02-06 NOTE — TELEPHONE ENCOUNTER
I called and spoke with Jay Jay Lowry, she requested I send her a copy of her office visits  I have sent them to her today

## 2023-03-14 ENCOUNTER — PATIENT MESSAGE (OUTPATIENT)
Dept: PULMONOLOGY | Facility: CLINIC | Age: 53
End: 2023-03-14

## 2023-03-16 ENCOUNTER — PATIENT MESSAGE (OUTPATIENT)
Dept: PULMONOLOGY | Facility: CLINIC | Age: 53
End: 2023-03-16

## 2023-10-19 ENCOUNTER — TELEPHONE (OUTPATIENT)
Dept: PULMONOLOGY | Facility: CLINIC | Age: 53
End: 2023-10-19

## 2023-10-19 DIAGNOSIS — J96.11 CHRONIC RESPIRATORY FAILURE WITH HYPOXIA (HCC): Primary | ICD-10-CM

## 2023-10-19 DIAGNOSIS — J98.4 RESTRICTIVE LUNG DISEASE: ICD-10-CM

## 2023-10-19 NOTE — TELEPHONE ENCOUNTER
Dina from [facility] calling for pt. States pt was evicted from her home in [place] and is currently staying with a friend in [place].      Requesting a new script for oxygen concentrator and poc with diagnosis code, and sat levels from last visit. She understands that her last visit was over a year ago, but pt is desperate and would like to see if this is possible to hold her over until her pulmonary appointment in [place] in December 2023.     Please advise.     Dina - [employer] clinical care coordinator   Phone [phone]  Fax [phone]  Email [email or web address] (Preferred)

## 2023-10-19 NOTE — TELEPHONE ENCOUNTER
Order along with walk test from 10/2022 faxed to [facility] jaun leger [phone] and email to [email or web address]